# Patient Record
Sex: FEMALE | Race: BLACK OR AFRICAN AMERICAN | NOT HISPANIC OR LATINO | Employment: OTHER | URBAN - METROPOLITAN AREA
[De-identification: names, ages, dates, MRNs, and addresses within clinical notes are randomized per-mention and may not be internally consistent; named-entity substitution may affect disease eponyms.]

---

## 2017-03-17 ENCOUNTER — TRANSCRIBE ORDERS (OUTPATIENT)
Dept: ADMINISTRATIVE | Facility: HOSPITAL | Age: 77
End: 2017-03-17

## 2017-03-17 DIAGNOSIS — Z12.31 OTHER SCREENING MAMMOGRAM: Primary | ICD-10-CM

## 2017-03-22 ENCOUNTER — HOSPITAL ENCOUNTER (OUTPATIENT)
Dept: MAMMOGRAPHY | Facility: HOSPITAL | Age: 77
Discharge: HOME | End: 2017-03-22
Payer: MEDICARE

## 2017-03-22 DIAGNOSIS — Z12.31 OTHER SCREENING MAMMOGRAM: ICD-10-CM

## 2017-03-22 PROCEDURE — G0202 SCR MAMMO BI INCL CAD: HCPCS

## 2018-04-16 LAB
ABSOL LYMPHOCYTES (HISTORICAL): 2.5 K/UL (ref 0.5–4)
ALBUMIN SERPL BCP-MCNC: 4.2 G/DL (ref 3–5.2)
ALP SERPL-CCNC: 80 U/L (ref 43–122)
ALT SERPL W P-5'-P-CCNC: 20 U/L (ref 9–52)
ANION GAP SERPL CALCULATED.3IONS-SCNC: 13 MMOL/L (ref 5–14)
AST SERPL W P-5'-P-CCNC: 18 U/L (ref 14–36)
BASOPHILS # BLD AUTO: 0.1 K/UL (ref 0–0.1)
BASOPHILS # BLD AUTO: 1 % (ref 0–1)
BILIRUB SERPL-MCNC: 1.3 MG/DL
BUN SERPL-MCNC: 19 MG/DL (ref 5–25)
CALCIUM SERPL-MCNC: 10 MG/DL (ref 8.4–10.2)
CHLORIDE SERPL-SCNC: 103 MEQ/L (ref 97–108)
CHOLEST SERPL-MCNC: 163 MG/DL
CHOLEST/HDLC SERPL: 2.6 {RATIO}
CO2 SERPL-SCNC: 28 MMOL/L (ref 22–30)
COMMENT (HISTORICAL): NORMAL
CREATINE, SERUM (HISTORICAL): 0.8 MG/DL (ref 0.6–1.2)
DEPRECATED RDW RBC AUTO: 14 %
EGFR (HISTORICAL): >60 ML/MIN/1.73 M2
EOSINOPHIL # BLD AUTO: 0 K/UL (ref 0–0.4)
EOSINOPHIL NFR BLD AUTO: 0 % (ref 0–6)
EST. AVERAGE GLUCOSE BLD GHB EST-MCNC: 134 MG/DL
FOLATE SERPL-MCNC: 10.4 NG/ML
GLUCOSE SERPL-MCNC: 109 MG/DL (ref 70–99)
HBA1C MFR BLD HPLC: 6.3 %
HCT VFR BLD AUTO: 44.8 % (ref 36–46)
HDLC SERPL-MCNC: 62 MG/DL
HGB BLD-MCNC: 15.1 G/DL (ref 12–16)
LDL/HDL RATIO (HISTORICAL): 1.3
LDLC SERPL CALC-MCNC: 78 MG/DL
LYMPHOCYTES NFR BLD AUTO: 42 % (ref 25–45)
MCH RBC QN AUTO: 31.2 PG (ref 26–34)
MCHC RBC AUTO-ENTMCNC: 33.8 % (ref 31–36)
MCV RBC AUTO: 92 FL (ref 80–100)
MONOCYTES # BLD AUTO: 0.5 K/UL (ref 0.2–0.9)
MONOCYTES NFR BLD AUTO: 9 % (ref 1–10)
NEUTROPHILS ABS COUNT (HISTORICAL): 2.9 K/UL (ref 1.8–7.8)
NEUTS SEG NFR BLD AUTO: 48 % (ref 45–65)
PLATELET # BLD AUTO: 210 K/MCL (ref 150–450)
POTASSIUM SERPL-SCNC: 4 MEQ/L (ref 3.6–5)
RBC # BLD AUTO: 4.85 M/MCL (ref 4–5.2)
RBC MORPHOLOGY (HISTORICAL): NORMAL
SODIUM SERPL-SCNC: 143 MEQ/L (ref 137–147)
TOTAL PROTEIN (HISTORICAL): 7.5 G/DL (ref 5.9–8.4)
TRIGL SERPL-MCNC: 117 MG/DL
TSH SERPL DL<=0.05 MIU/L-ACNC: 0.59 UIU/ML (ref 0.47–4.68)
VIT B12 SERPL-MCNC: 647 PG/ML (ref 239–931)
VITAMIN D25-HYDROXY (HISTORICAL): 23.3 NG/ML (ref 30–100)
VLDLC SERPL CALC-MCNC: 23 MG/DL (ref 0–40)
WBC # BLD AUTO: 6 K/MCL (ref 4.5–11)

## 2018-04-17 ENCOUNTER — TRANSCRIBE ORDERS (OUTPATIENT)
Dept: ADMINISTRATIVE | Facility: HOSPITAL | Age: 78
End: 2018-04-17

## 2018-04-17 DIAGNOSIS — Z12.31 ENCOUNTER FOR SCREENING MAMMOGRAM FOR MALIGNANT NEOPLASM OF BREAST: Primary | ICD-10-CM

## 2018-04-18 LAB — B BURGDORFERI AB,IGM/WB (HISTORICAL): 0.45

## 2018-04-30 ENCOUNTER — HOSPITAL ENCOUNTER (OUTPATIENT)
Dept: MAMMOGRAPHY | Facility: HOSPITAL | Age: 78
Discharge: HOME | End: 2018-04-30
Payer: MEDICARE

## 2018-04-30 DIAGNOSIS — Z12.31 ENCOUNTER FOR SCREENING MAMMOGRAM FOR MALIGNANT NEOPLASM OF BREAST: ICD-10-CM

## 2018-04-30 PROCEDURE — 77067 SCR MAMMO BI INCL CAD: CPT

## 2018-05-01 ENCOUNTER — TELEPHONE (OUTPATIENT)
Dept: MAMMOGRAPHY | Facility: CLINIC | Age: 78
End: 2018-05-01

## 2018-05-03 ENCOUNTER — TRANSCRIBE ORDERS (OUTPATIENT)
Dept: ADMINISTRATIVE | Facility: HOSPITAL | Age: 78
End: 2018-05-03

## 2018-05-03 DIAGNOSIS — R92.8 ABNORMAL MAMMOGRAM: Primary | ICD-10-CM

## 2018-05-04 ENCOUNTER — HOSPITAL ENCOUNTER (OUTPATIENT)
Dept: ULTRASOUND IMAGING | Facility: CLINIC | Age: 78
Discharge: HOME | End: 2018-05-04
Payer: MEDICARE

## 2018-05-04 ENCOUNTER — HOSPITAL ENCOUNTER (OUTPATIENT)
Dept: MAMMOGRAPHY | Facility: CLINIC | Age: 78
Discharge: HOME | End: 2018-05-04
Payer: MEDICARE

## 2018-05-04 DIAGNOSIS — R92.8 ABNORMAL MAMMOGRAM: ICD-10-CM

## 2018-05-04 DIAGNOSIS — R92.0 MAMMOGRAPHIC MICROCALCIFICATION: ICD-10-CM

## 2018-05-04 PROCEDURE — 76642 ULTRASOUND BREAST LIMITED: CPT

## 2018-05-04 PROCEDURE — 77065 DX MAMMO INCL CAD UNI: CPT

## 2018-05-04 RX ORDER — VALSARTAN AND HYDROCHLOROTHIAZIDE 80; 12.5 MG/1; MG/1
TABLET, FILM COATED ORAL
COMMUNITY
End: 2018-07-24

## 2018-05-04 RX ORDER — VALSARTAN AND HYDROCHLOROTHIAZIDE 80; 12.5 MG/1; MG/1
TABLET, FILM COATED ORAL EVERY 24 HOURS
COMMUNITY
Start: 2017-08-01 | End: 2019-01-07 | Stop reason: SDUPTHER

## 2018-05-07 RX ORDER — ASPIRIN 81 MG/1
81 TABLET ORAL DAILY
COMMUNITY
End: 2020-03-10 | Stop reason: ALTCHOICE

## 2018-05-07 RX ORDER — AMLODIPINE BESYLATE 5 MG/1
5 TABLET ORAL DAILY
COMMUNITY
End: 2019-01-07 | Stop reason: SDUPTHER

## 2018-05-09 ENCOUNTER — HOSPITAL ENCOUNTER (OUTPATIENT)
Dept: ULTRASOUND IMAGING | Facility: CLINIC | Age: 78
Discharge: HOME | End: 2018-05-09
Payer: MEDICARE

## 2018-05-09 ENCOUNTER — HOSPITAL ENCOUNTER (OUTPATIENT)
Dept: ULTRASOUND IMAGING | Facility: CLINIC | Age: 78
Discharge: HOME | End: 2018-05-09
Admitting: RADIOLOGY
Payer: MEDICARE

## 2018-05-09 ENCOUNTER — HOSPITAL ENCOUNTER (OUTPATIENT)
Dept: MAMMOGRAPHY | Facility: CLINIC | Age: 78
Discharge: HOME | End: 2018-05-09
Payer: MEDICARE

## 2018-05-09 ENCOUNTER — HOSPITAL ENCOUNTER (OUTPATIENT)
Dept: MAMMOGRAPHY | Facility: CLINIC | Age: 78
Discharge: HOME | End: 2018-05-09

## 2018-05-09 VITALS — DIASTOLIC BLOOD PRESSURE: 78 MMHG | SYSTOLIC BLOOD PRESSURE: 130 MMHG | HEART RATE: 72 BPM

## 2018-05-09 DIAGNOSIS — R92.0 MAMMOGRAPHIC MICROCALCIFICATION: ICD-10-CM

## 2018-05-09 DIAGNOSIS — R92.8 ABNORMAL FINDINGS ON DIAGNOSTIC IMAGING OF BREAST: ICD-10-CM

## 2018-05-09 DIAGNOSIS — R92.8 OTHER ABNORMAL AND INCONCLUSIVE FINDINGS ON DIAGNOSTIC IMAGING OF BREAST: ICD-10-CM

## 2018-05-09 PROCEDURE — 19081 BX BREAST 1ST LESION STRTCTC: CPT

## 2018-05-09 PROCEDURE — 88342 IMHCHEM/IMCYTCHM 1ST ANTB: CPT | Performed by: PATHOLOGY

## 2018-05-09 PROCEDURE — 88341 IMHCHEM/IMCYTCHM EA ADD ANTB: CPT | Performed by: PATHOLOGY

## 2018-05-09 PROCEDURE — 19083 BX BREAST 1ST LESION US IMAG: CPT

## 2018-05-09 PROCEDURE — 88305 TISSUE EXAM BY PATHOLOGIST: CPT | Performed by: PATHOLOGY

## 2018-05-09 PROCEDURE — 88361 TUMOR IMMUNOHISTOCHEM/COMPUT: CPT | Performed by: PATHOLOGY

## 2018-05-09 RX ORDER — LIDOCAINE HYDROCHLORIDE AND EPINEPHRINE BITARTRATE 20; .01 MG/ML; MG/ML
10 INJECTION, SOLUTION SUBCUTANEOUS ONCE
Status: COMPLETED | OUTPATIENT
Start: 2018-05-09 | End: 2018-05-09

## 2018-05-09 RX ORDER — LIDOCAINE HYDROCHLORIDE 10 MG/ML
5 INJECTION, SOLUTION INFILTRATION; PERINEURAL ONCE
Status: COMPLETED | OUTPATIENT
Start: 2018-05-09 | End: 2018-05-09

## 2018-05-09 RX ORDER — LIDOCAINE HYDROCHLORIDE 10 MG/ML
5 INJECTION, SOLUTION INFILTRATION; PERINEURAL ONCE
Status: DISCONTINUED | OUTPATIENT
Start: 2018-05-09 | End: 2018-05-10 | Stop reason: HOSPADM

## 2018-05-09 RX ADMIN — LIDOCAINE HYDROCHLORIDE AND EPINEPHRINE 10 ML: 20; 10 INJECTION, SOLUTION INFILTRATION; PERINEURAL at 11:32

## 2018-05-09 RX ADMIN — LIDOCAINE HYDROCHLORIDE 5 ML: 10 INJECTION, SOLUTION INFILTRATION; PERINEURAL at 10:34

## 2018-05-09 RX ADMIN — LIDOCAINE HYDROCHLORIDE 5 ML: 10 INJECTION, SOLUTION INFILTRATION; PERINEURAL at 11:32

## 2018-05-09 NOTE — PROGRESS NOTES
Ice pack given:    ___X__yes _____no    Discharge instructions signed by patient:    __X___yes _____no    Discharge instructions given to patient:    __X___yes _____no    Discharged via:    __X___amulatory    _____wheelchair    _____stretcher    Stable on discharge:    __X___yes ____no

## 2018-05-09 NOTE — PROGRESS NOTES
Patient arrived via:    ___X__ambulatory    _____wheelchair    _____stretcher      Domestic violence screen    ___X___negative______positive    Breast Implants:    _______yes _____X___no

## 2018-05-09 NOTE — PROGRESS NOTES
Procedure type:    _____ultrasound guided __x___stereotactic    Breast:    _____Left ___x__Right    Location:    Needle: 8ga Revolve    # of passes: 10    Clip: Tripple twist    Performed by: Dr Matias Park held for 5 minutes by:  Francisca GreenPCA)    Steri Strips:    __X___yes _____no    Band aid:  __X___yes_____no    Tape and guaze:    ___X__yes _____no    Tolerated procedure:    ___X__yes _____no

## 2018-05-09 NOTE — PROGRESS NOTES
Procedure type:    ___X__ultrasound guided _____stereotactic    Breast:    _____Left ___X__Right    Location: 100 5cmfn    Needle: 12ga Joaquin    # of passes: 2    Clip: Wing Ultra clip    Performed by: Dr Poppy Isbell held for 5 minutes by:  Balwinder GreenPCA)    Steri Strips:    ___X__yes _____no    Band aid:  ___X__yes_____no    Tape and guaze:    ___X__yes _____no    Tolerated procedure:    ___X__yes _____no

## 2018-05-10 NOTE — PROGRESS NOTES
Spoke with pt on 05/10/18 at 11:07     Post procedure call completed    Bleeding: _____yes __x___no    Pain: _____yes __x____no    Redness/Swelling: ______yes ___x___no    Band aid removed: ___x__yes _____no    Steri-Strips intact: ___x___yes _____no

## 2018-05-15 ENCOUNTER — TELEPHONE (OUTPATIENT)
Dept: MAMMOGRAPHY | Facility: CLINIC | Age: 78
End: 2018-05-15

## 2018-05-31 LAB
ABSOL LYMPHOCYTES (HISTORICAL): 2.4 K/UL (ref 0.5–4)
ALBUMIN SERPL BCP-MCNC: 4.1 G/DL (ref 3–5.2)
ALP SERPL-CCNC: 77 U/L (ref 43–122)
ALT SERPL W P-5'-P-CCNC: 21 U/L (ref 9–52)
ANION GAP SERPL CALCULATED.3IONS-SCNC: 11 MMOL/L (ref 5–14)
AST SERPL W P-5'-P-CCNC: 15 U/L (ref 14–36)
BASOPHILS # BLD AUTO: 0 % (ref 0–1)
BASOPHILS # BLD AUTO: 0 K/UL (ref 0–0.1)
BILIRUB SERPL-MCNC: 1.9 MG/DL
BUN SERPL-MCNC: 18 MG/DL (ref 5–25)
CALCIUM SERPL-MCNC: 9.6 MG/DL (ref 8.4–10.2)
CHLORIDE SERPL-SCNC: 101 MEQ/L (ref 97–108)
CO2 SERPL-SCNC: 29 MMOL/L (ref 22–30)
CREATINE, SERUM (HISTORICAL): 0.83 MG/DL (ref 0.6–1.2)
DEPRECATED RDW RBC AUTO: 13.6 %
EGFR (HISTORICAL): >60 ML/MIN/1.73 M2
EOSINOPHIL # BLD AUTO: 0 K/UL (ref 0–0.4)
EOSINOPHIL NFR BLD AUTO: 0 % (ref 0–6)
GLUCOSE SERPL-MCNC: 86 MG/DL (ref 70–99)
HCT VFR BLD AUTO: 43.4 % (ref 36–46)
HGB BLD-MCNC: 14.6 G/DL (ref 12–16)
LYMPHOCYTES NFR BLD AUTO: 43 % (ref 25–45)
MCH RBC QN AUTO: 32 PG (ref 26–34)
MCHC RBC AUTO-ENTMCNC: 33.7 % (ref 31–36)
MCV RBC AUTO: 95 FL (ref 80–100)
MONOCYTES # BLD AUTO: 0.5 K/UL (ref 0.2–0.9)
MONOCYTES NFR BLD AUTO: 9 % (ref 1–10)
NEUTROPHILS ABS COUNT (HISTORICAL): 2.6 K/UL (ref 1.8–7.8)
NEUTS SEG NFR BLD AUTO: 48 % (ref 45–65)
PLATELET # BLD AUTO: 198 K/MCL (ref 150–450)
POTASSIUM SERPL-SCNC: 4 MEQ/L (ref 3.6–5)
RBC # BLD AUTO: 4.56 M/MCL (ref 4–5.2)
SODIUM SERPL-SCNC: 141 MEQ/L (ref 137–147)
TOTAL PROTEIN (HISTORICAL): 7.5 G/DL (ref 5.9–8.4)
WBC # BLD AUTO: 5.5 K/MCL (ref 4.5–11)

## 2018-06-21 ENCOUNTER — CONVERSION ENCOUNTER (OUTPATIENT)
Dept: MAMMOGRAPHY | Facility: CLINIC | Age: 78
End: 2018-06-21

## 2018-07-19 PROBLEM — M17.10 OSTEOARTHRITIS OF KNEE: Status: ACTIVE | Noted: 2018-07-19

## 2018-07-19 PROBLEM — S06.5X9A SDH (SUBDURAL HEMATOMA) (HCC): Status: ACTIVE | Noted: 2017-01-31

## 2018-07-19 PROBLEM — Z91.19 NONCOMPLIANCE WITH TREATMENT: Status: ACTIVE | Noted: 2018-01-15

## 2018-07-19 PROBLEM — E04.1 THYROID NODULE: Status: ACTIVE | Noted: 2017-04-11

## 2018-07-19 PROBLEM — S22.49XA FRACTURE OF MULTIPLE RIBS: Status: ACTIVE | Noted: 2017-02-13

## 2018-07-19 PROBLEM — S06.5XAA SDH (SUBDURAL HEMATOMA): Status: ACTIVE | Noted: 2017-01-31

## 2018-07-19 PROBLEM — Z79.01 LONG TERM CURRENT USE OF ANTICOAGULANT THERAPY: Status: ACTIVE | Noted: 2018-04-23

## 2018-07-19 PROBLEM — M32.9 LUPUS (HCC): Status: ACTIVE | Noted: 2018-07-19

## 2018-07-19 PROBLEM — E55.9 VITAMIN D DEFICIENCY: Status: ACTIVE | Noted: 2017-01-27

## 2018-07-19 PROBLEM — R42 VERTIGO: Status: ACTIVE | Noted: 2018-07-19

## 2018-07-19 PROBLEM — M17.9 OSTEOARTHRITIS OF KNEE: Status: ACTIVE | Noted: 2018-07-19

## 2018-07-19 PROBLEM — I25.10 CHRONIC CORONARY ARTERY DISEASE: Status: ACTIVE | Noted: 2018-07-19

## 2018-07-19 PROBLEM — I48.0 PAROXYSMAL ATRIAL FIBRILLATION (HCC): Status: ACTIVE | Noted: 2017-10-10

## 2018-07-19 PROBLEM — Z91.199 NONCOMPLIANCE WITH TREATMENT: Status: ACTIVE | Noted: 2018-01-15

## 2018-07-19 PROBLEM — M19.041 OSTEOARTHRITIS OF FINGER OF RIGHT HAND: Status: ACTIVE | Noted: 2018-01-15

## 2018-07-19 PROBLEM — IMO0002 LUPUS: Status: ACTIVE | Noted: 2018-07-19

## 2018-07-19 PROBLEM — R92.8 ABNORMAL MAMMOGRAM: Status: ACTIVE | Noted: 2018-05-08

## 2018-07-19 PROBLEM — S02.401D: Status: ACTIVE | Noted: 2017-01-31

## 2018-07-19 PROBLEM — D05.11 INTRADUCTAL CARCINOMA IN SITU OF RIGHT BREAST: Status: ACTIVE | Noted: 2018-06-01

## 2018-07-19 PROBLEM — M48.02 CERVICAL SPINAL STENOSIS: Status: ACTIVE | Noted: 2017-07-19

## 2018-07-19 PROBLEM — E66.9 OBESITY: Status: ACTIVE | Noted: 2018-07-19

## 2018-07-19 PROBLEM — R51.9 HEADACHE: Status: ACTIVE | Noted: 2018-07-19

## 2018-07-24 ENCOUNTER — CLINICAL SUPPORT (OUTPATIENT)
Dept: RADIATION ONCOLOGY | Facility: CLINIC | Age: 78
End: 2018-07-24
Payer: MEDICARE

## 2018-07-24 ENCOUNTER — RADIATION ONCOLOGY CONSULT (OUTPATIENT)
Dept: RADIATION ONCOLOGY | Facility: CLINIC | Age: 78
End: 2018-07-24
Attending: RADIOLOGY
Payer: MEDICARE

## 2018-07-24 VITALS
HEIGHT: 63 IN | OXYGEN SATURATION: 95 % | SYSTOLIC BLOOD PRESSURE: 162 MMHG | WEIGHT: 165.4 LBS | RESPIRATION RATE: 2 BRPM | BODY MASS INDEX: 29.3 KG/M2 | TEMPERATURE: 96.4 F | HEART RATE: 60 BPM | DIASTOLIC BLOOD PRESSURE: 80 MMHG

## 2018-07-24 DIAGNOSIS — D05.11 INTRADUCTAL CARCINOMA IN SITU OF RIGHT BREAST: Primary | ICD-10-CM

## 2018-07-24 PROCEDURE — 99215 OFFICE O/P EST HI 40 MIN: CPT | Performed by: RADIOLOGY

## 2018-07-24 RX ORDER — CEFADROXIL 500 MG/1
500 CAPSULE ORAL EVERY 12 HOURS SCHEDULED
COMMUNITY
End: 2018-10-24

## 2018-07-24 NOTE — PROGRESS NOTES
Consultation - Radiation Oncology     [facility]:[id number] : 1940  Encounter: [id number]  Patient Information: Mendel Jourdain      CHIEF COMPLAINT  Chief Complaint   Patient presents with    Consult     radiation oncology     Cancer Staging  Intraductal carcinoma in situ of right breast  Staging form: Breast, AJCC 8th Edition  - Clinical: Stage 0 (cTis (DCIS), cN0, cM0, ER: Positive, NC: Positive) - Signed by Claudeen Bruce, MD on 2018           History of Present Illness   Mendel Jourdain is a 69y o  year old female who presented with abnormal screening mammogram   She has had a previous benign left breast biopsy on 4/3/13  Cyst aspiration left breast on 4/8/10      4/30/18 Screening bilateral mammogram:  Cluster of indeterminate cacilfications in the right retroareolar inner, slightly superior breast   Additional imaging for right breast  Recommended  Stable left breast      18 Diagnostic right breast mammogram and US:  Report lacks description of any significant findings  ACR BI-RADS® Assessments: BiRad:4 - Suspicious (Overall)     18 Right breast stereotactic biopsy  DCIS, grade 2/3  ER/NC positive     18 Dr Betsy Lovell consult  Recommended right partial mastectomy and her daughter to undergo genetic testing  Daughter with history of breast cancer in her 35s     18 Right breast partial mastectomy  Pathology revealed DCIS with calcifications present, grade 2-3/3, closest margin is inferior margin with 0 6mm of clearance     7/10/18 Reexcision of margin  Pathology residual DCIS with 2mm margin     18 Dr Betsy Lovell post-op [facility] oncology referral to discuss antiestrogen therapy and radiation oncology referral for evaluation of partial breast radiation      18 developed right breast redness, pain and had some drainage from incisional area  She called Dr Betsy Lovell on  to report symptoms  No drainage noted , but pain and redness persisted   She was started on Cefadroxil 500 mg BID x 10 days  She was seen by Dr Maulik Shay yesterday - continue antibiotics  Prescription given for Z-christy to begin the day prior to partial breast catheter placement  No drainage today, still with redness  She has some right breast soreness and occasional sharp shooting pains through breast    No fever/chills       Partial breast catheter insertion scheduled for 18 Dr Trice Jackson consult              Historical Information      Intraductal carcinoma in situ of right breast    2018 Biopsy     Right breast stereotactic biopsy x2 sites:   Ductal carcinoma in-situ (DCIS), features suggestive of involvement of underlying intraductal papilloma  Nuclear Grade: II-III (intermediate to high)  % positive  IN 70-75% positive           2018 Initial Diagnosis     Intraductal carcinoma in situ of right breast       2018 Surgery     Right-sided needle localized partial mastectomy with intraoperative ultrasound guidance:  DCIS, grade 2-3/3, calcifications present  Margins - free of malignancy, closest margin is inferior with 0 6 mm of clearance  Posterior margin with 2 1 mm of clearance  Stage 0, pTis (DCIS),pN0,pMX  - Dr Delaney Fried         7/10/2018 Surgery     Re-excision of right breast inferior margin tissue:  - Foci of ductal epithelial hyperplasia are seen ranging from usual ductal hyperplasia to atypical ductal hyperplasia to a few foci having features which are felt to be compatible with ductal carcinoma in situ, primarily solid type, nuclear grade 1-2/3   - None of the foci of atypia or probable ductal carcinoma in situ are seen at or within 2 mm the outer margins of excision, though it is noted that such foci are seen in 3 of 5 slides  - One intraductal papilloma is identified   - Fat necrosis is seen along the inner surface of this reexcision                Past Medical History:   Diagnosis Date    Allergic rhinitis     Arthritis     Atrial fibrillation ([facility] [address] [facility]     [facility]     Colitis     Coronary artery disease     Diabetes mellitus ([facility] )     DJD (degenerative joint disease), cervical     Fibromyalgia     Glaucoma     Hypertension     Osteoarthritis     Sleep apnea     Vertigo      Past Surgical History:   Procedure Laterality Date    BREAST BIOPSY      BREAST LUMPECTOMY      CHOLECYSTECTOMY      COLONOSCOPY  2013    HYSTERECTOMY      TONSILLECTOMY         Family History   Problem Relation Age of Onset    Heart attack Sister     Hypertension Family     Diabetes Family     Breast cancer Daughter 28       Social History   History   Alcohol Use    Yes     Comment: occasional     History   Drug Use No     History   Smoking Status    Never Smoker   Smokeless Tobacco    Never Used     Comment: no smoke exposure         Meds/Allergies     Current Outpatient Prescriptions:     amLODIPine (NORVASC) 5 mg tablet, Take 5 mg by mouth daily, Disp: , Rfl:     apixaban (ELIQUIS) 5 mg, Every 12 hours, Disp: , Rfl:     aspirin (ECOTRIN LOW STRENGTH) 81 mg EC tablet, Take 81 mg by mouth daily, Disp: , Rfl:     cefadroxil (DURICEF) 500 mg capsule, Take 500 mg by mouth every 12 (twelve) hours, Disp: , Rfl:     metFORMIN (GLUCOPHAGE) 500 mg tablet, metformin 500 mg tablet, Disp: , Rfl:     metoprolol tartrate (LOPRESSOR) 25 mg tablet, Take 25 mg by mouth every 12 (twelve) hours, Disp: , Rfl:     valsartan-hydrochlorothiazide (DIOVAN HCT) 80-12 5 MG per tablet, every 24 hours, Disp: , Rfl:   No Known Allergies      Review of Systems  Constitutional: Positive for fatigue  HENT: Negative  Eyes: Positive for visual disturbance (some blurred vision due to cataracts)  Respiratory: Negative  Cardiovascular: Negative  Gastrointestinal: Positive for nausea (occasional)  Bowels vary from constipation to loose stools   Endocrine: Negative  Genitourinary: Negative      Musculoskeletal: Positive for arthralgias (knees (L>R), lower back) and back pain  Skin: Positive for color change (darkening, dryness of skin at right upper outer breast region)  Allergic/Immunologic: Negative  Neurological: Positive for dizziness (sometimes) and headaches (frequency)  Hematological: Negative  Psychiatric/Behavioral: Negative          Screening  Tobacco  Current tobacco user: no  If yes, brief counseling provided: NA     Hypertension  Hypertension screening performed: yes  Normotensive:  no  If no, referred to PCP: yes     Depression Screening  Screened for depression using PHQ-2: yes     Screened for depression using PHQ-9:  no  Screening positive or negative:  negative  If score >4, was any of the following actions taken? Additional evaluation for depression, suicide risk assesment, referral to PCP or psychiatry, medication started:  n/a     Advanced Care Planning for Patients >65 years  Advanced Care Planning Discussed:  yes  Patient named surrogate decision maker or care plan in chart: no     OB/GYN History:  The patient underwent menarche at 16 years  Menopause Status - postmenopausal  No LMP recorded  Patient has had a hysterectomy  Menopause at unknown   Menopause Reason  Hormone replacement therapy: no   Years used n/a   4   Para 3 (1 miscarriage)  Age at first delivery being 14 years  Nursing: not applicable  Birth control pills: no   Years used n/a  Gyncological comment: partial hysterectomy at age 29       OBJECTIVE:   /80   Pulse 60   Temp (!) 96 4 °F (35 8 °C) (Tympanic)   Resp (!) 2   Ht 5' 2 5" (1 588 m)   Wt 75 kg (165 lb 6 4 oz)   SpO2 95%   BMI 29 77 kg/m²   Pain Assessment:  0  Performance Status: ECOG/Zubrod/WHO: 0 - Asymptomatic    Physical Exam   Constitutional: She appears well-developed  HENT:   Head: Normocephalic  Hair is normal    Mouth/Throat: Oropharynx is clear and moist    Eyes: EOM are normal  No scleral icterus  Neck: Neck supple  No spinous process tenderness present   No edema present  Cardiovascular: Normal rate and regular rhythm  Pulmonary/Chest: Effort normal and breath sounds normal  No respiratory distress  She has no wheezes  She exhibits no tenderness  There is no supraclavicular axillary adenopathy palpable  Minimal edema on the right breast   She has a patch of dry flaky skin in the right upper outer quadrant in the region of her incision  No drainage noted  No surrounding erythema  No suspicious lesions palpable in either breast   Abdominal: Soft  Bowel sounds are normal  She exhibits no distension, no ascites and no mass  There is no hepatosplenomegaly  There is no tenderness  There is no rebound  Genitourinary: No breast swelling or tenderness  Musculoskeletal: Normal range of motion  She exhibits no edema or tenderness  Lymphadenopathy:     She has no cervical adenopathy  She has no axillary adenopathy  Neurological: She is alert  She has normal strength  No cranial nerve deficit  Coordination and gait normal    Skin: Skin is intact  Psychiatric: She has a normal mood and affect  Her speech is normal and behavior is normal          RESULTS  Lab Results    Chemistry        Component Value Date/Time     05/31/2018 1338    K 4 0 05/31/2018 1338     05/31/2018 1338    CO2 29 05/31/2018 1338    BUN 18 05/31/2018 1338    CREATININE 0 90 06/29/2016 0733    CREATININE 0 87 05/13/2015 0732        Component Value Date/Time    CALCIUM 9 6 05/31/2018 1338    ALKPHOS 77 05/31/2018 1338    AST 15 05/31/2018 1338    ALT 21 05/31/2018 1338    BILITOT 1 9 (H) 05/31/2018 1338            Lab Results   Component Value Date    WBC 5 5 05/31/2018    HGB 14 6 05/31/2018    HCT 43 4 05/31/2018    MCV 95 05/31/2018     05/31/2018         Imaging Studies  No results found  Pathology:    Breast, Right, Stereotactic biopsy, 2 cores with calcifications:  - Ductal carcinoma in-situ (DCIS)  -- Architectural Patterns: Cribriform and solid  Cancerization of lobules  -- Features suggestive of involvement of underlying intraductal papilloma  -- Nuclear Grade: II-III (intermediate to high)  -- Necrosis: Present, focal and central "comedo" necrosis identified  -- Confirmed by positive e-cadherin, P120 (membranous) and myoepithelial immunostains p63 and        calponin-B     -- DCIS involves at least one submitted core biopsy and two core fragments, max  Dimension= 2 2 mm     -- Estrogen and Progesterone receptor studies pending, to be described in an addendum   - No invasive carcinoma identified  - Microcalcifications: Present, associated with DCIS and benign breast tissue  -- Calcifications are present & concordant with the radiologists description in terms of size & character        (i e  > 100 microns & clustered)  - Best representative tumor block: A2   - Sclerotic fibroepithelial nodule of uncertain significance, favor benign  -- Tissue loss on myoepithelial immunostains prevents further classification  -- Associated microcalcifications  - Benign proliferative and non-proliferative fibrocystic changes with atypia consisting of columnar cell     hyperplasia with flat epithelial atypia, apocrine adenosis and cystic and papillary apocrine metaplasia       B  Breast, Right, stereotactic biopsy, 3 cores without calcifications:  - Ductal carcinoma in-situ (DCIS), predominantly fragments associated with blood clot  -- Architectural Patterns: Cribriform and solid  -- Features consistent with involvement of underlying intraductal papilloma  -- Nuclear Grade: II-III (intermediate and high)  -- Necrosis: Present, central necrosis identified associated with detached calcification  -- Confirmed by positive myoepithelial immunostains p63, p40 ,calponin-B, SMHC        and absence of mosaic epithelial staining on CK 5/6 immunostain      -- DCIS involves one submitted core biopsy and multiple fragments within blood clot, max  dimension= 2 mm  - No invasive carcinoma identified  - Microcalcifications: Present, associated with DCIS and benign breast tissue     - Best representative tumor blocks: B1, B4   - Columnar cell hyperplasia      Varsha Moore MD          Received:            07/10/2018 2148               Specimen:    Breast, Right                                                                              Final Diagnosis   A  Right breast inferior margin tissue:  - Foci of ductal epithelial hyperplasia are seen ranging from usual ductal hyperplasia to atypical ductal hyperplasia to a few foci having features which are felt to be compatible with ductal carcinoma in situ, primarily solid type, nuclear grade 1-2/3   - None of the foci of atypia or probable ductal carcinoma in situ are seen at or within 2 mm the outer margins of excision, though it is noted that such foci are seen in 3 of 5 slides  - One intraductal papilloma is identified   - Fat necrosis is seen along the inner surface of this reexcision       Estrogen Receptor/ER               100%                 Positive  Primary Antibody: SP1  Internal control:positive              Staining Intensity:Strong  External control:positive             Shad Score*: 8     Progesterone Receptor/PgR       70-75%              Positive  Primary Antibody:1E2  Internal control:positive              Staining Intensity: Strong  External control: positive             Shad Score*: 7          ASSESSMENT  1   Intraductal carcinoma in situ of right breast  Radiation Simulation Treatment     Cancer Staging  Intraductal carcinoma in situ of right breast  Staging form: Breast, AJCC 8th Edition  - Clinical: Stage 0 (cTis (DCIS), cN0, cM0, ER: Positive, WA: Positive) - Signed by Lisy Aldana MD on 7/24/2018        PLAN/DISCUSSION  Orders Placed This Encounter   Procedures   Giana Grijalva Treatment          Soni Silverman is a 69y o  year old female with DCIS of the right breast status post breast conservation surgery  She is stage 0 (TIS Nx)  Her tumor is intermediate to high grade with negative margins  ER/NE positive  I reviewed with her that overall her prognosis is very good  She understands the role of radiation is to reduce her risk of local recurrence  We discussed radiation options with either whole breast standard fractionation or hypo fractionated course versus partial breast irradiation  We discussed the procedure involved with both modalities as well as areas irradiated and time frame for therapy  Jacek's  current suitable risk category for PBI include low risk DCIS which includes lesion less than 2 5 cm, low to intermediate grade  We discussed her tumor was intermediate to high grade  She was here today with her daughter as well as a phone conference with her other daughter during the consultation  She has been placed on antibiotics which has stopped her breast drainage  She currently has no signs of infection  She is scheduled to have a MOIRA catheter placed on 8/8/2018  We have scheduled her for CT simulation on 8/10/2018  She is aware that she would begin her radiation treatments on 08/13/18 on a twice a day schedule for 1 week  The possible side effects were reviewed with her  She has signed consent for partial breast irradiation  Yang Hi MD  3/73/1021,15:93 AM      Portions of the record may have been created with voice recognition software   Occasional wrong word or "sound a like" substitutions may have occurred due to the inherent limitations of voice recognition software   Read the chart carefully and recognize, using context, where substitutions have occurred

## 2018-07-24 NOTE — PROGRESS NOTES
Charlie Koehler  1940  Ms Carla Alfredo is a 69 y o  female    Chief Complaint   Patient presents with    Consult     radiation oncology       Cancer Staging  No matching staging information was found for the patient  69 y o  Female seen today for radiation oncology consult for recently diagnosed right breast cancer  Referred by Dr Yaneth Cummings for evaluation for partial breast radiation  Family history of breast cancer in daughter at age 28  Her genetic testing was negative for mutations  Hx of previous benign left breast biopsy on 4/3/13  Cyst aspiration left breast on 4/8/10     4/30/18 Screening bilateral mammogram:  Cluster of indeterminate cacilfications in the right retroareolar inner, slightly superior breast   Additional imaging for right breast  Recommended  Stable left breast     5/4/18 Diagnostic right breast mammogram and US:  Report lacks description of any significant findings  ACR BI-RADS® Assessments: BiRad:4 - Suspicious (Overall)    5/9/18 Right breast stereotactic biopsy  DCIS, 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    5/18/18 Dr Yaneth Cummings consult  Recommended right partial mastectomy and her daughter to undergo genetic testing     6/21/18 Right breast partial mastectomy  Pathology revealed DCIS with calcifications present, grade 2-3/3, closest margin is inferior margin with 0 6mm of clearance    7/10/18 Reexcision of margin  Pathology residual DCIS with 2mm margin    7/18/18 Dr Yaneth Cummings post-op OV  Recommending medical oncology referral to discuss antiestrogen therapy and radiation oncology referral for evaluation of partial breast radiation  7/21/18 developed right breast redness, pain and had some drainage from incisional area  She called Dr Yaneth Cummings on 7/22 to report symptoms  No drainage noted 7/22, but pain and redness persisted  She was started on Cefadroxil 500 mg BID x 10 days  She was seen by Dr Yaneth Cummings yesterday - continue antibiotics   Prescription given for Z-christy to begin the day prior to partial breast catheter placement  No drainage today, still with redness  She has some right breast soreness and occasional sharp shooting pains through breast    No fever/chills  Partial breast catheter insertion scheduled for 8/8/18 8/16/18 Dr Tereza Nassar consult         Intraductal carcinoma in situ of right breast    5/9/2018 Biopsy     Right breast stereotactic biopsy x2 sites:   Ductal carcinoma in-situ (DCIS), features suggestive of involvement of underlying intraductal papilloma  Nuclear Grade: II-III (intermediate to high)  % positive  CA 70-75% positive           6/1/2018 Initial Diagnosis     Intraductal carcinoma in situ of right breast       6/21/2018 Surgery     Right-sided needle localized partial mastectomy with intraoperative ultrasound guidance:  DCIS, grade 2-3/3, calcifications present  Margins - free of malignancy, closest margin is inferior with 0 6 mm of clearance  Posterior margin with 2 1 mm of clearance  Stage 0, pTis (DCIS),pN0,pMX  - Dr Veronica Hall         7/10/2018 Surgery     Re-excision of right breast inferior margin tissue:  - Foci of ductal epithelial hyperplasia are seen ranging from usual ductal hyperplasia to atypical ductal hyperplasia to a few foci having features which are felt to be compatible with ductal carcinoma in situ, primarily solid type, nuclear grade 1-2/3   - None of the foci of atypia or probable ductal carcinoma in situ are seen at or within 2 mm the outer margins of excision, though it is noted that such foci are seen in 3 of 5 slides  - One intraductal papilloma is identified   - Fat necrosis is seen along the inner surface of this reexcision              Clinical Trial: no    Screening  Tobacco  Current tobacco user: no  If yes, brief counseling provided: NA    Hypertension  Hypertension screening performed: yes  Normotensive:  no  If no, referred to PCP: yes    Depression Screening  Screened for depression using PHQ-2: yes    Screened for depression using PHQ-9:  no  Screening positive or negative:  negative  If score >4, was any of the following actions taken? Additional evaluation for depression, suicide risk assesment, referral to PCP or psychiatry, medication started:  n/a    Advanced Care Planning for Patients >65 years  Advanced Care Planning Discussed:  yes  Patient named surrogate decision maker or care plan in chart: no    OB/GYN History:  The patient underwent menarche at 16 years  Menopause Status - postmenopausal  No LMP recorded  Patient has had a hysterectomy  Menopause at unknown   Menopause Reason  Hormone replacement therapy: no   Years used n/a   4   Para 3 (1 miscarriage)  Age at first delivery being 14 years  Nursing: not applicable  Birth control pills: no   Years used n/a  Gyncological comment: partial hysterectomy at age 29        There are no preventive care reminders to display for this patient      Patient Active Problem List   Diagnosis    Abnormal mammogram    Paroxysmal atrial fibrillation (HCC)    Chronic coronary artery disease    Cervical spinal stenosis    Closed fracture of maxilla with routine healing    Conduction disorder of the heart    DDD (degenerative disc disease), cervical    Degeneration of intervertebral disc    Dizziness and giddiness    Headache    Hypokalemia    Fracture of multiple ribs    Impaired fasting glucose    Intraductal carcinoma in situ of right breast    Long term current use of anticoagulant therapy    Latent tuberculosis    Low back pain    Lupus    Noncompliance with treatment    Obesity    Osteoarthritis of finger of right hand    Osteoarthritis of knee    SDH (subdural hematoma) (HCC)    Type 2 diabetes mellitus ([address] )    Thyroid nodule    Vertigo    Vitamin D deficiency    Essential hypertension     Past Medical History:   Diagnosis Date    Allergic rhinitis     Arthritis     Atrial fibrillation ([address] )     [facility]     Colitis     Coronary artery disease     Diabetes mellitus ([facility] )     DJD (degenerative joint disease), cervical     Fibromyalgia     Glaucoma     Hypertension     Osteoarthritis     Sleep apnea     Vertigo      Past Surgical History:   Procedure Laterality Date    BREAST BIOPSY      BREAST LUMPECTOMY      CHOLECYSTECTOMY      COLONOSCOPY  2013    HYSTERECTOMY      TONSILLECTOMY       Family History   Problem Relation Age of Onset    Heart attack Sister     Hypertension Family     Diabetes Family     Breast cancer Daughter 28     Social History     Social History    Marital status: Single     Spouse name: N/A    Number of children: N/A    Years of education: N/A     Occupational History    Not on file  Social History Main Topics    Smoking status: Never Smoker    Smokeless tobacco: Never Used      Comment: no smoke exposure    Alcohol use Yes      Comment: occasional    Drug use: No    Sexual activity: Not on file     Other Topics Concern    Not on file     Social History Narrative    No narrative on file       Current Outpatient Prescriptions:     amLODIPine (NORVASC) 5 mg tablet, Take 5 mg by mouth daily, Disp: , Rfl:     apixaban (ELIQUIS) 5 mg, Every 12 hours, Disp: , Rfl:     aspirin (ECOTRIN LOW STRENGTH) 81 mg EC tablet, Take 81 mg by mouth daily, Disp: , Rfl:     cefadroxil (DURICEF) 500 mg capsule, Take 500 mg by mouth every 12 (twelve) hours, Disp: , Rfl:     metFORMIN (GLUCOPHAGE) 500 mg tablet, metformin 500 mg tablet, Disp: , Rfl:     metoprolol tartrate (LOPRESSOR) 25 mg tablet, Take 25 mg by mouth every 12 (twelve) hours, Disp: , Rfl:     valsartan-hydrochlorothiazide (DIOVAN HCT) 80-12 5 MG per tablet, every 24 hours, Disp: , Rfl:     No Known Allergies    Review of Systems:  Review of Systems   Constitutional: Positive for fatigue  HENT: Negative  Eyes: Positive for visual disturbance (some blurred vision due to cataracts)     Respiratory: Negative  Cardiovascular: Negative  Gastrointestinal: Positive for nausea (occasional)  Bowels vary from constipation to loose stools   Endocrine: Negative  Genitourinary: Negative  Musculoskeletal: Positive for arthralgias (knees (L>R), lower back) and back pain  Skin: Positive for color change (darkening, dryness of skin at right upper outer breast region)  Allergic/Immunologic: Negative  Neurological: Positive for dizziness (sometimes) and headaches (frequency)  Hematological: Negative  Psychiatric/Behavioral: Negative  Vitals:    07/24/18 0951   BP: 162/80   Pulse: 60   Resp: (!) 2   Temp: (!) 96 4 °F (35 8 °C)   TempSrc: Tympanic   SpO2: 95%   Weight: 75 kg (165 lb 6 4 oz)   Height: 5' 2 5" (1 588 m)            Imaging:No results found      Teaching:  NCI RT packet with RT to breast education given

## 2018-08-07 ENCOUNTER — APPOINTMENT (OUTPATIENT)
Dept: RADIATION ONCOLOGY | Facility: CLINIC | Age: 78
End: 2018-08-07
Attending: RADIOLOGY
Payer: MEDICARE

## 2018-08-07 PROCEDURE — 77290 THER RAD SIMULAJ FIELD CPLX: CPT | Performed by: RADIOLOGY

## 2018-08-07 PROCEDURE — 77332 RADIATION TREATMENT AID(S): CPT | Performed by: RADIOLOGY

## 2018-08-13 PROCEDURE — 77334 RADIATION TREATMENT AID(S): CPT | Performed by: RADIOLOGY

## 2018-08-13 PROCEDURE — 77300 RADIATION THERAPY DOSE PLAN: CPT | Performed by: RADIOLOGY

## 2018-08-13 PROCEDURE — 77295 3-D RADIOTHERAPY PLAN: CPT | Performed by: RADIOLOGY

## 2018-08-15 DIAGNOSIS — C50.111 MALIGNANT NEOPLASM OF CENTRAL PORTION OF RIGHT BREAST IN FEMALE, ESTROGEN RECEPTOR POSITIVE (HCC): Primary | ICD-10-CM

## 2018-08-15 DIAGNOSIS — Z17.0 MALIGNANT NEOPLASM OF CENTRAL PORTION OF RIGHT BREAST IN FEMALE, ESTROGEN RECEPTOR POSITIVE (HCC): Primary | ICD-10-CM

## 2018-08-15 PROCEDURE — 77280 THER RAD SIMULAJ FIELD SMPL: CPT | Performed by: RADIOLOGY

## 2018-08-16 ENCOUNTER — OFFICE VISIT (OUTPATIENT)
Dept: HEMATOLOGY ONCOLOGY | Facility: CLINIC | Age: 78
End: 2018-08-16
Payer: MEDICARE

## 2018-08-16 VITALS
BODY MASS INDEX: 28.7 KG/M2 | WEIGHT: 162 LBS | TEMPERATURE: 97.8 F | RESPIRATION RATE: 18 BRPM | HEART RATE: 66 BPM | DIASTOLIC BLOOD PRESSURE: 81 MMHG | HEIGHT: 63 IN | OXYGEN SATURATION: 98 % | SYSTOLIC BLOOD PRESSURE: 132 MMHG

## 2018-08-16 DIAGNOSIS — D05.11 INTRADUCTAL CARCINOMA IN SITU OF RIGHT BREAST: Primary | ICD-10-CM

## 2018-08-16 PROCEDURE — 77412 RADIATION TX DELIVERY LVL 3: CPT | Performed by: RADIOLOGY

## 2018-08-16 PROCEDURE — 99204 OFFICE O/P NEW MOD 45 MIN: CPT | Performed by: INTERNAL MEDICINE

## 2018-08-16 NOTE — LETTER
August 16, 2018     Lady Courtney MD  [address] [place] [address]    Patient: Mckinley Vu   YOB: 1940   Date of Visit: 8/16/2018       Dear Dr Kwasi Hatfield: Thank you for referring Mckinley Vu to me for evaluation  Below are my notes for this consultation  If you have questions, please do not hesitate to call me  I look forward to following your patient along with you  Sincerely,        Farnaz North MD        CC: MD Antolin Hughes MD Bertina Minks, MD  8/16/2018  8:31 AM  Sign at close encounter  Hematology / Oncology Outpatient Consult Note    Mckinley Vu 69 y o  female [id number]         Date:  8/16/2018    Assessment / Plan:  A 60-year-old postmenopausal woman with newly diagnosed ductal carcinoma in situ in the right breast, grade 2-3, % positive, AZ 70% positive  She underwent lumpectomy, resulting in PETE  She presents today with her daughter to discuss adjuvant treatment options  She is starting adjuvant radiation therapy which I agree  We had extensive discussion regarding the diagnosis, non invasive nature of disease, non life-threatening disease, very good prognosis, possible adjuvant hormonal therapy  Since her DCIS is ER positive, adjuvant hormonal therapy with aromatase inhibitor can be considered  However, adjuvant hormonal therapy does not provide survival benefit  Benefit of AI may include a few percentage decrease of local recurrence without affecting overall survival   Side effect of AI include hot flashes, musculoskeletal symptom as well as bone mineral density loss  Overall, risk benefit ratio is not favorable  Therefore, I recommended her to not to take adjuvant hormonal therapy which she and her daughter felt very comfortable  All the patient and her daughter's questions were answered to their satisfaction  She is going to be followed by Dr Kwasi Hatfield            Subjective: HPI:  A 81-year-old postmenopausal woman who was found to have radiographic abnormality in her right breast, based on a screening mammography  Therefore, she initially underwent stereotactic core biopsy from the right breast which showed ductal carcinoma in situ, grade 2-3  % positive, CA 70% positive  She subsequently underwent excisional biopsy by Dr Irene Peña which showed ductal carcinoma in situ, grade 2-3  Surgical margin was only 0 6 mm  Therefore, she underwent reexcision of inferior margin which showed residual ductal carcinoma in situ  Final surgical margin was more than 2 mm  She is going to start adjuvant radiation therapy, today  She presents today to discuss possible adjuvant hormonal therapy  She feels well  She has no complaint of pain  She has no fever, chills or night sweats  She has no recent weight loss  She denied any respiratory symptoms  She has a few past medical history, including hypertension, diabetes as well as atrial fibrillation  She takes apixaban for stroke prevention  She is a lifetime never smoker  Her performance status is normal         Interval History:          Objective:     Primary Diagnosis:    Ductal carcinoma in situ in the right breast, grade 2-3  % positive, CA 70% positive  Diagnosed in July 2018  Cancer Staging:  Cancer Staging  Intraductal carcinoma in situ of right breast  Staging form: Breast, AJCC 8th Edition  - Clinical: Stage 0 (cTis (DCIS), cN0, cM0, ER: Positive, CA: Positive) - Signed by Betty Calderon MD on 7/24/2018        Previous Hematologic/ Oncologic Treatment:         Current Hematologic/ Oncologic Treatment:      Adjuvant radiation therapy to be started, today  Disease Status:     PETE status post lumpectomy  Test Results:    Pathology:    Lumpectomy specimen showed ductal carcinoma in situ, grade 2-3  % positive, CA 70% positive  No evidence of invasive carcinoma    Final surgical margin was more than 2 mm     Radiology:        Laboratory:    See below  Physical Exam:      General Appearance:    Alert, oriented        Eyes:    PERRL   Ears:    Normal external ear canals, both ears   Nose:   Nares normal, septum midline   Throat:   Mucosa moist  Pharynx without injection  Neck:   Supple       Lungs:     Clear to auscultation bilaterally   Chest Wall:    No tenderness or deformity    Heart:    Regular rate and rhythm       Abdomen:     Soft, non-tender, bowel sounds +, no organomegaly           Extremities:   Extremities no cyanosis or edema       Skin:   no rash or icterus  Lymph nodes:   Cervical, supraclavicular, and axillary nodes normal   Neurologic:   CNII-XII intact, normal strength, sensation and reflexes     Throughout          Breast exam: Lumpectomy scar at 12:00 position in the right breast with no palpable abnormality  Left breast exam is negative  ROS: Review of Systems        Imaging: No results found  Labs:   Lab Results   Component Value Date    WBC 5 5 05/31/2018    HGB 14 6 05/31/2018    HCT 43 4 05/31/2018    MCV 95 05/31/2018     05/31/2018     Lab Results   Component Value Date     05/31/2018    K 4 0 05/31/2018     05/31/2018    CO2 29 05/31/2018    ANIONGAP 11 05/31/2018    BUN 18 05/31/2018    CREATININE 0 90 06/29/2016    GLUCOSE 86 05/31/2018    CALCIUM 9 6 05/31/2018    AST 15 05/31/2018    ALT 21 05/31/2018    ALKPHOS 77 05/31/2018    PROT 7 5 05/31/2018    BILITOT 1 9 (H) 05/31/2018    EGFR >60 0 06/29/2016         Lab Results   Component Value Date    GCEBCORG97 647 04/16/2018       Lab Results   Component Value Date    FOLATE 10 4 04/16/2018           Vital Sign:    Body surface area is 1 76 meters squared      Wt Readings from Last 3 Encounters:   08/16/18 73 5 kg (162 lb)   07/24/18 75 kg (165 lb 6 4 oz)        Temp Readings from Last 3 Encounters:   08/16/18 97 8 °F (36 6 °C) (Tympanic)   07/24/18 (!) 96 4 °F (35 8 °C) (Tympanic)        BP Readings from Last 3 Encounters:   08/16/18 132/81   07/24/18 162/80   05/09/18 130/78         Pulse Readings from Last 3 Encounters:   08/16/18 66   07/24/18 60   05/09/18 72     @LASTSAO2(3)@    Active Problems:   Patient Active Problem List   Diagnosis    Abnormal mammogram    Paroxysmal atrial fibrillation (HCC)    Chronic coronary artery disease    Cervical spinal stenosis    Closed fracture of maxilla with routine healing    Conduction disorder of the heart    DDD (degenerative disc disease), cervical    Degeneration of intervertebral disc    Dizziness and giddiness    Headache    Hypokalemia    Fracture of multiple ribs    Impaired fasting glucose    Intraductal carcinoma in situ of right breast    Long term current use of anticoagulant therapy    Latent tuberculosis    Low back pain    Lupus    Noncompliance with treatment    Obesity    Osteoarthritis of finger of right hand    Osteoarthritis of knee    SDH (subdural hematoma) (HCC)    Type 2 diabetes mellitus ([address] )    Thyroid nodule    Vertigo    Vitamin D deficiency    Essential hypertension       Past Medical History:   Past Medical History:   Diagnosis Date    Allergic rhinitis     Arthritis     Atrial fibrillation ([address]     Breast cancer ([address]     Colitis     Coronary artery disease     Diabetes mellitus ([address]     DJD (degenerative joint disease), cervical     Fibromyalgia     Glaucoma     Hypertension     Osteoarthritis     Sleep apnea     Vertigo        Surgical History:   Past Surgical History:   Procedure Laterality Date    BREAST BIOPSY      BREAST LUMPECTOMY      CHOLECYSTECTOMY      COLONOSCOPY  2013    HYSTERECTOMY      TONSILLECTOMY         Family History:    Family History   Problem Relation Age of Onset    Heart attack Sister     Hypertension Family     Diabetes Family     Breast cancer Daughter 28       Cancer-related family history includes Breast cancer (age of onset: 28) in her daughter  Social History:   Social History     Social History    Marital status: Single     Spouse name: N/A    Number of children: N/A    Years of education: N/A     Occupational History    Not on file  Social History Main Topics    Smoking status: Never Smoker    Smokeless tobacco: Never Used      Comment: no smoke exposure    Alcohol use Yes      Comment: occasional    Drug use: No    Sexual activity: Not on file     Other Topics Concern    Not on file     Social History Narrative    No narrative on file       Current Medications:   Current Outpatient Prescriptions   Medication Sig Dispense Refill    amLODIPine (NORVASC) 5 mg tablet Take 5 mg by mouth daily      apixaban (ELIQUIS) 5 mg Every 12 hours      aspirin (ECOTRIN LOW STRENGTH) 81 mg EC tablet Take 81 mg by mouth daily      cefadroxil (DURICEF) 500 mg capsule Take 500 mg by mouth every 12 (twelve) hours      metFORMIN (GLUCOPHAGE) 500 mg tablet metformin 500 mg tablet      metoprolol tartrate (LOPRESSOR) 25 mg tablet Take 25 mg by mouth every 12 (twelve) hours      valsartan-hydrochlorothiazide (DIOVAN HCT) 80-12 5 MG per tablet every 24 hours       No current facility-administered medications for this visit          Allergies: No Known Allergies

## 2018-08-16 NOTE — PROGRESS NOTES
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  She underwent lumpectomy, resulting in PETE  She presents today with her daughter to discuss adjuvant treatment options  She is starting adjuvant radiation therapy which I agree  We had extensive discussion regarding the diagnosis, non invasive nature of disease, non life-threatening disease, very good prognosis, possible adjuvant hormonal therapy  Since her DCIS is ER positive, adjuvant hormonal therapy with aromatase inhibitor can be considered  However, adjuvant hormonal therapy does not provide survival benefit  Benefit of AI may include a few percentage decrease of local recurrence without affecting overall survival   Side effect of AI include hot flashes, musculoskeletal symptom as well as bone mineral density loss  Overall, risk benefit ratio is not favorable  Therefore, I recommended her to not to take adjuvant hormonal therapy which she and her daughter felt very comfortable  All the patient and her daughter's questions were answered to their satisfaction  She is going to be followed by Dr Kwasi Hatfield  Subjective:     HPI:  A 52-year-old postmenopausal woman who was found to have radiographic abnormality in her right breast, based on a screening mammography  Therefore, she initially underwent stereotactic core biopsy from the right breast which showed ductal carcinoma in situ, grade 2-3  % positive, VT 70% positive  She subsequently underwent excisional biopsy by Dr Kwasi Hatfield which showed ductal carcinoma in situ, grade 2-3  Surgical margin was only 0 6 mm  Therefore, she underwent reexcision of inferior margin which showed residual ductal carcinoma in situ    Final surgical margin was more than 2 mm  She is going to start adjuvant radiation therapy, today  She presents today to discuss possible adjuvant hormonal therapy  She feels well  She has no complaint of pain  She has no fever, chills or night sweats  She has no recent weight loss  She denied any respiratory symptoms  She has a few past medical history, including hypertension, diabetes as well as atrial fibrillation  She takes apixaban for stroke prevention  She is a lifetime never smoker  Her performance status is normal         Interval History:          Objective:     Primary Diagnosis:    Ductal carcinoma in situ in the right breast, grade 2-3  % positive, AL 70% positive  Diagnosed in July 2018  Cancer Staging:  Cancer Staging  Intraductal carcinoma in situ of right breast  Staging form: Breast, AJCC 8th Edition  - Clinical: Stage 0 (cTis (DCIS), cN0, cM0, ER: Positive, AL: Positive) - Signed by Dulce Maria Sumner MD on 7/24/2018        Previous Hematologic/ Oncologic Treatment:         Current Hematologic/ Oncologic Treatment:      Adjuvant radiation therapy to be started, today  Disease Status:     PETE status post lumpectomy  Test Results:    Pathology:    Lumpectomy specimen showed ductal carcinoma in situ, grade 2-3  % positive, AL 70% positive  No evidence of invasive carcinoma  Final surgical margin was more than 2 mm  Radiology:        Laboratory:    See below  Physical Exam:      General Appearance:    Alert, oriented        Eyes:    PERRL   Ears:    Normal external ear canals, both ears   Nose:   Nares normal, septum midline   Throat:   Mucosa moist  Pharynx without injection  Neck:   Supple       Lungs:     Clear to auscultation bilaterally   Chest Wall:    No tenderness or deformity    Heart:    Regular rate and rhythm       Abdomen:     Soft, non-tender, bowel sounds +, no organomegaly           Extremities:   Extremities no cyanosis or edema       Skin:   no rash or icterus  Lymph nodes:   Cervical, supraclavicular, and axillary nodes normal   Neurologic:   CNII-XII intact, normal strength, sensation and reflexes     Throughout          Breast exam: Lumpectomy scar at 12:00 position in the right breast with no palpable abnormality  Left breast exam is negative  ROS: Review of Systems        Imaging: No results found  Labs:   Lab Results   Component Value Date    WBC 5 5 05/31/2018    HGB 14 6 05/31/2018    HCT 43 4 05/31/2018    MCV 95 05/31/2018     05/31/2018     Lab Results   Component Value Date     05/31/2018    K 4 0 05/31/2018     05/31/2018    CO2 29 05/31/2018    ANIONGAP 11 05/31/2018    BUN 18 05/31/2018    CREATININE 0 90 06/29/2016    GLUCOSE 86 05/31/2018    CALCIUM 9 6 05/31/2018    AST 15 05/31/2018    ALT 21 05/31/2018    ALKPHOS 77 05/31/2018    PROT 7 5 05/31/2018    BILITOT 1 9 (H) 05/31/2018    EGFR >60 0 06/29/2016         Lab Results   Component Value Date    IRBPGPJL17 647 04/16/2018       Lab Results   Component Value Date    FOLATE 10 4 04/16/2018           Vital Sign:    Body surface area is 1 76 meters squared      Wt Readings from Last 3 Encounters:   08/16/18 73 5 kg (162 lb)   07/24/18 75 kg (165 lb 6 4 oz)        Temp Readings from Last 3 Encounters:   08/16/18 97 8 °F (36 6 °C) (Tympanic)   07/24/18 (!) 96 4 °F (35 8 °C) (Tympanic)        BP Readings from Last 3 Encounters:   08/16/18 132/81   07/24/18 162/80   05/09/18 130/78         Pulse Readings from Last 3 Encounters:   08/16/18 66   07/24/18 60   05/09/18 72     @LASTSAO2(3)@    Active Problems:   Patient Active Problem List   Diagnosis    Abnormal mammogram    Paroxysmal atrial fibrillation (HCC)    Chronic coronary artery disease    Cervical spinal stenosis    Closed fracture of maxilla with routine healing    Conduction disorder of the heart    DDD (degenerative disc disease), cervical    Degeneration of intervertebral disc    Dizziness and giddiness  Headache    Hypokalemia    Fracture of multiple ribs    Impaired fasting glucose    Intraductal carcinoma in situ of right breast    Long term current use of anticoagulant therapy    Latent tuberculosis    Low back pain    Lupus    Noncompliance with treatment    Obesity    Osteoarthritis of finger of right hand    Osteoarthritis of knee    SDH (subdural hematoma) (HCC)    Type 2 diabetes mellitus (HCC)    Thyroid nodule    Vertigo    Vitamin D deficiency    Essential hypertension       Past Medical History:   Past Medical History:   Diagnosis Date    Allergic rhinitis     Arthritis     Atrial fibrillation (HCC)     [facility] )     Colitis     Coronary artery disease     Diabetes mellitus ([facility] )     DJD (degenerative joint disease), cervical     Fibromyalgia     Glaucoma     Hypertension     Osteoarthritis     Sleep apnea     Vertigo        Surgical History:   Past Surgical History:   Procedure Laterality Date    BREAST BIOPSY      BREAST LUMPECTOMY      CHOLECYSTECTOMY      COLONOSCOPY  2013    HYSTERECTOMY      TONSILLECTOMY         Family History:    Family History   Problem Relation Age of Onset    Heart attack Sister     Hypertension Family     Diabetes Family     Breast cancer Daughter 28       Cancer-related family history includes Breast cancer (age of onset: 28) in her daughter  Social History:   Social History     Social History    Marital status: Single     Spouse name: N/A    Number of children: N/A    Years of education: N/A     Occupational History    Not on file       Social History Main Topics    Smoking status: Never Smoker    Smokeless tobacco: Never Used      Comment: no smoke exposure    Alcohol use Yes      Comment: occasional    Drug use: No    Sexual activity: Not on file     Other Topics Concern    Not on file     Social History Narrative    No narrative on file       Current Medications:   Current Outpatient Prescriptions Medication Sig Dispense Refill    amLODIPine (NORVASC) 5 mg tablet Take 5 mg by mouth daily      apixaban (ELIQUIS) 5 mg Every 12 hours      aspirin (ECOTRIN LOW STRENGTH) 81 mg EC tablet Take 81 mg by mouth daily      cefadroxil (DURICEF) 500 mg capsule Take 500 mg by mouth every 12 (twelve) hours      metFORMIN (GLUCOPHAGE) 500 mg tablet metformin 500 mg tablet      metoprolol tartrate (LOPRESSOR) 25 mg tablet Take 25 mg by mouth every 12 (twelve) hours      valsartan-hydrochlorothiazide (DIOVAN HCT) 80-12 5 MG per tablet every 24 hours       No current facility-administered medications for this visit          Allergies: No Known Allergies

## 2018-08-17 PROCEDURE — 77412 RADIATION TX DELIVERY LVL 3: CPT | Performed by: RADIOLOGY

## 2018-08-20 ENCOUNTER — OFFICE VISIT (OUTPATIENT)
Dept: FAMILY MEDICINE CLINIC | Facility: CLINIC | Age: 78
End: 2018-08-20
Payer: MEDICARE

## 2018-08-20 ENCOUNTER — APPOINTMENT (OUTPATIENT)
Dept: LAB | Facility: CLINIC | Age: 78
End: 2018-08-20
Attending: RADIOLOGY
Payer: MEDICARE

## 2018-08-20 VITALS
RESPIRATION RATE: 16 BRPM | WEIGHT: 164 LBS | HEIGHT: 63 IN | BODY MASS INDEX: 29.06 KG/M2 | DIASTOLIC BLOOD PRESSURE: 80 MMHG | TEMPERATURE: 97.5 F | HEART RATE: 64 BPM | SYSTOLIC BLOOD PRESSURE: 114 MMHG | OXYGEN SATURATION: 96 %

## 2018-08-20 DIAGNOSIS — Z17.0 MALIGNANT NEOPLASM OF CENTRAL PORTION OF RIGHT BREAST IN FEMALE, ESTROGEN RECEPTOR POSITIVE (HCC): ICD-10-CM

## 2018-08-20 DIAGNOSIS — C50.111 MALIGNANT NEOPLASM OF CENTRAL PORTION OF RIGHT BREAST IN FEMALE, ESTROGEN RECEPTOR POSITIVE (HCC): ICD-10-CM

## 2018-08-20 DIAGNOSIS — E11.9 TYPE 2 DIABETES MELLITUS WITHOUT COMPLICATION, WITHOUT LONG-TERM CURRENT USE OF INSULIN (HCC): ICD-10-CM

## 2018-08-20 DIAGNOSIS — R73.01 IMPAIRED FASTING GLUCOSE: ICD-10-CM

## 2018-08-20 DIAGNOSIS — I10 ESSENTIAL HYPERTENSION: Primary | ICD-10-CM

## 2018-08-20 DIAGNOSIS — Z01.818 PRE-OPERATIVE CLEARANCE: ICD-10-CM

## 2018-08-20 LAB
ERYTHROCYTE [DISTWIDTH] IN BLOOD BY AUTOMATED COUNT: 14 % (ref 11.6–15.1)
GRANULOCYTES NFR BLD AUTO: 55.1 % (ref 47–80)
GRANULOCYTES NFR BLD: 3.3 THOUSAND/ΜL (ref 1.85–7.82)
HCT VFR BLD AUTO: 44.6 % (ref 34.8–46.1)
HGB BLD-MCNC: 14.4 G/DL (ref 11.5–15.4)
LYMPHOCYTES # BLD AUTO: 2.3 THOUSANDS/ΜL (ref 0.6–4.47)
LYMPHOCYTES NFR BLD AUTO: 39 % (ref 14–44)
MCH RBC QN AUTO: 30.5 PG (ref 26.8–34.3)
MCHC RBC AUTO-ENTMCNC: 32.2 G/DL (ref 31.4–37.4)
MCV RBC AUTO: 95 FL (ref 82–98)
MONOCYTES # BLD AUTO: 0.3 THOUSAND/ΜL (ref 0.17–1.22)
MONOCYTES NFR BLD AUTO: 6 % (ref 4–12)
PLATELET # BLD AUTO: 189 THOUSANDS/UL (ref 149–390)
PMV BLD AUTO: 8.9 FL (ref 8.9–12.7)
RBC # BLD AUTO: 4.71 MILLION/UL (ref 3.81–5.12)
WBC # BLD AUTO: 5.9 THOUSAND/UL (ref 4.31–10.16)
WBC NRBC COR # BLD: 5.9 THOUSAND/UL (ref 4.31–10.16)

## 2018-08-20 PROCEDURE — 36415 COLL VENOUS BLD VENIPUNCTURE: CPT

## 2018-08-20 PROCEDURE — 85025 COMPLETE CBC W/AUTO DIFF WBC: CPT

## 2018-08-20 PROCEDURE — 77412 RADIATION TX DELIVERY LVL 3: CPT | Performed by: RADIOLOGY

## 2018-08-20 PROCEDURE — 99214 OFFICE O/P EST MOD 30 MIN: CPT | Performed by: FAMILY MEDICINE

## 2018-08-20 RX ORDER — AMOXICILLIN 500 MG/1
CAPSULE ORAL 3 TIMES DAILY
COMMUNITY
Start: 2018-08-20 | End: 2019-02-25 | Stop reason: SDUPTHER

## 2018-08-21 PROBLEM — Z01.818 PRE-OPERATIVE CLEARANCE: Status: ACTIVE | Noted: 2018-08-21

## 2018-08-21 PROCEDURE — 77412 RADIATION TX DELIVERY LVL 3: CPT | Performed by: RADIOLOGY

## 2018-08-22 PROCEDURE — 77417 THER RADIOLOGY PORT IMAGE(S): CPT | Performed by: RADIOLOGY

## 2018-08-22 PROCEDURE — 77412 RADIATION TX DELIVERY LVL 3: CPT | Performed by: RADIOLOGY

## 2018-08-22 PROCEDURE — 77336 RADIATION PHYSICS CONSULT: CPT | Performed by: RADIOLOGY

## 2018-08-22 NOTE — PROGRESS NOTES
Assessment/Plan:    Pre-operative clearance  Recommend patient to stop the Eliquis 3 days before the extraction and started the day after the extraction the patient she does not need the antibiotic also patient will hold aspirin 7 days before the procedure prophylactic patient clear for the procedure       Diagnoses and all orders for this visit:    Essential hypertension  -     Comprehensive metabolic panel; Future  -     Hemoglobin A1C; Future  -     Lipid Panel with Direct LDL reflex; Future  -     Microalbumin / creatinine urine ratio; Future  -     TSH, 3rd generation with Free T4 reflex; Future    Impaired fasting glucose  -     Comprehensive metabolic panel; Future  -     Hemoglobin A1C; Future  -     Lipid Panel with Direct LDL reflex; Future  -     Microalbumin / creatinine urine ratio; Future  -     TSH, 3rd generation with Free T4 reflex; Future    Type 2 diabetes mellitus without complication, without long-term current use of insulin (HCC)  -     Comprehensive metabolic panel; Future  -     Hemoglobin A1C; Future  -     Lipid Panel with Direct LDL reflex; Future  -     Microalbumin / creatinine urine ratio; Future  -     TSH, 3rd generation with Free T4 reflex; Future    Pre-operative clearance    Other orders  -     amoxicillin (AMOXIL) 500 mg capsule; 3 (three) times a day          Subjective:   Chief Complaint   Patient presents with    Follow-up      chronic conditions         Patient ID: Adele Jean is a 69 y o  female      Patient here for preop clearance for dental work the patient had proximal AFib she been seen by Cardiology who placed her on Eliquis and the patient she is going to have a tooth extraction next week the she deny any chest pain short of breath no palpitation she is independent and no history of bleeding no history of valvular problem        The following portions of the patient's history were reviewed and updated as appropriate: allergies, current medications, past family history, past medical history, past social history, past surgical history and problem list     Review of Systems   Constitutional: Negative for fatigue and fever  HENT: Negative for ear pain, sinus pain, sinus pressure and sore throat  Eyes: Negative for pain and redness  Respiratory: Negative for cough, chest tightness and shortness of breath  Cardiovascular: Negative for chest pain, palpitations and leg swelling  Gastrointestinal: Negative for abdominal pain, blood in stool, constipation, diarrhea and nausea  Genitourinary: Negative for flank pain, frequency and hematuria  Musculoskeletal: Negative for back pain and joint swelling  Skin: Negative for rash  Neurological: Negative for dizziness, numbness and headaches  Hematological: Does not bruise/bleed easily  Objective:  Vitals:    08/20/18 1435   BP: 114/80   BP Location: Left arm   Patient Position: Sitting   Cuff Size: Standard   Pulse: 64   Resp: 16   Temp: 97 5 °F (36 4 °C)   TempSrc: Oral   SpO2: 96%   Weight: 74 4 kg (164 lb)   Height: 5' 2 75" (1 594 m)      Physical Exam   Constitutional: She is oriented to person, place, and time  She appears well-developed and well-nourished  HENT:   Head: Normocephalic  Right Ear: External ear normal    Left Ear: External ear normal    Eyes: Conjunctivae and EOM are normal  Right eye exhibits no discharge  Left eye exhibits no discharge  Neck: No JVD present  Cardiovascular: Normal rate, regular rhythm and normal heart sounds  Exam reveals no gallop  No murmur heard  Pulmonary/Chest: Effort normal  No respiratory distress  She has no wheezes  She has no rales  She exhibits no tenderness  Abdominal: She exhibits no mass  There is no tenderness  There is no rebound  Musculoskeletal: She exhibits no edema or tenderness  Neurological: She is alert and oriented to person, place, and time  Skin: No rash noted  No erythema

## 2018-08-22 NOTE — ASSESSMENT & PLAN NOTE
Recommend patient to stop the Eliquis 3 days before the extraction and started the day after the extraction the patient she does not need the antibiotic also patient will hold aspirin 7 days before the procedure prophylactic patient clear for the procedure

## 2018-08-23 PROCEDURE — 77412 RADIATION TX DELIVERY LVL 3: CPT | Performed by: RADIOLOGY

## 2018-08-24 PROCEDURE — 77412 RADIATION TX DELIVERY LVL 3: CPT | Performed by: RADIOLOGY

## 2018-08-27 DIAGNOSIS — I10 ESSENTIAL HYPERTENSION: ICD-10-CM

## 2018-08-27 DIAGNOSIS — I48.0 PAF (PAROXYSMAL ATRIAL FIBRILLATION) (HCC): Primary | ICD-10-CM

## 2018-08-27 PROCEDURE — 77412 RADIATION TX DELIVERY LVL 3: CPT | Performed by: RADIOLOGY

## 2018-08-28 ENCOUNTER — APPOINTMENT (OUTPATIENT)
Dept: LAB | Facility: HOSPITAL | Age: 78
End: 2018-08-28
Payer: MEDICARE

## 2018-08-28 DIAGNOSIS — E11.9 TYPE 2 DIABETES MELLITUS WITHOUT COMPLICATION, WITHOUT LONG-TERM CURRENT USE OF INSULIN (HCC): ICD-10-CM

## 2018-08-28 DIAGNOSIS — R73.01 IMPAIRED FASTING GLUCOSE: ICD-10-CM

## 2018-08-28 DIAGNOSIS — I10 ESSENTIAL HYPERTENSION: ICD-10-CM

## 2018-08-28 LAB
ALBUMIN SERPL BCP-MCNC: 4.1 G/DL (ref 3–5.2)
ALP SERPL-CCNC: 75 U/L (ref 43–122)
ALT SERPL W P-5'-P-CCNC: 25 U/L (ref 9–52)
ANION GAP SERPL CALCULATED.3IONS-SCNC: 8 MMOL/L (ref 5–14)
AST SERPL W P-5'-P-CCNC: 18 U/L (ref 14–36)
BILIRUB SERPL-MCNC: 1.4 MG/DL
BUN SERPL-MCNC: 18 MG/DL (ref 5–25)
CALCIUM SERPL-MCNC: 9.4 MG/DL (ref 8.4–10.2)
CHLORIDE SERPL-SCNC: 104 MMOL/L (ref 97–108)
CHOLEST SERPL-MCNC: 149 MG/DL
CO2 SERPL-SCNC: 28 MMOL/L (ref 22–30)
CREAT SERPL-MCNC: 0.76 MG/DL (ref 0.6–1.2)
CREAT UR-MCNC: 126 MG/DL
EST. AVERAGE GLUCOSE BLD GHB EST-MCNC: 131 MG/DL
GFR SERPL CREATININE-BSD FRML MDRD: 88 ML/MIN/1.73SQ M
GLUCOSE P FAST SERPL-MCNC: 88 MG/DL (ref 70–99)
HBA1C MFR BLD: 6.2 % (ref 4.2–6.3)
HDLC SERPL-MCNC: 48 MG/DL (ref 40–59)
LDLC SERPL CALC-MCNC: 69 MG/DL
MICROALBUMIN UR-MCNC: 11.1 MG/L (ref 0–20)
MICROALBUMIN/CREAT 24H UR: 9 MG/G CREATININE (ref 0–30)
POTASSIUM SERPL-SCNC: 3.6 MMOL/L (ref 3.6–5)
PROT SERPL-MCNC: 8 G/DL (ref 5.9–8.4)
SODIUM SERPL-SCNC: 140 MMOL/L (ref 137–147)
TRIGL SERPL-MCNC: 162 MG/DL
TSH SERPL DL<=0.05 MIU/L-ACNC: 0.79 UIU/ML (ref 0.47–4.68)

## 2018-08-28 PROCEDURE — 36415 COLL VENOUS BLD VENIPUNCTURE: CPT

## 2018-08-28 PROCEDURE — 77412 RADIATION TX DELIVERY LVL 3: CPT | Performed by: RADIOLOGY

## 2018-08-28 PROCEDURE — 82043 UR ALBUMIN QUANTITATIVE: CPT

## 2018-08-28 PROCEDURE — 80053 COMPREHEN METABOLIC PANEL: CPT

## 2018-08-28 PROCEDURE — 84443 ASSAY THYROID STIM HORMONE: CPT

## 2018-08-28 PROCEDURE — 80061 LIPID PANEL: CPT

## 2018-08-28 PROCEDURE — 82570 ASSAY OF URINE CREATININE: CPT

## 2018-08-28 PROCEDURE — 83036 HEMOGLOBIN GLYCOSYLATED A1C: CPT

## 2018-08-29 PROCEDURE — 77336 RADIATION PHYSICS CONSULT: CPT | Performed by: RADIOLOGY

## 2018-08-29 PROCEDURE — 77417 THER RADIOLOGY PORT IMAGE(S): CPT | Performed by: RADIOLOGY

## 2018-08-29 PROCEDURE — 77412 RADIATION TX DELIVERY LVL 3: CPT | Performed by: RADIOLOGY

## 2018-08-30 PROCEDURE — 77412 RADIATION TX DELIVERY LVL 3: CPT | Performed by: RADIOLOGY

## 2018-08-31 PROCEDURE — 77412 RADIATION TX DELIVERY LVL 3: CPT | Performed by: RADIOLOGY

## 2018-09-04 ENCOUNTER — RADIATION THERAPY TREATMENT (OUTPATIENT)
Dept: RADIATION ONCOLOGY | Facility: CLINIC | Age: 78
End: 2018-09-04
Attending: RADIOLOGY
Payer: MEDICARE

## 2018-09-04 PROCEDURE — 77412 RADIATION TX DELIVERY LVL 3: CPT | Performed by: RADIOLOGY

## 2018-09-05 PROCEDURE — 77412 RADIATION TX DELIVERY LVL 3: CPT | Performed by: RADIOLOGY

## 2018-09-05 PROCEDURE — 77334 RADIATION TREATMENT AID(S): CPT | Performed by: RADIOLOGY

## 2018-09-05 PROCEDURE — 77280 THER RAD SIMULAJ FIELD SMPL: CPT | Performed by: RADIOLOGY

## 2018-09-05 PROCEDURE — 77307 TELETHX ISODOSE PLAN CPLX: CPT | Performed by: RADIOLOGY

## 2018-09-06 PROCEDURE — 77336 RADIATION PHYSICS CONSULT: CPT | Performed by: RADIOLOGY

## 2018-09-06 PROCEDURE — 77412 RADIATION TX DELIVERY LVL 3: CPT | Performed by: RADIOLOGY

## 2018-09-06 PROCEDURE — 77417 THER RADIOLOGY PORT IMAGE(S): CPT | Performed by: RADIOLOGY

## 2018-09-07 PROCEDURE — 77412 RADIATION TX DELIVERY LVL 3: CPT | Performed by: RADIOLOGY

## 2018-09-10 PROCEDURE — 77412 RADIATION TX DELIVERY LVL 3: CPT | Performed by: RADIOLOGY

## 2018-09-10 PROCEDURE — 77331 SPECIAL RADIATION DOSIMETRY: CPT | Performed by: RADIOLOGY

## 2018-09-11 PROCEDURE — 77412 RADIATION TX DELIVERY LVL 3: CPT | Performed by: RADIOLOGY

## 2018-09-12 PROCEDURE — 77412 RADIATION TX DELIVERY LVL 3: CPT | Performed by: RADIOLOGY

## 2018-09-13 PROCEDURE — 77412 RADIATION TX DELIVERY LVL 3: CPT | Performed by: RADIOLOGY

## 2018-09-13 PROCEDURE — 77336 RADIATION PHYSICS CONSULT: CPT | Performed by: RADIOLOGY

## 2018-09-14 PROCEDURE — 77412 RADIATION TX DELIVERY LVL 3: CPT | Performed by: RADIOLOGY

## 2018-10-02 DIAGNOSIS — I48.91 ATRIAL FIBRILLATION, UNSPECIFIED TYPE (HCC): Primary | ICD-10-CM

## 2018-10-11 ENCOUNTER — OFFICE VISIT (OUTPATIENT)
Dept: FAMILY MEDICINE CLINIC | Facility: CLINIC | Age: 78
End: 2018-10-11
Payer: MEDICARE

## 2018-10-11 VITALS
BODY MASS INDEX: 30 KG/M2 | SYSTOLIC BLOOD PRESSURE: 130 MMHG | HEART RATE: 63 BPM | OXYGEN SATURATION: 98 % | WEIGHT: 163 LBS | TEMPERATURE: 97.8 F | DIASTOLIC BLOOD PRESSURE: 80 MMHG | HEIGHT: 62 IN

## 2018-10-11 DIAGNOSIS — I10 ESSENTIAL HYPERTENSION: ICD-10-CM

## 2018-10-11 DIAGNOSIS — Z00.01 ENCOUNTER FOR GENERAL ADULT MEDICAL EXAMINATION WITH ABNORMAL FINDINGS: Primary | ICD-10-CM

## 2018-10-11 DIAGNOSIS — Z23 NEED FOR INFLUENZA VACCINATION: ICD-10-CM

## 2018-10-11 DIAGNOSIS — I48.0 PAROXYSMAL ATRIAL FIBRILLATION (HCC): ICD-10-CM

## 2018-10-11 DIAGNOSIS — Z11.59 NEED FOR HEPATITIS C SCREENING TEST: ICD-10-CM

## 2018-10-11 DIAGNOSIS — Z11.4 ENCOUNTER FOR SCREENING FOR HIV: ICD-10-CM

## 2018-10-11 DIAGNOSIS — E55.9 VITAMIN D DEFICIENCY: ICD-10-CM

## 2018-10-11 DIAGNOSIS — E11.9 TYPE 2 DIABETES MELLITUS WITHOUT COMPLICATION, WITHOUT LONG-TERM CURRENT USE OF INSULIN (HCC): ICD-10-CM

## 2018-10-11 DIAGNOSIS — Z79.01 LONG TERM CURRENT USE OF ANTICOAGULANT THERAPY: ICD-10-CM

## 2018-10-11 PROCEDURE — 90662 IIV NO PRSV INCREASED AG IM: CPT

## 2018-10-11 PROCEDURE — G0439 PPPS, SUBSEQ VISIT: HCPCS

## 2018-10-11 PROCEDURE — 99214 OFFICE O/P EST MOD 30 MIN: CPT

## 2018-10-11 PROCEDURE — G0008 ADMIN INFLUENZA VIRUS VAC: HCPCS

## 2018-10-11 NOTE — ASSESSMENT & PLAN NOTE
Chronic asymptomatic patient on vitamin-D supplement encouraged patient to continue diet which was vitamin-D

## 2018-10-11 NOTE — PROGRESS NOTES
Assessment and Plan:    Problem List Items Addressed This Visit     Paroxysmal atrial fibrillation (HCC)     Chronic asymptomatic rate is controlled on metoprolol tartrate 25 mg twice a day patient already on a Eliquis 5 mg twice a day patient does followed by Cardiology         Relevant Orders    CBC and differential    Comprehensive metabolic panel    Hemoglobin A1C    Lipid panel    Microalbumin / creatinine urine ratio    TSH baseline    Vitamin D 25 hydroxy    Long term current use of anticoagulant therapy     Patient on Eliquis secondary to AFib         Relevant Orders    CBC and differential    Comprehensive metabolic panel    Hemoglobin A1C    Lipid panel    Microalbumin / creatinine urine ratio    TSH baseline    Vitamin D 25 hydroxy    Type 2 diabetes mellitus (HCC)     Lab Results   Component Value Date    HGBA1C 6 2 08/28/2018     Chronic fair control on metformin 500 twice a day patient already on Arb and she is already on statin we encouraged patient to continue with the low carb diet and important lose weight           Relevant Orders    CBC and differential    Comprehensive metabolic panel    Hemoglobin A1C    Lipid panel    Microalbumin / creatinine urine ratio    TSH baseline    Vitamin D 25 hydroxy    Vitamin D deficiency     Chronic asymptomatic patient on vitamin-D supplement encouraged patient to continue diet which was vitamin-D         Relevant Orders    CBC and differential    Comprehensive metabolic panel    Hemoglobin A1C    Lipid panel    Microalbumin / creatinine urine ratio    TSH baseline    Vitamin D 25 hydroxy    Essential hypertension     Chronic well controlled continue current medication low-salt diet less than 2 g a day ,   low caffeine intake   regular aerobic exercise 20 to totally minute a day diet and important lose weight discussed with the patient           Relevant Orders    CBC and differential    Comprehensive metabolic panel    Hemoglobin A1C    Lipid panel Microalbumin / creatinine urine ratio    TSH baseline    Vitamin D 25 hydroxy    BMI 29 0-29 9,adult     Lifestyle modification including increased activity 30 min a day 5 days a week and healthy diet discussed with the patient           Other Visit Diagnoses     Encounter for general adult medical examination with abnormal findings    -  Primary    Increased activity healthy diet will hydration fall precaution discussed with the patient also we discussed Ms shearer shin appropriate for her age    Need for influenza vaccination        Relevant Orders    influenza vaccine, 2984-6064, high-dose, PF 0 5 mL, for patients 65 yr+ (FLUZONE HIGH-DOSE) (Completed)    Need for hepatitis C screening test        Relevant Orders    Hepatitis C antibody    Encounter for screening for HIV        Relevant Orders    HIV 1/2 AG-AB combo        Health Maintenance Due   Topic Date Due    Diabetic Foot Exam  12/21/1950    DM Eye Exam  12/21/1950    DTaP,Tdap,and Td Vaccines (1 - Tdap) 12/21/1962    Urinary Incontinence Screening  12/21/2006    INFLUENZA VACCINE  07/01/2018         HPI:  Richard Villegas is a 69 y o  female here for her Subsequent Wellness Visit      Patient Active Problem List   Diagnosis    Abnormal mammogram    Paroxysmal atrial fibrillation (HCC)    Chronic coronary artery disease    Cervical spinal stenosis    Closed fracture of maxilla with routine healing    Conduction disorder of the heart    DDD (degenerative disc disease), cervical    Degeneration of intervertebral disc    Dizziness and giddiness    Headache    Hypokalemia    Fracture of multiple ribs    Intraductal carcinoma in situ of right breast    Long term current use of anticoagulant therapy    Latent tuberculosis    Low back pain    Lupus    Noncompliance with treatment    Obesity    Osteoarthritis of finger of right hand    Osteoarthritis of knee    SDH (subdural hematoma) (HCC)    Type 2 diabetes mellitus ([address] )    Thyroid nodule  Vertigo    Vitamin D deficiency    Essential hypertension    Pre-operative clearance    BMI 29 0-29 9,adult     Past Medical History:   Diagnosis Date    Allergic rhinitis     Arthritis     Atrial fibrillation (HCC)     [facility] )     Colitis     Coronary artery disease     Diabetes mellitus ([facility] )     DJD (degenerative joint disease), cervical     Fibromyalgia     Glaucoma     Hypertension     Osteoarthritis     Sleep apnea     Vertigo      Past Surgical History:   Procedure Laterality Date    BREAST BIOPSY      BREAST LUMPECTOMY      CHOLECYSTECTOMY      COLONOSCOPY  2013    HYSTERECTOMY      TONSILLECTOMY       Family History   Problem Relation Age of Onset    Heart attack Sister     Hypertension Family     Diabetes Family     Breast cancer Daughter 28     History   Smoking Status    Never Smoker   Smokeless Tobacco    Never Used     Comment: no smoke exposure     History   Alcohol Use    Yes     Comment: occasional      History   Drug Use No       Current Outpatient Prescriptions   Medication Sig Dispense Refill    amLODIPine (NORVASC) 5 mg tablet Take 5 mg by mouth daily      amoxicillin (AMOXIL) 500 mg capsule 3 (three) times a day      apixaban (ELIQUIS) 5 mg Take 1 tablet (5 mg total) by mouth 2 (two) times a day 60 tablet 2    aspirin (ECOTRIN LOW STRENGTH) 81 mg EC tablet Take 81 mg by mouth daily      cefadroxil (DURICEF) 500 mg capsule Take 500 mg by mouth every 12 (twelve) hours      metFORMIN (GLUCOPHAGE) 500 mg tablet metformin 500 mg tablet      metoprolol tartrate (LOPRESSOR) 25 mg tablet Take 1 tablet (25 mg total) by mouth every 12 (twelve) hours 60 tablet 5    valsartan-hydrochlorothiazide (DIOVAN HCT) 80-12 5 MG per tablet every 24 hours       No current facility-administered medications for this visit        No Known Allergies  Immunization History   Administered Date(s) Administered    Influenza 11/07/2015, 01/27/2017, 10/09/2017    Influenza Split 10/25/2013    Influenza Split High Dose Preservative Free IM 01/27/2017, 10/09/2017    Influenza TIV (IM) 12/01/2014    Influenza, high dose seasonal 0 5 mL 10/11/2018    Pneumococcal Conjugate 13-Valent 01/27/2017    Pneumococcal Polysaccharide PPV23 08/14/2017    Zoster 09/12/2017, 09/13/2017       Patient Care Team:  Christiano Ambrose MD as PCP - Meryle Lei, MD (Radiation Oncology)  Marilu Painting MD (Cardiology)  Valentine Larios PA-C as Physician Assistant (Neurology)  Olga Patel MD (Breast Surgery)    Medicare Screening Tests and Risk Assessments:  Tal Little is here for her Subsequent Wellness visit  Last Medicare Wellness visit information reviewed, patient interviewed and updates made to the record today  Health Risk Assessment:  Patient rates overall health as good  Patient feels that their physical health rating is Same  Eyesight was rated as Same  Hearing was rated as Slightly worse  Patient feels that their emotional and mental health rating is Same  Pain experienced by patient in the last 7 days has been A lot  Patient's pain rating has been 9/10  Patient states that she has experienced no weight loss or gain in last 6 months  Emotional/Mental Health:  Patient has been feeling nervous/anxious  PHQ-9 Depression Screening:    Frequency of the following problems over the past two weeks:      1  Little interest or pleasure in doing things: 0 - not at all      2  Feeling down, depressed, or hopeless: 0 - not at all  PHQ-2 Score: 0          Broken Bones/Falls: Fall Risk Assessment:    In the past year, patient has experienced: No history of falling in past year          Bladder/Bowel:  Patient has not leaked urine accidently in the last six months  Patient reports no loss of bowel control  Immunizations:  Patient has had a flu vaccination within the last year  Patient has received a pneumonia shot  Patient has received a shingles shot    Patient has received tetanus/diphtheria shot  Home Safety:  Patient does not have trouble with stairs inside or outside of their home  Patient currently reports that there are no safety hazards present in home, working smoke alarms, working carbon monoxide detectors  Preventative Screenings:   Breast cancer screening performed, colon cancer screen completed, cholesterol screen completed, glaucoma eye exam completed,     Nutrition:  Current diet: Regular with servings of the following:    Medications:  Patient is currently taking over-the-counter supplements  List of OTC medications includes: ibuprophen, 81 asp, vitamin d    Lifestyle Choices:  Patient reports no tobacco use  Patient reports alcohol use  Alcohol use per week: 2 weekly  Patient does not drive a vehicle  Patient wears seat belt  Current level of exercise of physical activity described by patient as: none  Activities of Daily Living:  Can get out of bed by his or her self, able to dress self, able to make own meals, able to do own shopping, able to bathe self, can do own laundry/housekeeping, can manage own money, pay bills and track expenses    Previous Hospitalizations:  Hospitalization or ED visit in past 12 months  Number of hospitalizations within the last year: 1-2        Advanced Directives:  Patient has not decided on power of   Patient has not completed advanced directive          Preventative Screening/Counseling:      Cardiovascular:      General: Risks and Benefits Discussed and Screening Current      Counseling: Healthy Diet and Healthy Weight          Diabetes:      General: Risks and Benefits Discussed and Screening Current      Counseling: Healthy Diet and Healthy Weight          Colorectal Cancer:      General: Risks and Benefits Discussed and Screening Current      Comments: Colonoscopy was done January 2018 the no colonoscopy recall as screening        Breast Cancer:      General: Risks and Benefits Discussed Comments: Patient recently diagnosed with breast cancer and she is followed by specialist status post radiation treatment        Cervical Cancer:      General: Risks and Benefits Discussed, Screening Not Indicated and Patient Declines          Osteoporosis:      General: Risks and Benefits Discussed and Screening Current      Comments: Patient is up-to-date with her DEXA scan was done in 2017 April        AAA:      General: Risks and Benefits Discussed and Screening Current          Glaucoma:      General: Risks and Benefits Discussed and Screening Current      Comments: Patient does followed by Ophthalmology in regular basis        HIV:      General: Risks and Benefits Discussed      Due for labs: Rapid Test          Hepatitis C:      General: Risks and Benefits Discussed      Counseling: has received general HCV counseling        Advanced Directives:   Patient has no living will for healthcare, does not have durable POA for healthcare, 5 wishes given  Additional Comments: Five wishes booklet is given to the patient recommend to bring it next visit  Other Preventative Counseling (Non-Medicare):   Fall Prevention, Nutrition Counseling, Skin self-exam and Sunscreen use

## 2018-10-11 NOTE — PATIENT INSTRUCTIONS
10% - bad control"> 10% - bad control,Hemoglobin A1c (HbA1c) greater than 10% indicating poor diabetic control,Haemoglobin A1c greater than 10% indicating poor diabetic control">   Diabetes Mellitus Type 2 in Adults, Ambulatory Care   GENERAL INFORMATION:   Diabetes mellitus type 2  is a disease that affects how your body uses glucose (sugar)  Insulin helps move sugar out of the blood so it can be used for energy  Normally, when the blood sugar level increases, the pancreas makes more insulin  Type 2 diabetes develops because either the body cannot make enough insulin, or it cannot use the insulin correctly  After many years, your pancreas may stop making insulin  Common symptoms include the following:   · More hunger or thirst than usual     · Frequent urination     · Weight loss without trying     · Blurred vision  Seek immediate care for the following symptoms:   · Severe abdominal pain, or pain that spreads to your back  You may also be vomiting  · Trouble staying awake or focusing    · Shaking or sweating    · Blurred or double vision    · Breath has a fruity, sweet smell    · Breathing is deep and labored, or rapid and shallow    · Heartbeat is fast and weak  Treatment for diabetes mellitus type 2  includes keeping your blood sugar at a normal level  You must eat the right foods, and exercise regularly  You may also need medicine if you cannot control your blood sugar level with nutrition and exercise  Manage diabetes mellitus type 2:   · Check your blood sugar level  You will be taught how to check a small drop of blood in a glucose monitor  Ask your healthcare provider when and how often to check during the day  Ask your healthcare provider what your blood sugar levels should be when you check them  · Keep track of carbohydrates (sugar and starchy foods)  Your blood sugar level can get too high if you eat too many carbohydrates   Your dietitian will help you plan meals and snacks that have the right amount of carbohydrates  · Eat low-fat foods  Some examples are skinless chicken and low-fat milk  · Eat less sodium (salt)  Some examples of high-sodium foods to limit are soy sauce, potato chips, and soup  Do not add salt to food you cook  Limit your use of table salt  · Eat high-fiber foods  Foods that are a good source of fiber include vegetables, whole grain bread, and beans  · Limit alcohol  Alcohol affects your blood sugar level and can make it harder to manage your diabetes  Women should limit alcohol to 1 drink a day  Men should limit alcohol to 2 drinks a day  A drink of alcohol is 12 ounces of beer, 5 ounces of wine, or 1½ ounces of liquor  · Get regular exercise  Exercise can help keep your blood sugar level steady, decrease your risk of heart disease, and help you lose weight  Exercise for at least 30 minutes, 5 days a week  Include muscle strengthening activities 2 days each week  Work with your healthcare provider to create an exercise plan  · Check your feet each day  for injuries or open sores  Ask your healthcare provider for activities you can do if you have an open sore  · Quit smoking  If you smoke, it is never too late to quit  Smoking can worsen the problems that may occur with diabetes  Ask your healthcare provider for information about how to stop smoking if you are having trouble quitting  · Ask about your weight:  Ask healthcare providers if you need to lose weight, and how much to lose  Ask them to help you with a weight loss program  Even a 10 to 15 pound weight loss can help you manage your blood sugar level  · Carry medical alert identification  Wear medical alert jewelry or carry a card that says you have diabetes  Ask your healthcare provider where to get these items  · Ask about vaccines  Diabetes puts you at risk of serious illness if you get the flu, pneumonia, or hepatitis   Ask your healthcare provider if you should get a flu, pneumonia, or hepatitis B vaccine, and when to get the vaccine  Follow up with your healthcare provider as directed:  Write down your questions so you remember to ask them during your visits  CARE AGREEMENT:   You have the right to help plan your care  Learn about your health condition and how it may be treated  Discuss treatment options with your caregivers to decide what care you want to receive  You always have the right to refuse treatment  The above information is an  only  It is not intended as medical advice for individual conditions or treatments  Talk to your doctor, nurse or pharmacist before following any medical regimen to see if it is safe and effective for you  © 2014 [address] is for End User's use only and may not be sold, redistributed or otherwise used for commercial purposes  All illustrations and images included in CareNotes® are the copyrighted property of A D A M [employer]  or Tremaine Harris

## 2018-10-11 NOTE — ASSESSMENT & PLAN NOTE
Chronic asymptomatic rate is controlled on metoprolol tartrate 25 mg twice a day patient already on a Eliquis 5 mg twice a day patient does followed by Cardiology

## 2018-10-11 NOTE — PROGRESS NOTES
Subjective:   Chief Complaint   Patient presents with    Medicare Wellness Visit    Follow-up     chronic conditions        Patient ID: Bud Nolan is a 69 y o  female  Patient here for her Medicare annual exam and follow-up with a chronic condition  Patient's history of non-insulin-dependent diabetes on metformin 500 twice a day tolerated well asymptomatic and no side effect patient already on Arb and she is on statin and no microalbuminuria The patient has long history of hypertension the blood pressure today is in control patient tolerates medication well and no chest pain or short of breath no palpitation no headache patient's history of for proximal AFib her heart rate control asymptomatic no chest pain short of breath no palpitation the on Eliquis blood thinner also history of breast cancer she just finished her radiation and treatment and she is going to follow up with the Oncology patient's history of vitamin-D deficiency he is taking vitamin-D OTC as a supplement  Recent blood work discussed with the patient        The following portions of the patient's history were reviewed and updated as appropriate: allergies, current medications, past family history, past medical history, past social history, past surgical history and problem list     Review of Systems   Constitutional: Negative for fatigue and fever  HENT: Negative for ear pain, sinus pain, sinus pressure and sore throat  Eyes: Negative for pain and redness  Respiratory: Negative for cough, chest tightness and shortness of breath  Cardiovascular: Negative for chest pain, palpitations and leg swelling  Gastrointestinal: Negative for abdominal pain, blood in stool, constipation, diarrhea and nausea  Genitourinary: Negative for flank pain, frequency and hematuria  Musculoskeletal: Negative for back pain and joint swelling  Skin: Negative for rash  Neurological: Negative for dizziness, numbness and headaches  Hematological: Does not bruise/bleed easily  Objective:  Vitals:    10/11/18 1504   BP: 130/80   Pulse: 63   Temp: 97 8 °F (36 6 °C)   TempSrc: Oral   SpO2: 98%   Weight: 73 9 kg (163 lb)   Height: 5' 2" (1 575 m)      Physical Exam   Constitutional: She is oriented to person, place, and time  She appears well-developed and well-nourished  HENT:   Head: Normocephalic  Right Ear: External ear normal    Left Ear: External ear normal    Eyes: Conjunctivae and EOM are normal  Right eye exhibits no discharge  Left eye exhibits no discharge  Neck: No JVD present  Cardiovascular: Regular rhythm and normal heart sounds  Exam reveals no gallop  No murmur heard  Pulmonary/Chest: Effort normal  No respiratory distress  She has no wheezes  She has no rales  She exhibits no tenderness  Abdominal: She exhibits no mass  There is no tenderness  There is no rebound  Musculoskeletal: She exhibits no edema or tenderness  Neurological: She is alert and oriented to person, place, and time  Skin: No rash noted  No erythema           Assessment/Plan:    Type 2 diabetes mellitus (HCC)  Lab Results   Component Value Date    HGBA1C 6 2 08/28/2018     Chronic fair control on metformin 500 twice a day patient already on Arb and she is already on statin we encouraged patient to continue with the low carb diet and important lose weight      Paroxysmal atrial fibrillation (HCC)  Chronic asymptomatic rate is controlled on metoprolol tartrate 25 mg twice a day patient already on a Eliquis 5 mg twice a day patient does followed by Cardiology    Essential hypertension  Chronic well controlled continue current medication low-salt diet less than 2 g a day ,   low caffeine intake   regular aerobic exercise 20 to totally minute a day diet and important lose weight discussed with the patient      Long term current use of anticoagulant therapy  Patient on Eliquis secondary to AFib    Vitamin D deficiency  Chronic asymptomatic patient on vitamin-D supplement encouraged patient to continue diet which was vitamin-D    BMI 29 0-29 9,adult  Lifestyle modification including increased activity 30 min a day 5 days a week and healthy diet discussed with the patient       Diagnoses and all orders for this visit:    Encounter for general adult medical examination with abnormal findings  Comments:  Increased activity healthy diet will hydration fall precaution discussed with the patient also we discussed Ms  manfred shin appropriate for her age    Essential hypertension  -     CBC and differential; Future  -     Comprehensive metabolic panel; Future  -     Hemoglobin A1C; Future  -     Lipid panel; Future  -     Microalbumin / creatinine urine ratio  -     TSH baseline; Future  -     Vitamin D 25 hydroxy; Future    Paroxysmal atrial fibrillation (HCC)  -     CBC and differential; Future  -     Comprehensive metabolic panel; Future  -     Hemoglobin A1C; Future  -     Lipid panel; Future  -     Microalbumin / creatinine urine ratio  -     TSH baseline; Future  -     Vitamin D 25 hydroxy; Future    Type 2 diabetes mellitus without complication, without long-term current use of insulin (HCC)  -     CBC and differential; Future  -     Comprehensive metabolic panel; Future  -     Hemoglobin A1C; Future  -     Lipid panel; Future  -     Microalbumin / creatinine urine ratio  -     TSH baseline; Future  -     Vitamin D 25 hydroxy; Future    Vitamin D deficiency  -     CBC and differential; Future  -     Comprehensive metabolic panel; Future  -     Hemoglobin A1C; Future  -     Lipid panel; Future  -     Microalbumin / creatinine urine ratio  -     TSH baseline; Future  -     Vitamin D 25 hydroxy; Future    Long term current use of anticoagulant therapy  -     CBC and differential; Future  -     Comprehensive metabolic panel; Future  -     Hemoglobin A1C; Future  -     Lipid panel; Future  -     Microalbumin / creatinine urine ratio  -     TSH baseline;  Future  - Vitamin D 25 hydroxy; Future    Need for influenza vaccination  -     influenza vaccine, 5377-0807, high-dose, PF 0 5 mL, for patients 65 yr+ (FLUZONE HIGH-DOSE)    Need for hepatitis C screening test  -     Hepatitis C antibody; Future    Encounter for screening for HIV  -     HIV 1/2 AG-AB combo;  Future    BMI 29 0-29 9,adult

## 2018-10-11 NOTE — ASSESSMENT & PLAN NOTE
Lifestyle modification including increased activity 30 min a day 5 days a week and healthy diet discussed with the patient

## 2018-10-11 NOTE — ASSESSMENT & PLAN NOTE
Lab Results   Component Value Date    HGBA1C 6 2 08/28/2018     Chronic fair control on metformin 500 twice a day patient already on Arb and she is already on statin we encouraged patient to continue with the low carb diet and important lose weight

## 2018-10-24 ENCOUNTER — CLINICAL SUPPORT (OUTPATIENT)
Dept: RADIATION ONCOLOGY | Facility: CLINIC | Age: 78
End: 2018-10-24
Payer: MEDICARE

## 2018-10-24 ENCOUNTER — RADIATION ONCOLOGY FOLLOW-UP (OUTPATIENT)
Dept: RADIATION ONCOLOGY | Facility: CLINIC | Age: 78
End: 2018-10-24
Attending: RADIOLOGY
Payer: MEDICARE

## 2018-10-24 VITALS
BODY MASS INDEX: 30.47 KG/M2 | WEIGHT: 165.6 LBS | RESPIRATION RATE: 16 BRPM | DIASTOLIC BLOOD PRESSURE: 78 MMHG | HEART RATE: 70 BPM | HEIGHT: 62 IN | TEMPERATURE: 97.9 F | SYSTOLIC BLOOD PRESSURE: 120 MMHG | OXYGEN SATURATION: 98 %

## 2018-10-24 DIAGNOSIS — D05.11 INTRADUCTAL CARCINOMA IN SITU OF RIGHT BREAST: Primary | ICD-10-CM

## 2018-10-24 PROCEDURE — 99215 OFFICE O/P EST HI 40 MIN: CPT | Performed by: RADIOLOGY

## 2018-10-24 NOTE — PROGRESS NOTES
Paris Barakat  1940   Ms Joel Goldsmith is a 69 y o  female       Chief Complaint   Patient presents with    Follow-up     Radiation Oncology       Cancer Staging  Intraductal carcinoma in situ of right breast  Staging form: Breast, AJCC 8th Edition  - Clinical: Stage 0 (cTis (DCIS), cN0, cM0, ER: Positive, CO: Positive) - Signed by Latrice Carlin MD on 7/24/2018         Intraductal carcinoma in situ of right breast    5/9/2018 Initial Diagnosis     Intraductal carcinoma in situ of right breast         5/9/2018 Biopsy     Right breast stereotactic biopsy x2 sites:   Ductal carcinoma in-situ (DCIS), features suggestive of involvement of underlying intraductal papilloma  Nuclear Grade: II-III (intermediate to high)  % positive  CO 70-75% positive           6/21/2018 Surgery     Right-sided needle localized partial mastectomy with intraoperative ultrasound guidance:  DCIS, grade 2-3/3, calcifications present  Margins - free of malignancy, closest margin is inferior with 0 6 mm of clearance  Posterior margin with 2 1 mm of clearance  Stage 0, pTis (DCIS),pN0,pMX  - Dr Coral Vickers         7/10/2018 Surgery     Re-excision of right breast inferior margin tissue:  - Foci of ductal epithelial hyperplasia are seen ranging from usual ductal hyperplasia to atypical ductal hyperplasia to a few foci having features which are felt to be compatible with ductal carcinoma in situ, primarily solid type, nuclear grade 1-2/3   - None of the foci of atypia or probable ductal carcinoma in situ are seen at or within 2 mm the outer margins of excision, though it is noted that such foci are seen in 3 of 5 slides  - One intraductal papilloma is identified   - Fat necrosis is seen along the inner surface of this reexcision           8/16/2018 -  Hormone Therapy     None recommended by Dr Jessica Cline         8/16/2018 - 9/14/2018 Radiation     hypo fractionated course directed to the right breast to a total dose of 4256 centigray with an additional 1000 centigray to the lumpectomy cavity             Clinical Trial: no    Interval History: First follow up post right breast radiation completed on 9/14/18  Nipple still sore and itching, but still using Remedy cream       Lexy Lane MD on 12/12/2018  Screening  Tobacco  Current tobacco user: no  If yes, brief counseling provided: NA    Hypertension  Hypertension screening performed: yes  Normotensive:  yes  If no, referred to PCP: n/a    Depression Screening  Screened for depression using PHQ-2: yes    Screened for depression using PHQ-9:  no  Screening positive or negative:  negative  If score >4, was any of the following actions taken?    Additional evaluation for depression, suicide risk assesment, referral to PCP or psychiatry, medication started:  [id number] [facility] >65 years  Advanced Care Planning Discussed:  no  Patient named surrogate decision maker or care plan in chart: no    [unfilled]  Health Maintenance   Topic Date Due    Diabetic Foot Exam  12/21/1950    DM Eye Exam  12/21/1950    DTaP,Tdap,and Td Vaccines (1 - Tdap) 12/21/1962    HEMOGLOBIN A1C  02/28/2019    DXA SCAN  04/25/2019    URINE MICROALBUMIN  08/28/2019    Fall Risk  10/11/2019    Depression Screening PHQ  10/11/2019    Urinary Incontinence Screening  10/11/2019    CRC Screening: Colonoscopy  01/29/2028    INFLUENZA VACCINE  Completed    Pneumococcal PPSV23/PCV13 65+ Years / High and Highest Risk  Completed       Patient Active Problem List   Diagnosis    Abnormal mammogram    Paroxysmal atrial fibrillation (HCC)    Chronic coronary artery disease    Cervical spinal stenosis    Closed fracture of maxilla with routine healing    Conduction disorder of the heart    DDD (degenerative disc disease), cervical    Degeneration of intervertebral disc    Dizziness and giddiness    Headache    Hypokalemia    Fracture of multiple ribs    Intraductal carcinoma in situ of right breast    Long term current use of anticoagulant therapy    Latent tuberculosis    Low back pain    Lupus    Noncompliance with treatment    Obesity    Osteoarthritis of finger of right hand    Osteoarthritis of knee    SDH (subdural hematoma) (HCC)    Type 2 diabetes mellitus (HCC)    Thyroid nodule    Vertigo    Vitamin D deficiency    Essential hypertension    Pre-operative clearance    BMI 29 0-29 9,adult     Past Medical History:   Diagnosis Date    Allergic rhinitis     Arthritis     Atrial fibrillation (HCC)     [facility] ([facility] [address] )     Colitis     Coronary artery disease     [facility] ([email or web address] )     DJD (degenerative joint disease), cervical     Fibromyalgia     Glaucoma     History of external beam radiation therapy     8/16/18 to 9/14/2018 Right breast    Hypertension     Osteoarthritis     Sleep apnea     Vertigo      Past Surgical History:   Procedure Laterality Date    BREAST BIOPSY      BREAST LUMPECTOMY      CHOLECYSTECTOMY      COLONOSCOPY  2013    HYSTERECTOMY      TONSILLECTOMY       Family History   Problem Relation Age of Onset    Heart attack Sister     Hypertension Family     Diabetes Family     Breast cancer Daughter 28     Social History     Social History    Marital status: Single     Spouse name: N/A    Number of children: N/A    Years of education: N/A     Occupational History    Not on file       Social History Main Topics    Smoking status: Never Smoker    Smokeless tobacco: Never Used      Comment: no smoke exposure    Alcohol use Yes      Comment: occasional    Drug use: No    Sexual activity: Not on file     Other Topics Concern    Not on file     Social History Narrative    No narrative on file       Current Outpatient Prescriptions:     amLODIPine (NORVASC) 5 mg tablet, Take 5 mg by mouth daily, Disp: , Rfl:     apixaban (ELIQUIS) 5 mg, Take 1 tablet (5 mg total) by mouth 2 (two) times a day, Disp: 60 tablet, Rfl: 2    aspirin (ECOTRIN LOW STRENGTH) 81 mg EC tablet, Take 81 mg by mouth daily, Disp: , Rfl:     metFORMIN (GLUCOPHAGE) 500 mg tablet, metformin 500 mg tablet, Disp: , Rfl:     metoprolol tartrate (LOPRESSOR) 25 mg tablet, Take 1 tablet (25 mg total) by mouth every 12 (twelve) hours, Disp: 60 tablet, Rfl: 5    valsartan-hydrochlorothiazide (DIOVAN HCT) 80-12 5 MG per tablet, every 24 hours, Disp: , Rfl:     amoxicillin (AMOXIL) 500 mg capsule, 3 (three) times a day, Disp: , Rfl:   No Known Allergies    Review of Systems:  Review of Systems   Constitutional: Positive for fatigue  HENT: Negative  Eyes: Negative  Respiratory: Negative  Cardiovascular: Negative  Gastrointestinal: Negative  Endocrine: Negative  Musculoskeletal: Positive for arthralgias and back pain (Chronic)  Skin: Negative  Allergic/Immunologic: Negative  Neurological: Positive for dizziness and headaches (Ongoing from before treatment  )  Hematological: Negative  Psychiatric/Behavioral: Negative  Vitals:    10/24/18 1052   BP: 120/78   BP Location: Left arm   Patient Position: Sitting   Cuff Size: Standard   Pulse: 70   Resp: 16   Temp: 97 9 °F (36 6 °C)   TempSrc: Tympanic   SpO2: 98%   Weight: 75 1 kg (165 lb 9 6 oz)   Height: 5' 2" (1 575 m)       Pain Score: 0-No pain    Imaging:No results found  Teaching: Discussed breast massage

## 2018-10-24 NOTE — PROGRESS NOTES
Follow-up - Radiation Oncology   Giuliano Vasquez 1940 69 y o  female [id number]      History of Present Illness   Cancer Staging  Intraductal carcinoma in situ of right breast  Staging form: Breast, AJCC 8th Edition  - Clinical: Stage 0 (cTis (DCIS), cN0, cM0, ER: Positive, TX: Positive) - Signed by Yelena Valdez MD on 7/24/2018          Interval History:   First follow up post right breast radiation completed on 9/14/18  Nipple still sore and itching, but still using Remedy cream        Juaquin Dominguez MD on 12/12/2018       She denies any breast discomfort or swelling  Screening  Tobacco  Current tobacco user: no  If yes, brief counseling provided: NA     Hypertension  Hypertension screening performed: yes  Normotensive:  yes  If no, referred to PCP: n/a     Depression Screening  Screened for depression using PHQ-2: yes     Screened for depression using PHQ-9:  no  Screening positive or negative:  negative  If score >4, was any of the following actions taken? Additional evaluation for depression, suicide risk assesment, referral to PCP or psychiatry, medication started:  n/a     Advanced Care Planning for Patients >65 years  Advanced Care Planning Discussed:  no  Patient named surrogate decision maker or care plan in chart: no        Historical Information      Intraductal carcinoma in situ of right breast    5/9/2018 Initial Diagnosis     Intraductal carcinoma in situ of right breast         5/9/2018 Biopsy     Right breast stereotactic biopsy x2 sites:   Ductal carcinoma in-situ (DCIS), features suggestive of involvement of underlying intraductal papilloma  Nuclear Grade: II-III (intermediate to high)  % positive  TX 70-75% positive           6/21/2018 Surgery     Right-sided needle localized partial mastectomy with intraoperative ultrasound guidance:  DCIS, grade 2-3/3, calcifications present  Margins - free of malignancy, closest margin is inferior with 0 6 mm of clearance   Posterior margin with 2 1 mm of clearance  Stage 0, pTis (DCIS),pN0,pMX  - Dr Ras Hughes         7/10/2018 Surgery     Re-excision of right breast inferior margin tissue:  - Foci of ductal epithelial hyperplasia are seen ranging from usual ductal hyperplasia to atypical ductal hyperplasia to a few foci having features which are felt to be compatible with ductal carcinoma in situ, primarily solid type, nuclear grade 1-2/3   - None of the foci of atypia or probable ductal carcinoma in situ are seen at or within 2 mm the outer margins of excision, though it is noted that such foci are seen in 3 of 5 slides  - One intraductal papilloma is identified   - Fat necrosis is seen along the inner surface of this reexcision           8/16/2018 -  Hormone Therapy     None recommended by Dr Jailyn Ren         8/16/2018 - 9/14/2018 Radiation     hypo fractionated course directed to the right breast to a total dose of 4256 centigray with an additional 1000 centigray to the lumpectomy cavity             Past Medical History:   Diagnosis Date    Allergic rhinitis     Arthritis     Atrial fibrillation ([address]     Breast cancer ([address]     Colitis     Coronary artery disease     Diabetes mellitus ([address]     DJD (degenerative joint disease), cervical     Fibromyalgia     Glaucoma     History of external beam radiation therapy     8/16/18 to 9/14/2018 Right breast    Hypertension     Osteoarthritis     Sleep apnea     Vertigo      Past Surgical History:   Procedure Laterality Date    BREAST BIOPSY      BREAST LUMPECTOMY      CHOLECYSTECTOMY      COLONOSCOPY  2013    HYSTERECTOMY      TONSILLECTOMY         Social History   History   Alcohol Use    Yes     Comment: occasional     History   Drug Use No     History   Smoking Status    Never Smoker   Smokeless Tobacco    Never Used     Comment: no smoke exposure         Meds/Allergies     Current Outpatient Prescriptions:     amLODIPine (NORVASC) 5 mg tablet, Take 5 mg by mouth daily, Disp: , Rfl:     apixaban (ELIQUIS) 5 mg, Take 1 tablet (5 mg total) by mouth 2 (two) times a day, Disp: 60 tablet, Rfl: 2    aspirin (ECOTRIN LOW STRENGTH) 81 mg EC tablet, Take 81 mg by mouth daily, Disp: , Rfl:     metFORMIN (GLUCOPHAGE) 500 mg tablet, metformin 500 mg tablet, Disp: , Rfl:     metoprolol tartrate (LOPRESSOR) 25 mg tablet, Take 1 tablet (25 mg total) by mouth every 12 (twelve) hours, Disp: 60 tablet, Rfl: 5    valsartan-hydrochlorothiazide (DIOVAN HCT) 80-12 5 MG per tablet, every 24 hours, Disp: , Rfl:     amoxicillin (AMOXIL) 500 mg capsule, 3 (three) times a day, Disp: , Rfl:   No Known Allergies      Review of Systems  Constitutional: Positive for fatigue  HENT: Negative  Eyes: Negative  Respiratory: Negative  Cardiovascular: Negative  Gastrointestinal: Negative  Endocrine: Negative  Musculoskeletal: Positive for arthralgias and back pain (Chronic)  Skin: Negative  Allergic/Immunologic: Negative  Neurological: Positive for dizziness and headaches (Ongoing from before treatment  )  Hematological: Negative  Psychiatric/Behavioral: Negative  OBJECTIVE:   /78 (BP Location: Left arm, Patient Position: Sitting, Cuff Size: Standard)   Pulse 70   Temp 97 9 °F (36 6 °C) (Tympanic)   Resp 16   Ht 5' 2" (1 575 m)   Wt 75 1 kg (165 lb 9 6 oz)   SpO2 98%   BMI 30 29 kg/m²   Pain Assessment:  0  ECOG/Zubrod/WHO: 0 - Asymptomatic    Physical Exam   Constitutional: She appears well-developed  HENT:   Head: Normocephalic  Hair is normal    Mouth/Throat: Oropharynx is clear and moist    Eyes: EOM are normal  No scleral icterus  Neck: Neck supple  No spinous process tenderness present  No edema present  Cardiovascular: Normal rate and regular rhythm  Pulmonary/Chest: Effort normal and breath sounds normal  No respiratory distress  She has no wheezes  She exhibits no tenderness     There is no supraclavicular axillary adenopathy palpable  The skin in the radiated field shows hyperpigmentation which has less than  No area of desquamation  No suspicious lesions noted in either breast   No breast edema  Abdominal: Soft  Bowel sounds are normal  She exhibits no distension, no ascites and no mass  There is no hepatosplenomegaly  There is no tenderness  There is no rebound  Genitourinary: No breast swelling or tenderness  Musculoskeletal: Normal range of motion  She exhibits no edema or tenderness  Lymphadenopathy:     She has no cervical adenopathy  She has no axillary adenopathy  Neurological: She is alert  She has normal strength  No cranial nerve deficit  Coordination and gait normal    Skin: Skin is intact  Psychiatric: She has a normal mood and affect  Her speech is normal and behavior is normal            RESULTS    Lab Results: No results found for this or any previous visit (from the past 672 hour(s))  Imaging Studies:No results found  Assessment/Plan:  No orders of the defined types were placed in this encounter  Ana Rooney is a 69y o  year old female who is 1 month post radiation therapy to the right breast for DCIS  Her acute radiation reaction continues to improve  I have encouraged her to perform breast massage in given her skin care instructions  She will return for follow-up visit in 6 months  Celina Mata MD  10/24/2018,11:28 AM    Portions of the record may have been created with voice recognition software   Occasional wrong word or "sound a like" substitutions may have occurred due to the inherent limitations of voice recognition software   Read the chart carefully and recognize, using context, where substitutions have occurred

## 2018-11-12 ENCOUNTER — TRANSCRIBE ORDERS (OUTPATIENT)
Dept: ADMINISTRATIVE | Facility: HOSPITAL | Age: 78
End: 2018-11-12

## 2018-11-12 ENCOUNTER — APPOINTMENT (OUTPATIENT)
Dept: LAB | Facility: HOSPITAL | Age: 78
End: 2018-11-12
Payer: MEDICARE

## 2018-11-12 DIAGNOSIS — Z79.01 LONG TERM CURRENT USE OF ANTICOAGULANT THERAPY: ICD-10-CM

## 2018-11-12 DIAGNOSIS — I48.0 PAROXYSMAL ATRIAL FIBRILLATION (HCC): ICD-10-CM

## 2018-11-12 DIAGNOSIS — Z11.4 ENCOUNTER FOR SCREENING FOR HIV: ICD-10-CM

## 2018-11-12 DIAGNOSIS — E11.9 TYPE 2 DIABETES MELLITUS WITHOUT COMPLICATION, WITHOUT LONG-TERM CURRENT USE OF INSULIN (HCC): ICD-10-CM

## 2018-11-12 DIAGNOSIS — E55.9 VITAMIN D DEFICIENCY: ICD-10-CM

## 2018-11-12 DIAGNOSIS — Z11.59 NEED FOR HEPATITIS C SCREENING TEST: ICD-10-CM

## 2018-11-12 DIAGNOSIS — I10 ESSENTIAL HYPERTENSION: ICD-10-CM

## 2018-11-12 LAB
25(OH)D3 SERPL-MCNC: 22.8 NG/ML (ref 30–100)
ALBUMIN SERPL BCP-MCNC: 4.1 G/DL (ref 3–5.2)
ALP SERPL-CCNC: 81 U/L (ref 43–122)
ALT SERPL W P-5'-P-CCNC: 25 U/L (ref 9–52)
ANION GAP SERPL CALCULATED.3IONS-SCNC: 6 MMOL/L (ref 5–14)
AST SERPL W P-5'-P-CCNC: 19 U/L (ref 14–36)
BASOPHILS # BLD AUTO: 0 THOUSANDS/ΜL (ref 0–0.1)
BASOPHILS NFR BLD AUTO: 0 % (ref 0–1)
BILIRUB SERPL-MCNC: 1.6 MG/DL
BUN SERPL-MCNC: 17 MG/DL (ref 5–25)
CALCIUM SERPL-MCNC: 9.7 MG/DL (ref 8.4–10.2)
CHLORIDE SERPL-SCNC: 101 MMOL/L (ref 97–108)
CHOLEST SERPL-MCNC: 152 MG/DL
CO2 SERPL-SCNC: 34 MMOL/L (ref 22–30)
CREAT SERPL-MCNC: 0.83 MG/DL (ref 0.6–1.2)
CREAT UR-MCNC: 151 MG/DL
EOSINOPHIL # BLD AUTO: 0 THOUSAND/ΜL (ref 0–0.4)
EOSINOPHIL NFR BLD AUTO: 0 % (ref 0–6)
ERYTHROCYTE [DISTWIDTH] IN BLOOD BY AUTOMATED COUNT: 13.4 %
EST. AVERAGE GLUCOSE BLD GHB EST-MCNC: 137 MG/DL
GFR SERPL CREATININE-BSD FRML MDRD: 79 ML/MIN/1.73SQ M
GLUCOSE P FAST SERPL-MCNC: 109 MG/DL (ref 70–99)
HBA1C MFR BLD: 6.4 % (ref 4.2–6.3)
HCT VFR BLD AUTO: 43.6 % (ref 36–46)
HCV AB SER QL: NORMAL
HDLC SERPL-MCNC: 52 MG/DL (ref 40–59)
HGB BLD-MCNC: 14.8 G/DL (ref 12–16)
LDLC SERPL CALC-MCNC: 80 MG/DL
LYMPHOCYTES # BLD AUTO: 1.3 THOUSANDS/ΜL (ref 0.5–4)
LYMPHOCYTES NFR BLD AUTO: 32 % (ref 20–50)
MCH RBC QN AUTO: 32.2 PG (ref 26–34)
MCHC RBC AUTO-ENTMCNC: 34 G/DL (ref 31–36)
MCV RBC AUTO: 95 FL (ref 80–100)
MICROALBUMIN UR-MCNC: 10.3 MG/L (ref 0–20)
MICROALBUMIN/CREAT 24H UR: 7 MG/G CREATININE (ref 0–30)
MONOCYTES # BLD AUTO: 0.4 THOUSAND/ΜL (ref 0.2–0.9)
MONOCYTES NFR BLD AUTO: 10 % (ref 1–10)
NEUTROPHILS # BLD AUTO: 2.4 THOUSANDS/ΜL (ref 1.8–7.8)
NEUTS SEG NFR BLD AUTO: 57 % (ref 45–65)
NONHDLC SERPL-MCNC: 100 MG/DL
PLATELET # BLD AUTO: 181 THOUSANDS/UL (ref 150–450)
PMV BLD AUTO: 9.1 FL (ref 8.9–12.7)
POTASSIUM SERPL-SCNC: 3.8 MMOL/L (ref 3.6–5)
PROT SERPL-MCNC: 7.8 G/DL (ref 5.9–8.4)
RBC # BLD AUTO: 4.61 MILLION/UL (ref 4–5.2)
SODIUM SERPL-SCNC: 141 MMOL/L (ref 137–147)
TRIGL SERPL-MCNC: 102 MG/DL
TSH SERPL DL<=0.05 MIU/L-ACNC: 0.61 UIU/ML (ref 0.47–4.68)
WBC # BLD AUTO: 4.1 THOUSAND/UL (ref 4.5–11)

## 2018-11-12 PROCEDURE — 86803 HEPATITIS C AB TEST: CPT

## 2018-11-12 PROCEDURE — 82570 ASSAY OF URINE CREATININE: CPT | Performed by: FAMILY MEDICINE

## 2018-11-12 PROCEDURE — 85025 COMPLETE CBC W/AUTO DIFF WBC: CPT

## 2018-11-12 PROCEDURE — 80061 LIPID PANEL: CPT

## 2018-11-12 PROCEDURE — 80053 COMPREHEN METABOLIC PANEL: CPT

## 2018-11-12 PROCEDURE — 87389 HIV-1 AG W/HIV-1&-2 AB AG IA: CPT

## 2018-11-12 PROCEDURE — 84443 ASSAY THYROID STIM HORMONE: CPT

## 2018-11-12 PROCEDURE — 36415 COLL VENOUS BLD VENIPUNCTURE: CPT

## 2018-11-12 PROCEDURE — 82043 UR ALBUMIN QUANTITATIVE: CPT | Performed by: FAMILY MEDICINE

## 2018-11-12 PROCEDURE — 83036 HEMOGLOBIN GLYCOSYLATED A1C: CPT

## 2018-11-12 PROCEDURE — 82306 VITAMIN D 25 HYDROXY: CPT

## 2018-11-14 LAB — HIV 1+2 AB+HIV1 P24 AG SERPL QL IA: NORMAL

## 2018-12-01 PROBLEM — R55 SYNCOPE AND COLLAPSE: Status: ACTIVE | Noted: 2018-12-01

## 2018-12-01 PROBLEM — E78.00 PURE HYPERCHOLESTEROLEMIA: Status: ACTIVE | Noted: 2018-12-01

## 2018-12-13 ENCOUNTER — OFFICE VISIT (OUTPATIENT)
Dept: NEUROLOGY | Facility: CLINIC | Age: 78
End: 2018-12-13
Payer: MEDICARE

## 2018-12-13 VITALS
BODY MASS INDEX: 29.55 KG/M2 | SYSTOLIC BLOOD PRESSURE: 130 MMHG | DIASTOLIC BLOOD PRESSURE: 82 MMHG | WEIGHT: 166.8 LBS | RESPIRATION RATE: 18 BRPM | HEIGHT: 63 IN | HEART RATE: 68 BPM

## 2018-12-13 DIAGNOSIS — G44.211 INTRACTABLE EPISODIC TENSION-TYPE HEADACHE: ICD-10-CM

## 2018-12-13 DIAGNOSIS — M79.2 NEURALGIA: Primary | ICD-10-CM

## 2018-12-13 PROCEDURE — 99214 OFFICE O/P EST MOD 30 MIN: CPT | Performed by: PHYSICIAN ASSISTANT

## 2018-12-13 RX ORDER — IBUPROFEN 400 MG/1
400 TABLET ORAL EVERY 6 HOURS PRN
COMMUNITY
Start: 2017-01-31 | End: 2019-05-21 | Stop reason: ALTCHOICE

## 2018-12-13 RX ORDER — ACETAMINOPHEN 500 MG
1000 TABLET ORAL EVERY 8 HOURS PRN
COMMUNITY
Start: 2017-01-31 | End: 2019-11-18

## 2018-12-13 RX ORDER — GABAPENTIN 100 MG/1
100 CAPSULE ORAL 2 TIMES DAILY
COMMUNITY
Start: 2018-11-28 | End: 2019-02-18 | Stop reason: SDUPTHER

## 2018-12-13 NOTE — ASSESSMENT & PLAN NOTE
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
## 2018-12-13 NOTE — PATIENT INSTRUCTIONS
Increase gabapentin to 100mg three times a day for 3 days, then start taking 2-1-1 for 3 days, then 2-1-2 for 3 days, then call the office with an update  Call sooner if you are unable to tolerate the increase at any time

## 2019-01-07 DIAGNOSIS — I10 ESSENTIAL HYPERTENSION: Primary | ICD-10-CM

## 2019-01-07 RX ORDER — VALSARTAN AND HYDROCHLOROTHIAZIDE 80; 12.5 MG/1; MG/1
1 TABLET, FILM COATED ORAL EVERY 24 HOURS
Qty: 90 TABLET | Refills: 2 | Status: SHIPPED | OUTPATIENT
Start: 2019-01-07 | End: 2019-03-13 | Stop reason: ALTCHOICE

## 2019-01-07 RX ORDER — AMLODIPINE BESYLATE 5 MG/1
5 TABLET ORAL DAILY
Qty: 30 TABLET | Refills: 3 | Status: SHIPPED | OUTPATIENT
Start: 2019-01-07 | End: 2019-07-25 | Stop reason: SDUPTHER

## 2019-01-15 ENCOUNTER — OFFICE VISIT (OUTPATIENT)
Dept: FAMILY MEDICINE CLINIC | Facility: CLINIC | Age: 79
End: 2019-01-15
Payer: MEDICARE

## 2019-01-15 VITALS
RESPIRATION RATE: 16 BRPM | BODY MASS INDEX: 28.7 KG/M2 | WEIGHT: 162 LBS | DIASTOLIC BLOOD PRESSURE: 80 MMHG | OXYGEN SATURATION: 96 % | HEART RATE: 69 BPM | SYSTOLIC BLOOD PRESSURE: 136 MMHG | HEIGHT: 63 IN

## 2019-01-15 DIAGNOSIS — I10 ESSENTIAL HYPERTENSION: ICD-10-CM

## 2019-01-15 DIAGNOSIS — D05.11 INTRADUCTAL CARCINOMA IN SITU OF RIGHT BREAST: ICD-10-CM

## 2019-01-15 DIAGNOSIS — Z79.01 LONG TERM CURRENT USE OF ANTICOAGULANT THERAPY: ICD-10-CM

## 2019-01-15 DIAGNOSIS — I48.0 PAROXYSMAL ATRIAL FIBRILLATION (HCC): ICD-10-CM

## 2019-01-15 DIAGNOSIS — E78.00 PURE HYPERCHOLESTEROLEMIA: ICD-10-CM

## 2019-01-15 DIAGNOSIS — E11.9 TYPE 2 DIABETES MELLITUS WITHOUT COMPLICATION, WITHOUT LONG-TERM CURRENT USE OF INSULIN (HCC): Primary | ICD-10-CM

## 2019-01-15 DIAGNOSIS — E55.9 VITAMIN D DEFICIENCY: ICD-10-CM

## 2019-01-15 PROCEDURE — 99214 OFFICE O/P EST MOD 30 MIN: CPT | Performed by: FAMILY MEDICINE

## 2019-01-15 RX ORDER — ATORVASTATIN CALCIUM 10 MG/1
10 TABLET, FILM COATED ORAL DAILY
Qty: 30 TABLET | Refills: 2 | Status: SHIPPED | OUTPATIENT
Start: 2019-01-15 | End: 2019-04-08 | Stop reason: SDUPTHER

## 2019-01-15 NOTE — PROGRESS NOTES
Subjective:   Chief Complaint   Patient presents with    Follow-up     chronic conditions         Patient ID: Evie Smith is a 69 y o  female  Patient and office here for follow-up with a chronic condition  The patient has long history of hypertension the blood pressure today is in control patient tolerates medication well and no chest pain or short of breath no palpitation no headache  Patient's history of non-insulin-dependent diabetic she is on metformin 500 twice a day tolerated well without any side effect patient asymptomatic no dizziness no increased frequency urination nausea vomiting and no increase the thirsty the patient already on Arb   Patient was history of vitamin-D deficiency finished her course of 50,000 once a week for 12 week and she is taking no OTC the vitamin-D and she deny any muscle pain joint pain or bone pain  Patient's history of hyperlipidemia he try to controlled with diet deny any chest pain short of breath no palpitation no headache no blurred vision no weakness or lateralized of the symptom  Patient was history of for breast cancer intraductal carcinoma on the right breast status post partial mastectomy status post radiation  Patient also with history of proximal AFib and she is on metoprolol her rate is controlled asymptomatic no dizzy no light headache no chest pain no short of breath patient on anticoagulation Eliquis  Recent blood work discussed with the patient        The following portions of the patient's history were reviewed and updated as appropriate: allergies, current medications, past family history, past medical history, past social history, past surgical history and problem list     Review of Systems   Constitutional: Negative for fatigue and fever  HENT: Negative for ear pain, sinus pain, sinus pressure and sore throat  Eyes: Negative for pain and redness  Respiratory: Negative for cough, chest tightness and shortness of breath      Cardiovascular: Negative for chest pain, palpitations and leg swelling  Gastrointestinal: Negative for abdominal pain, blood in stool, constipation, diarrhea and nausea  Genitourinary: Negative for flank pain, frequency and hematuria  Musculoskeletal: Negative for back pain and joint swelling  Skin: Negative for rash  Neurological: Negative for dizziness, numbness and headaches  Hematological: Does not bruise/bleed easily  Objective:  Vitals:    01/15/19 1430   BP: 136/80   BP Location: Left arm   Patient Position: Sitting   Cuff Size: Large   Pulse: 69   Resp: 16   SpO2: 96%   Weight: 73 5 kg (162 lb)   Height: 5' 2 5" (1 588 m)      Physical Exam   Constitutional: She is oriented to person, place, and time  She appears well-developed and well-nourished  HENT:   Head: Normocephalic  Right Ear: External ear normal    Left Ear: External ear normal    Eyes: Conjunctivae and EOM are normal  Right eye exhibits no discharge  Left eye exhibits no discharge  Neck: No JVD present  Cardiovascular: Normal rate and regular rhythm  Exam reveals no gallop  Pulses are no weak pulses  Murmur heard  Pulses:       Dorsalis pedis pulses are 2+ on the right side, and 2+ on the left side  Pulmonary/Chest: Effort normal  No respiratory distress  She has no wheezes  She has no rales  She exhibits no tenderness  Abdominal: She exhibits no mass  There is no tenderness  There is no rebound  Musculoskeletal: She exhibits no edema or tenderness  Feet:    Feet:   Right Foot:   Skin Integrity: Negative for warmth  Left Foot:   Skin Integrity: Negative for warmth  Neurological: She is alert and oriented to person, place, and time  Skin: No rash noted  No erythema  Patient's shoes and socks removed  Right Foot/Ankle   Right Foot Inspection  Skin Exam: skin intact no warmth and no pre-ulcer                          Toe Exam: no swelling and erythema  Sensory       Monofilament testing: intact  Vascular  Capillary refills: < 3 seconds  The right DP pulse is 2+  Left Foot/Ankle  Left Foot Inspection  Skin Exam: skin intactno warmth and no pre-ulcer                         Toe Exam: no swelling and no erythema                   Sensory       Monofilament: intact  Vascular  Capillary refills: < 3 seconds  The left DP pulse is 2+  Assign Risk Category:  No deformity present; No loss of protective sensation;  No weak pulses       Risk: 0      Assessment/Plan:    Type 2 diabetes mellitus (HCC)  Lab Results   Component Value Date    HGBA1C 6 4 (H) 11/12/2018    Chronic asymptomatic fair control patient on metformin 500 mg twice a day tolerated well without any side effect patient already on Arb and will start her today on statin the patient does followed by Ophthalmology  No microalbuminuria    Hypertension  Chronic well controlled continue current medication  The patient on amlodipine 5 mg and she is on valsartan HCT 80/12 5 mg and patient on metoprolol   low-salt diet less than 2 g a day ,   low caffeine intake   regular aerobic exercise 20 to totally minute a day diet and important lose weight discussed with the patient      Paroxysmal atrial fibrillation (HCC)  Chronic asymptomatic rate is controlled patient already on metoprolol patient also on the blood thinner and Eliquis and patient tolerated well aware of risk of bleeding and patient does followed by Cardiology    Intraductal carcinoma in situ of right breast  Status post the partial mastectomy status post radiation and patient continue followed by Hematology Oncology    Pure hypercholesterolemia  Chronic , LDL sub therapeutic will start the patient on atorvastatin 10 mg once a day proper use of medication possible side effect discussed with the patient  Advised to maintain a low-fat low-cholesterol diet consult regarding potential comorbidity including cardiovascular disease consult regarding important weight loss      Vitamin D deficiency  Chronic asymptomatic fair control patient taking OTC med of vitamin-D but that she does not recall with the dosage patient will call us with the dosage will recommend patient to increase the knee diet which was vitamin-D       Diagnoses and all orders for this visit:    Type 2 diabetes mellitus without complication, without long-term current use of insulin ([facility] [address] )  -     Ambulatory referral to Ophthalmology; Future  -     atorvastatin (LIPITOR) 10 mg tablet; Take 1 tablet (10 mg total) by mouth daily  -     CBC and differential; Future  -     Comprehensive metabolic panel; Future  -     Hemoglobin A1C; Future  -     Lipid Panel with Direct LDL reflex; Future  -     TSH, 3rd generation with Free T4 reflex; Future    Essential hypertension  -     CBC and differential; Future  -     Comprehensive metabolic panel; Future  -     Hemoglobin A1C; Future  -     Lipid Panel with Direct LDL reflex; Future  -     TSH, 3rd generation with Free T4 reflex; Future    Paroxysmal atrial fibrillation (HCC)  -     CBC and differential; Future  -     Comprehensive metabolic panel; Future  -     Hemoglobin A1C; Future  -     Lipid Panel with Direct LDL reflex; Future  -     TSH, 3rd generation with Free T4 reflex; Future    Intraductal carcinoma in situ of right breast    Long term current use of anticoagulant therapy  -     CBC and differential; Future  -     Comprehensive metabolic panel; Future  -     Hemoglobin A1C; Future  -     Lipid Panel with Direct LDL reflex; Future  -     TSH, 3rd generation with Free T4 reflex; Future    Vitamin D deficiency    Pure hypercholesterolemia  -     CBC and differential; Future  -     Comprehensive metabolic panel; Future  -     Hemoglobin A1C; Future  -     Lipid Panel with Direct LDL reflex; Future  -     TSH, 3rd generation with Free T4 reflex;  Future

## 2019-01-16 ENCOUNTER — TELEPHONE (OUTPATIENT)
Dept: FAMILY MEDICINE CLINIC | Facility: CLINIC | Age: 79
End: 2019-01-16

## 2019-01-16 PROBLEM — M21.619 BUNION OF UNSPECIFIED FOOT: Status: ACTIVE | Noted: 2019-01-16

## 2019-01-16 NOTE — TELEPHONE ENCOUNTER
Daughter called stating that you wanted to know what type of vit d Sharath Pleasure is taking   She is on calcium 600 with vit d3

## 2019-01-16 NOTE — TELEPHONE ENCOUNTER
Vit D is low , and this will not be enough I recommend her to take extra vitamin-D 3 2000 International Units once a day

## 2019-01-17 NOTE — ASSESSMENT & PLAN NOTE
Chronic well controlled continue current medication  The patient on amlodipine 5 mg and she is on valsartan HCT 80/12 5 mg and patient on metoprolol   low-salt diet less than 2 g a day ,   low caffeine intake   regular aerobic exercise 20 to totally minute a day diet and important lose weight discussed with the patient

## 2019-01-17 NOTE — ASSESSMENT & PLAN NOTE
Status post the partial mastectomy status post radiation and patient continue followed by Hematology Oncology

## 2019-01-17 NOTE — ASSESSMENT & PLAN NOTE
Chronic , LDL sub therapeutic will start the patient on atorvastatin 10 mg once a day proper use of medication possible side effect discussed with the patient  Advised to maintain a low-fat low-cholesterol diet consult regarding potential comorbidity including cardiovascular disease consult regarding important weight loss

## 2019-01-17 NOTE — ASSESSMENT & PLAN NOTE
Chronic asymptomatic rate is controlled patient already on metoprolol patient also on the blood thinner and Eliquis and patient tolerated well aware of risk of bleeding and patient does followed by Cardiology

## 2019-01-17 NOTE — ASSESSMENT & PLAN NOTE
Lab Results   Component Value Date    HGBA1C 6 4 (H) 11/12/2018    Chronic asymptomatic fair control patient on metformin 500 mg twice a day tolerated well without any side effect patient already on Arb and will start her today on statin the patient does followed by Ophthalmology  No microalbuminuria

## 2019-01-28 LAB
LEFT EYE DIABETIC RETINOPATHY: NORMAL
RIGHT EYE DIABETIC RETINOPATHY: NORMAL

## 2019-02-12 ENCOUNTER — TELEPHONE (OUTPATIENT)
Dept: FAMILY MEDICINE CLINIC | Facility: CLINIC | Age: 79
End: 2019-02-12

## 2019-02-18 ENCOUNTER — APPOINTMENT (OUTPATIENT)
Dept: LAB | Facility: HOSPITAL | Age: 79
End: 2019-02-18
Payer: MEDICARE

## 2019-02-18 ENCOUNTER — TRANSCRIBE ORDERS (OUTPATIENT)
Dept: ADMINISTRATIVE | Facility: HOSPITAL | Age: 79
End: 2019-02-18

## 2019-02-18 DIAGNOSIS — M17.10 OSTEOARTHRITIS OF KNEE, UNSPECIFIED LATERALITY, UNSPECIFIED OSTEOARTHRITIS TYPE: ICD-10-CM

## 2019-02-18 DIAGNOSIS — E13.8 DIABETES MELLITUS OF OTHER TYPE WITH COMPLICATION, UNSPECIFIED WHETHER LONG TERM INSULIN USE: ICD-10-CM

## 2019-02-18 DIAGNOSIS — E13.8 DIABETES MELLITUS OF OTHER TYPE WITH COMPLICATION, UNSPECIFIED WHETHER LONG TERM INSULIN USE: Primary | ICD-10-CM

## 2019-02-18 DIAGNOSIS — M50.30 DDD (DEGENERATIVE DISC DISEASE), CERVICAL: Primary | ICD-10-CM

## 2019-02-18 LAB
ANION GAP SERPL CALCULATED.3IONS-SCNC: 9 MMOL/L (ref 5–14)
BUN SERPL-MCNC: 19 MG/DL (ref 5–25)
CALCIUM SERPL-MCNC: 9.9 MG/DL (ref 8.4–10.2)
CHLORIDE SERPL-SCNC: 100 MMOL/L (ref 97–108)
CO2 SERPL-SCNC: 30 MMOL/L (ref 22–30)
CREAT SERPL-MCNC: 0.79 MG/DL (ref 0.6–1.2)
GFR SERPL CREATININE-BSD FRML MDRD: 84 ML/MIN/1.73SQ M
GLUCOSE P FAST SERPL-MCNC: 112 MG/DL (ref 70–99)
POTASSIUM SERPL-SCNC: 3.4 MMOL/L (ref 3.6–5)
SODIUM SERPL-SCNC: 139 MMOL/L (ref 137–147)

## 2019-02-18 PROCEDURE — 36415 COLL VENOUS BLD VENIPUNCTURE: CPT

## 2019-02-18 PROCEDURE — 80048 BASIC METABOLIC PNL TOTAL CA: CPT

## 2019-02-18 RX ORDER — GABAPENTIN 100 MG/1
100 CAPSULE ORAL 2 TIMES DAILY
Qty: 120 CAPSULE | Refills: 0 | Status: SHIPPED | OUTPATIENT
Start: 2019-02-18 | End: 2019-03-13 | Stop reason: SDUPTHER

## 2019-02-25 ENCOUNTER — CONSULT (OUTPATIENT)
Dept: FAMILY MEDICINE CLINIC | Facility: CLINIC | Age: 79
End: 2019-02-25
Payer: MEDICARE

## 2019-02-25 VITALS
WEIGHT: 159 LBS | DIASTOLIC BLOOD PRESSURE: 80 MMHG | SYSTOLIC BLOOD PRESSURE: 140 MMHG | TEMPERATURE: 97.5 F | HEART RATE: 80 BPM | BODY MASS INDEX: 29.26 KG/M2 | HEIGHT: 62 IN | OXYGEN SATURATION: 97 %

## 2019-02-25 DIAGNOSIS — R07.81 RIB PAIN ON RIGHT SIDE: ICD-10-CM

## 2019-02-25 DIAGNOSIS — E66.3 OVERWEIGHT (BMI 25.0-29.9): ICD-10-CM

## 2019-02-25 DIAGNOSIS — Z01.818 PRE-OP EXAMINATION: Primary | ICD-10-CM

## 2019-02-25 DIAGNOSIS — Z23 NEED FOR DIPHTHERIA-TETANUS-PERTUSSIS (TDAP) VACCINE: ICD-10-CM

## 2019-02-25 PROCEDURE — 99214 OFFICE O/P EST MOD 30 MIN: CPT | Performed by: FAMILY MEDICINE

## 2019-02-25 PROCEDURE — 93000 ELECTROCARDIOGRAM COMPLETE: CPT | Performed by: FAMILY MEDICINE

## 2019-02-25 RX ORDER — POTASSIUM CHLORIDE 1500 MG/1
1 TABLET, FILM COATED, EXTENDED RELEASE ORAL EVERY 24 HOURS
COMMUNITY
Start: 2016-10-19 | End: 2019-08-20 | Stop reason: ALTCHOICE

## 2019-02-25 RX ORDER — NEPAFENAC 0.3 %
SUSPENSION, DROPS(FINAL DOSAGE FORM)(ML) OPHTHALMIC (EYE)
COMMUNITY
Start: 2019-02-08 | End: 2019-05-21 | Stop reason: ALTCHOICE

## 2019-02-25 RX ORDER — BESIFLOXACIN 6 MG/ML
SUSPENSION OPHTHALMIC
COMMUNITY
Start: 2019-02-15 | End: 2019-05-21 | Stop reason: ALTCHOICE

## 2019-02-25 RX ORDER — TRIPROLIDINE/PSEUDOEPHEDRINE 2.5MG-60MG
TABLET ORAL
COMMUNITY
Start: 2019-02-08 | End: 2019-05-21 | Stop reason: ALTCHOICE

## 2019-02-25 NOTE — PATIENT INSTRUCTIONS

## 2019-02-25 NOTE — ASSESSMENT & PLAN NOTE
Symptomatic recommended Tylenol for the pain we discussed the using a heating pad the and massage on the area of the symptom persistent plan for x-ray

## 2019-02-25 NOTE — ASSESSMENT & PLAN NOTE
The patient having cataract surgery on the right eye March 4, 2019 and on the left eye it in March 18, 2019  Minor surgery  The functional capacity for the patient is fair  Patient on anticoagulation we will hold the Eliquis 48 hours before the surgery start the 2nd day patient will hold aspirin 5 days before the surgery patient not on any nonsteroidal anti-inflammatory drugs

## 2019-02-25 NOTE — ASSESSMENT & PLAN NOTE
The patient BMI 29 5 for we discussed with the patient increase the physical activity totally minute a day 5 days a week 0 or 150 minutes a week patient has been recommended the to the watch for portion control cut down in the carbohydrate

## 2019-02-25 NOTE — PROGRESS NOTES
Subjective:   Chief Complaint   Patient presents with    Pre-op Exam    Other     rib pain        Patient ID: Gala Freeman is a 69 y o  female  Patient here for preop clearance having cataract surgery on her right eye on now on March 4th and the left eye in March 18 the patient deny any chest pain short of breath no palpitation no headache no blurred vision no weakness or lateralized of the symptom no abdomen pain nausea vomiting or diarrhea no renal problem patient able to go up 2 flight of steps without short of breath patient is not smoker patient on Eliquis for proximal AFib and she is on aspirin and no personal or family history of bleeding disorder  Patient concerned about the pain on her right rib the area below the breast the and patient feel pain worse with certain range of the motion describe it as achy 5/10 no swelling no fall no trauma no rash the pain is not increasing with breathing or was exertion      The following portions of the patient's history were reviewed and updated as appropriate: allergies, current medications, past family history, past medical history, past social history, past surgical history and problem list     Review of Systems   Constitutional: Negative for fatigue and fever  HENT: Negative for ear pain, sinus pressure, sinus pain and sore throat  Eyes: Negative for pain and redness  Respiratory: Negative for cough, chest tightness and shortness of breath  Cardiovascular: Negative for chest pain, palpitations and leg swelling  Gastrointestinal: Negative for abdominal pain, blood in stool, constipation, diarrhea and nausea  Genitourinary: Negative for flank pain, frequency and hematuria  Musculoskeletal: Negative for back pain and joint swelling  Rib pain   Skin: Negative for rash  Neurological: Negative for dizziness, numbness and headaches  Hematological: Does not bruise/bleed easily           Objective:  Vitals:    02/25/19 1335   BP: 140/80 Pulse: 80   Temp: 97 5 °F (36 4 °C)   TempSrc: Oral   SpO2: 97%   Weight: 72 1 kg (159 lb)   Height: 5' 1 5" (1 562 m)      Physical Exam   Constitutional: She is oriented to person, place, and time  She appears well-developed and well-nourished  HENT:   Head: Normocephalic  Right Ear: External ear normal    Left Ear: External ear normal    Eyes: Conjunctivae and EOM are normal  Right eye exhibits no discharge  Left eye exhibits no discharge  Neck: No JVD present  Cardiovascular: Normal rate, regular rhythm and normal heart sounds  Exam reveals no gallop  No murmur heard  Pulmonary/Chest: Effort normal  No respiratory distress  She has no wheezes  She has no rales  She exhibits no tenderness  Abdominal: She exhibits no mass  There is no tenderness  There is no rebound  Musculoskeletal: She exhibits tenderness  She exhibits no edema  Tenderness with palpitation on the right rib 7-8 no erythema no swelling   Neurological: She is alert and oriented to person, place, and time  Skin: No rash noted  No erythema  Assessment/Plan:    Overweight (BMI 25 0-29  9)  The patient BMI 29 5 for we discussed with the patient increase the physical activity totally minute a day 5 days a week 0 or 150 minutes a week patient has been recommended the to the watch for portion control cut down in the carbohydrate    Pre-op examination  The patient having cataract surgery on the right eye March 4, 2019 and on the left eye it in March 18, 2019  Minor surgery  The functional capacity for the patient is fair  Patient on anticoagulation we will hold the Eliquis 48 hours before the surgery start the 2nd day patient will hold aspirin 5 days before the surgery patient not on any nonsteroidal anti-inflammatory drugs    Rib pain on right side  Symptomatic recommended Tylenol for the pain we discussed the using a heating pad the and massage on the area of the symptom persistent plan for x-ray       Diagnoses and all orders for this visit:    Pre-op examination  -     POCT ECG    Overweight (BMI 25 0-29  9)    Rib pain on right side    Need for diphtheria-tetanus-pertussis (Tdap) vaccine  -     tetanus-diphtheria-acellular pertussis (BOOSTRIX) injection; Inject 0 5 mL into a muscle once for 1 dose    Other orders  -     BESIVANCE 0 6 % SUSP  -     DUREZOL 0 05 % EMUL  -     ILEVRO 0 3 % SUSP  -     Potassium Chloride ER (K-TAB) 20 MEQ TBCR; 1 tablet every 24 hours  -     Capsaicin (ARTHRITIS PAIN RELIEF EX); Apply topically as needed          BMI Counseling: Body mass index is 29 56 kg/m²  Discussed the patient's BMI with her  The BMI is above average  BMI counseling and education was provided to the patient  Nutrition recommendations include reducing portion sizes, decreasing overall calorie intake, 3-5 servings of fruits/vegetables daily, reducing fast food intake, consuming healthier snacks, decreasing soda and/or juice intake, moderation in carbohydrate intake and reducing intake of cholesterol  Exercise recommendations include moderate aerobic physical activity for 150 minutes/week

## 2019-03-06 ENCOUNTER — OFFICE VISIT (OUTPATIENT)
Dept: NEUROLOGY | Facility: CLINIC | Age: 79
End: 2019-03-06
Payer: MEDICARE

## 2019-03-06 VITALS
DIASTOLIC BLOOD PRESSURE: 90 MMHG | HEART RATE: 67 BPM | BODY MASS INDEX: 28.81 KG/M2 | RESPIRATION RATE: 18 BRPM | SYSTOLIC BLOOD PRESSURE: 142 MMHG | WEIGHT: 162.6 LBS | HEIGHT: 63 IN

## 2019-03-06 DIAGNOSIS — G44.219 EPISODIC TENSION-TYPE HEADACHE, NOT INTRACTABLE: Primary | ICD-10-CM

## 2019-03-06 DIAGNOSIS — M79.2 NEURALGIA: ICD-10-CM

## 2019-03-06 PROCEDURE — 99213 OFFICE O/P EST LOW 20 MIN: CPT | Performed by: PSYCHIATRY & NEUROLOGY

## 2019-03-06 RX ORDER — AMOXICILLIN 250 MG
1 CAPSULE ORAL
COMMUNITY
Start: 2017-01-31 | End: 2019-11-18

## 2019-03-06 RX ORDER — POLYETHYLENE GLYCOL 3350 17 G/17G
17 POWDER, FOR SOLUTION ORAL
COMMUNITY
Start: 2017-01-31 | End: 2019-06-04 | Stop reason: SDUPTHER

## 2019-03-06 NOTE — PROGRESS NOTES
Patient is here today for neuralgia    Patient ID: Ana Rooney is a 69 y o  female  Assessment/Plan:    Headache  Overall improved to the point where she is satisfied with her current level of control, describing it in the recent past only scattered mild tension-type headaches  Neuralgia  Left supraorbital region, posttraumatic appearing  On her current gabapentin schedule, which is quite low, she has noted improvement  However, she still finds the discomfort from that particular area to be annoying  Tolerating her low-dose gabapentin without any adverse side effects  Hopeful for improved control   --has been in the past intolerant to large dose increases in gabapentin  As result, will advance much more slowly  She will now increase to gabapentin 100 mg capsules taking 1 capsule 3 times daily (a m , mid to late afternoon and bedtime)  --I have asked that she call the office with a status report in 2 weeks or before then should she have any questions or concerns  She will follow up in 8 weeks  Subjective:    HPI  Patient, now 69years of age, is being followed in this office with her history of headaches, tension type, along with a post traumatic left supra orbital neuralgia  From the standpoint of her headaches, she has actually done well, noting scattered headaches generally of mild degree and she is satisfied with her overall current level of control  On her current low-dose gabapentin schedule at 100 mg twice daily she is experiencing some improvement with regard to the neuralgia involving the left supraorbital area  However, she continues to find the discomfort annoying  She is hopeful for additional control  There been no symptoms to suggest any adverse side effects from her current schedule of gabapentin  However, she does make note that in the past someone attempted to start her gabapentin at a much higher schedule and she became quite drowsy      Past Medical History: Diagnosis Date    Allergic rhinitis     Arthritis     Atrial fibrillation (HCC)     Breast cancer (HCC)     Cataracts, bilateral     Chronic headaches     pulsatile daily headaches    Colitis     Coronary artery disease     Diabetes mellitus ([address] )     DJD (degenerative joint disease), cervical     Facial neuralgia     left frontal facial post traumatic neuralgia    Fibromyalgia     Glaucoma     History of external beam radiation therapy     8/16/18 to 9/14/2018 Right breast    Hypertension     Hypokalemia     Hypovitaminosis D     Osteoarthritis     Prediabetes     Sleep apnea     Vertigo      Past Surgical History:   Procedure Laterality Date    BREAST BIOPSY      BREAST LUMPECTOMY      CHOLECYSTECTOMY      COLONOSCOPY  2013    HYSTERECTOMY      MAMMO NEEDLE LOCALIZATION RIGHT (ALL INC) Right 6/21/2018    MAMMO STEREOTACTIC BREAST BIOPSY RIGHT (ALL INC) Right 5/9/2018    TONSILLECTOMY      US GUIDED BREAST BIOPSY RIGHT COMPLETE Right 5/9/2018     Social History     Socioeconomic History    Marital status: Single     Spouse name: None    Number of children: None    Years of education: None    Highest education level: None   Occupational History    None   Social Needs    Financial resource strain: None    Food insecurity:     Worry: None     Inability: None    Transportation needs:     Medical: None     Non-medical: None   Tobacco Use    Smoking status: Never Smoker    Smokeless tobacco: Never Used    Tobacco comment: no smoke exposure   Substance and Sexual Activity    Alcohol use: Yes     Comment: occasional    Drug use: No    Sexual activity: None   Lifestyle    Physical activity:     Days per week: None     Minutes per session: None    Stress: None   Relationships    Social connections:     Talks on phone: None     Gets together: None     Attends [employer] service: None     Active member of club or organization: None     Attends meetings of clubs or organizations: None     Relationship status: None    Intimate partner violence:     Fear of current or ex partner: None     Emotionally abused: None     Physically abused: None     Forced sexual activity: None   Other Topics Concern    None   Social History Narrative    None     Family History   Problem Relation Age of Onset    Heart attack Sister     Hypertension Family     Diabetes Family     Stroke Family     Migraines Family     Breast cancer Daughter 28     No Known Allergies    Current Outpatient Medications:     acetaminophen (TYLENOL) 500 mg tablet, Take 1,000 mg by mouth every 8 (eight) hours as needed, Disp: , Rfl:     amLODIPine (NORVASC) 5 mg tablet, Take 1 tablet (5 mg total) by mouth daily, Disp: 30 tablet, Rfl: 3    apixaban (ELIQUIS) 5 mg, Take 1 tablet (5 mg total) by mouth 2 (two) times a day, Disp: 60 tablet, Rfl: 2    aspirin (ECOTRIN LOW STRENGTH) 81 mg EC tablet, Take 81 mg by mouth daily, Disp: , Rfl:     atorvastatin (LIPITOR) 10 mg tablet, Take 1 tablet (10 mg total) by mouth daily, Disp: 30 tablet, Rfl: 2    BESIVANCE 0 6 % SUSP, , Disp: , Rfl:     Capsaicin (ARTHRITIS PAIN RELIEF EX), Apply topically as needed, Disp: , Rfl:     DUREZOL 0 05 % EMUL, , Disp: , Rfl:     gabapentin (NEURONTIN) 100 mg capsule, Take 1 capsule (100 mg total) by mouth 2 (two) times a day, Disp: 120 capsule, Rfl: 0    ibuprofen (MOTRIN) 400 mg tablet, Take 400 mg by mouth every 6 (six) hours as needed, Disp: , Rfl:     ILEVRO 0 3 % SUSP, , Disp: , Rfl:     metFORMIN (GLUCOPHAGE) 500 mg tablet, metformin 500 mg tablet, Disp: , Rfl:     metoprolol tartrate (LOPRESSOR) 25 mg tablet, Take 1 tablet (25 mg total) by mouth every 12 (twelve) hours, Disp: 60 tablet, Rfl: 5    polyethylene glycol (MIRALAX) 17 g packet, Take 17 g by mouth, Disp: , Rfl:     Potassium Chloride ER (K-TAB) 20 MEQ TBCR, 1 tablet every 24 hours, Disp: , Rfl:     senna-docusate sodium (SENOKOT S) 8 6-50 mg per tablet, Take 1 tablet by mouth, Disp: , Rfl:     valsartan-hydrochlorothiazide (DIOVAN HCT) 80-12 5 MG per tablet, Take 1 tablet by mouth every 24 hours, Disp: 90 tablet, Rfl: 2    Objective:    Blood pressure 142/90, pulse 67, resp  rate 18, height 5' 2 5" (1 588 m), weight 73 8 kg (162 lb 9 6 oz), not currently breastfeeding  Physical Exam  Lungs clear to auscultation  Rhythm regular  Neurological Exam  Alert  Pleasantly interactive  Fully oriented  Unremarkable spontaneous gait  Cranial nerves 2-12 tested and grossly intact except for evidence of recent right cataract surgery as well as a subtle left facial asymmetry which is unchanged from the past   Today she described not a reduction in pin in the left supraorbital area but more an element of hypersensitivity  Funduscopic examination with marginated discs bilaterally  Essentially full symmetrical strength throughout the 4 extremities with no upper extremity drift  Sensory testing grossly intact to pin and position throughout  Muscle stretch reflexes bilaterally 1 throughout the upper extremities and at the knees and bilaterally 1+ at the ankles  Toe response downgoing bilaterally  ROS:    Review of Systems   Constitutional: Negative  Negative for appetite change and fever  HENT: Negative  Negative for hearing loss, tinnitus, trouble swallowing and voice change  Eyes: Negative  Negative for photophobia and pain  Respiratory: Negative  Negative for shortness of breath  Cardiovascular: Negative  Negative for palpitations  Gastrointestinal: Positive for diarrhea  Negative for nausea and vomiting  Endocrine: Negative  Negative for cold intolerance and heat intolerance  Genitourinary: Positive for frequency and urgency  Negative for dysuria  Musculoskeletal: Negative  Negative for myalgias and neck pain  Skin: Negative  Negative for rash  Allergic/Immunologic: Negative  Neurological: Positive for dizziness, light-headedness and headaches  Negative for tremors, seizures, syncope, facial asymmetry, speech difficulty, weakness and numbness  Hematological: Negative  Does not bruise/bleed easily  Psychiatric/Behavioral: Negative  Negative for confusion, hallucinations and sleep disturbance  I personally reviewed the ROS that was entered by the medical assistant  *Please note this document was created using voice recognition software and may contain sound-alike word errors  *

## 2019-03-06 NOTE — ASSESSMENT & PLAN NOTE
Overall improved to the point where she is satisfied with her current level of control, describing it in the recent past only scattered mild tension-type headaches

## 2019-03-06 NOTE — ASSESSMENT & PLAN NOTE
Left supraorbital region, posttraumatic appearing  On her current gabapentin schedule, which is quite low, she has noted improvement  However, she still finds the discomfort from that particular area to be annoying  Tolerating her low-dose gabapentin without any adverse side effects  Hopeful for improved control   --has been in the past intolerant to large dose increases in gabapentin  As result, will advance much more slowly  She will now increase to gabapentin 100 mg capsules taking 1 capsule 3 times daily (a m , mid to late afternoon and bedtime)  --I have asked that she call the office with a status report in 2 weeks or before then should she have any questions or concerns

## 2019-03-06 NOTE — PATIENT INSTRUCTIONS
Increase gabapentin using 100 mg capsules to 1 capsule 3 times a day  Please call in 2 weeks with a report as to how you are doing or before then should she have any questions or concerns

## 2019-03-06 NOTE — LETTER
March 6, 2019     Sae Lopez MD  [address]  [address]    Patient: Shelbi Bonilla   YOB: 1940   Date of Visit: 3/6/2019       Dear Dr Ulisses Russell: Thank you for referring Shelbi Bonilla to me for evaluation  Below are my notes for this consultation  If you have questions, please do not hesitate to call me  I look forward to following your patient along with you  Sincerely,        Mayra Gallardo MD        CC: No Recipients  Mayra Gallardo MD  3/6/2019  1:27 PM  Sign at close encounter  Patient is here today for neuralgia    Patient ID: Shelbi Bonilla is a 69 y o  female  Assessment/Plan:    Headache  Overall improved to the point where she is satisfied with her current level of control, describing it in the recent past only scattered mild tension-type headaches  Neuralgia  Left supraorbital region, posttraumatic appearing  On her current gabapentin schedule, which is quite low, she has noted improvement  However, she still finds the discomfort from that particular area to be annoying  Tolerating her low-dose gabapentin without any adverse side effects  Hopeful for improved control   --has been in the past intolerant to large dose increases in gabapentin  As result, will advance much more slowly  She will now increase to gabapentin 100 mg capsules taking 1 capsule 3 times daily (a m , mid to late afternoon and bedtime)  --I have asked that she call the office with a status report in 2 weeks or before then should she have any questions or concerns  She will follow up in 8 weeks  Subjective:    HPI  Patient, now 69years of age, is being followed in this office with her history of headaches, tension type, along with a post traumatic left supra orbital neuralgia    From the standpoint of her headaches, she has actually done well, noting scattered headaches generally of mild degree and she is satisfied with her overall current level of control  On her current low-dose gabapentin schedule at 100 mg twice daily she is experiencing some improvement with regard to the neuralgia involving the left supraorbital area  However, she continues to find the discomfort annoying  She is hopeful for additional control  There been no symptoms to suggest any adverse side effects from her current schedule of gabapentin  However, she does make note that in the past someone attempted to start her gabapentin at a much higher schedule and she became quite drowsy      Past Medical History:   Diagnosis Date    Allergic rhinitis     Arthritis     Atrial fibrillation (HCC)     Breast cancer (HCC)     Cataracts, bilateral     Chronic headaches     pulsatile daily headaches    Colitis     Coronary artery disease     Diabetes mellitus ([facility] [address] [facility]     DJD (degenerative joint disease), cervical     Facial neuralgia     left frontal facial post traumatic neuralgia    Fibromyalgia     Glaucoma     History of external beam radiation therapy     8/16/18 to 9/14/2018 Right breast    Hypertension     Hypokalemia     Hypovitaminosis D     Osteoarthritis     Prediabetes     Sleep apnea     Vertigo      Past Surgical History:   Procedure Laterality Date    BREAST BIOPSY      BREAST LUMPECTOMY      CHOLECYSTECTOMY      COLONOSCOPY  2013    HYSTERECTOMY      MAMMO NEEDLE LOCALIZATION RIGHT (ALL INC) Right 6/21/2018    MAMMO STEREOTACTIC BREAST BIOPSY RIGHT (ALL INC) Right 5/9/2018    TONSILLECTOMY      US GUIDED BREAST BIOPSY RIGHT COMPLETE Right 5/9/2018     Social History     Socioeconomic History    Marital status: Single     Spouse name: None    Number of children: None    Years of education: None    Highest education level: None   Occupational History    None   Social Needs    Financial resource strain: None    Food insecurity:     Worry: None     Inability: None    Transportation needs:     Medical: None     Non-medical: None   Tobacco Use    Smoking status: Never Smoker    Smokeless tobacco: Never Used    Tobacco comment: no smoke exposure   Substance and Sexual Activity    Alcohol use: Yes     Comment: occasional    Drug use: No    Sexual activity: None   Lifestyle    Physical activity:     Days per week: None     Minutes per session: None    Stress: None   Relationships    Social connections:     Talks on phone: None     Gets together: None     Attends Evangelical service: None     Active member of club or organization: None     Attends meetings of clubs or organizations: None     Relationship status: None    Intimate partner violence:     Fear of current or ex partner: None     Emotionally abused: None     Physically abused: None     Forced sexual activity: None   Other Topics Concern    None   Social History Narrative    None     Family History   Problem Relation Age of Onset    Heart attack Sister     Hypertension Family     Diabetes Family     Stroke Family     Migraines Family     Breast cancer Daughter 35     No Known Allergies    Current Outpatient Medications:     acetaminophen (TYLENOL) 500 mg tablet, Take 1,000 mg by mouth every 8 (eight) hours as needed, Disp: , Rfl:     amLODIPine (NORVASC) 5 mg tablet, Take 1 tablet (5 mg total) by mouth daily, Disp: 30 tablet, Rfl: 3    apixaban (ELIQUIS) 5 mg, Take 1 tablet (5 mg total) by mouth 2 (two) times a day, Disp: 60 tablet, Rfl: 2    aspirin (ECOTRIN LOW STRENGTH) 81 mg EC tablet, Take 81 mg by mouth daily, Disp: , Rfl:     atorvastatin (LIPITOR) 10 mg tablet, Take 1 tablet (10 mg total) by mouth daily, Disp: 30 tablet, Rfl: 2    BESIVANCE 0 6 % SUSP, , Disp: , Rfl:     Capsaicin (ARTHRITIS PAIN RELIEF EX), Apply topically as needed, Disp: , Rfl:     DUREZOL 0 05 % EMUL, , Disp: , Rfl:     gabapentin (NEURONTIN) 100 mg capsule, Take 1 capsule (100 mg total) by mouth 2 (two) times a day, Disp: 120 capsule, Rfl: 0    ibuprofen (MOTRIN) 400 mg tablet, Take 400 mg by mouth every 6 (six) hours as needed, Disp: , Rfl:     ILEVRO 0 3 % SUSP, , Disp: , Rfl:     metFORMIN (GLUCOPHAGE) 500 mg tablet, metformin 500 mg tablet, Disp: , Rfl:     metoprolol tartrate (LOPRESSOR) 25 mg tablet, Take 1 tablet (25 mg total) by mouth every 12 (twelve) hours, Disp: 60 tablet, Rfl: 5    polyethylene glycol (MIRALAX) 17 g packet, Take 17 g by mouth, Disp: , Rfl:     Potassium Chloride ER (K-TAB) 20 MEQ TBCR, 1 tablet every 24 hours, Disp: , Rfl:     senna-docusate sodium (SENOKOT S) 8 6-50 mg per tablet, Take 1 tablet by mouth, Disp: , Rfl:     valsartan-hydrochlorothiazide (DIOVAN HCT) 80-12 5 MG per tablet, Take 1 tablet by mouth every 24 hours, Disp: 90 tablet, Rfl: 2    Objective:    Blood pressure 142/90, pulse 67, resp  rate 18, height 5' 2 5" (1 588 m), weight 73 8 kg (162 lb 9 6 oz), not currently breastfeeding  Physical Exam  Lungs clear to auscultation  Rhythm regular  Neurological Exam  Alert  Pleasantly interactive  Fully oriented  Unremarkable spontaneous gait  Cranial nerves 2-12 tested and grossly intact except for evidence of recent right cataract surgery as well as a subtle left facial asymmetry which is unchanged from the past   Today she described not a reduction in pin in the left supraorbital area but more an element of hypersensitivity  Funduscopic examination with marginated discs bilaterally  Essentially full symmetrical strength throughout the 4 extremities with no upper extremity drift  Sensory testing grossly intact to pin and position throughout  Muscle stretch reflexes bilaterally 1 throughout the upper extremities and at the knees and bilaterally 1+ at the ankles  Toe response downgoing bilaterally  ROS:    Review of Systems   Constitutional: Negative  Negative for appetite change and fever  HENT: Negative  Negative for hearing loss, tinnitus, trouble swallowing and voice change  Eyes: Negative  Negative for photophobia and pain  Respiratory: Negative  Negative for shortness of breath  Cardiovascular: Negative  Negative for palpitations  Gastrointestinal: Positive for diarrhea  Negative for nausea and vomiting  Endocrine: Negative  Negative for cold intolerance and heat intolerance  Genitourinary: Positive for frequency and urgency  Negative for dysuria  Musculoskeletal: Negative  Negative for myalgias and neck pain  Skin: Negative  Negative for rash  Allergic/Immunologic: Negative  Neurological: Positive for dizziness, light-headedness and headaches  Negative for tremors, seizures, syncope, facial asymmetry, speech difficulty, weakness and numbness  Hematological: Negative  Does not bruise/bleed easily  Psychiatric/Behavioral: Negative  Negative for confusion, hallucinations and sleep disturbance  I personally reviewed the ROS that was entered by the medical assistant  *Please note this document was created using voice recognition software and may contain sound-alike word errors  *

## 2019-03-13 DIAGNOSIS — I10 ESSENTIAL HYPERTENSION: ICD-10-CM

## 2019-03-13 DIAGNOSIS — E11.9 TYPE 2 DIABETES MELLITUS WITHOUT COMPLICATION, WITHOUT LONG-TERM CURRENT USE OF INSULIN (HCC): Primary | ICD-10-CM

## 2019-03-13 DIAGNOSIS — M17.10 OSTEOARTHRITIS OF KNEE, UNSPECIFIED LATERALITY, UNSPECIFIED OSTEOARTHRITIS TYPE: ICD-10-CM

## 2019-03-13 DIAGNOSIS — M50.30 DDD (DEGENERATIVE DISC DISEASE), CERVICAL: ICD-10-CM

## 2019-03-13 DIAGNOSIS — M48.02 CERVICAL SPINAL STENOSIS: ICD-10-CM

## 2019-03-13 DIAGNOSIS — I48.91 ATRIAL FIBRILLATION, UNSPECIFIED TYPE (HCC): ICD-10-CM

## 2019-03-13 RX ORDER — TELMISARTAN AND HYDROCHLORTHIAZIDE 40; 12.5 MG/1; MG/1
1 TABLET ORAL DAILY
Qty: 30 TABLET | Refills: 2 | Status: SHIPPED | OUTPATIENT
Start: 2019-03-13 | End: 2019-03-14 | Stop reason: ALTCHOICE

## 2019-03-13 RX ORDER — GABAPENTIN 100 MG/1
100 CAPSULE ORAL 2 TIMES DAILY
Qty: 120 CAPSULE | Refills: 0 | Status: SHIPPED | OUTPATIENT
Start: 2019-03-13 | End: 2019-07-25 | Stop reason: SDUPTHER

## 2019-03-13 NOTE — TELEPHONE ENCOUNTER
Incoming fax next appointment 5/21/2019    Please send alternative for valsartan-hydrochlorathiazide 80-12 5mg

## 2019-03-14 DIAGNOSIS — I10 ESSENTIAL HYPERTENSION: Primary | ICD-10-CM

## 2019-03-14 RX ORDER — IRBESARTAN AND HYDROCHLOROTHIAZIDE 150; 12.5 MG/1; MG/1
1 TABLET, FILM COATED ORAL DAILY
Qty: 30 TABLET | Refills: 2 | Status: SHIPPED | OUTPATIENT
Start: 2019-03-14 | End: 2019-07-25 | Stop reason: SDUPTHER

## 2019-04-08 DIAGNOSIS — E11.9 TYPE 2 DIABETES MELLITUS WITHOUT COMPLICATION, WITHOUT LONG-TERM CURRENT USE OF INSULIN (HCC): ICD-10-CM

## 2019-04-08 RX ORDER — ATORVASTATIN CALCIUM 10 MG/1
10 TABLET, FILM COATED ORAL DAILY
Qty: 90 TABLET | Refills: 1 | Status: SHIPPED | OUTPATIENT
Start: 2019-04-08 | End: 2019-09-17 | Stop reason: SDUPTHER

## 2019-04-23 ENCOUNTER — CLINICAL SUPPORT (OUTPATIENT)
Dept: RADIATION ONCOLOGY | Facility: CLINIC | Age: 79
End: 2019-04-23
Attending: RADIOLOGY
Payer: MEDICARE

## 2019-04-23 VITALS
BODY MASS INDEX: 28.28 KG/M2 | DIASTOLIC BLOOD PRESSURE: 72 MMHG | TEMPERATURE: 97.2 F | SYSTOLIC BLOOD PRESSURE: 112 MMHG | HEART RATE: 66 BPM | HEIGHT: 63 IN | WEIGHT: 159.6 LBS | RESPIRATION RATE: 16 BRPM

## 2019-04-23 DIAGNOSIS — D05.11 INTRADUCTAL CARCINOMA IN SITU OF RIGHT BREAST: Primary | ICD-10-CM

## 2019-04-23 PROCEDURE — 99215 OFFICE O/P EST HI 40 MIN: CPT | Performed by: RADIOLOGY

## 2019-05-06 ENCOUNTER — APPOINTMENT (OUTPATIENT)
Dept: LAB | Facility: HOSPITAL | Age: 79
End: 2019-05-06
Payer: MEDICARE

## 2019-05-06 DIAGNOSIS — I48.0 PAROXYSMAL ATRIAL FIBRILLATION (HCC): ICD-10-CM

## 2019-05-06 DIAGNOSIS — E78.00 PURE HYPERCHOLESTEROLEMIA: ICD-10-CM

## 2019-05-06 DIAGNOSIS — I10 ESSENTIAL HYPERTENSION: ICD-10-CM

## 2019-05-06 DIAGNOSIS — Z79.01 LONG TERM CURRENT USE OF ANTICOAGULANT THERAPY: ICD-10-CM

## 2019-05-06 DIAGNOSIS — E11.9 TYPE 2 DIABETES MELLITUS WITHOUT COMPLICATION, WITHOUT LONG-TERM CURRENT USE OF INSULIN (HCC): ICD-10-CM

## 2019-05-06 LAB
ALBUMIN SERPL BCP-MCNC: 4.5 G/DL (ref 3–5.2)
ALP SERPL-CCNC: 77 U/L (ref 43–122)
ALT SERPL W P-5'-P-CCNC: 24 U/L (ref 9–52)
ANION GAP SERPL CALCULATED.3IONS-SCNC: 10 MMOL/L (ref 5–14)
AST SERPL W P-5'-P-CCNC: 20 U/L (ref 14–36)
BASOPHILS # BLD AUTO: 0 THOUSANDS/ΜL (ref 0–0.1)
BASOPHILS NFR BLD AUTO: 0 % (ref 0–1)
BILIRUB SERPL-MCNC: 1.6 MG/DL
BUN SERPL-MCNC: 20 MG/DL (ref 5–25)
CALCIUM ALBUM COR SERPL-MCNC: 9.8 MG/DL (ref 8.3–10.1)
CALCIUM SERPL-MCNC: 10.2 MG/DL (ref 8.4–10.2)
CHLORIDE SERPL-SCNC: 101 MMOL/L (ref 97–108)
CHOLEST SERPL-MCNC: 102 MG/DL
CO2 SERPL-SCNC: 30 MMOL/L (ref 22–30)
CREAT SERPL-MCNC: 0.74 MG/DL (ref 0.6–1.2)
EOSINOPHIL # BLD AUTO: 0 THOUSAND/ΜL (ref 0–0.4)
EOSINOPHIL NFR BLD AUTO: 0 % (ref 0–6)
ERYTHROCYTE [DISTWIDTH] IN BLOOD BY AUTOMATED COUNT: 13.7 %
EST. AVERAGE GLUCOSE BLD GHB EST-MCNC: 128 MG/DL
GFR SERPL CREATININE-BSD FRML MDRD: 90 ML/MIN/1.73SQ M
GLUCOSE P FAST SERPL-MCNC: 97 MG/DL (ref 70–99)
HBA1C MFR BLD: 6.1 % (ref 4.2–6.3)
HCT VFR BLD AUTO: 42.7 % (ref 36–46)
HDLC SERPL-MCNC: 54 MG/DL (ref 40–59)
HGB BLD-MCNC: 14.6 G/DL (ref 12–16)
LDLC SERPL CALC-MCNC: 32 MG/DL
LYMPHOCYTES # BLD AUTO: 1.5 THOUSANDS/ΜL (ref 0.5–4)
LYMPHOCYTES NFR BLD AUTO: 39 % (ref 25–45)
MCH RBC QN AUTO: 31.8 PG (ref 26–34)
MCHC RBC AUTO-ENTMCNC: 34.2 G/DL (ref 31–36)
MCV RBC AUTO: 93 FL (ref 80–100)
MONOCYTES # BLD AUTO: 0.4 THOUSAND/ΜL (ref 0.2–0.9)
MONOCYTES NFR BLD AUTO: 10 % (ref 1–10)
NEUTROPHILS # BLD AUTO: 2 THOUSANDS/ΜL (ref 1.8–7.8)
NEUTS SEG NFR BLD AUTO: 50 % (ref 45–65)
PLATELET # BLD AUTO: 188 THOUSANDS/UL (ref 150–450)
PMV BLD AUTO: 9.4 FL (ref 8.9–12.7)
POTASSIUM SERPL-SCNC: 3.6 MMOL/L (ref 3.6–5)
PROT SERPL-MCNC: 7.5 G/DL (ref 5.9–8.4)
RBC # BLD AUTO: 4.6 MILLION/UL (ref 4–5.2)
SODIUM SERPL-SCNC: 141 MMOL/L (ref 137–147)
TRIGL SERPL-MCNC: 78 MG/DL
TSH SERPL DL<=0.05 MIU/L-ACNC: 0.74 UIU/ML (ref 0.47–4.68)
WBC # BLD AUTO: 3.9 THOUSAND/UL (ref 4.5–11)

## 2019-05-06 PROCEDURE — 84443 ASSAY THYROID STIM HORMONE: CPT

## 2019-05-06 PROCEDURE — 36415 COLL VENOUS BLD VENIPUNCTURE: CPT

## 2019-05-06 PROCEDURE — 83036 HEMOGLOBIN GLYCOSYLATED A1C: CPT

## 2019-05-06 PROCEDURE — 80053 COMPREHEN METABOLIC PANEL: CPT

## 2019-05-06 PROCEDURE — 85025 COMPLETE CBC W/AUTO DIFF WBC: CPT

## 2019-05-06 PROCEDURE — 80061 LIPID PANEL: CPT

## 2019-05-21 ENCOUNTER — OFFICE VISIT (OUTPATIENT)
Dept: FAMILY MEDICINE CLINIC | Facility: CLINIC | Age: 79
End: 2019-05-21
Payer: MEDICARE

## 2019-05-21 VITALS
BODY MASS INDEX: 29.81 KG/M2 | SYSTOLIC BLOOD PRESSURE: 120 MMHG | HEART RATE: 63 BPM | HEIGHT: 62 IN | TEMPERATURE: 96.8 F | WEIGHT: 162 LBS | DIASTOLIC BLOOD PRESSURE: 80 MMHG | OXYGEN SATURATION: 96 %

## 2019-05-21 DIAGNOSIS — R42 VERTIGO: ICD-10-CM

## 2019-05-21 DIAGNOSIS — Z17.0 MALIGNANT NEOPLASM OF CENTRAL PORTION OF RIGHT BREAST IN FEMALE, ESTROGEN RECEPTOR POSITIVE (HCC): ICD-10-CM

## 2019-05-21 DIAGNOSIS — C50.111 MALIGNANT NEOPLASM OF CENTRAL PORTION OF RIGHT BREAST IN FEMALE, ESTROGEN RECEPTOR POSITIVE (HCC): ICD-10-CM

## 2019-05-21 DIAGNOSIS — E04.1 THYROID NODULE: ICD-10-CM
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
## 2019-05-21 DIAGNOSIS — E11.9 TYPE 2 DIABETES MELLITUS WITHOUT COMPLICATION, WITHOUT LONG-TERM CURRENT USE OF INSULIN (HCC): Primary | ICD-10-CM

## 2019-05-21 DIAGNOSIS — M85.88 OTHER SPECIFIED DISORDERS OF BONE DENSITY AND STRUCTURE, OTHER SITE: ICD-10-CM

## 2019-05-21 PROBLEM — S06.5XAA SDH (SUBDURAL HEMATOMA): Status: RESOLVED | Noted: 2017-01-31 | Resolved: 2019-05-21

## 2019-05-21 PROBLEM — R55 SYNCOPE AND COLLAPSE: Status: RESOLVED | Noted: 2018-12-01 | Resolved: 2019-05-21

## 2019-05-21 PROBLEM — S06.5X9A SDH (SUBDURAL HEMATOMA) (HCC): Status: RESOLVED | Noted: 2017-01-31 | Resolved: 2019-05-21

## 2019-05-21 PROBLEM — IMO0002 LUPUS: Status: RESOLVED | Noted: 2018-07-19 | Resolved: 2019-05-21

## 2019-05-21 PROBLEM — M32.9 LUPUS (HCC): Status: RESOLVED | Noted: 2018-07-19 | Resolved: 2019-05-21

## 2019-05-21 PROCEDURE — 99214 OFFICE O/P EST MOD 30 MIN: CPT | Performed by: FAMILY MEDICINE

## 2019-05-21 RX ORDER — MECLIZINE HYDROCHLORIDE 25 MG/1
25 TABLET ORAL DAILY
Qty: 15 TABLET | Refills: 0 | Status: SHIPPED | OUTPATIENT
Start: 2019-05-21 | End: 2020-02-22 | Stop reason: SDUPTHER

## 2019-05-29 ENCOUNTER — HOSPITAL ENCOUNTER (OUTPATIENT)
Dept: BONE DENSITY | Facility: CLINIC | Age: 79
Discharge: HOME | End: 2019-05-29
Payer: MEDICARE

## 2019-05-29 ENCOUNTER — HOSPITAL ENCOUNTER (OUTPATIENT)
Dept: ULTRASOUND IMAGING | Facility: HOSPITAL | Age: 79
Discharge: HOME | End: 2019-05-29
Payer: MEDICARE

## 2019-05-29 DIAGNOSIS — E04.1 THYROID NODULE: ICD-10-CM

## 2019-05-29 DIAGNOSIS — M85.88 OTHER SPECIFIED DISORDERS OF BONE DENSITY AND STRUCTURE, OTHER SITE: ICD-10-CM

## 2019-05-29 PROCEDURE — 77080 DXA BONE DENSITY AXIAL: CPT

## 2019-05-29 PROCEDURE — 76536 US EXAM OF HEAD AND NECK: CPT

## 2019-06-04 ENCOUNTER — OFFICE VISIT (OUTPATIENT)
Dept: FAMILY MEDICINE CLINIC | Facility: CLINIC | Age: 79
End: 2019-06-04
Payer: MEDICARE

## 2019-06-04 VITALS
HEIGHT: 62 IN | BODY MASS INDEX: 29.81 KG/M2 | TEMPERATURE: 97 F | SYSTOLIC BLOOD PRESSURE: 130 MMHG | DIASTOLIC BLOOD PRESSURE: 80 MMHG | OXYGEN SATURATION: 97 % | WEIGHT: 162 LBS | HEART RATE: 59 BPM

## 2019-06-04 DIAGNOSIS — E78.00 PURE HYPERCHOLESTEROLEMIA: ICD-10-CM

## 2019-06-04 DIAGNOSIS — M85.89 OSTEOPENIA OF MULTIPLE SITES: ICD-10-CM

## 2019-06-04 DIAGNOSIS — I48.0 PAROXYSMAL ATRIAL FIBRILLATION (HCC): ICD-10-CM

## 2019-06-04 DIAGNOSIS — Z79.01 LONG TERM CURRENT USE OF ANTICOAGULANT THERAPY: ICD-10-CM

## 2019-06-04 DIAGNOSIS — I10 ESSENTIAL HYPERTENSION: ICD-10-CM

## 2019-06-04 DIAGNOSIS — D72.818 OTHER DECREASED WHITE BLOOD CELL (WBC) COUNT: ICD-10-CM

## 2019-06-04 DIAGNOSIS — E04.1 THYROID NODULE: Primary | ICD-10-CM

## 2019-06-04 PROCEDURE — 99214 OFFICE O/P EST MOD 30 MIN: CPT | Performed by: FAMILY MEDICINE

## 2019-06-04 RX ORDER — TOPIRAMATE 25 MG/1
TABLET ORAL
COMMUNITY
Start: 2018-04-19 | End: 2019-11-18

## 2019-06-12 ENCOUNTER — HOSPITAL ENCOUNTER (OUTPATIENT)
Dept: ULTRASOUND IMAGING | Facility: HOSPITAL | Age: 79
Discharge: HOME | End: 2019-06-12
Payer: MEDICARE

## 2019-06-12 DIAGNOSIS — E04.1 THYROID NODULE: ICD-10-CM

## 2019-06-12 PROCEDURE — 88172 CYTP DX EVAL FNA 1ST EA SITE: CPT | Performed by: PATHOLOGY

## 2019-06-12 PROCEDURE — 88173 CYTOPATH EVAL FNA REPORT: CPT | Performed by: PATHOLOGY

## 2019-06-12 PROCEDURE — 10005 FNA BX W/US GDN 1ST LES: CPT

## 2019-06-14 ENCOUNTER — TELEPHONE (OUTPATIENT)
Dept: FAMILY MEDICINE CLINIC | Facility: CLINIC | Age: 79
End: 2019-06-14

## 2019-06-14 DIAGNOSIS — I48.0 PAROXYSMAL ATRIAL FIBRILLATION (HCC): ICD-10-CM

## 2019-06-14 DIAGNOSIS — E11.9 TYPE 2 DIABETES MELLITUS WITHOUT COMPLICATION, WITHOUT LONG-TERM CURRENT USE OF INSULIN (HCC): Primary | ICD-10-CM

## 2019-06-14 DIAGNOSIS — I10 ESSENTIAL HYPERTENSION: ICD-10-CM

## 2019-06-14 DIAGNOSIS — R89.9 ABNORMAL THYROID BIOPSY: Primary | ICD-10-CM

## 2019-06-14 DIAGNOSIS — E04.1 THYROID NODULE: ICD-10-CM

## 2019-06-14 DIAGNOSIS — E78.00 PURE HYPERCHOLESTEROLEMIA: ICD-10-CM

## 2019-06-14 DIAGNOSIS — E87.6 HYPOKALEMIA: ICD-10-CM

## 2019-07-12 ENCOUNTER — CONSULT (OUTPATIENT)
Dept: SURGICAL ONCOLOGY | Facility: CLINIC | Age: 79
End: 2019-07-12
Payer: MEDICARE

## 2019-07-12 VITALS
HEART RATE: 55 BPM | RESPIRATION RATE: 16 BRPM | SYSTOLIC BLOOD PRESSURE: 134 MMHG | DIASTOLIC BLOOD PRESSURE: 78 MMHG | WEIGHT: 163 LBS | HEIGHT: 62 IN | TEMPERATURE: 98.6 F | BODY MASS INDEX: 30 KG/M2

## 2019-07-12 DIAGNOSIS — R89.9 ABNORMAL THYROID BIOPSY: ICD-10-CM

## 2019-07-12 DIAGNOSIS — E04.1 THYROID NODULE: Primary | ICD-10-CM

## 2019-07-12 PROCEDURE — 99203 OFFICE O/P NEW LOW 30 MIN: CPT | Performed by: SURGERY

## 2019-07-12 RX ORDER — OYSTER SHELL CALCIUM WITH VITAMIN D 500; 200 MG/1; [IU]/1
1 TABLET, FILM COATED ORAL DAILY
COMMUNITY
End: 2019-11-18

## 2019-07-12 NOTE — LETTER
July 12, 2019     Essence Blas MD  [address] [facility]  [address]    Patient: Yvonne Mireles   YOB: 1940   Date of Visit: 7/12/2019       Dear Dr Iain Dela Cruz: Thank you for referring Yvonne Mireles to me for evaluation  Below are my notes for this consultation  If you have questions, please do not hesitate to call me  I look forward to following your patient along with you  Sincerely,        Haile Lovelace MD        CC: MD Vivi Upton MD Kristan Chesterfield, PA-C Saundra Bumps, MD Lorayne Hoffman, MD  7/12/2019 10:46 AM  Sign at close encounter               Surgical Oncology Consult       [facility]  [address] [place] [address]    Yvonne Mireles  1940  [id number]  [facility]  [address] [facility] [address]    Chief Complaint   Patient presents with    Consult     Thyroid nodule  Assessment/Plan:    No problem-specific Assessment & Plan notes found for this encounter  Problem List Items Addressed This Visit        Endocrine    Thyroid nodule - Primary    Relevant Orders    US guided thyroid biopsy with [facility]      Other Visit Diagnoses     Abnormal thyroid biopsy                Advance Care Planning/Advance Directives:  Discussed disease status, cancer treatment plans and/or cancer treatment goals with the patient  Intraductal carcinoma in situ of right breast    5/9/2018 Initial Diagnosis     Intraductal carcinoma in situ of right breast      5/9/2018 Biopsy     Right breast stereotactic biopsy x2 sites:   Ductal carcinoma in-situ (DCIS), features suggestive of involvement of underlying intraductal papilloma  Nuclear Grade: II-III (intermediate to high)     % positive  WI 70-75% positive        6/21/2018 Surgery     Right-sided needle localized partial mastectomy with intraoperative ultrasound guidance:  DCIS, grade 2-3/3, calcifications present  Margins - free of malignancy, closest margin is inferior with 0 6 mm of clearance  Posterior margin with 2 1 mm of clearance  Stage 0, pTis (DCIS),pN0,pMX  - Dr Lori Baron      7/10/2018 Surgery     Re-excision of right breast inferior margin tissue:  - Foci of ductal epithelial hyperplasia are seen ranging from usual ductal hyperplasia to atypical ductal hyperplasia to a few foci having features which are felt to be compatible with ductal carcinoma in situ, primarily solid type, nuclear grade 1-2/3   - None of the foci of atypia or probable ductal carcinoma in situ are seen at or within 2 mm the outer margins of excision, though it is noted that such foci are seen in 3 of 5 slides  - One intraductal papilloma is identified   - Fat necrosis is seen along the inner surface of this reexcision  8/16/2018 -  Hormone Therapy     None recommended by Dr Nasreen Rivera      8/16/2018 - 9/14/2018 Radiation     hypo fractionated course directed to the right breast to a total dose of 4256 centigray with an additional 1000 centigray to the lumpectomy cavity          History of Present Illness:  Patient is a 52-year-old woman who was found to have an incidental thyroid nodule of undetermined etiology  She has been asymptomatic from a endocrine standpoint  No difficulty breathing or swallowing  She underwent biopsy which revealed a [place] category 3 lesion  She has been referred for evaluation and treatment  No history of radiation exposure to the head or neck area as a child, though she did have radiation therapy for breast DCIS, 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  Review of Systems   Constitutional: Negative  HENT: Negative  Eyes: Negative  Respiratory: Negative  Cardiovascular: Negative  Gastrointestinal: Negative  Endocrine: Negative  Genitourinary: Negative  Musculoskeletal: Negative  Skin: Negative  Allergic/Immunologic: Negative  Neurological: Negative  Hematological: Negative  Psychiatric/Behavioral: Negative  Patient Active Problem List   Diagnosis    Abnormal mammogram    Paroxysmal atrial fibrillation (HCC)    Chronic coronary artery disease    Cervical spinal stenosis    Closed fracture of maxilla with routine healing    Conduction disorder of the heart    DDD (degenerative disc disease), cervical    Degeneration of intervertebral disc    Dizziness and giddiness    Headache    Hypokalemia    Fracture of multiple ribs    Intraductal carcinoma in situ of right breast    Long term current use of anticoagulant therapy    Latent tuberculosis    Low back pain    Noncompliance with treatment    Obesity    Osteoarthritis of finger of right hand    Osteoarthritis of knee    Type 2 diabetes mellitus ([address] )    Thyroid nodule    Vertigo    Vitamin D deficiency    Hypertension    Pre-operative clearance    Overweight (BMI 25 0-29  9)    Pure hypercholesterolemia    Neuralgia    Bunion of unspecified foot    Pre-op examination    Rib pain on right side    Malignant neoplasm of central portion of right breast in female, estrogen receptor positive ([address] )    Bug bite     Past Medical History:   Diagnosis Date    Allergic rhinitis     Arthritis     Atrial fibrillation ([address] )     Breast cancer (HCC)     Cataracts, bilateral     Chronic headaches     pulsatile daily headaches    Colitis     Coronary artery disease     Diabetes mellitus (HCC)     DJD (degenerative joint disease), cervical     Facial neuralgia     left frontal facial post traumatic neuralgia    Fibromyalgia     Glaucoma     History of external beam radiation therapy     8/16/18 to 9/14/2018 Right breast    Hypertension     Hypokalemia     Hypovitaminosis D     Lupus ([address] ) 7/19/2018    Osteoarthritis     Prediabetes     SDH (subdural hematoma) ([address] ) 1/31/2017    Sleep apnea     Syncope and collapse 12/1/2018    Vertigo      Past Surgical History:   Procedure Laterality Date    BREAST BIOPSY      BREAST LUMPECTOMY      CHOLECYSTECTOMY      COLONOSCOPY  2013    HYSTERECTOMY      MAMMO NEEDLE LOCALIZATION RIGHT (ALL INC) Right 6/21/2018    MAMMO STEREOTACTIC BREAST BIOPSY RIGHT (ALL INC) Right 5/9/2018    TONSILLECTOMY      US GUIDED BREAST BIOPSY RIGHT COMPLETE Right 5/9/2018    US GUIDED THYROID BIOPSY  6/12/2019     Family History   Problem Relation Age of Onset    Heart attack Sister     Hypertension Family     Diabetes Family     Stroke Family     Migraines Family     Breast cancer Daughter 28     Social History     Socioeconomic History    Marital status: Single     Spouse name: Not on file    Number of children: Not on file    Years of education: Not on file    Highest education level: Not on file   Occupational History    Not on file   Social Needs    Financial resource strain: Not on file    Food insecurity:     Worry: Not on file     Inability: Not on file    Transportation needs:     Medical: Not on file     Non-medical: Not on file   Tobacco Use    Smoking status: Never Smoker    Smokeless tobacco: Never Used    Tobacco comment: no smoke exposure   Substance and Sexual Activity    Alcohol use: Yes     Comment: occasional    Drug use: No    Sexual activity: Not on file   Lifestyle    Physical activity:     Days per week: Not on file     Minutes per session: Not on file    Stress: Not on file   Relationships    Social connections:     Talks on phone: Not on file     Gets together: Not on file     Attends Christianity service: Not on file     Active member of club or organization: Not on file     Attends meetings of clubs or organizations: Not on file     Relationship status: Not on file    Intimate partner violence:     Fear of current or ex partner: Not on file     Emotionally abused: Not on file     Physically abused: Not on file     Forced sexual activity: Not on file   Other Topics Concern    Not on file   Social History Narrative    Not on file       Current Outpatient Medications:     acetaminophen (TYLENOL) 500 mg tablet, Take 1,000 mg by mouth every 8 (eight) hours as needed, Disp: , Rfl:     amLODIPine (NORVASC) 5 mg tablet, Take 1 tablet (5 mg total) by mouth daily, Disp: 30 tablet, Rfl: 3    apixaban (ELIQUIS) 5 mg, Take 1 tablet (5 mg total) by mouth 2 (two) times a day, Disp: 60 tablet, Rfl: 2    aspirin (ECOTRIN LOW STRENGTH) 81 mg EC tablet, Take 81 mg by mouth daily, Disp: , Rfl:     atorvastatin (LIPITOR) 10 mg tablet, Take 1 tablet (10 mg total) by mouth daily, Disp: 90 tablet, Rfl: 1    calcium-vitamin D (OSCAL 500 + D) 500 mg-200 units per tablet, Take 1 tablet by mouth daily, Disp: , Rfl:     Capsaicin (ARTHRITIS PAIN RELIEF EX), Apply topically as needed, Disp: , Rfl:     gabapentin (NEURONTIN) 100 mg capsule, Take 1 capsule (100 mg total) by mouth 2 (two) times a day, Disp: 120 capsule, Rfl: 0    irbesartan-hydrochlorothiazide (AVALIDE) 150-12 5 MG per tablet, Take 1 tablet by mouth daily, Disp: 30 tablet, Rfl: 2    meclizine (ANTIVERT) 25 mg tablet, Take 1 tablet (25 mg total) by mouth daily, Disp: 15 tablet, Rfl: 0    metFORMIN (GLUCOPHAGE) 500 mg tablet, Take 1 tablet (500 mg total) by mouth 2 (two) times a day with meals, Disp: 180 tablet, Rfl: 2    metoprolol tartrate (LOPRESSOR) 25 mg tablet, Take 1 tablet (25 mg total) by mouth every 12 (twelve) hours, Disp: 60 tablet, Rfl: 5    topiramate (TOPAMAX) 25 mg tablet, take 1 tablet by oral route bid or as directed, Disp: , Rfl:     Potassium Chloride ER (K-TAB) 20 MEQ TBCR, 1 tablet every 24 hours, Disp: , Rfl:     senna-docusate sodium (SENOKOT S) 8 6-50 mg per tablet, Take 1 tablet by mouth, Disp: , Rfl:   No Known Allergies  Vitals:    07/12/19 1011   BP: 134/78   Pulse: 55   Resp: 16   Temp: 98 6 °F (37 °C)       Physical Exam   Constitutional: She appears well-developed and well-nourished  HENT:   Head: Normocephalic and atraumatic  Right Ear: External ear normal    Left Ear: External ear normal    Eyes: Pupils are equal, round, and reactive to light  EOM are normal    Neck: Normal range of motion  Neck supple  Cardiovascular: Normal rate and regular rhythm  Pulmonary/Chest: Effort normal and breath sounds normal    Abdominal: Soft  Bowel sounds are normal        Pathology:  [place] category 3 lesion, right thyroid nodule  Dated June 12, 2019    Labs:  Lab Results   Component Value Date    [id number] 0 740 05/06/2019         Imaging  Us Guided Thyroid Biopsy    Result Date: 6/12/2019  Narrative: ULTRASOUND-GUIDED THYROID BIOPSY HISTORY: 69year-old female with history of a right-sided thyroid nodule  COMPARISON: Thyroid ultrasound on May 29, 2019  FINDINGS: Prior ultrasound images were reviewed  On prior thyroid ultrasound from May 29, 2019, there was a 2 8 x 2 2 x 2 1 cm thyroid nodule (TI-RADS 4) that met criteria for FNA  On today's limited thyroid ultrasound, the right lower pole thyroid nodule measured 2 8 x 2 2 x 2 0 cm  The procedure was explained to the patient, including risks of hemorrhage, infection and local injury  Informed consent was freely obtained  The patient verbalized expressed understanding of the above risks and wished to proceed with the procedure  Final standard "time-out" procedure performed  PROCEDURE: The neck was prepped and draped in normal sterile fashion  Under real-time ultrasound guidance and local anesthesia 3 passes with a 25 gauge needle were made through the right lower pole thyroid nodule  Cytopathology was present and deemed the specimens adequate for evaluation  The patient tolerated the procedure well  Postprocedure instructions were provided for the patient  I asked the patient to call us with any questions, concerns, or acute problems  The patient expressed understanding of the above       Impression: Status post successful ultrasound-guided thyroid biopsy of the right lower pole thyroid nodule  I reviewed the above findings and procedure with Dr Lila Dang  PERFORMED, DICTATED AND SIGNED BY: Bereket Galicia PA-C Workstation performed: [id number]     Radiology Results    Result Date: 6/17/2019  Narrative: Ordered by an unspecified provider  I reviewed the above laboratory and imaging data  Discussion/Summary:  67-year-old woman with right thyroid nodule  Plan on repeat biopsy with [facility] testing 3 months from original biopsy  Rationale for this discussed with patient and her daughter  They understand the plan and wish to proceed as outlined

## 2019-07-12 NOTE — PROGRESS NOTES
Surgical Oncology Consult       [facility]  CANCER [facility] SURGICAL ONCOLOGY [facility] [address]    Tenisha Kenny  1940  [id number]  [address]  CANCER [facility] SURGICAL ONCOLOGY [facility]    Chief Complaint   Patient presents with    Consult     Thyroid nodule  Assessment/Plan:    No problem-specific Assessment & Plan notes found for this encounter  Problem List Items Addressed This Visit        Endocrine    Thyroid nodule - Primary    Relevant Orders    US guided thyroid biopsy with [facility]      Other Visit Diagnoses     Abnormal thyroid biopsy                Advance Care Planning/Advance Directives:  Discussed disease status, cancer treatment plans and/or cancer treatment goals with the patient  Intraductal carcinoma in situ of right breast    5/9/2018 Initial Diagnosis     Intraductal carcinoma in situ of right breast      5/9/2018 Biopsy     Right breast stereotactic biopsy x2 sites:   Ductal carcinoma in-situ (DCIS), features suggestive of involvement of underlying intraductal papilloma  Nuclear Grade: II-III (intermediate to high)  % positive  MT 70-75% positive        6/21/2018 Surgery     Right-sided needle localized partial mastectomy with intraoperative ultrasound guidance:  DCIS, grade 2-3/3, calcifications present  Margins - free of malignancy, closest margin is inferior with 0 6 mm of clearance  Posterior margin with 2 1 mm of clearance    Stage 0, pTis (DCIS),pN0,pMX  - Dr Art Keller      7/10/2018 Surgery     Re-excision of right breast inferior margin tissue:  - Foci of ductal epithelial hyperplasia are seen ranging from usual ductal hyperplasia to atypical ductal hyperplasia to a few foci having features which are felt to be compatible with ductal carcinoma in situ, primarily solid type, nuclear grade 1-2/3   - None of the foci of atypia or probable ductal carcinoma in situ are seen at or within 2 mm the outer margins of excision, though it is noted that such foci are seen in 3 of 5 slides  - One intraductal papilloma is identified   - Fat necrosis is seen along the inner surface of this reexcision  8/16/2018 -  Hormone Therapy     None recommended by Dr Kellie Workman      8/16/2018 - 9/14/2018 Radiation     hypo fractionated course directed to the right breast to a total dose of 4256 centigray with an additional 1000 centigray to the lumpectomy cavity          History of Present Illness:  Patient is a 72-year-old woman who was found to have an incidental thyroid nodule of undetermined etiology  She has been asymptomatic from a endocrine standpoint  No difficulty breathing or swallowing  She underwent biopsy which revealed a [place] category 3 lesion  She has been referred for evaluation and treatment  No history of radiation exposure to the head or neck area as a child, though she did have radiation therapy for breast DCIS, 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  Review of Systems   Constitutional: Negative  HENT: Negative  Eyes: Negative  Respiratory: Negative  Cardiovascular: Negative  Gastrointestinal: Negative  Endocrine: Negative  Genitourinary: Negative  Musculoskeletal: Negative  Skin: Negative  Allergic/Immunologic: Negative  Neurological: Negative  Hematological: Negative  Psychiatric/Behavioral: Negative            Patient Active Problem List   Diagnosis    Abnormal mammogram    Paroxysmal atrial fibrillation (HCC)    Chronic coronary artery disease    Cervical spinal stenosis    Closed fracture of maxilla with routine healing    Conduction disorder of the heart    DDD (degenerative disc disease), cervical    Degeneration of intervertebral disc    Dizziness and giddiness    Headache    Hypokalemia    Fracture of multiple ribs    Intraductal carcinoma in situ of right breast    Long term current use of anticoagulant therapy    Latent tuberculosis    Low back pain    Noncompliance with treatment    Obesity    Osteoarthritis of finger of right hand    Osteoarthritis of knee    Type 2 diabetes mellitus (HCC)    Thyroid nodule    Vertigo    Vitamin D deficiency    Hypertension    Pre-operative clearance    Overweight (BMI 25 0-29  9)    Pure hypercholesterolemia    Neuralgia    Bunion of unspecified foot    Pre-op examination    Rib pain on right side    Malignant neoplasm of central portion of right breast in female, estrogen receptor positive ([facility] [address] )    Bug bite     Past Medical History:   Diagnosis Date    Allergic rhinitis     Arthritis     Atrial fibrillation (HCC)     Breast cancer (HCC)     Cataracts, bilateral     Chronic headaches     pulsatile daily headaches    Colitis     Coronary artery disease     Diabetes mellitus (HCC)     DJD (degenerative joint disease), cervical     Facial neuralgia     left frontal facial post traumatic neuralgia    Fibromyalgia     Glaucoma     History of external beam radiation therapy     8/16/18 to 9/14/2018 Right breast    Hypertension     Hypokalemia     Hypovitaminosis D     Lupus ([facility] [address] ) 7/19/2018    Osteoarthritis     Prediabetes     SDH (subdural hematoma) ([facility] [address] ) 1/31/2017    Sleep apnea     Syncope and collapse 12/1/2018    Vertigo      Past Surgical History:   Procedure Laterality Date    BREAST BIOPSY      BREAST LUMPECTOMY      CHOLECYSTECTOMY      COLONOSCOPY  2013    HYSTERECTOMY      MAMMO NEEDLE LOCALIZATION RIGHT (ALL INC) Right 6/21/2018    MAMMO STEREOTACTIC BREAST BIOPSY RIGHT (ALL INC) Right 5/9/2018    TONSILLECTOMY      US GUIDED BREAST BIOPSY RIGHT COMPLETE Right 5/9/2018    US GUIDED THYROID BIOPSY  6/12/2019     Family History   Problem Relation Age of Onset    Heart attack Sister     Hypertension Family     Diabetes Family     Stroke Family     Migraines Family     Breast cancer Daughter 28 Social History     Socioeconomic History    Marital status: Single     Spouse name: Not on file    Number of children: Not on file    Years of education: Not on file    Highest education level: Not on file   Occupational History    Not on file   Social Needs    Financial resource strain: Not on file    Food insecurity:     Worry: Not on file     Inability: Not on file    Transportation needs:     Medical: Not on file     Non-medical: Not on file   Tobacco Use    Smoking status: Never Smoker    Smokeless tobacco: Never Used    Tobacco comment: no smoke exposure   Substance and Sexual Activity    Alcohol use: Yes     Comment: occasional    Drug use: No    Sexual activity: Not on file   Lifestyle    Physical activity:     Days per week: Not on file     Minutes per session: Not on file    Stress: Not on file   Relationships    Social connections:     Talks on phone: Not on file     Gets together: Not on file     Attends Sikh service: Not on file     Active member of club or organization: Not on file     Attends meetings of clubs or organizations: Not on file     Relationship status: Not on file    Intimate partner violence:     Fear of current or ex partner: Not on file     Emotionally abused: Not on file     Physically abused: Not on file     Forced sexual activity: Not on file   Other Topics Concern    Not on file   Social History Narrative    Not on file       Current Outpatient Medications:     acetaminophen (TYLENOL) 500 mg tablet, Take 1,000 mg by mouth every 8 (eight) hours as needed, Disp: , Rfl:     amLODIPine (NORVASC) 5 mg tablet, Take 1 tablet (5 mg total) by mouth daily, Disp: 30 tablet, Rfl: 3    apixaban (ELIQUIS) 5 mg, Take 1 tablet (5 mg total) by mouth 2 (two) times a day, Disp: 60 tablet, Rfl: 2    aspirin (ECOTRIN LOW STRENGTH) 81 mg EC tablet, Take 81 mg by mouth daily, Disp: , Rfl:     atorvastatin (LIPITOR) 10 mg tablet, Take 1 tablet (10 mg total) by mouth daily, Disp: 90 tablet, Rfl: 1    calcium-vitamin D (OSCAL 500 + D) 500 mg-200 units per tablet, Take 1 tablet by mouth daily, Disp: , Rfl:     Capsaicin (ARTHRITIS PAIN RELIEF EX), Apply topically as needed, Disp: , Rfl:     gabapentin (NEURONTIN) 100 mg capsule, Take 1 capsule (100 mg total) by mouth 2 (two) times a day, Disp: 120 capsule, Rfl: 0    irbesartan-hydrochlorothiazide (AVALIDE) 150-12 5 MG per tablet, Take 1 tablet by mouth daily, Disp: 30 tablet, Rfl: 2    meclizine (ANTIVERT) 25 mg tablet, Take 1 tablet (25 mg total) by mouth daily, Disp: 15 tablet, Rfl: 0    metFORMIN (GLUCOPHAGE) 500 mg tablet, Take 1 tablet (500 mg total) by mouth 2 (two) times a day with meals, Disp: 180 tablet, Rfl: 2    metoprolol tartrate (LOPRESSOR) 25 mg tablet, Take 1 tablet (25 mg total) by mouth every 12 (twelve) hours, Disp: 60 tablet, Rfl: 5    topiramate (TOPAMAX) 25 mg tablet, take 1 tablet by oral route bid or as directed, Disp: , Rfl:     Potassium Chloride ER (K-TAB) 20 MEQ TBCR, 1 tablet every 24 hours, Disp: , Rfl:     senna-docusate sodium (SENOKOT S) 8 6-50 mg per tablet, Take 1 tablet by mouth, Disp: , Rfl:   No Known Allergies  Vitals:    07/12/19 1011   BP: 134/78   Pulse: 55   Resp: 16   Temp: 98 6 °F (37 °C)       Physical Exam   Constitutional: She appears well-developed and well-nourished  HENT:   Head: Normocephalic and atraumatic  Right Ear: External ear normal    Left Ear: External ear normal    Eyes: Pupils are equal, round, and reactive to light  EOM are normal    Neck: Normal range of motion  Neck supple  Cardiovascular: Normal rate and regular rhythm  Pulmonary/Chest: Effort normal and breath sounds normal    Abdominal: Soft  Bowel sounds are normal        Pathology:  [place] category 3 lesion, right thyroid nodule    Dated June 12, 2019    Labs:  Lab Results   Component Value Date    [id number] 0 740 05/06/2019         Imaging  Us Guided Thyroid Biopsy    Result Date: 6/12/2019  Narrative: ULTRASOUND-GUIDED THYROID BIOPSY HISTORY: 69year-old female with history of a right-sided thyroid nodule  COMPARISON: Thyroid ultrasound on May 29, 2019  FINDINGS: Prior ultrasound images were reviewed  On prior thyroid ultrasound from May 29, 2019, there was a 2 8 x 2 2 x 2 1 cm thyroid nodule (TI-RADS 4) that met criteria for FNA  On today's limited thyroid ultrasound, the right lower pole thyroid nodule measured 2 8 x 2 2 x 2 0 cm  The procedure was explained to the patient, including risks of hemorrhage, infection and local injury  Informed consent was freely obtained  The patient verbalized expressed understanding of the above risks and wished to proceed with the procedure  Final standard "time-out" procedure performed  PROCEDURE: The neck was prepped and draped in normal sterile fashion  Under real-time ultrasound guidance and local anesthesia 3 passes with a 25 gauge needle were made through the right lower pole thyroid nodule  Cytopathology was present and deemed the specimens adequate for evaluation  The patient tolerated the procedure well  Postprocedure instructions were provided for the patient  I asked the patient to call us with any questions, concerns, or acute problems  The patient expressed understanding of the above  Impression: Status post successful ultrasound-guided thyroid biopsy of the right lower pole thyroid nodule  I reviewed the above findings and procedure with Dr Bertrand Leyva  PERFORMED, DICTATED AND SIGNED BY: Vanda Valderrama PA-C Workstation performed: [id number]     Radiology Results    Result Date: 6/17/2019  Narrative: Ordered by an unspecified provider  I reviewed the above laboratory and imaging data  Discussion/Summary:  50-year-old woman with right thyroid nodule  Plan on repeat biopsy with Afirma testing 3 months from original biopsy  Rationale for this discussed with patient and her daughter    They understand the plan and wish to proceed as outlined

## 2019-07-18 ENCOUNTER — TRANSCRIBE ORDERS (OUTPATIENT)
Dept: ADMINISTRATIVE | Facility: HOSPITAL | Age: 79
End: 2019-07-18

## 2019-07-18 ENCOUNTER — APPOINTMENT (OUTPATIENT)
Dept: LAB | Facility: HOSPITAL | Age: 79
End: 2019-07-18
Payer: MEDICARE

## 2019-07-18 DIAGNOSIS — I10 ESSENTIAL HYPERTENSION: ICD-10-CM

## 2019-07-18 DIAGNOSIS — Z79.01 LONG TERM CURRENT USE OF ANTICOAGULANT THERAPY: ICD-10-CM

## 2019-07-18 DIAGNOSIS — D72.818 OTHER DECREASED WHITE BLOOD CELL (WBC) COUNT: ICD-10-CM

## 2019-07-18 LAB
BASOPHILS # BLD AUTO: 0 THOUSANDS/ΜL (ref 0–0.1)
BASOPHILS NFR BLD AUTO: 0 % (ref 0–1)
EOSINOPHIL # BLD AUTO: 0 THOUSAND/ΜL (ref 0–0.4)
EOSINOPHIL NFR BLD AUTO: 0 % (ref 0–6)
ERYTHROCYTE [DISTWIDTH] IN BLOOD BY AUTOMATED COUNT: 14 %
FOLATE SERPL-MCNC: 12.7 NG/ML (ref 3.1–17.5)
HCT VFR BLD AUTO: 40.3 % (ref 36–46)
HGB BLD-MCNC: 13.4 G/DL (ref 12–16)
LYMPHOCYTES # BLD AUTO: 1.3 THOUSANDS/ΜL (ref 0.5–4)
LYMPHOCYTES NFR BLD AUTO: 32 % (ref 25–45)
MCH RBC QN AUTO: 31.7 PG (ref 26–34)
MCHC RBC AUTO-ENTMCNC: 33.3 G/DL (ref 31–36)
MCV RBC AUTO: 95 FL (ref 80–100)
MONOCYTES # BLD AUTO: 0.5 THOUSAND/ΜL (ref 0.2–0.9)
MONOCYTES NFR BLD AUTO: 12 % (ref 1–10)
NEUTROPHILS # BLD AUTO: 2.3 THOUSANDS/ΜL (ref 1.8–7.8)
NEUTS SEG NFR BLD AUTO: 56 % (ref 45–65)
PLATELET # BLD AUTO: 192 THOUSANDS/UL (ref 150–450)
PMV BLD AUTO: 9 FL (ref 8.9–12.7)
RBC # BLD AUTO: 4.22 MILLION/UL (ref 4–5.2)
VIT B12 SERPL-MCNC: 500 PG/ML (ref 100–900)
WBC # BLD AUTO: 4.2 THOUSAND/UL (ref 4.5–11)

## 2019-07-18 PROCEDURE — 84166 PROTEIN E-PHORESIS/URINE/CSF: CPT | Performed by: FAMILY MEDICINE

## 2019-07-18 PROCEDURE — 84165 PROTEIN E-PHORESIS SERUM: CPT | Performed by: PATHOLOGY

## 2019-07-18 PROCEDURE — 82607 VITAMIN B-12: CPT

## 2019-07-18 PROCEDURE — 85025 COMPLETE CBC W/AUTO DIFF WBC: CPT

## 2019-07-18 PROCEDURE — 82746 ASSAY OF FOLIC ACID SERUM: CPT

## 2019-07-18 PROCEDURE — 84166 PROTEIN E-PHORESIS/URINE/CSF: CPT | Performed by: PATHOLOGY

## 2019-07-18 PROCEDURE — 84165 PROTEIN E-PHORESIS SERUM: CPT

## 2019-07-18 PROCEDURE — 36415 COLL VENOUS BLD VENIPUNCTURE: CPT

## 2019-07-19 ENCOUNTER — TELEPHONE (OUTPATIENT)
Dept: NEUROLOGY | Facility: CLINIC | Age: 79
End: 2019-07-19

## 2019-07-19 LAB
ALBUMIN SERPL ELPH-MCNC: 3.68 G/DL (ref 3.5–5)
ALBUMIN SERPL ELPH-MCNC: 53.4 % (ref 52–65)
ALBUMIN UR ELPH-MCNC: 100 %
ALPHA1 GLOB MFR UR ELPH: 0 %
ALPHA1 GLOB SERPL ELPH-MCNC: 0.3 G/DL (ref 0.1–0.4)
ALPHA1 GLOB SERPL ELPH-MCNC: 4.3 % (ref 2.5–5)
ALPHA2 GLOB MFR UR ELPH: 0 %
ALPHA2 GLOB SERPL ELPH-MCNC: 0.76 G/DL (ref 0.4–1.2)
ALPHA2 GLOB SERPL ELPH-MCNC: 11 % (ref 7–13)
B-GLOBULIN MFR UR ELPH: 0 %
BETA GLOB ABNORMAL SERPL ELPH-MCNC: 0.43 G/DL (ref 0.4–0.8)
BETA1 GLOB SERPL ELPH-MCNC: 6.2 % (ref 5–13)
BETA2 GLOB SERPL ELPH-MCNC: 5.8 % (ref 2–8)
BETA2+GAMMA GLOB SERPL ELPH-MCNC: 0.4 G/DL (ref 0.2–0.5)
GAMMA GLOB ABNORMAL SERPL ELPH-MCNC: 1.33 G/DL (ref 0.5–1.6)
GAMMA GLOB MFR UR ELPH: 0 %
GAMMA GLOB SERPL ELPH-MCNC: 19.3 % (ref 12–22)
IGG/ALB SER: 1.15 {RATIO} (ref 1.1–1.8)
PROT PATTERN SERPL ELPH-IMP: NORMAL
PROT PATTERN UR ELPH-IMP: NORMAL
PROT SERPL-MCNC: 6.9 G/DL (ref 6.4–8.2)
PROT UR-MCNC: 7 MG/DL

## 2019-07-19 NOTE — TELEPHONE ENCOUNTER
----- Message from Lisa Shelby MD sent at 7/18/2019 10:50 AM EDT -----  Patient according to schedule with no follow-up by Neurology  Is the patient is still being followed by Neurology?

## 2019-07-25 DIAGNOSIS — I10 ESSENTIAL HYPERTENSION: ICD-10-CM

## 2019-07-25 DIAGNOSIS — I48.0 PAF (PAROXYSMAL ATRIAL FIBRILLATION) (HCC): ICD-10-CM

## 2019-07-25 DIAGNOSIS — I48.91 ATRIAL FIBRILLATION, UNSPECIFIED TYPE (HCC): ICD-10-CM

## 2019-07-25 DIAGNOSIS — M48.02 CERVICAL SPINAL STENOSIS: ICD-10-CM

## 2019-07-26 RX ORDER — IRBESARTAN AND HYDROCHLOROTHIAZIDE 150; 12.5 MG/1; MG/1
1 TABLET, FILM COATED ORAL DAILY
Qty: 30 TABLET | Refills: 0 | Status: SHIPPED | OUTPATIENT
Start: 2019-07-26 | End: 2019-09-17 | Stop reason: SDUPTHER

## 2019-07-26 RX ORDER — AMLODIPINE BESYLATE 5 MG/1
5 TABLET ORAL DAILY
Qty: 30 TABLET | Refills: 0 | Status: SHIPPED | OUTPATIENT
Start: 2019-07-26 | End: 2019-08-20 | Stop reason: ALTCHOICE

## 2019-07-26 RX ORDER — GABAPENTIN 100 MG/1
100 CAPSULE ORAL 2 TIMES DAILY
Qty: 120 CAPSULE | Refills: 0 | Status: SHIPPED | OUTPATIENT
Start: 2019-07-26 | End: 2019-09-17 | Stop reason: SDUPTHER

## 2019-08-06 ENCOUNTER — APPOINTMENT (OUTPATIENT)
Dept: LAB | Facility: HOSPITAL | Age: 79
End: 2019-08-06
Payer: MEDICARE

## 2019-08-06 DIAGNOSIS — E87.6 HYPOKALEMIA: ICD-10-CM

## 2019-08-06 DIAGNOSIS — I48.0 PAROXYSMAL ATRIAL FIBRILLATION (HCC): ICD-10-CM

## 2019-08-06 DIAGNOSIS — E78.00 PURE HYPERCHOLESTEROLEMIA: ICD-10-CM

## 2019-08-06 DIAGNOSIS — E11.9 TYPE 2 DIABETES MELLITUS WITHOUT COMPLICATION, WITHOUT LONG-TERM CURRENT USE OF INSULIN (HCC): ICD-10-CM

## 2019-08-06 DIAGNOSIS — E04.1 THYROID NODULE: ICD-10-CM

## 2019-08-06 DIAGNOSIS — I10 ESSENTIAL HYPERTENSION: ICD-10-CM

## 2019-08-06 LAB
ANION GAP SERPL CALCULATED.3IONS-SCNC: 10 MMOL/L (ref 5–14)
BASOPHILS # BLD AUTO: 0 THOUSANDS/ΜL (ref 0–0.1)
BASOPHILS NFR BLD AUTO: 1 % (ref 0–1)
BUN SERPL-MCNC: 18 MG/DL (ref 5–25)
CALCIUM SERPL-MCNC: 9.7 MG/DL (ref 8.4–10.2)
CHLORIDE SERPL-SCNC: 103 MMOL/L (ref 97–108)
CHOLEST SERPL-MCNC: 108 MG/DL
CO2 SERPL-SCNC: 27 MMOL/L (ref 22–30)
CREAT SERPL-MCNC: 0.89 MG/DL (ref 0.6–1.2)
EOSINOPHIL # BLD AUTO: 0 THOUSAND/ΜL (ref 0–0.4)
EOSINOPHIL NFR BLD AUTO: 1 % (ref 0–6)
ERYTHROCYTE [DISTWIDTH] IN BLOOD BY AUTOMATED COUNT: 14 %
EST. AVERAGE GLUCOSE BLD GHB EST-MCNC: 126 MG/DL
GFR SERPL CREATININE-BSD FRML MDRD: 72 ML/MIN/1.73SQ M
GLUCOSE P FAST SERPL-MCNC: 127 MG/DL (ref 70–99)
HBA1C MFR BLD: 6 % (ref 4.2–6.3)
HCT VFR BLD AUTO: 41.3 % (ref 36–46)
HDLC SERPL-MCNC: 49 MG/DL (ref 40–59)
HGB BLD-MCNC: 13.6 G/DL (ref 12–16)
LDLC SERPL CALC-MCNC: 41 MG/DL
LYMPHOCYTES # BLD AUTO: 1.7 THOUSANDS/ΜL (ref 0.5–4)
LYMPHOCYTES NFR BLD AUTO: 38 % (ref 25–45)
MCH RBC QN AUTO: 31.3 PG (ref 26–34)
MCHC RBC AUTO-ENTMCNC: 33 G/DL (ref 31–36)
MCV RBC AUTO: 95 FL (ref 80–100)
MONOCYTES # BLD AUTO: 0.4 THOUSAND/ΜL (ref 0.2–0.9)
MONOCYTES NFR BLD AUTO: 10 % (ref 1–10)
NEUTROPHILS # BLD AUTO: 2.4 THOUSANDS/ΜL (ref 1.8–7.8)
NEUTS SEG NFR BLD AUTO: 52 % (ref 45–65)
PLATELET # BLD AUTO: 197 THOUSANDS/UL (ref 150–450)
PMV BLD AUTO: 8.7 FL (ref 8.9–12.7)
POTASSIUM SERPL-SCNC: 3.8 MMOL/L (ref 3.6–5)
RBC # BLD AUTO: 4.34 MILLION/UL (ref 4–5.2)
SODIUM SERPL-SCNC: 140 MMOL/L (ref 137–147)
T4 FREE SERPL-MCNC: 1 NG/DL (ref 0.76–1.46)
TRIGL SERPL-MCNC: 90 MG/DL
TSH SERPL DL<=0.05 MIU/L-ACNC: 0.4 UIU/ML (ref 0.47–4.68)
WBC # BLD AUTO: 4.6 THOUSAND/UL (ref 4.5–11)

## 2019-08-06 PROCEDURE — 80048 BASIC METABOLIC PNL TOTAL CA: CPT

## 2019-08-06 PROCEDURE — 85025 COMPLETE CBC W/AUTO DIFF WBC: CPT

## 2019-08-06 PROCEDURE — 80061 LIPID PANEL: CPT

## 2019-08-06 PROCEDURE — 84443 ASSAY THYROID STIM HORMONE: CPT

## 2019-08-06 PROCEDURE — 84439 ASSAY OF FREE THYROXINE: CPT

## 2019-08-06 PROCEDURE — 83036 HEMOGLOBIN GLYCOSYLATED A1C: CPT

## 2019-08-06 PROCEDURE — 36415 COLL VENOUS BLD VENIPUNCTURE: CPT

## 2019-08-20 ENCOUNTER — OFFICE VISIT (OUTPATIENT)
Dept: FAMILY MEDICINE CLINIC | Facility: CLINIC | Age: 79
End: 2019-08-20
Payer: MEDICARE

## 2019-08-20 VITALS
TEMPERATURE: 97.1 F | WEIGHT: 158 LBS | OXYGEN SATURATION: 96 % | HEIGHT: 62 IN | BODY MASS INDEX: 29.08 KG/M2 | SYSTOLIC BLOOD PRESSURE: 102 MMHG | HEART RATE: 63 BPM | DIASTOLIC BLOOD PRESSURE: 62 MMHG | RESPIRATION RATE: 16 BRPM

## 2019-08-20 DIAGNOSIS — E55.9 VITAMIN D DEFICIENCY: ICD-10-CM

## 2019-08-20 DIAGNOSIS — I10 ESSENTIAL HYPERTENSION: ICD-10-CM

## 2019-08-20 DIAGNOSIS — I25.10 CHRONIC CORONARY ARTERY DISEASE: ICD-10-CM

## 2019-08-20 DIAGNOSIS — I48.0 PAROXYSMAL ATRIAL FIBRILLATION (HCC): ICD-10-CM

## 2019-08-20 DIAGNOSIS — E78.00 PURE HYPERCHOLESTEROLEMIA: ICD-10-CM

## 2019-08-20 DIAGNOSIS — E11.9 TYPE 2 DIABETES MELLITUS WITHOUT COMPLICATION, WITHOUT LONG-TERM CURRENT USE OF INSULIN (HCC): Primary | ICD-10-CM

## 2019-08-20 PROCEDURE — 99214 OFFICE O/P EST MOD 30 MIN: CPT | Performed by: FAMILY MEDICINE

## 2019-08-20 NOTE — PROGRESS NOTES
Subjective:   Chief Complaint   Patient presents with    Follow-up     chronic conditions        Patient ID: Rodríguez Arellano is a 69 y o  female  Patient and office follow-up with a chronic condition patient history of non-insulin-dependent diabetic she is on metformin tolerated well without any side effect a deny any increased thirsty increased frequency urination no dizziness no abdomen pain and no headache and patient also was history of coronary artery disease deny any chest pain short of breath no palpitation no dyspnea on exertion no lower extremity edema she is on the beta-blocker and statin she is on aspirin does followed by Cardiology patient history of vitamin-D deficiency on vitamin-D supplement tolerated well without any side effect deny any bone pain joint pain no muscle pain and no fatigue  Patient history of hypertension she is on amlodipine metoprolol and she is on Avalide and patient really has been running low blood pressure and sometimes she get dizzy when she get up quickly and the light headache and no nausea no vomiting no diarrhea  Recent blood work discussed with the patient      The following portions of the patient's history were reviewed and updated as appropriate: allergies, current medications, past family history, past medical history, past social history, past surgical history and problem list     Review of Systems   Constitutional: Negative for fatigue and fever  HENT: Negative for ear pain, sinus pressure, sinus pain and sore throat  Eyes: Negative for pain and redness  Respiratory: Negative for cough, chest tightness and shortness of breath  Cardiovascular: Negative for chest pain, palpitations and leg swelling  Gastrointestinal: Negative for abdominal pain, blood in stool, constipation, diarrhea and nausea  Genitourinary: Negative for flank pain, frequency and hematuria  Musculoskeletal: Negative for back pain and joint swelling  Skin: Negative for rash  Neurological: Negative for dizziness, numbness and headaches  Hematological: Does not bruise/bleed easily  Objective:  Vitals:    08/20/19 1515   BP: 102/62   BP Location: Left arm   Patient Position: Sitting   Cuff Size: Adult   Pulse: 63   Resp: 16   Temp: (!) 97 1 °F (36 2 °C)   TempSrc: Tympanic   SpO2: 96%   Weight: 71 7 kg (158 lb)   Height: 5' 2" (1 575 m)      Physical Exam   Constitutional: She is oriented to person, place, and time  She appears well-developed and well-nourished  HENT:   Head: Normocephalic  Right Ear: External ear normal    Left Ear: External ear normal    Eyes: Conjunctivae and EOM are normal  Right eye exhibits no discharge  Left eye exhibits no discharge  Neck: No JVD present  Cardiovascular: Normal rate  Exam reveals no gallop  Murmur heard  Pulmonary/Chest: Effort normal  No respiratory distress  She has no wheezes  She has no rales  She exhibits no tenderness  Abdominal: She exhibits no mass  There is no tenderness  There is no rebound  Musculoskeletal: She exhibits no edema or tenderness  Neurological: She is alert and oriented to person, place, and time  Skin: No rash noted  No erythema           Assessment/Plan:    Type 2 diabetes mellitus ([facility] [address] )  Lab Results   Component Value Date    HGBA1C 6 0 08/06/2019       Had chronic asymptomatic fair control continue with metformin the encouraged patient the a to the follow-up with the low carb diet important lose weight no sinus symptom of hypoglycemia patient already on atorvastatin and patient already on Arb    Hypertension  A chronic asymptomatic blood pressure has been running low will stop the amlodipine and continue with the Avalide and the metoprolol proper use of medication possible side effect discussed the patient low-salt diet and increased physical activity discussed with the patient    Chronic coronary artery disease  Chronic asymptomatic fair control patient does follow up with the Cardiology periodically she is already on statin she is already on beta-blocker aspirin and she is on Arb    Vitamin D deficiency  Chronic A asymptomatic continue vitamin-D supplement encouraged patient to increase physical activity       Diagnoses and all orders for this visit:    Type 2 diabetes mellitus without complication, without long-term current use of insulin ([facility])    Essential hypertension    Chronic coronary artery disease    Vitamin D deficiency    Paroxysmal atrial fibrillation ([facility] [address] )    Pure hypercholesterolemia

## 2019-08-22 NOTE — ASSESSMENT & PLAN NOTE
Chronic A asymptomatic continue vitamin-D supplement encouraged patient to increase physical activity

## 2019-08-22 NOTE — ASSESSMENT & PLAN NOTE
Lab Results   Component Value Date    HGBA1C 6 0 08/06/2019       Had chronic asymptomatic fair control continue with metformin the encouraged patient the a to the follow-up with the low carb diet important lose weight no sinus symptom of hypoglycemia patient already on atorvastatin and patient already on Arb

## 2019-08-22 NOTE — ASSESSMENT & PLAN NOTE
A chronic asymptomatic blood pressure has been running low will stop the amlodipine and continue with the Avalide and the metoprolol proper use of medication possible side effect discussed the patient low-salt diet and increased physical activity discussed with the patient

## 2019-08-22 NOTE — ASSESSMENT & PLAN NOTE
Chronic asymptomatic fair control patient does follow up with the Cardiology periodically she is already on statin she is already on beta-blocker aspirin and she is on Arb

## 2019-09-13 ENCOUNTER — HOSPITAL ENCOUNTER (OUTPATIENT)
Dept: ULTRASOUND IMAGING | Facility: HOSPITAL | Age: 79
Discharge: HOME | End: 2019-09-13
Attending: SURGERY | Admitting: RADIOLOGY
Payer: MEDICARE

## 2019-09-13 DIAGNOSIS — E04.1 THYROID NODULE: ICD-10-CM

## 2019-09-13 PROCEDURE — 10005 FNA BX W/US GDN 1ST LES: CPT

## 2019-09-13 PROCEDURE — 88173 CYTOPATH EVAL FNA REPORT: CPT | Performed by: PATHOLOGY

## 2019-09-13 RX ORDER — LIDOCAINE HYDROCHLORIDE 10 MG/ML
5 INJECTION, SOLUTION EPIDURAL; INFILTRATION; INTRACAUDAL; PERINEURAL ONCE
Status: COMPLETED | OUTPATIENT
Start: 2019-09-13 | End: 2019-09-13

## 2019-09-13 RX ADMIN — LIDOCAINE HYDROCHLORIDE 5 ML: 10 INJECTION, SOLUTION INFILTRATION; PERINEURAL at 09:45

## 2019-09-17 DIAGNOSIS — E11.9 TYPE 2 DIABETES MELLITUS WITHOUT COMPLICATION, WITHOUT LONG-TERM CURRENT USE OF INSULIN (HCC): ICD-10-CM

## 2019-09-17 DIAGNOSIS — I10 ESSENTIAL HYPERTENSION: ICD-10-CM

## 2019-09-17 DIAGNOSIS — I48.0 PAF (PAROXYSMAL ATRIAL FIBRILLATION) (HCC): ICD-10-CM

## 2019-09-17 DIAGNOSIS — M48.02 CERVICAL SPINAL STENOSIS: ICD-10-CM

## 2019-09-17 RX ORDER — GABAPENTIN 100 MG/1
100 CAPSULE ORAL 2 TIMES DAILY
Qty: 120 CAPSULE | Refills: 0 | Status: SHIPPED | OUTPATIENT
Start: 2019-09-17 | End: 2019-11-19 | Stop reason: SDUPTHER

## 2019-09-17 RX ORDER — IRBESARTAN AND HYDROCHLOROTHIAZIDE 150; 12.5 MG/1; MG/1
1 TABLET, FILM COATED ORAL DAILY
Qty: 30 TABLET | Refills: 0 | Status: SHIPPED | OUTPATIENT
Start: 2019-09-17 | End: 2019-09-18 | Stop reason: SDUPTHER

## 2019-09-17 RX ORDER — ATORVASTATIN CALCIUM 10 MG/1
10 TABLET, FILM COATED ORAL DAILY
Qty: 90 TABLET | Refills: 0 | Status: SHIPPED | OUTPATIENT
Start: 2019-09-17 | End: 2019-11-19 | Stop reason: SDUPTHER

## 2019-09-18 DIAGNOSIS — I10 ESSENTIAL HYPERTENSION: ICD-10-CM

## 2019-09-18 RX ORDER — IRBESARTAN AND HYDROCHLOROTHIAZIDE 150; 12.5 MG/1; MG/1
1 TABLET, FILM COATED ORAL DAILY
Qty: 30 TABLET | Refills: 0 | Status: SHIPPED | OUTPATIENT
Start: 2019-09-18 | End: 2019-10-14 | Stop reason: SDUPTHER

## 2019-09-20 ENCOUNTER — TELEPHONE (OUTPATIENT)
Dept: FAMILY MEDICINE CLINIC | Facility: CLINIC | Age: 79
End: 2019-09-20

## 2019-09-20 DIAGNOSIS — E11.9 TYPE 2 DIABETES MELLITUS WITHOUT COMPLICATION, WITHOUT LONG-TERM CURRENT USE OF INSULIN (HCC): Primary | ICD-10-CM

## 2019-09-20 DIAGNOSIS — I10 ESSENTIAL HYPERTENSION: ICD-10-CM

## 2019-09-20 RX ORDER — LANCETS 28 GAUGE
EACH MISCELLANEOUS
Qty: 100 EACH | Refills: 2 | Status: SHIPPED | OUTPATIENT
Start: 2019-09-20 | End: 2019-11-18

## 2019-09-20 RX ORDER — BLOOD PRESSURE TEST KIT-MEDIUM
KIT MISCELLANEOUS 2 TIMES DAILY
Qty: 1 EACH | Refills: 0 | Status: SHIPPED | OUTPATIENT
Start: 2019-09-20 | End: 2019-10-18 | Stop reason: SDUPTHER

## 2019-09-20 NOTE — TELEPHONE ENCOUNTER
To give script for Glucomenter ,lancet and test strips ,to check blood sugar once/day  Regarding Blood pressure machine I do not think insurance will cover

## 2019-10-04 ENCOUNTER — OFFICE VISIT (OUTPATIENT)
Dept: SURGICAL ONCOLOGY | Facility: CLINIC | Age: 79
End: 2019-10-04
Payer: MEDICARE

## 2019-10-04 VITALS
TEMPERATURE: 97.8 F | DIASTOLIC BLOOD PRESSURE: 90 MMHG | BODY MASS INDEX: 29.08 KG/M2 | WEIGHT: 158 LBS | RESPIRATION RATE: 16 BRPM | HEART RATE: 60 BPM | HEIGHT: 62 IN | SYSTOLIC BLOOD PRESSURE: 140 MMHG

## 2019-10-04 DIAGNOSIS — E04.1 THYROID NODULE: Primary | ICD-10-CM

## 2019-10-04 PROCEDURE — 99213 OFFICE O/P EST LOW 20 MIN: CPT | Performed by: SURGERY

## 2019-10-04 NOTE — PROGRESS NOTES
Surgical Oncology Follow Up       [facility] SURGICAL ONCOLOGY [facility] [address]    Rachel Jeffrey  1940  [id number]  [address]  [facility] SURGICAL ONCOLOGY [place]  [place] [facility] [address]    Chief Complaint   Patient presents with    Follow-up     3 month follow up  Assessment/Plan:    No problem-specific Assessment & Plan notes found for this encounter  Diagnoses and all orders for this visit:    Thyroid nodule        Advance Care Planning/Advance Directives:  Discussed disease status, cancer treatment plans and/or cancer treatment goals with the patient  Intraductal carcinoma in situ of right breast    5/9/2018 Initial Diagnosis     Intraductal carcinoma in situ of right breast      5/9/2018 Biopsy     Right breast stereotactic biopsy x2 sites:   Ductal carcinoma in-situ (DCIS), features suggestive of involvement of underlying intraductal papilloma  Nuclear Grade: II-III (intermediate to high)  % positive  CT 70-75% positive        6/21/2018 Surgery     Right-sided needle localized partial mastectomy with intraoperative ultrasound guidance:  DCIS, grade 2-3/3, calcifications present  Margins - free of malignancy, closest margin is inferior with 0 6 mm of clearance  Posterior margin with 2 1 mm of clearance  Stage 0, pTis (DCIS),pN0,pMX  - Dr Nilda Baron      7/10/2018 Surgery     Re-excision of right breast inferior margin tissue:  - Foci of ductal epithelial hyperplasia are seen ranging from usual ductal hyperplasia to atypical ductal hyperplasia to a few foci having features which are felt to be compatible with ductal carcinoma in situ, primarily solid type, nuclear grade 1-2/3   - None of the foci of atypia or probable ductal carcinoma in situ are seen at or within 2 mm the outer margins of excision, though it is noted that such foci are seen in 3 of 5 slides    - One intraductal papilloma is identified   - Fat necrosis is seen along the inner surface of this reexcision  8/16/2018 -  Hormone Therapy     None recommended by Dr Lindsey Myers      8/16/2018 - 9/14/2018 Radiation     hypo fractionated course directed to the right breast to a total dose of 4256 centigray with an additional 1000 centigray to the lumpectomy cavity          History of Present Illness:  Patient is here for follow-up  She had a right thyroid nodule biopsied with Afirma testing  She is now here to discuss results   -Interval History:  She had no new issues since I saw her last time  Review of Systems:  Review of Systems   Constitutional: Negative  HENT: Negative  Eyes: Negative  Respiratory: Negative  Cardiovascular: Negative  Gastrointestinal: Negative  Endocrine: Negative  Genitourinary: Negative  Musculoskeletal: Negative  Skin: Negative  Allergic/Immunologic: Negative  Neurological: Negative  Hematological: Negative  Psychiatric/Behavioral: Negative  Patient Active Problem List   Diagnosis    Abnormal mammogram    Paroxysmal atrial fibrillation (HCC)    Chronic coronary artery disease    Cervical spinal stenosis    Closed fracture of maxilla with routine healing    Conduction disorder of the heart    DDD (degenerative disc disease), cervical    Degeneration of intervertebral disc    Dizziness and giddiness    Headache    Hypokalemia    Fracture of multiple ribs    Intraductal carcinoma in situ of right breast    Long term current use of anticoagulant therapy    Latent tuberculosis    Low back pain    Noncompliance with treatment    Obesity    Osteoarthritis of finger of right hand    Osteoarthritis of knee    Type 2 diabetes mellitus ([address] )    Thyroid nodule    Vertigo    Vitamin D deficiency    Hypertension    Pre-operative clearance    Overweight (BMI 25 0-29  9)    Pure hypercholesterolemia    Neuralgia    Bunion of unspecified foot    Pre-op examination    Rib pain on right side    Malignant neoplasm of central portion of right breast in female, estrogen receptor positive [facility]    Bug bite     Past Medical History:   Diagnosis Date    Allergic rhinitis     Arthritis     Atrial fibrillation ([facility]     Breast cancer (HCC)     Cataracts, bilateral     Chronic headaches     pulsatile daily headaches    Colitis     Coronary artery disease     Diabetes mellitus ([facility]     DJD (degenerative joint disease), cervical     Facial neuralgia     left frontal facial post traumatic neuralgia    Fibromyalgia     Glaucoma     History of external beam radiation therapy     8/16/18 to 9/14/2018 Right breast    Hypertension     Hypokalemia     Hypovitaminosis D     Lupus [facility] 7/19/2018    Osteoarthritis     Prediabetes     SDH (subdural hematoma) ([place] [address] ) 1/31/2017    Sleep apnea     Syncope and collapse 12/1/2018    Vertigo      Past Surgical History:   Procedure Laterality Date    BREAST BIOPSY      BREAST LUMPECTOMY      CHOLECYSTECTOMY      COLONOSCOPY  2013    HYSTERECTOMY      MAMMO NEEDLE LOCALIZATION RIGHT ([employer]) Right 6/21/2018    MAMMO STEREOTACTIC BREAST BIOPSY RIGHT (ALL INC) Right 5/9/2018    TONSILLECTOMY      US GUIDED BREAST BIOPSY RIGHT COMPLETE Right 5/9/2018    US GUIDED THYROID BIOPSY  6/12/2019    US GUIDED THYROID BIOPSY  9/13/2019     Family History   Problem Relation Age of Onset    Heart attack Sister     Hypertension Family     Diabetes Family     Stroke Family     Migraines Family     Breast cancer Daughter 28     Social History     Socioeconomic History    Marital status: Single     Spouse name: Not on file    Number of children: Not on file    Years of education: Not on file    Highest education level: Not on file   Occupational History    Not on file   Social Needs    Financial resource strain: Not on file    Food insecurity:     Worry: Not on file     Inability: Not on file    Transportation needs:     Medical: Not on file     Non-medical: Not on file   Tobacco Use    Smoking status: Never Smoker    Smokeless tobacco: Never Used    Tobacco comment: no smoke exposure   Substance and Sexual Activity    Alcohol use: Yes     Comment: occasional    Drug use: No    Sexual activity: Not on file   Lifestyle    Physical activity:     Days per week: Not on file     Minutes per session: Not on file    Stress: Not on file   Relationships    Social connections:     Talks on phone: Not on file     Gets together: Not on file     Attends Hindu service: Not on file     Active member of club or organization: Not on file     Attends meetings of clubs or organizations: Not on file     Relationship status: Not on file    Intimate partner violence:     Fear of current or ex partner: Not on file     Emotionally abused: Not on file     Physically abused: Not on file     Forced sexual activity: Not on file   Other Topics Concern    Not on file   Social History Narrative    Not on file       Current Outpatient Medications:     acetaminophen (TYLENOL) 500 mg tablet, Take 1,000 mg by mouth every 8 (eight) hours as needed, Disp: , Rfl:     apixaban (ELIQUIS) 5 mg, Take 1 tablet (5 mg total) by mouth 2 (two) times a day, Disp: 60 tablet, Rfl: 2    aspirin (ECOTRIN LOW STRENGTH) 81 mg EC tablet, Take 81 mg by mouth daily, Disp: , Rfl:     atorvastatin (LIPITOR) 10 mg tablet, Take 1 tablet (10 mg total) by mouth daily, Disp: 90 tablet, Rfl: 0    Blood Pressure Monitoring (BLOOD PRESS MONITOR/M-L CUFF) MISC, by Does not apply route 2 (two) times a day, Disp: 1 each, Rfl: 0    calcium-vitamin D (OSCAL 500 + D) 500 mg-200 units per tablet, Take 1 tablet by mouth daily, Disp: , Rfl:     Capsaicin (ARTHRITIS PAIN RELIEF EX), Apply topically as needed, Disp: , Rfl:     gabapentin (NEURONTIN) 100 mg capsule, Take 1 capsule (100 mg total) by mouth 2 (two) times a day, Disp: 120 capsule, Rfl: 0    Lancets (FREESTYLE) lancets, Test BID, Disp: 100 each, Rfl: 2    metFORMIN (GLUCOPHAGE) 500 mg tablet, Take 1 tablet (500 mg total) by mouth 2 (two) times a day with meals, Disp: 180 tablet, Rfl: 2    metoprolol tartrate (LOPRESSOR) 25 mg tablet, Take 1 tablet (25 mg total) by mouth every 12 (twelve) hours, Disp: 60 tablet, Rfl: 0    senna-docusate sodium (SENOKOT S) 8 6-50 mg per tablet, Take 1 tablet by mouth, Disp: , Rfl:     irbesartan-hydrochlorothiazide (AVALIDE) 150-12 5 MG per tablet, Take 1 tablet by mouth daily (Patient not taking: Reported on 10/4/2019), Disp: 30 tablet, Rfl: 0    meclizine (ANTIVERT) 25 mg tablet, Take 1 tablet (25 mg total) by mouth daily (Patient not taking: Reported on 10/4/2019), Disp: 15 tablet, Rfl: 0    topiramate (TOPAMAX) 25 mg tablet, take 1 tablet by oral route bid or as directed, Disp: , Rfl:   No Known Allergies  Vitals:    10/04/19 0818   BP: 140/90   Pulse: 60   Resp: 16   Temp: 97 8 °F (36 6 °C)       Physical Exam   Constitutional: She is oriented to person, place, and time  She appears well-developed and well-nourished  HENT:   Head: Normocephalic and atraumatic  Right Ear: External ear normal    Left Ear: External ear normal    Eyes: Pupils are equal, round, and reactive to light  EOM are normal    Neck: Normal range of motion  Neck supple  No JVD present  No tracheal deviation present  No thyromegaly present  Palpable right thyroid nodule   Cardiovascular: Normal rate, regular rhythm and normal heart sounds  Pulmonary/Chest: Effort normal and breath sounds normal    Abdominal: Soft  Bowel sounds are normal    Musculoskeletal: Normal range of motion  Lymphadenopathy:     She has no cervical adenopathy  Neurological: She is alert and oriented to person, place, and time  Skin: Skin is warm and dry  Psychiatric: She has a normal mood and affect   Her behavior is normal  Judgment normal          Results:  Labs:  Case Report   Non-gynecologic Cytology                          Case: [id number]                                   Authorizing Provider: Claudia Christensen MD        Collected:           09/13/2019 0859               Ordering Location:     [facility]        Received:            09/13/2019 [address] [place] [address] Ultrasound                                                          Pathologist:           Flavia Leung MD                                                    Specimens:   A) - Thyroid, Right, lower pole                                                                      B) - Thyroid, Right, lower pole                                                            Final Diagnosis   A - B  Thyroid, Right, lower pole: (Thin prep and smear preparations)  Atypia of undetermined significance ([place] Category III) - See note  Follicular cells with oncocytic features in micro follicles with focal nuclear atypia characterized by crowding/overlapping, occasional very enlarged nuclei with pale chromatin and some alteration in nuclear countors  Colloid is present      Note:  (1) As reported in the [address] [place] [address] for Reporting Thyroid Cytopathology*, this diagnostic category has demonstrated anywhere from 10-30% risk of malignancy being found in subsequent resections and/or FNA  This risk of malignancy is expected to change due to the usage of the surgical pathology diagnosis of 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 (NIFTP)    The anticipated risk of malignancy secondary to NIFTP is 6-18%  The manual reports that the usual management following this diagnosis is repeat FNA, molecular testing, or lobectomy   Ultimately, clinical/imaging correlation for this patient is needed in arriving at the actual management plan      *The [place] System for Reporting Thyroid Cytopathology, Blondell Kayser , Vanita Matsu Rolley [place] ), 2018 (2nd ed )   Electronically signed by Kashif Dexter MD on 9/17/2019 at 10:26 AM   Note    The treating physician has requested a sample(s) from the above thyroid nodule(s) be sent for analysis by  [employer] [employer]  ([facility]), performed by Jazmine Han [place], [place])  [facility] only performs this test on specimen(s) receiving a cytologic diagnosis of [place] Category III or  IV  If such a diagnosis is rendered (above) specimen will be sent to THE [facility], and a separate report with  results of Afirma GEC will follow directly from [facility] (typically taking 14 days)  If a cytologic  interpretation other than [place] category III or IV is rendered, specimen will not be sent for Afirma GE  Imaging  Us Guided Thyroid Biopsy With Afirma    Result Date: 9/13/2019  Narrative: ULTRASOUND-GUIDED THYROID BIOPSY HISTORY: 69year-old with a history of a right lower pole thyroid nodule biopsy with cytopathology result indicating atypia of undetermined significance ([place] Category 3)  Patient presents with a prescription for ultrasound-guided fine-needle aspiration biopsy of the right lower pole thyroid nodule with Afirma sampling  COMPARISON: Prior thyroid ultrasound dated 5/29/2019 and prior ultrasound-guided thyroid biopsy dated 6/12/2019 were reviewed  A right lower pole thyroid nodule measuring 2 8 x 2 2 x 2 1 cm was identified and recommended for biopsy  FINDINGS: Prior ultrasound images were reviewed  On ultrasound imaging performed today, the corresponding right lower pole thyroid nodule measures 2 7 x 2 3 x 2 1 cm  The procedure was explained to the patient, including risks of hemorrhage, infection and local injury  The possibility of a nondiagnostic biopsy result and the need for repeat biopsy or sonographic follow-up was explained to the patient  Informed consent was freely obtained  The patient verbalized expressed understanding of the above risks and wished to proceed with the procedure   Final standard "time-out" procedure performed  PROCEDURE: The neck was prepped and draped in normal sterile fashion  Under real-time ultrasound guidance and local anesthesia five passes with 25 gauge needles were made through the right lower pole thyroid nodule  Cytopathology was present and deemed the specimens adequate for initial evaluation  Two of the samples were saved for potential Afirma testing  The patient tolerated the procedure well  Postprocedure instructions were provided for the patient  I asked the patient to call us with any questions, concerns, or acute problems  The patient expressed understanding of the above  Impression: Status post successful ultrasound-guided thyroid biopsy  Tissue samples were saved for Afirma testing, if required  The above findings and procedure were reviewed with Dr Evelyn Meza  Procedure was performed by El August PA-C under the direct supervision of Dr Evelyn Meza  Workstation performed: [id number]     I reviewed the above laboratory and imaging data  Discussion/Summary:  69year old woman, suspicious right thyroid nodule based on Afirma testing  Treatment implications were discussed with patient  Treatment options discussed including hemithyroidectomy, which I recommend based on Afirma test   She has 40-50% chance of risk of malignancy based on that test   The other option should she wish to avoid an operation would be interval surveillance with ultrasound  She is not sure as to what she would like to do at this point and would like to think about things over the next couple weeks  I will therefore have her come back in 2 weeks to discuss treatment options and make appropriate plans based on her decision  Copy reports given to patient for her records

## 2019-10-04 NOTE — LETTER
October 4, 2019     Rachel Michaud MD  [address] [facility]  [address]    Patient: Babar Santos   YOB: 1940   Date of Visit: 10/4/2019       Dear Dr Reena Heath: Thank you for referring Babar Santos to me for evaluation  Below are my notes for this consultation  If you have questions, please do not hesitate to call me  I look forward to following your patient along with you  Sincerely,        Francisco Olmos MD        CC: MD Tish Fall MD Fae Grumbles, MD Sandford Massed, MD  10/4/2019  8:54 AM  Sign at close encounter     Surgical Oncology Follow Up       [facility]    Babar Santos  1940  [id number]  [address] [facility] [place] [address]    Chief Complaint   Patient presents with    Follow-up     3 month follow up  Assessment/Plan:    No problem-specific Assessment & Plan notes found for this encounter  Diagnoses and all orders for this visit:    Thyroid nodule        Advance Care Planning/Advance Directives:  Discussed disease status, cancer treatment plans and/or cancer treatment goals with the patient  Intraductal carcinoma in situ of right breast    5/9/2018 Initial Diagnosis     Intraductal carcinoma in situ of right breast      5/9/2018 Biopsy     Right breast stereotactic biopsy x2 sites:   Ductal carcinoma in-situ (DCIS), features suggestive of involvement of underlying intraductal papilloma  Nuclear Grade: II-III (intermediate to high)  % positive  WA 70-75% positive        6/21/2018 Surgery     Right-sided needle localized partial mastectomy with intraoperative ultrasound guidance:  DCIS, grade 2-3/3, calcifications present  Margins - free of malignancy, closest margin is inferior with 0 6 mm of clearance   Posterior margin with 2 1 mm of clearance  Stage 0, pTis (DCIS),pN0,pMX  - Dr Quique Callahan      7/10/2018 Surgery     Re-excision of right breast inferior margin tissue:  - Foci of ductal epithelial hyperplasia are seen ranging from usual ductal hyperplasia to atypical ductal hyperplasia to a few foci having features which are felt to be compatible with ductal carcinoma in situ, primarily solid type, nuclear grade 1-2/3   - None of the foci of atypia or probable ductal carcinoma in situ are seen at or within 2 mm the outer margins of excision, though it is noted that such foci are seen in 3 of 5 slides  - One intraductal papilloma is identified   - Fat necrosis is seen along the inner surface of this reexcision  8/16/2018 -  Hormone Therapy     None recommended by Dr Hilda Garzon      8/16/2018 - 9/14/2018 Radiation     hypo fractionated course directed to the right breast to a total dose of 4256 centigray with an additional 1000 centigray to the lumpectomy cavity          History of Present Illness:  Patient is here for follow-up  She had a right thyroid nodule biopsied with Afirma testing  She is now here to discuss results   -Interval History:  She had no new issues since I saw her last time  Review of Systems:  Review of Systems   Constitutional: Negative  HENT: Negative  Eyes: Negative  Respiratory: Negative  Cardiovascular: Negative  Gastrointestinal: Negative  Endocrine: Negative  Genitourinary: Negative  Musculoskeletal: Negative  Skin: Negative  Allergic/Immunologic: Negative  Neurological: Negative  Hematological: Negative  Psychiatric/Behavioral: Negative          Patient Active Problem List   Diagnosis    Abnormal mammogram    Paroxysmal atrial fibrillation (HCC)    Chronic coronary artery disease    Cervical spinal stenosis    Closed fracture of maxilla with routine healing    Conduction disorder of the heart    DDD (degenerative disc disease), cervical    Degeneration of intervertebral disc    Dizziness and giddiness    Headache    Hypokalemia    Fracture of multiple ribs    Intraductal carcinoma in situ of right breast    Long term current use of anticoagulant therapy    Latent tuberculosis    Low back pain    Noncompliance with treatment    Obesity    Osteoarthritis of finger of right hand    Osteoarthritis of knee    Type 2 diabetes mellitus (HCC)    Thyroid nodule    Vertigo    Vitamin D deficiency    Hypertension    Pre-operative clearance    Overweight (BMI 25 0-29  9)    Pure hypercholesterolemia    Neuralgia    Bunion of unspecified foot    Pre-op examination    Rib pain on right side    Malignant neoplasm of central portion of right breast in female, estrogen receptor positive ([facility] [address] )    Bug bite     Past Medical History:   Diagnosis Date    Allergic rhinitis     Arthritis     Atrial fibrillation (HCC)     Breast cancer (HCC)     Cataracts, bilateral     Chronic headaches     pulsatile daily headaches    Colitis     Coronary artery disease     Diabetes mellitus ([address] )     DJD (degenerative joint disease), cervical     Facial neuralgia     left frontal facial post traumatic neuralgia    Fibromyalgia     Glaucoma     History of external beam radiation therapy     8/16/18 to 9/14/2018 Right breast    Hypertension     Hypokalemia     Hypovitaminosis D     Lupus ([facility] [address] ) 7/19/2018    Osteoarthritis     Prediabetes     SDH (subdural hematoma) ([facility] [address] ) 1/31/2017    Sleep apnea     Syncope and collapse 12/1/2018    Vertigo      Past Surgical History:   Procedure Laterality Date    BREAST BIOPSY      BREAST LUMPECTOMY      CHOLECYSTECTOMY      COLONOSCOPY  2013    HYSTERECTOMY      MAMMO NEEDLE LOCALIZATION RIGHT ([employer]) Right 6/21/2018    MAMMO STEREOTACTIC BREAST BIOPSY RIGHT (ALL INC) Right 5/9/2018    TONSILLECTOMY      US GUIDED BREAST BIOPSY RIGHT COMPLETE Right 5/9/2018    US GUIDED THYROID BIOPSY  6/12/2019 Amber 46 THYROID BIOPSY  9/13/2019     Family History   Problem Relation Age of Onset    Heart attack Sister     Hypertension Family     Diabetes Family     Stroke Family     Migraines Family     Breast cancer Daughter 28     Social History     Socioeconomic History    Marital status: Single     Spouse name: Not on file    Number of children: Not on file    Years of education: Not on file    Highest education level: Not on file   Occupational History    Not on file   Social Needs    Financial resource strain: Not on file    Food insecurity:     Worry: Not on file     Inability: Not on file    Transportation needs:     Medical: Not on file     Non-medical: Not on file   Tobacco Use    Smoking status: Never Smoker    Smokeless tobacco: Never Used    Tobacco comment: no smoke exposure   Substance and Sexual Activity    Alcohol use: Yes     Comment: occasional    Drug use: No    Sexual activity: Not on file   Lifestyle    Physical activity:     Days per week: Not on file     Minutes per session: Not on file    Stress: Not on file   Relationships    Social connections:     Talks on phone: Not on file     Gets together: Not on file     Attends Hindu service: Not on file     Active member of club or organization: Not on file     Attends meetings of clubs or organizations: Not on file     Relationship status: Not on file    Intimate partner violence:     Fear of current or ex partner: Not on file     Emotionally abused: Not on file     Physically abused: Not on file     Forced sexual activity: Not on file   Other Topics Concern    Not on file   Social History Narrative    Not on file       Current Outpatient Medications:     acetaminophen (TYLENOL) 500 mg tablet, Take 1,000 mg by mouth every 8 (eight) hours as needed, Disp: , Rfl:     apixaban (ELIQUIS) 5 mg, Take 1 tablet (5 mg total) by mouth 2 (two) times a day, Disp: 60 tablet, Rfl: 2    aspirin (ECOTRIN LOW STRENGTH) 81 mg EC tablet, Take 81 mg by mouth daily, Disp: , Rfl:     atorvastatin (LIPITOR) 10 mg tablet, Take 1 tablet (10 mg total) by mouth daily, Disp: 90 tablet, Rfl: 0    Blood Pressure Monitoring (BLOOD PRESS MONITOR/M-L CUFF) MISC, by Does not apply route 2 (two) times a day, Disp: 1 each, Rfl: 0    calcium-vitamin D (OSCAL 500 + D) 500 mg-200 units per tablet, Take 1 tablet by mouth daily, Disp: , Rfl:     Capsaicin (ARTHRITIS PAIN RELIEF EX), Apply topically as needed, Disp: , Rfl:     gabapentin (NEURONTIN) 100 mg capsule, Take 1 capsule (100 mg total) by mouth 2 (two) times a day, Disp: 120 capsule, Rfl: 0    Lancets (FREESTYLE) lancets, Test BID, Disp: 100 each, Rfl: 2    metFORMIN (GLUCOPHAGE) 500 mg tablet, Take 1 tablet (500 mg total) by mouth 2 (two) times a day with meals, Disp: 180 tablet, Rfl: 2    metoprolol tartrate (LOPRESSOR) 25 mg tablet, Take 1 tablet (25 mg total) by mouth every 12 (twelve) hours, Disp: 60 tablet, Rfl: 0    senna-docusate sodium (SENOKOT S) 8 6-50 mg per tablet, Take 1 tablet by mouth, Disp: , Rfl:     irbesartan-hydrochlorothiazide (AVALIDE) 150-12 5 MG per tablet, Take 1 tablet by mouth daily (Patient not taking: Reported on 10/4/2019), Disp: 30 tablet, Rfl: 0    meclizine (ANTIVERT) 25 mg tablet, Take 1 tablet (25 mg total) by mouth daily (Patient not taking: Reported on 10/4/2019), Disp: 15 tablet, Rfl: 0    topiramate (TOPAMAX) 25 mg tablet, take 1 tablet by oral route bid or as directed, Disp: , Rfl:   No Known Allergies  Vitals:    10/04/19 0818   BP: 140/90   Pulse: 60   Resp: 16   Temp: 97 8 °F (36 6 °C)       Physical Exam   Constitutional: She is oriented to person, place, and time  She appears well-developed and well-nourished  HENT:   Head: Normocephalic and atraumatic  Right Ear: External ear normal    Left Ear: External ear normal    Eyes: Pupils are equal, round, and reactive to light  EOM are normal    Neck: Normal range of motion  Neck supple   No JVD present  No tracheal deviation present  No thyromegaly present  Palpable right thyroid nodule   Cardiovascular: Normal rate, regular rhythm and normal heart sounds  Pulmonary/Chest: Effort normal and breath sounds normal    Abdominal: Soft  Bowel sounds are normal    Musculoskeletal: Normal range of motion  Lymphadenopathy:     She has no cervical adenopathy  Neurological: She is alert and oriented to person, place, and time  Skin: Skin is warm and dry  Psychiatric: She has a normal mood and affect  Her behavior is normal  Judgment normal          Results:  Labs:  Case Report   Non-gynecologic Cytology                          Case: [id number]                                   Authorizing Provider: Brooks Zuñiga MD        Collected:           09/13/2019 0859               Ordering Location:     [facility]        Received:            09/13/2019 [address] [place] [address] Ultrasound                                                          Pathologist:           Cliff Betancourt MD                                                    Specimens:   A) - Thyroid, Right, lower pole                                                                      B) - Thyroid, Right, lower pole                                                            Final Diagnosis   A - B  Thyroid, Right, lower pole: (Thin prep and smear preparations)  Atypia of undetermined significance ([place] Category III) - See note  Follicular cells with oncocytic features in micro follicles with focal nuclear atypia characterized by crowding/overlapping, occasional very enlarged nuclei with pale chromatin and some alteration in nuclear countors  Colloid is present      Note:  (1) As reported in the [facility] for Reporting Thyroid Cytopathology*, this diagnostic category has demonstrated anywhere from 10-30% risk of malignancy being found in subsequent resections and/or FNA    This risk of malignancy is expected to change due to the usage of the surgical pathology diagnosis of 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 (NIFTP)    The anticipated risk of malignancy secondary to NIFTP is 6-18%  The manual reports that the usual management following this diagnosis is repeat FNA, molecular testing, or lobectomy  Ultimately, clinical/imaging correlation for this patient is needed in arriving at the actual management plan      *The [place] System for Reporting Thyroid Cytopathology, Rory Stout (Ricardo Logan ), 2018 (2nd ed )   Electronically signed by Nataliia Clifford MD on 9/17/2019 at 10:26 AM   Note    The treating physician has requested a sample(s) from the above thyroid nodule(s) be sent for analysis by  [employer]  (Afirma GEC), performed by Jazmine Han [place], [place])  [facility] only performs this test on specimen(s) receiving a cytologic diagnosis of [place] Category III or  IV  If such a diagnosis is rendered (above) specimen will be sent to THE [facility] [place], and a separate report with  results of [facility] GEC will follow directly from [facility] (typically taking 14 days)  If a cytologic  interpretation other than [place] category III or IV is rendered, specimen will not be sent for Afirma GEC  Imaging  Us Guided Thyroid Biopsy With Afirma    Result Date: 9/13/2019  Narrative: ULTRASOUND-GUIDED THYROID BIOPSY HISTORY: 69year-old with a history of a right lower pole thyroid nodule biopsy with cytopathology result indicating atypia of undetermined significance ([place] Category 3)  Patient presents with a prescription for ultrasound-guided fine-needle aspiration biopsy of the right lower pole thyroid nodule with Afirma sampling  COMPARISON: Prior thyroid ultrasound dated 5/29/2019 and prior ultrasound-guided thyroid biopsy dated 6/12/2019 were reviewed    A right lower pole thyroid nodule measuring 2 8 x 2 2 x 2 1 cm was identified and recommended for biopsy  FINDINGS: Prior ultrasound images were reviewed  On ultrasound imaging performed today, the corresponding right lower pole thyroid nodule measures 2 7 x 2 3 x 2 1 cm  The procedure was explained to the patient, including risks of hemorrhage, infection and local injury  The possibility of a nondiagnostic biopsy result and the need for repeat biopsy or sonographic follow-up was explained to the patient  Informed consent was freely obtained  The patient verbalized expressed understanding of the above risks and wished to proceed with the procedure  Final standard "time-out" procedure performed  PROCEDURE: The neck was prepped and draped in normal sterile fashion  Under real-time ultrasound guidance and local anesthesia five passes with 25 gauge needles were made through the right lower pole thyroid nodule  Cytopathology was present and deemed the specimens adequate for initial evaluation  Two of the samples were saved for potential Afirma testing  The patient tolerated the procedure well  Postprocedure instructions were provided for the patient  I asked the patient to call us with any questions, concerns, or acute problems  The patient expressed understanding of the above  Impression: Status post successful ultrasound-guided thyroid biopsy  Tissue samples were saved for Afirma testing, if required  The above findings and procedure were reviewed with Dr Sandy uHnt  Procedure was performed by Germania August PA-C under the direct supervision of Dr Sandy Hunt  Workstation performed: [id number]     I reviewed the above laboratory and imaging data  Discussion/Summary:  69year old woman, suspicious right thyroid nodule based on Afirma testing  Treatment implications were discussed with patient    Treatment options discussed including hemithyroidectomy, which I recommend based on Afirma test   She has 40-50% chance of risk of malignancy based on that test   The other option should she wish to avoid an operation would be interval surveillance with ultrasound  She is not sure as to what she would like to do at this point and would like to think about things over the next couple weeks  I will therefore have her come back in 2 weeks to discuss treatment options and make appropriate plans based on her decision  Copy reports given to patient for her records

## 2019-10-14 ENCOUNTER — OFFICE VISIT (OUTPATIENT)
Dept: FAMILY MEDICINE CLINIC | Facility: CLINIC | Age: 79
End: 2019-10-14
Payer: MEDICARE

## 2019-10-14 VITALS
SYSTOLIC BLOOD PRESSURE: 120 MMHG | HEIGHT: 62 IN | BODY MASS INDEX: 29.26 KG/M2 | HEART RATE: 63 BPM | DIASTOLIC BLOOD PRESSURE: 80 MMHG | WEIGHT: 159 LBS | OXYGEN SATURATION: 98 % | TEMPERATURE: 97.8 F

## 2019-10-14 DIAGNOSIS — Z23 NEED FOR INFLUENZA VACCINATION: ICD-10-CM

## 2019-10-14 DIAGNOSIS — E55.9 VITAMIN D DEFICIENCY: ICD-10-CM

## 2019-10-14 DIAGNOSIS — Z00.01 ENCOUNTER FOR WELL ADULT EXAM WITH ABNORMAL FINDINGS: Primary | ICD-10-CM

## 2019-10-14 DIAGNOSIS — E11.9 TYPE 2 DIABETES MELLITUS WITHOUT COMPLICATION, WITHOUT LONG-TERM CURRENT USE OF INSULIN (HCC): ICD-10-CM

## 2019-10-14 DIAGNOSIS — I48.91 ATRIAL FIBRILLATION, UNSPECIFIED TYPE (HCC): ICD-10-CM

## 2019-10-14 DIAGNOSIS — M79.605 PAIN OF LEFT LOWER EXTREMITY: ICD-10-CM

## 2019-10-14 DIAGNOSIS — I10 ESSENTIAL HYPERTENSION: ICD-10-CM

## 2019-10-14 DIAGNOSIS — E04.1 THYROID NODULE: ICD-10-CM

## 2019-10-14 PROCEDURE — G0008 ADMIN INFLUENZA VIRUS VAC: HCPCS | Performed by: FAMILY MEDICINE

## 2019-10-14 PROCEDURE — 99214 OFFICE O/P EST MOD 30 MIN: CPT | Performed by: FAMILY MEDICINE

## 2019-10-14 PROCEDURE — 90662 IIV NO PRSV INCREASED AG IM: CPT | Performed by: FAMILY MEDICINE

## 2019-10-14 PROCEDURE — G0439 PPPS, SUBSEQ VISIT: HCPCS | Performed by: FAMILY MEDICINE

## 2019-10-14 RX ORDER — IRBESARTAN AND HYDROCHLOROTHIAZIDE 150; 12.5 MG/1; MG/1
1 TABLET, FILM COATED ORAL DAILY
Qty: 30 TABLET | Refills: 2 | Status: SHIPPED | OUTPATIENT
Start: 2019-10-14 | End: 2019-12-31 | Stop reason: SDUPTHER

## 2019-10-14 NOTE — PROGRESS NOTES
Assessment and Plan:     Problem List Items Addressed This Visit        Endocrine    Type 2 diabetes mellitus ([facility] )    Relevant Orders    CBC and differential    Basic metabolic panel    Lipid Panel with Direct LDL reflex    TSH, 3rd generation with Free T4 reflex    Hemoglobin A1C    Thyroid nodule     A chronic S/P biopsy ,abnormal Atypical cell   Patient F/up with Surgical Onchology         Relevant Orders    CBC and differential    Basic metabolic panel    Lipid Panel with Direct LDL reflex    TSH, 3rd generation with Free T4 reflex    Hemoglobin A1C       Cardiovascular and Mediastinum    Hypertension    Relevant Medications    irbesartan-hydrochlorothiazide (AVALIDE) 150-12 5 MG per tablet    Other Relevant Orders    CBC and differential    Basic metabolic panel    Lipid Panel with Direct LDL reflex    TSH, 3rd generation with Free T4 reflex    Hemoglobin A1C       Other    Vitamin D deficiency    Relevant Orders    CBC and differential    Basic metabolic panel    Lipid Panel with Direct LDL reflex    TSH, 3rd generation with Free T4 reflex    Hemoglobin A1C    Encounter for well adult exam with abnormal findings - Primary     Advice and education were given regarding nutrition, aerobic exercises, weight bearing exercises, cardiovascular risk reduction, fall risk reduction, and age appropriate supplements  The patient was counseled regarding instructions for management, risk factor reductions, prognosis, risks and benefits of treatment options, patient and family education, and importance of compliance with treatment       High-dose flu shot will be given today possible side effect discussed with the patient         Pain of left lower extremity     A new diagnosis associated with the muscle cramp positive tenderness in the lumbar spine plan to do x-ray of the lumbar spine to rule out degenerative disc disease discussed with the patient         Relevant Orders    XR spine lumbar minimum 4 views non injury Other Visit Diagnoses     Need for influenza vaccination        Relevant Orders    FLUZONE HIGH-DOSE: influenza vaccine, high-dose, preservative-free 0 5 mL (Completed)    Atrial fibrillation, unspecified type ([facility] )        Relevant Medications    apixaban (ELIQUIS) 5 mg           Preventive health issues were discussed with patient, and age appropriate screening tests were ordered as noted in patient's After Visit Summary  Personalized health advice and appropriate referrals for health education or preventive services given if needed, as noted in patient's After Visit Summary  History of Present Illness:     Patient presents for Medicare Annual Wellness visit    Patient Care Team:  Maria Teresa Lewis MD as PCP - Andrew Segal MD (Radiation Oncology)  Keith Navas MD (Cardiology)  Denny Dee MD (Breast Surgery)  Melissa Holly MD as Surgeon (Surgical Oncology)     Problem List:     Patient Active Problem List   Diagnosis    Abnormal mammogram    [facility]    Chronic coronary artery disease    Cervical spinal stenosis    Closed fracture of maxilla with routine healing    Conduction disorder of the heart    DDD (degenerative disc disease), cervical    Degeneration of intervertebral disc    Dizziness and giddiness    Headache    Hypokalemia    Fracture of multiple ribs    Intraductal carcinoma in situ of right breast    Long term current use of anticoagulant therapy    Latent tuberculosis    Low back pain    Noncompliance with treatment    Obesity    Osteoarthritis of finger of right hand    Osteoarthritis of knee    Type 2 diabetes mellitus ([facility]    Thyroid nodule    Vertigo    Vitamin D deficiency    Hypertension    Pre-operative clearance    Overweight (BMI 25 0-29  9)    Pure hypercholesterolemia    Neuralgia    Bunion of unspecified foot    Pre-op examination    Rib pain on right side    Malignant neoplasm of central portion of right breast in female, estrogen receptor positive ([facility] )    Bug bite    Encounter for well adult exam with abnormal findings    Pain of left lower extremity      Past Medical and Surgical History:     Past Medical History:   Diagnosis Date    Allergic rhinitis     Arthritis     Atrial fibrillation ([facility] )     Breast cancer ([place] [address] )     Cataracts, bilateral     Chronic headaches     pulsatile daily headaches    Colitis     Coronary artery disease     Diabetes mellitus ([place] [address] )     DJD (degenerative joint disease), cervical     Facial neuralgia     left frontal facial post traumatic neuralgia    Fibromyalgia     Glaucoma     History of external beam radiation therapy     8/16/18 to 9/14/2018 Right breast    Hypertension     Hypokalemia     Hypovitaminosis D     Lupus ([place] [address] ) 7/19/2018    Osteoarthritis     Prediabetes     SDH (subdural hematoma) ([place] [address] ) 1/31/2017    Sleep apnea     Syncope and collapse 12/1/2018    Vertigo      Past Surgical History:   Procedure Laterality Date    BREAST BIOPSY      BREAST LUMPECTOMY      CHOLECYSTECTOMY      COLONOSCOPY  2013    HYSTERECTOMY      MAMMO NEEDLE LOCALIZATION RIGHT ([employer] INC) Right 6/21/2018    MAMMO STEREOTACTIC BREAST BIOPSY RIGHT (ALL INC) Right 5/9/2018    TONSILLECTOMY      US GUIDED BREAST BIOPSY RIGHT COMPLETE Right 5/9/2018    US GUIDED THYROID BIOPSY  6/12/2019    US GUIDED THYROID BIOPSY  9/13/2019      Family History:     Family History   Problem Relation Age of Onset    Heart attack Sister     Hypertension Family     Diabetes Family     Stroke Family     Migraines Family     Breast cancer Daughter 28      Social History:     Social History     Socioeconomic History    Marital status: Single     Spouse name: None    Number of children: None    Years of education: None    Highest education level: None   Occupational History    None   Social Needs    Financial resource strain: None    Food insecurity:     Worry: Never true Inability: Never true    Transportation needs:     Medical: No     Non-medical: No   Tobacco Use    Smoking status: Never Smoker    Smokeless tobacco: Never Used    Tobacco comment: no smoke exposure   Substance and Sexual Activity    Alcohol use: Yes     Comment: occasional    Drug use: No    Sexual activity: None   Lifestyle    Physical activity:     Days per week: 0 days     Minutes per session: 0 min    Stress: Very much   Relationships    Social connections:     Talks on phone: Once a week     Gets together: More than three times a week     Attends Gnosticism service: More than 4 times per year     Active member of club or organization: Yes     Attends meetings of clubs or organizations: More than 4 times per year     Relationship status: Never     Intimate partner violence:     Fear of current or ex partner: No     Emotionally abused: No     Physically abused: No     Forced sexual activity: No   Other Topics Concern    None   Social History Narrative    None       Medications and Allergies:     Current Outpatient Medications   Medication Sig Dispense Refill    acetaminophen (TYLENOL) 500 mg tablet Take 1,000 mg by mouth every 8 (eight) hours as needed      apixaban (ELIQUIS) 5 mg Take 1 tablet (5 mg total) by mouth 2 (two) times a day 60 tablet 2    aspirin (ECOTRIN LOW STRENGTH) 81 mg EC tablet Take 81 mg by mouth daily      atorvastatin (LIPITOR) 10 mg tablet Take 1 tablet (10 mg total) by mouth daily 90 tablet 0    Blood Pressure Monitoring (BLOOD PRESS MONITOR/M-L CUFF) MISC by Does not apply route 2 (two) times a day 1 each 0    calcium-vitamin D (OSCAL 500 + D) 500 mg-200 units per tablet Take 1 tablet by mouth daily      Capsaicin (ARTHRITIS PAIN RELIEF EX) Apply topically as needed      gabapentin (NEURONTIN) 100 mg capsule Take 1 capsule (100 mg total) by mouth 2 (two) times a day 120 capsule 0    irbesartan-hydrochlorothiazide (AVALIDE) 150-12 5 MG per tablet Take 1 tablet by mouth daily 30 tablet 2    Lancets (FREESTYLE) lancets Test  each 2    meclizine (ANTIVERT) 25 mg tablet Take 1 tablet (25 mg total) by mouth daily 15 tablet 0    metFORMIN (GLUCOPHAGE) 500 mg tablet Take 1 tablet (500 mg total) by mouth 2 (two) times a day with meals 180 tablet 2    metoprolol tartrate (LOPRESSOR) 25 mg tablet Take 1 tablet (25 mg total) by mouth every 12 (twelve) hours 60 tablet 0    senna-docusate sodium (SENOKOT S) 8 6-50 mg per tablet Take 1 tablet by mouth      topiramate (TOPAMAX) 25 mg tablet take 1 tablet by oral route bid or as directed       No current facility-administered medications for this visit  No Known Allergies   Immunizations:     Immunization History   Administered Date(s) Administered    INFLUENZA 11/07/2015, 01/27/2017, 10/09/2017    Influenza Split 10/25/2013    Influenza Split High Dose Preservative Free IM 01/27/2017, 10/09/2017    Influenza TIV (IM) 12/01/2014    Influenza, high dose seasonal 0 5 mL 10/11/2018, 10/14/2019    Pneumococcal Conjugate 13-Valent 01/27/2017    Pneumococcal Polysaccharide PPV23 08/14/2017    Tdap 02/25/2019    Zoster 09/12/2017, 09/13/2017      Health Maintenance:         Topic Date Due    DXA SCAN  05/29/2021    CRC Screening: Colonoscopy  01/29/2028    Hepatitis C Screening  Completed         Topic Date Due    HEPATITIS B VACCINES (1 of 3 - Risk 3-dose series) 12/21/1959      Medicare Health Risk Assessment:     /80   Pulse 63   Temp 97 8 °F (36 6 °C) (Tympanic)   Ht 5' 2" (1 575 m)   Wt 72 1 kg (159 lb)   SpO2 98%   Breastfeeding? No   BMI 29 08 kg/m²      Ashley Miguel is here for her Subsequent Wellness visit  Last Medicare Wellness visit information reviewed, patient interviewed and updates made to the record today  Health Risk Assessment:   Patient rates overall health as good  Patient feels that their physical health rating is same  Eyesight was rated as slightly worse   Hearing was rated as slightly worse  Patient feels that their emotional and mental health rating is same  Pain experienced in the last 7 days has been a lot  Patient's pain rating has been 10/10  Patient states that she has experienced no weight loss or gain in last 6 months  Depression Screening:   PHQ-2 Score: 0      Fall Risk Screening: In the past year, patient has experienced: no history of falling in past year      Urinary Incontinence Screening:   Patient has not leaked urine accidently in the last six months  Home Safety:  Patient does not have trouble with stairs inside or outside of their home  Patient has working smoke alarms and has working carbon monoxide detector  Home safety hazards include: none  Nutrition:   Current diet is Regular  Medications:   Patient is currently taking over-the-counter supplements  OTC medications include: see medication list  Patient is able to manage medications  Activities of Daily Living (ADLs)/Instrumental Activities of Daily Living (IADLs):   Walk and transfer into and out of bed and chair?: Yes  Dress and groom yourself?: Yes    Bathe or shower yourself?: Yes    Feed yourself?  Yes  Do your laundry/housekeeping?: Yes  Manage your money, pay your bills and track your expenses?: Yes  Make your own meals?: Yes    Do your own shopping?: Yes    Durable Medical Equipment Suppliers  none    Previous Hospitalizations:   Any hospitalizations or ED visits within the last 12 months?: No      Advance Care Planning:   Living will: No    Durable POA for healthcare: No    Advanced directive: No    Advanced directive counseling given: No    Five wishes given: Yes    Patient declined ACP directive: No      Cognitive Screening:   Provider or family/friend/caregiver concerned regarding cognition?: No    PREVENTIVE SCREENINGS      Cardiovascular Screening:    General: Screening Not Indicated and History Lipid Disorder      Diabetes Screening:     General: Screening Not Indicated and History Diabetes      Colorectal Cancer Screening:     General: Screening Current      Breast Cancer Screening:     General: History Breast Cancer      Cervical Cancer Screening:    General: Screening Not Indicated      Osteoporosis Screening:    General: Screening Current      Abdominal Aortic Aneurysm (AAA) Screening:        General: Risks and Benefits Discussed and Patient Declines      Lung Cancer Screening:     General: Risks and Benefits Discussed and Screening Not Indicated      Hepatitis C Screening:    General: Screening Current      Nigel Damon MD

## 2019-10-14 NOTE — PROGRESS NOTES
Subjective:   Chief Complaint   Patient presents with   [facility] Wellness Visit    Other     L knee gives out        Patient ID: Malika Rosa is a 69 y o  female  Patient office for Medicare physical exam with her daughter and she had a concerned about her left leg and she feel like cramp in her left leg and the cramp start from the toe up to her the thigh and it can happen during the day and during the night no muscle weakness no numbness no rash she deny any fall or trauma no swelling in the joint and no redness in the joint patient does have history of chronic back pain she deny any lose control of the urine or stool no gait problem and no headache no blurred vision and no weakness or lateralized of the symptom        The following portions of the patient's history were reviewed and updated as appropriate: allergies, current medications, past family history, past medical history, past social history, past surgical history and problem list     Review of Systems   Constitutional: Negative for fatigue and fever  HENT: Negative for ear pain, sinus pressure, sinus pain and sore throat  Eyes: Negative for pain and redness  Respiratory: Negative for cough, chest tightness and shortness of breath  Cardiovascular: Negative for chest pain, palpitations and leg swelling  Gastrointestinal: Negative for abdominal pain, blood in stool, constipation, diarrhea and nausea  Genitourinary: Negative for flank pain, frequency and hematuria  Musculoskeletal: Negative for back pain and joint swelling  Left leg cramps   Skin: Negative for rash  Neurological: Negative for dizziness, numbness and headaches  Hematological: Does not bruise/bleed easily           Objective:  Vitals:    10/14/19 1457   BP: 120/80   Pulse: 63   Temp: 97 8 °F (36 6 °C)   TempSrc: Tympanic   SpO2: 98%   Weight: 72 1 kg (159 lb)   Height: 5' 2" (1 575 m)      Physical Exam   Constitutional: She is oriented to person, place, and time  She appears well-developed and well-nourished  HENT:   Head: Normocephalic  Right Ear: External ear normal    Left Ear: External ear normal    Eyes: Conjunctivae and EOM are normal  Right eye exhibits no discharge  Left eye exhibits no discharge  Neck: No JVD present  Cardiovascular: Normal rate, regular rhythm and normal heart sounds  Exam reveals no gallop  No murmur heard  Pulmonary/Chest: Effort normal  No respiratory distress  She has no wheezes  She has no rales  She exhibits no tenderness  Abdominal: She exhibits no mass  There is no tenderness  There is no rebound  Musculoskeletal: She exhibits tenderness  She exhibits no edema  The positive tenderness in the lumbar spine L4-L5 leg raise test is positive in the left side   Neurological: She is alert and oriented to person, place, and time  The bilateral leg no swelling and no joint swelling no joint tenderness sensation is normal and deep tendon reflex is normal and bilateral patella area   Skin: No rash noted  No erythema  Assessment/Plan:    Encounter for well adult exam with abnormal findings  Advice and education were given regarding nutrition, aerobic exercises, weight bearing exercises, cardiovascular risk reduction, fall risk reduction, and age appropriate supplements  The patient was counseled regarding instructions for management, risk factor reductions, prognosis, risks and benefits of treatment options, patient and family education, and importance of compliance with treatment       High-dose flu shot will be given today possible side effect discussed with the patient    Pain of left lower extremity  A new diagnosis associated with the muscle cramp positive tenderness in the lumbar spine plan to do x-ray of the lumbar spine to rule out degenerative disc disease discussed with the patient    Thyroid nodule  A chronic S/P biopsy ,abnormal Atypical cell   Patient F/up with Surgical Onchology       Diagnoses and all orders for this visit:    Encounter for well adult exam with abnormal findings    Pain of left lower extremity  -     XR spine lumbar minimum 4 views non injury; Future    Need for influenza vaccination  -     FLUZONE HIGH-DOSE: influenza vaccine, high-dose, preservative-free 0 5 mL    [facility], [facility]  -     apixaban (ELIQUIS) 5 mg; Take 1 tablet (5 mg total) by mouth 2 (two) times a day    Essential hypertension  -     irbesartan-hydrochlorothiazide (AVALIDE) 150-12 5 MG per tablet; Take 1 tablet by mouth daily  -     CBC and differential; Future  -     Basic metabolic panel; Future  -     Lipid Panel with Direct LDL reflex; Future  -     TSH, 3rd generation with Free T4 reflex; Future  -     Hemoglobin A1C; Future    Thyroid nodule  -     CBC and differential; Future  -     Basic metabolic panel; Future  -     Lipid Panel with Direct LDL reflex; Future  -     TSH, 3rd generation with Free T4 reflex; Future  -     Hemoglobin A1C; Future    Type 2 diabetes mellitus without complication, without long-term current use of insulin (HCC)  -     CBC and differential; Future  -     Basic metabolic panel; Future  -     Lipid Panel with Direct LDL reflex; Future  -     TSH, 3rd generation with Free T4 reflex; Future  -     Hemoglobin A1C; Future    Vitamin D deficiency  -     CBC and differential; Future  -     Basic metabolic panel; Future  -     Lipid Panel with Direct LDL reflex; Future  -     TSH, 3rd generation with Free T4 reflex;  Future  -     Hemoglobin A1C; Future

## 2019-10-14 NOTE — PATIENT INSTRUCTIONS
Medicare Preventive Visit Patient Instructions  Thank you for completing your Welcome to Medicare Visit or Medicare Annual Wellness Visit today  Your next wellness visit will be due in one year (10/14/2020)  The screening/preventive services that you may require over the next 5-10 years are detailed below  Some tests may not apply to you based off risk factors and/or age  Screening tests ordered at today's visit but not completed yet may show as past due  Also, please note that scanned in results may not display below  Preventive Screenings:  Service Recommendations Previous Testing/Comments   Colorectal Cancer Screening  * Colonoscopy    * Fecal Occult Blood Test (FOBT)/Fecal Immunochemical Test (FIT)  * Fecal DNA/Cologuard Test  * Flexible Sigmoidoscopy Age: 54-65 years old   Colonoscopy: every 10 years (may be performed more frequently if at higher risk)  OR  FOBT/FIT: every 1 year  OR  Cologuard: every 3 years  OR  Sigmoidoscopy: every 5 years  Screening may be recommended earlier than age 48 if at higher risk for colorectal cancer  Also, an individualized decision between you and your healthcare provider will decide whether screening between the ages of 74-80 would be appropriate  Colonoscopy: 01/29/2018  FOBT/FIT: Not on file  Cologuard: Not on file  Sigmoidoscopy: Not on file    Screening Current     Breast Cancer Screening Age: 36 years old  Frequency: every 1-2 years  Not required if history of left and right mastectomy Mammogram: 04/24/2019    History Breast Cancer   Cervical Cancer Screening Between the ages of 21-29, pap smear recommended once every 3 years  Between the ages of 33-67, can perform pap smear with HPV co-testing every 5 years     Recommendations may differ for women with a history of total hysterectomy, cervical cancer, or abnormal pap smears in past  Pap Smear: Not on file    Screening Not Indicated   Hepatitis C Screening Once for adults born between 1945 and 1965  More frequently in patients at high risk for Hepatitis C Hep C Antibody: 11/12/2018    Screening Current   Diabetes Screening 1-2 times per year if you're at risk for diabetes or have pre-diabetes Fasting glucose: 127 mg/dL   A1C: 6 0 %    Screening Not Indicated  History Diabetes   Cholesterol Screening Once every 5 years if you don't have a lipid disorder  May order more often based on risk factors  Lipid panel: 08/06/2019    Screening Not Indicated  History Lipid Disorder     Other Preventive Screenings Covered by Medicare:  1  Abdominal Aortic Aneurysm (AAA) Screening: covered once if your at risk  You're considered to be at risk if you have a family history of AAA  2  Lung Cancer Screening: covers low dose CT scan once per year if you meet all of the following conditions: (1) Age 50-69; (2) No signs or symptoms of lung cancer; (3) Current smoker or have quit smoking within the last 15 years; (4) You have a tobacco smoking history of at least 30 pack years (packs per day multiplied by number of years you smoked); (5) You get a written order from a healthcare provider  3  Glaucoma Screening: covered annually if you're considered high risk: (1) You have diabetes OR (2) Family history of glaucoma OR (3)  aged 48 and older OR (3)  American aged 72 and older  3  Osteoporosis Screening: covered every 2 years if you meet one of the following conditions: (1) You're estrogen deficient and at risk for osteoporosis based off medical history and other findings; (2) Have a vertebral abnormality; (3) On glucocorticoid therapy for more than 3 months; (4) Have primary hyperparathyroidism; (5) On osteoporosis medications and need to assess response to drug therapy  · Last bone density test (DXA Scan): 05/29/2019   5  HIV Screening: covered annually if you're between the age of 15-65  Also covered annually if you are younger than 13 and older than 72 with risk factors for HIV infection   For pregnant patients, it is covered up to 3 times per pregnancy  Immunizations:  Immunization Recommendations   Influenza Vaccine Annual influenza vaccination during flu season is recommended for all persons aged >= 6 months who do not have contraindications   Pneumococcal Vaccine (Prevnar and Pneumovax)  * Prevnar = PCV13  * Pneumovax = PPSV23   Adults 25-60 years old: 1-3 doses may be recommended based on certain risk factors  Adults 72 years old: Prevnar (PCV13) vaccine recommended followed by Pneumovax (PPSV23) vaccine  If already received PPSV23 since turning 65, then PCV13 recommended at least one year after PPSV23 dose  Hepatitis B Vaccine 3 dose series if at intermediate or high risk (ex: diabetes, end stage renal disease, liver disease)   Tetanus (Td) Vaccine - COST NOT COVERED BY MEDICARE PART B Following completion of primary series, a booster dose should be given every 10 years to maintain immunity against tetanus  Td may also be given as tetanus wound prophylaxis  Tdap Vaccine - COST NOT COVERED BY MEDICARE PART B Recommended at least once for all adults  For pregnant patients, recommended with each pregnancy  Shingles Vaccine (Shingrix) - COST NOT COVERED BY MEDICARE PART B  2 shot series recommended in those aged 48 and above     Health Maintenance Due:      Topic Date Due    DXA SCAN  05/29/2021    CRC Screening: Colonoscopy  01/29/2028    Hepatitis C Screening  Completed     Immunizations Due:      Topic Date Due    HEPATITIS B VACCINES (1 of 3 - Risk 3-dose series) 12/21/1959    INFLUENZA VACCINE  07/01/2019     Advance Directives   What are advance directives? Advance directives are legal documents that state your wishes and plans for medical care  These plans are made ahead of time in case you lose your ability to make decisions for yourself  Advance directives can apply to any medical decision, such as the treatments you want, and if you want to donate organs  What are the types of advance directives? There are many types of advance directives, and each state has rules about how to use them  You may choose a combination of any of the following:  · Living will: This is a written record of the treatment you want  You can also choose which treatments you do not want, which to limit, and which to stop at a certain time  This includes surgery, medicine, IV fluid, and tube feedings  · Durable power of  for healthcare [facility]): This is a written record that states who you want to make healthcare choices for you when you are unable to make them for yourself  This person, called a proxy, is usually a family member or a friend  You may choose more than 1 proxy  · Do not resuscitate (DNR) order:  A DNR order is used in case your heart stops beating or you stop breathing  It is a request not to have certain forms of treatment, such as CPR  A DNR order may be included in other types of advance directives  · Medical directive: This covers the care that you want if you are in a coma, near death, or unable to make decisions for yourself  You can list the treatments you want for each condition  Treatment may include pain medicine, surgery, blood transfusions, dialysis, IV or tube feedings, and a ventilator (breathing machine)  · Values history: This document has questions about your views, beliefs, and how you feel and think about life  This information can help others choose the care that you would choose  Why are advance directives important? An advance directive helps you control your care  Although spoken wishes may be used, it is better to have your wishes written down  Spoken wishes can be misunderstood, or not followed  Treatments may be given even if you do not want them  An advance directive may make it easier for your family to make difficult choices about your care     Weight Management   Why it is important to manage your weight:  Being overweight increases your risk of health conditions such as heart disease, high blood pressure, type 2 diabetes, and certain types of cancer  It can also increase your risk for osteoarthritis, sleep apnea, and other respiratory problems  Aim for a slow, steady weight loss  Even a small amount of weight loss can lower your risk of health problems  How to lose weight safely:  A safe and healthy way to lose weight is to eat fewer calories and get regular exercise  You can lose up about 1 pound a week by decreasing the number of calories you eat by 500 calories each day  Healthy meal plan for weight management:  A healthy meal plan includes a variety of foods, contains fewer calories, and helps you stay healthy  A healthy meal plan includes the following:  · Eat whole-grain foods more often  A healthy meal plan should contain fiber  Fiber is the part of grains, fruits, and vegetables that is not broken down by your body  Whole-grain foods are healthy and provide extra fiber in your diet  Some examples of whole-grain foods are whole-wheat breads and pastas, oatmeal, brown rice, and bulgur  · Eat a variety of vegetables every day  Include dark, leafy greens such as spinach, kale, nahum greens, and mustard greens  Eat yellow and orange vegetables such as carrots, sweet potatoes, and winter squash  · Eat a variety of fruits every day  Choose fresh or canned fruit (canned in its own juice or light syrup) instead of juice  Fruit juice has very little or no fiber  · Eat low-fat dairy foods  Drink fat-free (skim) milk or 1% milk  Eat fat-free yogurt and low-fat cottage cheese  Try low-fat cheeses such as mozzarella and other reduced-fat cheeses  · Choose meat and other protein foods that are low in fat  Choose beans or other legumes such as split peas or lentils  Choose fish, skinless poultry (chicken or turkey), or lean cuts of red meat (beef or pork)  Before you cook meat or poultry, cut off any visible fat  · Use less fat and oil  Try baking foods instead of frying them  Add less fat, such as margarine, sour cream, regular salad dressing and mayonnaise to foods  Eat fewer high-fat foods  Some examples of high-fat foods include french fries, doughnuts, ice cream, and cakes  · Eat fewer sweets  Limit foods and drinks that are high in sugar  This includes candy, cookies, regular soda, and sweetened drinks  Exercise:  Exercise at least 30 minutes per day on most days of the week  Some examples of exercise include walking, biking, dancing, and swimming  You can also fit in more physical activity by taking the stairs instead of the elevator or parking farther away from stores  Ask your healthcare provider about the best exercise plan for you  © Copyright [employer] 2018 Information is for End User's use only and may not be sold, redistributed or otherwise used for commercial purposes   All illustrations and images included in CareNotes® are the copyrighted property of [employer]  or [address] [place] [address]

## 2019-10-16 PROBLEM — M79.605 PAIN OF LEFT LOWER EXTREMITY: Status: ACTIVE | Noted: 2019-10-14

## 2019-10-16 PROBLEM — M79.605 PAIN OF LEFT LOWER EXTREMITY: Status: ACTIVE | Noted: 2019-10-16

## 2019-10-16 NOTE — ASSESSMENT & PLAN NOTE
Advice and education were given regarding nutrition, aerobic exercises, weight bearing exercises, cardiovascular risk reduction, fall risk reduction, and age appropriate supplements  The patient was counseled regarding instructions for management, risk factor reductions, prognosis, risks and benefits of treatment options, patient and family education, and importance of compliance with treatment       High-dose flu shot will be given today possible side effect discussed with the patient

## 2019-10-16 NOTE — ASSESSMENT & PLAN NOTE
A new diagnosis associated with the muscle cramp positive tenderness in the lumbar spine plan to do x-ray of the lumbar spine to rule out degenerative disc disease discussed with the patient

## 2019-10-18 DIAGNOSIS — I10 ESSENTIAL HYPERTENSION: ICD-10-CM

## 2019-10-18 DIAGNOSIS — E11.9 TYPE 2 DIABETES MELLITUS WITHOUT COMPLICATION, WITHOUT LONG-TERM CURRENT USE OF INSULIN (HCC): Primary | ICD-10-CM

## 2019-10-18 RX ORDER — BLOOD PRESSURE TEST KIT-MEDIUM
KIT MISCELLANEOUS 2 TIMES DAILY
Qty: 1 EACH | Refills: 0 | Status: SHIPPED | OUTPATIENT
Start: 2019-10-18 | End: 2019-11-18

## 2019-10-18 NOTE — TELEPHONE ENCOUNTER
INCOMING CALL FROM DAUGHTER STATING THIS WAS TO BE DONE AT APPOINTMENT AND NEVER WAS SENT TO PHARMACY

## 2019-10-25 ENCOUNTER — OFFICE VISIT (OUTPATIENT)
Dept: SURGICAL ONCOLOGY | Facility: CLINIC | Age: 79
End: 2019-10-25
Payer: MEDICARE

## 2019-10-25 VITALS
WEIGHT: 159 LBS | BODY MASS INDEX: 29.26 KG/M2 | HEART RATE: 66 BPM | HEIGHT: 62 IN | TEMPERATURE: 97.9 F | RESPIRATION RATE: 16 BRPM | SYSTOLIC BLOOD PRESSURE: 150 MMHG | DIASTOLIC BLOOD PRESSURE: 90 MMHG

## 2019-10-25 DIAGNOSIS — E04.1 THYROID NODULE: Primary | ICD-10-CM

## 2019-10-25 PROCEDURE — 99214 OFFICE O/P EST MOD 30 MIN: CPT | Performed by: SURGERY

## 2019-10-25 RX ORDER — TRAMADOL HYDROCHLORIDE 50 MG/1
50 TABLET ORAL EVERY 6 HOURS PRN
Qty: 10 TABLET | Refills: 0 | Status: SHIPPED | OUTPATIENT
Start: 2019-10-25 | End: 2019-12-31 | Stop reason: ALTCHOICE

## 2019-10-25 RX ORDER — LEVOTHYROXINE SODIUM 0.12 MG/1
125 TABLET ORAL DAILY
Qty: 30 TABLET | Refills: 3 | Status: SHIPPED | OUTPATIENT
Start: 2019-10-25 | End: 2019-12-31 | Stop reason: SDUPTHER

## 2019-10-25 NOTE — PATIENT INSTRUCTIONS
Pre-Surgery Instructions:   Medication Instructions    acetaminophen (TYLENOL) 500 mg tablet Stop taking 1 days prior to surgery    apixaban (ELIQUIS) 5 mg Refer to Dr Kely Benítez aspirin (ECOTRIN LOW STRENGTH) 81 mg EC tablet Stop taking 1 week prior to surgery    atorvastatin (LIPITOR) 10 mg tablet Stop taking 1 days prior to surgery    calcium-vitamin D (OSCAL 500 + D) 500 mg-200 units per tablet Stop taking 1 days prior to surgery    Capsaicin (ARTHRITIS PAIN RELIEF EX) Stop taking 1 days prior to surgery    gabapentin (NEURONTIN) 100 mg capsule Stop taking 1 days prior to surgery    irbesartan-hydrochlorothiazide (AVALIDE) 150-12 5 MG per tablet Take morning of surgery    meclizine (ANTIVERT) 25 mg tablet Stop taking 1 days prior to surgery    metFORMIN (GLUCOPHAGE) 500 mg tablet Stop taking 1 days prior to surgery    metoprolol tartrate (LOPRESSOR) 25 mg tablet Take morning of surgery    senna-docusate sodium (SENOKOT S) 8 6-50 mg per tablet Stop taking 1 days prior to surgery    topiramate (TOPAMAX) 25 mg tablet Stop taking 1 days prior to surgery     Total Thyroidectomy   WHAT YOU NEED TO KNOW:   A total thyroidectomy is surgery to remove your thyroid gland  Your thyroid gland makes hormones that regulate your metabolism, body temperature, heart rate, and the level of calcium in your blood  Your thyroid gland is shaped like a butterfly and found in the front lower part of your neck  DISCHARGE INSTRUCTIONS:   Medicines:   · Thyroid hormone: You are given this medicine to bring your thyroid hormone level back to normal      · Pain medicine: You may be given a prescription medicine to decrease pain  Do not wait until the pain is severe before you take this medicine  · Take your medicine as directed  Contact your healthcare provider if you think your medicine is not helping or if you have side effects  Tell him or her if you are allergic to any medicine   Keep a list of the medicines, vitamins, and herbs you take  Include the amounts, and when and why you take them  Bring the list or the pill bottles to follow-up visits  Carry your medicine list with you in case of an emergency  Follow up with your endocrinologist or surgeon as directed: You may need to return to have your bandage changed, drains removed, or more tests  Write down your questions so you remember to ask them during your visits  Self-care:   · Drains: You may go home with one or more drains in your neck  Ask your surgeon for more information about drains  · Swallowing and voice changes: You may have a sore throat, hoarse voice, or difficulty swallowing after surgery  It is normal to have these problems for up to 6 months after total thyroidectomy surgery  · Supplements:  Ask your endocrinologist if you need to take calcium or vitamin D and how much to take  Contact your endocrinologist or surgeon if:   · You have a fever  · You have questions about your drain  · You have pain in your surgery area that does not go away after you take pain medicine  · You lose weight, feel very nervous and hungry, and sweat for no reason  · You feel very tired and cold, gain weight for no reason, and your skin is very dry  · You vomit several times in a row  · Your skin is itchy, swollen, or has a rash  · Your incision is swollen, red, or has pus coming from it  · You have new voice weakness or hoarseness, or it is getting worse  · You have questions or concerns about your condition or care  Seek care immediately or call 911 if:   · You have sudden tingling or muscle cramps in your face, arm, or leg  · You have muscle spasms in your legs and feet that do not go away  · You have sudden abdominal pain  · Your arm or leg feels warm, tender, and painful  It may look swollen and red  · Your incision comes apart, or blood soaks through your bandage       · You have sudden swelling in your neck or difficulty swallowing  · You suddenly feel lightheaded and short of breath  · You have chest pain when you take a deep breath or cough, or you cough up blood  © 2017 [address] [employer] Information is for End User's use only and may not be sold, redistributed or otherwise used for commercial purposes  All illustrations and images included in CareNotes® are the copyrighted property of [employer]  or Tremaine Harris  The above information is an  only  It is not intended as medical advice for individual conditions or treatments  Talk to your doctor, nurse or pharmacist before following any medical regimen to see if it is safe and effective for you

## 2019-10-25 NOTE — H&P (VIEW-ONLY)
Surgical Oncology Follow Up       [facility] SURGICAL ONCOLOGY [facility] [address]    Kelvin Sebastian  1940  [id number]  [address] [facility] SURGICAL ONCOLOGY [place]  [place] [address]    Chief Complaint   Patient presents with    Follow-up     Discuss surgery  Assessment/Plan:    No problem-specific Assessment & Plan notes found for this encounter  Diagnoses and all orders for this visit:    Thyroid nodule  -     levothyroxine 125 mcg tablet; Take 1 tablet (125 mcg total) by mouth daily Take on an empty stomach in the morning  Start after surgery  -     traMADol (ULTRAM) 50 mg tablet; Take 1 tablet (50 mg total) by mouth every 6 (six) hours as needed for moderate pain        Advance Care Planning/Advance Directives:  Discussed disease status, cancer treatment plans and/or cancer treatment goals with the patient  Intraductal carcinoma in situ of right breast    5/9/2018 Initial Diagnosis     Intraductal carcinoma in situ of right breast      5/9/2018 Biopsy     Right breast stereotactic biopsy x2 sites:   Ductal carcinoma in-situ (DCIS), features suggestive of involvement of underlying intraductal papilloma  Nuclear Grade: II-III (intermediate to high)  % positive  AZ 70-75% positive        6/21/2018 Surgery     Right-sided needle localized partial mastectomy with intraoperative ultrasound guidance:  DCIS, grade 2-3/3, calcifications present  Margins - free of malignancy, closest margin is inferior with 0 6 mm of clearance  Posterior margin with 2 1 mm of clearance    Stage 0, pTis (DCIS),pN0,pMX  - Dr Shaunna Hyatt      7/10/2018 Surgery     Re-excision of right breast inferior margin tissue:  - Foci of ductal epithelial hyperplasia are seen ranging from usual ductal hyperplasia to atypical ductal hyperplasia to a few foci having features which are felt to be compatible with ductal carcinoma in situ, primarily solid type, nuclear grade 1-2/3   - None of the foci of atypia or probable ductal carcinoma in situ are seen at or within 2 mm the outer margins of excision, though it is noted that such foci are seen in 3 of 5 slides  - One intraductal papilloma is identified   - Fat necrosis is seen along the inner surface of this reexcision  8/16/2018 -  Hormone Therapy     None recommended by Dr Dianne Chavez      8/16/2018 - 9/14/2018 Radiation     hypo fractionated course directed to the right breast to a total dose of 4256 centigray with an additional 1000 centigray to the lumpectomy cavity          History of Present Illness:  Patient is a 71-year-old woman here for follow-up    -Interval History: We discussed thyroidectomy couple weeks ago but she wanted time to think about things  She is now here to go over her decision  Review of Systems:  Review of Systems   Constitutional: Negative  HENT: Negative  Eyes: Negative  Respiratory: Negative  Cardiovascular: Negative  Gastrointestinal: Negative  Endocrine: Negative  Genitourinary: Negative  Musculoskeletal: Negative  Skin: Negative  Allergic/Immunologic: Negative  Neurological: Negative  Hematological: Negative  Psychiatric/Behavioral: Negative          Patient Active Problem List   Diagnosis    Abnormal mammogram    Paroxysmal atrial fibrillation (HCC)    Chronic coronary artery disease    Cervical spinal stenosis    Closed fracture of maxilla with routine healing    Conduction disorder of the heart    DDD (degenerative disc disease), cervical    Degeneration of intervertebral disc    Dizziness and giddiness    Headache    Hypokalemia    Fracture of multiple ribs    Intraductal carcinoma in situ of right breast    Long term current use of anticoagulant therapy    Latent tuberculosis    Low back pain    Noncompliance with treatment    Obesity    Osteoarthritis of finger of right hand    Osteoarthritis of knee    Type 2 diabetes mellitus (HCC)    Thyroid nodule    Vertigo    Vitamin D deficiency    Hypertension    Pre-operative clearance    Overweight (BMI 25 0-29  9)    Pure hypercholesterolemia    Neuralgia    Bunion of unspecified foot    Pre-op examination    Rib pain on right side    Malignant neoplasm of central portion of right breast in female, estrogen receptor positive ([facility] [address]    Bug bite    Encounter for well adult exam with abnormal findings    Pain of left lower extremity     Past Medical History:   Diagnosis Date    Allergic rhinitis     Arthritis     Atrial fibrillation ([facility] [address]     Breast cancer (HCC)     Cataracts, bilateral     Chronic headaches     pulsatile daily headaches    Colitis     Coronary artery disease     Diabetes mellitus ([address]     DJD (degenerative joint disease), cervical     Facial neuralgia     left frontal facial post traumatic neuralgia    Fibromyalgia     Glaucoma     History of external beam radiation therapy     8/16/18 to 9/14/2018 Right breast    Hypertension     Hypokalemia     Hypovitaminosis D     Lupus ([facility] [address] ) 7/19/2018    Osteoarthritis     Prediabetes     SDH (subdural hematoma) ([facility] [address] ) 1/31/2017    Sleep apnea     Syncope and collapse 12/1/2018    Vertigo      Past Surgical History:   Procedure Laterality Date    BREAST BIOPSY      BREAST LUMPECTOMY      CHOLECYSTECTOMY      COLONOSCOPY  2013    HYSTERECTOMY      MAMMO NEEDLE LOCALIZATION RIGHT (ALL INC) Right 6/21/2018    MAMMO STEREOTACTIC BREAST BIOPSY RIGHT (ALL INC) Right 5/9/2018    TONSILLECTOMY      US GUIDED BREAST BIOPSY RIGHT COMPLETE Right 5/9/2018    US GUIDED THYROID BIOPSY  6/12/2019    US GUIDED THYROID BIOPSY  9/13/2019     Family History   Problem Relation Age of Onset    Heart attack Sister     Hypertension Family     Diabetes Family     Stroke Family     Migraines Family     Breast cancer Daughter 28     Social History Socioeconomic History    Marital status: Single     Spouse name: Not on file    Number of children: Not on file    Years of education: Not on file    Highest education level: Not on file   Occupational History    Not on file   Social Needs    Financial resource strain: Not on file    Food insecurity:     Worry: Never true     Inability: Never true   [facility] Transportation needs:     Medical: No     Non-medical: No   Tobacco Use    Smoking status: Never Smoker    Smokeless tobacco: Never Used    Tobacco comment: no smoke exposure   Substance and Sexual Activity    Alcohol use: Yes     Comment: occasional    Drug use: No    Sexual activity: Not on file   Lifestyle    Physical activity:     Days per week: 0 days     Minutes per session: 0 min    Stress: Very much   Relationships    Social connections:     Talks on phone: Once a week     Gets together: More than three times a week     Attends [employer] service: More than 4 times per year     Active member of club or organization: Yes     Attends meetings of clubs or organizations: More than 4 times per year     Relationship status: Never     Intimate partner violence:     Fear of current or ex partner: No     Emotionally abused: No     Physically abused: No     Forced sexual activity: No   Other Topics Concern    Not on file   Social History Narrative    Not on file       Current Outpatient Medications:     acetaminophen (TYLENOL) 500 mg tablet, Take 1,000 mg by mouth every 8 (eight) hours as needed, Disp: , Rfl:     apixaban (ELIQUIS) 5 mg, Take 1 tablet (5 mg total) by mouth 2 (two) times a day, Disp: 60 tablet, Rfl: 2    aspirin (ECOTRIN LOW STRENGTH) 81 mg EC tablet, Take 81 mg by mouth daily, Disp: , Rfl:     atorvastatin (LIPITOR) 10 mg tablet, Take 1 tablet (10 mg total) by mouth daily, Disp: 90 tablet, Rfl: 0    Blood Glucose Monitoring Suppl (ACURA BLOOD GLUCOSE METER) w/Device KIT, by Does not apply route 2 (two) times a day, Disp: 1 kit, Rfl: 0    Blood Pressure Monitoring (BLOOD PRESS MONITOR/M-L CUFF) MISC, by Does not apply route 2 (two) times a day, Disp: 1 each, Rfl: 0    calcium-vitamin D (OSCAL 500 + D) 500 mg-200 units per tablet, Take 1 tablet by mouth daily, Disp: , Rfl:     Capsaicin (ARTHRITIS PAIN RELIEF EX), Apply topically as needed, Disp: , Rfl:     gabapentin (NEURONTIN) 100 mg capsule, Take 1 capsule (100 mg total) by mouth 2 (two) times a day, Disp: 120 capsule, Rfl: 0    irbesartan-hydrochlorothiazide (AVALIDE) 150-12 5 MG per tablet, Take 1 tablet by mouth daily, Disp: 30 tablet, Rfl: 2    Lancets (FREESTYLE) lancets, Test BID, Disp: 100 each, Rfl: 2    meclizine (ANTIVERT) 25 mg tablet, Take 1 tablet (25 mg total) by mouth daily, Disp: 15 tablet, Rfl: 0    metFORMIN (GLUCOPHAGE) 500 mg tablet, Take 1 tablet (500 mg total) by mouth 2 (two) times a day with meals, Disp: 180 tablet, Rfl: 2    metoprolol tartrate (LOPRESSOR) 25 mg tablet, Take 1 tablet (25 mg total) by mouth every 12 (twelve) hours, Disp: 60 tablet, Rfl: 0    senna-docusate sodium (SENOKOT S) 8 6-50 mg per tablet, Take 1 tablet by mouth, Disp: , Rfl:     topiramate (TOPAMAX) 25 mg tablet, take 1 tablet by oral route bid or as directed, Disp: , Rfl:   No Known Allergies  Vitals:    10/25/19 0957   BP: 150/90   Pulse: 66   Resp: 16   Temp: 97 9 °F (36 6 °C)       Physical Exam   Constitutional: She is oriented to person, place, and time  She appears well-developed and well-nourished  HENT:   Head: Normocephalic and atraumatic  Right Ear: External ear normal    Left Ear: External ear normal    Eyes: Pupils are equal, round, and reactive to light  EOM are normal    Neck: Normal range of motion  Neck supple  Cardiovascular: Normal rate, regular rhythm and normal heart sounds  Pulmonary/Chest: Effort normal and breath sounds normal    Abdominal: Soft  Bowel sounds are normal    Musculoskeletal: Normal range of motion     Neurological: She is alert and oriented to person, place, and time  Skin: Skin is warm and dry  Psychiatric: She has a normal mood and affect  Her behavior is normal  Judgment and thought content normal          Results:  Labs:  Lab Results   Component Value Date    [id number] 0 401 (L) 08/06/2019         Imaging  No results found  I reviewed the above laboratory and imaging data  Discussion/Summary:  78-year-old woman, suspicious thyroid nodule  Treatment options including hemithyroidectomy versus total thyroidectomy were discussed with her  She is not too keen on the idea of possible repeat operation needed should she have hemithyroidectomy and cancer be found  She therefore opted for total thyroidectomy  Risks and benefits of the surgery were therefore discussed with her  These include infection, bleeding, recurrent nerve injury, need for possible additional surgery, hypocalcemia, were discussed  She understands the plan wishes to proceed as outlined  Will set up for total thyroidectomy

## 2019-10-25 NOTE — PROGRESS NOTES
Surgical Oncology Follow Up       [facility] SURGICAL ONCOLOGY [facility] [address]    Soledad Bare  1940  [id number]  [address] [facility] SURGICAL ONCOLOGY [place]  [place] [address]    Chief Complaint   Patient presents with    Follow-up     Discuss surgery  Assessment/Plan:    No problem-specific Assessment & Plan notes found for this encounter  Diagnoses and all orders for this visit:    Thyroid nodule  -     levothyroxine 125 mcg tablet; Take 1 tablet (125 mcg total) by mouth daily Take on an empty stomach in the morning  Start after surgery  -     traMADol (ULTRAM) 50 mg tablet; Take 1 tablet (50 mg total) by mouth every 6 (six) hours as needed for moderate pain        Advance Care Planning/Advance Directives:  Discussed disease status, cancer treatment plans and/or cancer treatment goals with the patient  Intraductal carcinoma in situ of right breast    5/9/2018 Initial Diagnosis     Intraductal carcinoma in situ of right breast      5/9/2018 Biopsy     Right breast stereotactic biopsy x2 sites:   Ductal carcinoma in-situ (DCIS), features suggestive of involvement of underlying intraductal papilloma  Nuclear Grade: II-III (intermediate to high)  % positive  SD 70-75% positive        6/21/2018 Surgery     Right-sided needle localized partial mastectomy with intraoperative ultrasound guidance:  DCIS, grade 2-3/3, calcifications present  Margins - free of malignancy, closest margin is inferior with 0 6 mm of clearance  Posterior margin with 2 1 mm of clearance    Stage 0, pTis (DCIS),pN0,pMX  - Dr Radha Shankar      7/10/2018 Surgery     Re-excision of right breast inferior margin tissue:  - Foci of ductal epithelial hyperplasia are seen ranging from usual ductal hyperplasia to atypical ductal hyperplasia to a few foci having features which are felt to be compatible with ductal carcinoma in situ, primarily solid type, nuclear grade 1-2/3   - None of the foci of atypia or probable ductal carcinoma in situ are seen at or within 2 mm the outer margins of excision, though it is noted that such foci are seen in 3 of 5 slides  - One intraductal papilloma is identified   - Fat necrosis is seen along the inner surface of this reexcision  8/16/2018 -  Hormone Therapy     None recommended by Dr Meek Perry      8/16/2018 - 9/14/2018 Radiation     hypo fractionated course directed to the right breast to a total dose of 4256 centigray with an additional 1000 centigray to the lumpectomy cavity          History of Present Illness:  Patient is a 80-year-old woman here for follow-up    -Interval History: We discussed thyroidectomy couple weeks ago but she wanted time to think about things  She is now here to go over her decision  Review of Systems:  Review of Systems   Constitutional: Negative  HENT: Negative  Eyes: Negative  Respiratory: Negative  Cardiovascular: Negative  Gastrointestinal: Negative  Endocrine: Negative  Genitourinary: Negative  Musculoskeletal: Negative  Skin: Negative  Allergic/Immunologic: Negative  Neurological: Negative  Hematological: Negative  Psychiatric/Behavioral: Negative          Patient Active Problem List   Diagnosis    Abnormal mammogram    Paroxysmal atrial fibrillation (HCC)    Chronic coronary artery disease    Cervical spinal stenosis    Closed fracture of maxilla with routine healing    Conduction disorder of the heart    DDD (degenerative disc disease), cervical    Degeneration of intervertebral disc    Dizziness and giddiness    Headache    Hypokalemia    Fracture of multiple ribs    Intraductal carcinoma in situ of right breast    Long term current use of anticoagulant therapy    Latent tuberculosis    Low back pain    Noncompliance with treatment    Obesity    Osteoarthritis of finger of right hand    Osteoarthritis of knee    Type 2 diabetes mellitus (HCC)    Thyroid nodule    Vertigo    Vitamin D deficiency    Hypertension    Pre-operative clearance    Overweight (BMI 25 0-29  9)    Pure hypercholesterolemia    Neuralgia    Bunion of unspecified foot    Pre-op examination    Rib pain on right side    Malignant neoplasm of central portion of right breast in female, estrogen receptor positive ([facility] [address] [facility]    Bug bite    Encounter for well adult exam with abnormal findings    Pain of left lower extremity     Past Medical History:   Diagnosis Date    Allergic rhinitis     Arthritis     Atrial fibrillation ([facility] [address] [facility]     Breast cancer (HCC)     Cataracts, bilateral     Chronic headaches     pulsatile daily headaches    Colitis     Coronary artery disease     Diabetes mellitus ([address]     DJD (degenerative joint disease), cervical     Facial neuralgia     left frontal facial post traumatic neuralgia    Fibromyalgia     Glaucoma     History of external beam radiation therapy     8/16/18 to 9/14/2018 Right breast    Hypertension     Hypokalemia     Hypovitaminosis D     Lupus ([facility] [address] [facility] 7/19/2018    Osteoarthritis     Prediabetes     SDH (subdural hematoma) ([facility] [address] [facility] 1/31/2017    Sleep apnea     Syncope and collapse 12/1/2018    Vertigo      Past Surgical History:   Procedure Laterality Date    BREAST BIOPSY      BREAST LUMPECTOMY      CHOLECYSTECTOMY      COLONOSCOPY  2013    HYSTERECTOMY      MAMMO NEEDLE LOCALIZATION RIGHT (ALL INC) Right 6/21/2018    MAMMO STEREOTACTIC BREAST BIOPSY RIGHT (ALL INC) Right 5/9/2018    TONSILLECTOMY      US GUIDED BREAST BIOPSY RIGHT COMPLETE Right 5/9/2018    US GUIDED THYROID BIOPSY  6/12/2019    US GUIDED THYROID BIOPSY  9/13/2019     Family History   Problem Relation Age of Onset    Heart attack Sister     Hypertension Family     Diabetes Family     Stroke Family     Migraines Family     Breast cancer Daughter 28     Social History Socioeconomic History    Marital status: Single     Spouse name: Not on file    Number of children: Not on file    Years of education: Not on file    Highest education level: Not on file   Occupational History    Not on file   Social Needs    Financial resource strain: Not on file    Food insecurity:     Worry: Never true     Inability: Never true   [employer] Transportation needs:     Medical: No     Non-medical: No   Tobacco Use    Smoking status: Never Smoker    Smokeless tobacco: Never Used    Tobacco comment: no smoke exposure   Substance and Sexual Activity    Alcohol use: Yes     Comment: occasional    Drug use: No    Sexual activity: Not on file   Lifestyle    Physical activity:     Days per week: 0 days     Minutes per session: 0 min    Stress: Very much   Relationships    Social connections:     Talks on phone: Once a week     Gets together: More than three times a week     Attends [employer] service: More than 4 times per year     Active member of club or organization: Yes     Attends meetings of clubs or organizations: More than 4 times per year     Relationship status: Never     Intimate partner violence:     Fear of current or ex partner: No     Emotionally abused: No     Physically abused: No     Forced sexual activity: No   Other Topics Concern    Not on file   Social History Narrative    Not on file       Current Outpatient Medications:     acetaminophen (TYLENOL) 500 mg tablet, Take 1,000 mg by mouth every 8 (eight) hours as needed, Disp: , Rfl:     apixaban (ELIQUIS) 5 mg, Take 1 tablet (5 mg total) by mouth 2 (two) times a day, Disp: 60 tablet, Rfl: 2    aspirin (ECOTRIN LOW STRENGTH) 81 mg EC tablet, Take 81 mg by mouth daily, Disp: , Rfl:     atorvastatin (LIPITOR) 10 mg tablet, Take 1 tablet (10 mg total) by mouth daily, Disp: 90 tablet, Rfl: 0    Blood Glucose Monitoring Suppl (ACURA BLOOD GLUCOSE METER) w/Device KIT, by Does not apply route 2 (two) times a day, Disp: 1 kit, Rfl: 0    Blood Pressure Monitoring (BLOOD PRESS MONITOR/M-L CUFF) MISC, by Does not apply route 2 (two) times a day, Disp: 1 each, Rfl: 0    calcium-vitamin D (OSCAL 500 + D) 500 mg-200 units per tablet, Take 1 tablet by mouth daily, Disp: , Rfl:     Capsaicin (ARTHRITIS PAIN RELIEF EX), Apply topically as needed, Disp: , Rfl:     gabapentin (NEURONTIN) 100 mg capsule, Take 1 capsule (100 mg total) by mouth 2 (two) times a day, Disp: 120 capsule, Rfl: 0    irbesartan-hydrochlorothiazide (AVALIDE) 150-12 5 MG per tablet, Take 1 tablet by mouth daily, Disp: 30 tablet, Rfl: 2    Lancets (FREESTYLE) lancets, Test BID, Disp: 100 each, Rfl: 2    meclizine (ANTIVERT) 25 mg tablet, Take 1 tablet (25 mg total) by mouth daily, Disp: 15 tablet, Rfl: 0    metFORMIN (GLUCOPHAGE) 500 mg tablet, Take 1 tablet (500 mg total) by mouth 2 (two) times a day with meals, Disp: 180 tablet, Rfl: 2    metoprolol tartrate (LOPRESSOR) 25 mg tablet, Take 1 tablet (25 mg total) by mouth every 12 (twelve) hours, Disp: 60 tablet, Rfl: 0    senna-docusate sodium (SENOKOT S) 8 6-50 mg per tablet, Take 1 tablet by mouth, Disp: , Rfl:     topiramate (TOPAMAX) 25 mg tablet, take 1 tablet by oral route bid or as directed, Disp: , Rfl:   No Known Allergies  Vitals:    10/25/19 0957   BP: 150/90   Pulse: 66   Resp: 16   Temp: 97 9 °F (36 6 °C)       Physical Exam   Constitutional: She is oriented to person, place, and time  She appears well-developed and well-nourished  HENT:   Head: Normocephalic and atraumatic  Right Ear: External ear normal    Left Ear: External ear normal    Eyes: Pupils are equal, round, and reactive to light  EOM are normal    Neck: Normal range of motion  Neck supple  Cardiovascular: Normal rate, regular rhythm and normal heart sounds  Pulmonary/Chest: Effort normal and breath sounds normal    Abdominal: Soft  Bowel sounds are normal    Musculoskeletal: Normal range of motion     Neurological: She is alert and oriented to person, place, and time  Skin: Skin is warm and dry  Psychiatric: She has a normal mood and affect  Her behavior is normal  Judgment and thought content normal          Results:  Labs:  Lab Results   Component Value Date    [id number] 0 401 (L) 08/06/2019         Imaging  No results found  I reviewed the above laboratory and imaging data  Discussion/Summary:  27-year-old woman, suspicious thyroid nodule  Treatment options including hemithyroidectomy versus total thyroidectomy were discussed with her  She is not too keen on the idea of possible repeat operation needed should she have hemithyroidectomy and cancer be found  She therefore opted for total thyroidectomy  Risks and benefits of the surgery were therefore discussed with her  These include infection, bleeding, recurrent nerve injury, need for possible additional surgery, hypocalcemia, were discussed  She understands the plan wishes to proceed as outlined  Will set up for total thyroidectomy

## 2019-10-25 NOTE — LETTER
October 25, 2019     Nigel Damon MD  [address] [facility]  [address]    Patient: Spencer Reed   YOB: 1940   Date of Visit: 10/25/2019       Dear Dr Manjit Dos Santos: Thank you for referring Spencer Reed to me for evaluation  Below are my notes for this consultation  If you have questions, please do not hesitate to call me  I look forward to following your patient along with you  Sincerely,        Kenneth Burden MD        CC: MD Rebecca Pham MD Ansel Shay, MD Cayetano Cypress, MD  10/25/2019 10:54 AM  Sign at close encounter     Surgical Oncology Follow Up       [facility]    Spencer Reed  1940  [id number]  [address] [facility] SURGICAL ONCOLOGY [address] [address]    Chief Complaint   Patient presents with    Follow-up     Discuss surgery  Assessment/Plan:    No problem-specific Assessment & Plan notes found for this encounter  Diagnoses and all orders for this visit:    Thyroid nodule  -     levothyroxine 125 mcg tablet; Take 1 tablet (125 mcg total) by mouth daily Take on an empty stomach in the morning  Start after surgery  -     traMADol (ULTRAM) 50 mg tablet; Take 1 tablet (50 mg total) by mouth every 6 (six) hours as needed for moderate pain        Advance Care Planning/Advance Directives:  Discussed disease status, cancer treatment plans and/or cancer treatment goals with the patient  Intraductal carcinoma in situ of right breast    5/9/2018 Initial Diagnosis     Intraductal carcinoma in situ of right breast      5/9/2018 Biopsy     Right breast stereotactic biopsy x2 sites:   Ductal carcinoma in-situ (DCIS), features suggestive of involvement of underlying intraductal papilloma  Nuclear Grade: II-III (intermediate to high)     % positive  ID 70-75% positive        6/21/2018 Surgery     Right-sided needle localized partial mastectomy with intraoperative ultrasound guidance:  DCIS, grade 2-3/3, calcifications present  Margins - free of malignancy, closest margin is inferior with 0 6 mm of clearance  Posterior margin with 2 1 mm of clearance  Stage 0, pTis (DCIS),pN0,pMX  - Dr Lady Valladares      7/10/2018 Surgery     Re-excision of right breast inferior margin tissue:  - Foci of ductal epithelial hyperplasia are seen ranging from usual ductal hyperplasia to atypical ductal hyperplasia to a few foci having features which are felt to be compatible with ductal carcinoma in situ, primarily solid type, nuclear grade 1-2/3   - None of the foci of atypia or probable ductal carcinoma in situ are seen at or within 2 mm the outer margins of excision, though it is noted that such foci are seen in 3 of 5 slides  - One intraductal papilloma is identified   - Fat necrosis is seen along the inner surface of this reexcision  8/16/2018 -  Hormone Therapy     None recommended by Dr Jigar Hendrix      8/16/2018 - 9/14/2018 Radiation     hypo fractionated course directed to the right breast to a total dose of 4256 centigray with an additional 1000 centigray to the lumpectomy cavity          History of Present Illness:  Patient is a 23-year-old woman here for follow-up    -Interval History: We discussed thyroidectomy couple weeks ago but she wanted time to think about things  She is now here to go over her decision  Review of Systems:  Review of Systems   Constitutional: Negative  HENT: Negative  Eyes: Negative  Respiratory: Negative  Cardiovascular: Negative  Gastrointestinal: Negative  Endocrine: Negative  Genitourinary: Negative  Musculoskeletal: Negative  Skin: Negative  Allergic/Immunologic: Negative  Neurological: Negative  Hematological: Negative  Psychiatric/Behavioral: Negative          Patient Active Problem List Diagnosis    Abnormal mammogram    Paroxysmal atrial fibrillation (HCC)    Chronic coronary artery disease    Cervical spinal stenosis    Closed fracture of maxilla with routine healing    Conduction disorder of the heart    DDD (degenerative disc disease), cervical    Degeneration of intervertebral disc    Dizziness and giddiness    Headache    Hypokalemia    Fracture of multiple ribs    Intraductal carcinoma in situ of right breast    Long term current use of anticoagulant therapy    Latent tuberculosis    Low back pain    Noncompliance with treatment    Obesity    Osteoarthritis of finger of right hand    Osteoarthritis of knee    Type 2 diabetes mellitus ([address]    Thyroid nodule    Vertigo    Vitamin D deficiency    Hypertension    Pre-operative clearance    Overweight (BMI 25 0-29  9)    Pure hypercholesterolemia    Neuralgia    Bunion of unspecified foot    Pre-op examination    Rib pain on right side    Malignant neoplasm of central portion of right breast in female, estrogen receptor positive ([address] )    Bug bite    Encounter for well adult exam with abnormal findings    Pain of left lower extremity     Past Medical History:   Diagnosis Date    Allergic rhinitis     Arthritis     Atrial fibrillation ([address] )     Breast cancer ([address]     Cataracts, bilateral     Chronic headaches     pulsatile daily headaches    Colitis     Coronary artery disease     Diabetes mellitus ([address] )     DJD (degenerative joint disease), cervical     Facial neuralgia     left frontal facial post traumatic neuralgia    Fibromyalgia     Glaucoma     History of external beam radiation therapy     8/16/18 to 9/14/2018 Right breast    Hypertension     Hypokalemia     Hypovitaminosis D     Lupus ([address] ) 7/19/2018    Osteoarthritis     Prediabetes     SDH (subdural hematoma) ([address] ) 1/31/2017    Sleep apnea     Syncope and collapse 12/1/2018    Vertigo      Past Surgical History:   Procedure Laterality Date    BREAST BIOPSY      BREAST LUMPECTOMY      CHOLECYSTECTOMY      COLONOSCOPY  2013    HYSTERECTOMY      MAMMO NEEDLE LOCALIZATION RIGHT (ALL INC) Right 6/21/2018    MAMMO STEREOTACTIC BREAST BIOPSY RIGHT (ALL INC) Right 5/9/2018    TONSILLECTOMY      US GUIDED BREAST BIOPSY RIGHT COMPLETE Right 5/9/2018    US GUIDED THYROID BIOPSY  6/12/2019    US GUIDED THYROID BIOPSY  9/13/2019     Family History   Problem Relation Age of Onset    Heart attack Sister     Hypertension Family     Diabetes Family     Stroke Family     Migraines Family     Breast cancer Daughter 28     Social History     Socioeconomic History    Marital status: Single     Spouse name: Not on file    Number of children: Not on file    Years of education: Not on file    Highest education level: Not on file   Occupational History    Not on file   Social Needs    Financial resource strain: Not on file    Food insecurity:     Worry: Never true     Inability: Never true    Transportation needs:     Medical: No     Non-medical: No   Tobacco Use    Smoking status: Never Smoker    Smokeless tobacco: Never Used    Tobacco comment: no smoke exposure   Substance and Sexual Activity    Alcohol use: Yes     Comment: occasional    Drug use: No    Sexual activity: Not on file   Lifestyle    Physical activity:     Days per week: 0 days     Minutes per session: 0 min    Stress: Very much   Relationships    Social connections:     Talks on phone: Once a week     Gets together: More than three times a week     Attends Rastafari service: More than 4 times per year     Active member of club or organization: Yes     Attends meetings of clubs or organizations: More than 4 times per year     Relationship status: Never     Intimate partner violence:     Fear of current or ex partner: No     Emotionally abused: No     Physically abused: No     Forced sexual activity: No   Other Topics Concern    Not on file   Social History Narrative    Not on file       Current Outpatient Medications:     acetaminophen (TYLENOL) 500 mg tablet, Take 1,000 mg by mouth every 8 (eight) hours as needed, Disp: , Rfl:     apixaban (ELIQUIS) 5 mg, Take 1 tablet (5 mg total) by mouth 2 (two) times a day, Disp: 60 tablet, Rfl: 2    aspirin (ECOTRIN LOW STRENGTH) 81 mg EC tablet, Take 81 mg by mouth daily, Disp: , Rfl:     atorvastatin (LIPITOR) 10 mg tablet, Take 1 tablet (10 mg total) by mouth daily, Disp: 90 tablet, Rfl: 0    Blood Glucose Monitoring Suppl (ACURA BLOOD GLUCOSE METER) w/Device KIT, by Does not apply route 2 (two) times a day, Disp: 1 kit, Rfl: 0    Blood Pressure Monitoring (BLOOD PRESS MONITOR/M-L CUFF) MISC, by Does not apply route 2 (two) times a day, Disp: 1 each, Rfl: 0    calcium-vitamin D (OSCAL 500 + D) 500 mg-200 units per tablet, Take 1 tablet by mouth daily, Disp: , Rfl:     Capsaicin (ARTHRITIS PAIN RELIEF EX), Apply topically as needed, Disp: , Rfl:     gabapentin (NEURONTIN) 100 mg capsule, Take 1 capsule (100 mg total) by mouth 2 (two) times a day, Disp: 120 capsule, Rfl: 0    irbesartan-hydrochlorothiazide (AVALIDE) 150-12 5 MG per tablet, Take 1 tablet by mouth daily, Disp: 30 tablet, Rfl: 2    Lancets (FREESTYLE) lancets, Test BID, Disp: 100 each, Rfl: 2    meclizine (ANTIVERT) 25 mg tablet, Take 1 tablet (25 mg total) by mouth daily, Disp: 15 tablet, Rfl: 0    metFORMIN (GLUCOPHAGE) 500 mg tablet, Take 1 tablet (500 mg total) by mouth 2 (two) times a day with meals, Disp: 180 tablet, Rfl: 2    metoprolol tartrate (LOPRESSOR) 25 mg tablet, Take 1 tablet (25 mg total) by mouth every 12 (twelve) hours, Disp: 60 tablet, Rfl: 0    senna-docusate sodium (SENOKOT S) 8 6-50 mg per tablet, Take 1 tablet by mouth, Disp: , Rfl:     topiramate (TOPAMAX) 25 mg tablet, take 1 tablet by oral route bid or as directed, Disp: , Rfl:   No Known Allergies  Vitals:    10/25/19 0957   BP: 150/90   Pulse: 66   Resp: 16 Temp: 97 9 °F (36 6 °C)       Physical Exam   Constitutional: She is oriented to person, place, and time  She appears well-developed and well-nourished  HENT:   Head: Normocephalic and atraumatic  Right Ear: External ear normal    Left Ear: External ear normal    Eyes: Pupils are equal, round, and reactive to light  EOM are normal    Neck: Normal range of motion  Neck supple  Cardiovascular: Normal rate, regular rhythm and normal heart sounds  Pulmonary/Chest: Effort normal and breath sounds normal    Abdominal: Soft  Bowel sounds are normal    Musculoskeletal: Normal range of motion  Neurological: She is alert and oriented to person, place, and time  Skin: Skin is warm and dry  Psychiatric: She has a normal mood and affect  Her behavior is normal  Judgment and thought content normal          Results:  Labs:  Lab Results   Component Value Date    [id number] 0 401 (L) 08/06/2019         Imaging  No results found  I reviewed the above laboratory and imaging data  Discussion/Summary:  67-year-old woman, suspicious thyroid nodule  Treatment options including hemithyroidectomy versus total thyroidectomy were discussed with her  She is not too keen on the idea of possible repeat operation needed should she have hemithyroidectomy and cancer be found  She therefore opted for total thyroidectomy  Risks and benefits of the surgery were therefore discussed with her  These include infection, bleeding, recurrent nerve injury, need for possible additional surgery, hypocalcemia, were discussed  She understands the plan wishes to proceed as outlined  Will set up for total thyroidectomy

## 2019-10-26 ENCOUNTER — HOSPITAL ENCOUNTER (OUTPATIENT)
Dept: ULTRASOUND IMAGING | Facility: HOSPITAL | Age: 79
Discharge: HOME | End: 2019-10-26
Attending: SURGERY
Payer: MEDICARE

## 2019-10-26 DIAGNOSIS — E04.1 THYROID NODULE: ICD-10-CM

## 2019-10-26 PROCEDURE — 76536 US EXAM OF HEAD AND NECK: CPT

## 2019-10-28 ENCOUNTER — HOSPITAL ENCOUNTER (OUTPATIENT)
Dept: RADIOLOGY | Facility: HOSPITAL | Age: 79
Discharge: HOME | End: 2019-10-28
Attending: SURGERY
Payer: MEDICARE

## 2019-10-28 ENCOUNTER — APPOINTMENT (OUTPATIENT)
Dept: LAB | Facility: HOSPITAL | Age: 79
End: 2019-10-28
Attending: SURGERY
Payer: MEDICARE

## 2019-10-28 ENCOUNTER — HOSPITAL ENCOUNTER (OUTPATIENT)
Dept: NON INVASIVE DIAGNOSTICS | Facility: HOSPITAL | Age: 79
Discharge: HOME | End: 2019-10-28
Attending: SURGERY
Payer: MEDICARE

## 2019-10-28 DIAGNOSIS — E04.1 THYROID NODULE: ICD-10-CM

## 2019-10-28 LAB
ALBUMIN SERPL BCP-MCNC: 3.5 G/DL (ref 3.5–5)
ALP SERPL-CCNC: 81 U/L (ref 46–116)
ALT SERPL W P-5'-P-CCNC: 16 U/L (ref 12–78)
ANION GAP SERPL CALCULATED.3IONS-SCNC: 9 MMOL/L (ref 4–13)
AST SERPL W P-5'-P-CCNC: 21 U/L (ref 5–45)
ATRIAL RATE: 53 BPM
BASOPHILS # BLD AUTO: 0.01 THOUSANDS/ΜL (ref 0–0.1)
BASOPHILS NFR BLD AUTO: 0 % (ref 0–1)
BILIRUB SERPL-MCNC: 1.27 MG/DL (ref 0.2–1)
BUN SERPL-MCNC: 15 MG/DL (ref 5–25)
CALCIUM SERPL-MCNC: 9.7 MG/DL (ref 8.3–10.1)
CHLORIDE SERPL-SCNC: 101 MMOL/L (ref 100–108)
CO2 SERPL-SCNC: 33 MMOL/L (ref 21–32)
CREAT SERPL-MCNC: 0.87 MG/DL (ref 0.6–1.3)
EOSINOPHIL # BLD AUTO: 0.02 THOUSAND/ΜL (ref 0–0.61)
EOSINOPHIL NFR BLD AUTO: 0 % (ref 0–6)
ERYTHROCYTE [DISTWIDTH] IN BLOOD BY AUTOMATED COUNT: 12.7 % (ref 11.6–15.1)
GFR SERPL CREATININE-BSD FRML MDRD: 74 ML/MIN/1.73SQ M
GLUCOSE P FAST SERPL-MCNC: 102 MG/DL (ref 65–99)
HCT VFR BLD AUTO: 42.2 % (ref 34.8–46.1)
HGB BLD-MCNC: 13.9 G/DL (ref 11.5–15.4)
IMM GRANULOCYTES # BLD AUTO: 0.01 THOUSAND/UL (ref 0–0.2)
IMM GRANULOCYTES NFR BLD AUTO: 0 % (ref 0–2)
LYMPHOCYTES # BLD AUTO: 2.23 THOUSANDS/ΜL (ref 0.6–4.47)
LYMPHOCYTES NFR BLD AUTO: 44 % (ref 14–44)
MCH RBC QN AUTO: 31.4 PG (ref 26.8–34.3)
MCHC RBC AUTO-ENTMCNC: 32.9 G/DL (ref 31.4–37.4)
MCV RBC AUTO: 96 FL (ref 82–98)
MONOCYTES # BLD AUTO: 0.48 THOUSAND/ΜL (ref 0.17–1.22)
MONOCYTES NFR BLD AUTO: 10 % (ref 4–12)
NEUTROPHILS # BLD AUTO: 2.27 THOUSANDS/ΜL (ref 1.85–7.62)
NEUTS SEG NFR BLD AUTO: 46 % (ref 43–75)
NRBC BLD AUTO-RTO: 0 /100 WBCS
P AXIS: 65 DEGREES
PLATELET # BLD AUTO: 193 THOUSANDS/UL (ref 149–390)
PMV BLD AUTO: 11 FL (ref 8.9–12.7)
POTASSIUM SERPL-SCNC: 3.2 MMOL/L (ref 3.5–5.3)
PR INTERVAL: 154 MS
PROT SERPL-MCNC: 7.3 G/DL (ref 6.4–8.2)
QRS AXIS: 4 DEGREES
QRSD INTERVAL: 96 MS
QT INTERVAL: 414 MS
QTC INTERVAL: 388 MS
RBC # BLD AUTO: 4.42 MILLION/UL (ref 3.81–5.12)
SODIUM SERPL-SCNC: 143 MMOL/L (ref 136–145)
T WAVE AXIS: 13 DEGREES
VENTRICULAR RATE: 53 BPM
WBC # BLD AUTO: 5.02 THOUSAND/UL (ref 4.31–10.16)

## 2019-10-28 PROCEDURE — 85025 COMPLETE CBC W/AUTO DIFF WBC: CPT

## 2019-10-28 PROCEDURE — 93005 ELECTROCARDIOGRAM TRACING: CPT | Performed by: SURGERY

## 2019-10-28 PROCEDURE — 71046 X-RAY EXAM CHEST 2 VIEWS: CPT

## 2019-10-28 PROCEDURE — 36415 COLL VENOUS BLD VENIPUNCTURE: CPT

## 2019-10-28 PROCEDURE — 93010 ELECTROCARDIOGRAM REPORT: CPT | Performed by: INTERNAL MEDICINE

## 2019-10-28 PROCEDURE — 80053 COMPREHEN METABOLIC PANEL: CPT

## 2019-10-29 ENCOUNTER — RADIATION ONCOLOGY FOLLOW-UP (OUTPATIENT)
Dept: RADIATION ONCOLOGY | Facility: CLINIC | Age: 79
End: 2019-10-29
Attending: RADIOLOGY
Payer: MEDICARE

## 2019-10-29 VITALS
TEMPERATURE: 97.6 F | OXYGEN SATURATION: 94 % | RESPIRATION RATE: 16 BRPM | WEIGHT: 158.51 LBS | HEART RATE: 64 BPM | BODY MASS INDEX: 29.17 KG/M2 | SYSTOLIC BLOOD PRESSURE: 146 MMHG | DIASTOLIC BLOOD PRESSURE: 80 MMHG | HEIGHT: 62 IN

## 2019-10-29 DIAGNOSIS — D05.11 INTRADUCTAL CARCINOMA IN SITU OF RIGHT BREAST: Primary | ICD-10-CM

## 2019-10-29 PROCEDURE — 99211 OFF/OP EST MAY X REQ PHY/QHP: CPT | Performed by: RADIOLOGY

## 2019-10-29 NOTE — PROGRESS NOTES
Fredy Currie 1940 is a 69 y o  female       Follow up visit   Fredy Currie 69year old female with DCIS in the central portion of the right breast, status post breast conservation surgery  She is stage 0 (TIS Nx)  Her tumor is intermediate to high grade with negative margins   ER/LA positive  Presents today for a routine follow up after completing radiation treatments on 9/14/18  Last seen in our office on 4/23/19 4/24/19-MAMMO   IMPRESSIONS:  Benign findings on bilateral diagnostic mammogram with 3D tomogram    7/12/19-Dr Kam Del Castillo for thyroid nodule   -Plan on repeat biopsy with Afirma testing 3 months from original biopsy    7/29/19-Dr Favian Coronel   - No evidence of disease upon examination  -F/U in 3 months    9/13/19- Thyroid biopsy revealed   Atypia of undetermined significance ([place] Category III)    10/25/19- Dr Chloe Ho   -Recent biopsy revealed suspicious nodule  -schedule thyroidectomy (Nov 21)     10/31/19-Dr Favian Coronel      Oncology History    Fredy Currie 69year old female with DCIS in the central portion of the right breast, status post breast conservation surgery  She is stage 0 (TIS Nx)  Her tumor is intermediate to high grade with negative margins   ER/LA positive  Presents today for a routine follow up after completing radiation treatments on 9/14/18   Last seen in our office on 4/23/19 4/24/19-MAMMO   IMPRESSIONS:  Benign findings on bilateral diagnostic mammogram with 3D tomogram    7/12/19-Dr Kam Del Castillo for thyroid nodule   -Plan on repeat biopsy with Afirma testing 3 months from original biopsy    7/29/19-Dr Favian Coronel   - No evidence of disease upon examination  -F/U in 3 months    9/13/19- Thyroid biopsy revealed   Atypia of undetermined significance ([place] Category III)    10/25/19- Dr Chloe Ho   -Recent biopsy revealed suspicious nodule  -schedule thyroidectomy (Nov 21)     10/31/19-Dr  [facility]          Intraductal carcinoma in situ of right breast    5/9/2018 Initial Diagnosis     Intraductal carcinoma in situ of right breast      5/9/2018 Biopsy     Right breast stereotactic biopsy x2 sites:   Ductal carcinoma in-situ (DCIS), features suggestive of involvement of underlying intraductal papilloma  Nuclear Grade: II-III (intermediate to high)  % positive  NE 70-75% positive        6/21/2018 Surgery     Right-sided needle localized partial mastectomy with intraoperative ultrasound guidance:  DCIS, grade 2-3/3, calcifications present  Margins - free of malignancy, closest margin is inferior with 0 6 mm of clearance  Posterior margin with 2 1 mm of clearance  Stage 0, pTis (DCIS),pN0,pMX  - Dr Florencio Wheeler      7/10/2018 Surgery     Re-excision of right breast inferior margin tissue:  - Foci of ductal epithelial hyperplasia are seen ranging from usual ductal hyperplasia to atypical ductal hyperplasia to a few foci having features which are felt to be compatible with ductal carcinoma in situ, primarily solid type, nuclear grade 1-2/3   - None of the foci of atypia or probable ductal carcinoma in situ are seen at or within 2 mm the outer margins of excision, though it is noted that such foci are seen in 3 of 5 slides  - One intraductal papilloma is identified   - Fat necrosis is seen along the inner surface of this reexcision        8/16/2018 -  Hormone Therapy     None recommended by Dr Faby Knox      8/16/2018 - 9/14/2018 Radiation     hypo fractionated course directed to the right breast to a total dose of 4256 centigray with an additional 1000 centigray to the lumpectomy cavity          Clinical Trial: no    Imaging    Health Maintenance   Topic Date Due    HEPATITIS B VACCINES (1 of 3 - Risk 3-dose series) 12/21/1960    URINE MICROALBUMIN  11/12/2019    Diabetic Foot Exam  01/16/2020    DM Eye Exam  01/28/2020    HEMOGLOBIN A1C  02/06/2020    BMI: Followup Plan  02/25/2020    Fall Risk  10/14/2020    Depression Screening PHQ  10/14/2020    Urinary Incontinence Screening  10/14/2020    Medicare Annual Wellness Visit (AWV)  10/14/2020    BMI: Adult  10/25/2020    DXA SCAN  05/29/2021    CRC Screening: Colonoscopy  01/29/2028    DTaP,Tdap,and Td Vaccines (2 - Td) 02/25/2029    Hepatitis C Screening  Completed    INFLUENZA VACCINE  Completed    Pneumococcal Vaccine: 65+ Years  Completed    Pneumococcal Vaccine: Pediatrics (0 to 5 Years) and At-Risk Patients (6 to 59 Years)  Aged Out       Patient Active Problem List   Diagnosis    Abnormal mammogram    Paroxysmal atrial fibrillation ([address]    Chronic coronary artery disease    Cervical spinal stenosis    Closed fracture of maxilla with routine healing    Conduction disorder of the heart    DDD (degenerative disc disease), cervical    Degeneration of intervertebral disc    Dizziness and giddiness    Headache    Hypokalemia    Fracture of multiple ribs    Intraductal carcinoma in situ of right breast    Long term current use of anticoagulant therapy    Latent tuberculosis    Low back pain    Noncompliance with treatment    Obesity    Osteoarthritis of finger of right hand    Osteoarthritis of knee    Type 2 diabetes mellitus ([address]    Thyroid nodule    Vertigo    Vitamin D deficiency    Hypertension    Pre-operative clearance    Overweight (BMI 25 0-29  9)    Pure hypercholesterolemia    Neuralgia    Bunion of unspecified foot    Pre-op examination    Rib pain on right side    Malignant neoplasm of central portion of right breast in female, estrogen receptor positive ([address] )    Bug bite    Encounter for well adult exam with abnormal findings    Pain of left lower extremity     Past Medical History:   Diagnosis Date    Allergic rhinitis     Arthritis     Atrial fibrillation ([address] )     Breast cancer ([address] )     Cataracts, bilateral     Chronic headaches     pulsatile daily headaches    Colitis     Coronary artery disease     Diabetes mellitus ([facility] )     DJD (degenerative joint disease), cervical     Facial neuralgia     left frontal facial post traumatic neuralgia    Fibromyalgia     Glaucoma     History of external beam radiation therapy     8/16/18 to 9/14/2018 Right breast    Hypertension     Hypokalemia     Hypovitaminosis D     Lupus ([facility] ) 7/19/2018    Osteoarthritis     Prediabetes     SDH [facility]dural hematoma) ([facility]) 1/31/2017    Sleep apnea     Syncope and collapse 12/1/2018    Vertigo      Past Surgical History:   Procedure Laterality Date    BREAST BIOPSY      BREAST LUMPECTOMY      CHOLECYSTECTOMY      COLONOSCOPY  2013    HYSTERECTOMY      MAMMO NEEDLE LOCALIZATION RIGHT (ALL INC) Right 6/21/2018    MAMMO STEREOTACTIC BREAST BIOPSY RIGHT (ALL INC) Right 5/9/2018    TONSILLECTOMY      US GUIDED BREAST BIOPSY RIGHT COMPLETE Right 5/9/2018    US GUIDED THYROID BIOPSY  6/12/2019    US GUIDED THYROID BIOPSY  9/13/2019     Family History   Problem Relation Age of Onset    Heart attack Sister     Hypertension Family     Diabetes Family     Stroke Family     Migraines Family     Breast cancer Daughter 28     Social History     Socioeconomic History    Marital status: Single     Spouse name: Not on file    Number of children: Not on file    Years of education: Not on file    Highest education level: Not on file   Occupational History    Not on file   Social Needs    Financial resource strain: Not on file    Food insecurity:     Worry: Never true     Inability: Never true    Transportation needs:     Medical: No     Non-medical: No   Tobacco Use    Smoking status: Never Smoker    Smokeless tobacco: Never Used    Tobacco comment: no smoke exposure   Substance and Sexual Activity    Alcohol use: Yes     Comment: occasional    Drug use: No    Sexual activity: Not on file   Lifestyle    Physical activity:     Days per week: 0 days     Minutes per session: 0 min    Stress: Very much   Relationships    Social connections:     Talks on phone: Once a week     Gets together: More than three times a week     Attends Hindu service: More than 4 times per year     Active member of club or organization: Yes     Attends meetings of clubs or organizations: More than 4 times per year     Relationship status: Never     Intimate partner violence:     Fear of current or ex partner: No     Emotionally abused: No     Physically abused: No     Forced sexual activity: No   Other Topics Concern    Not on file   Social History Narrative    Not on file       Current Outpatient Medications:     acetaminophen (TYLENOL) 500 mg tablet, Take 1,000 mg by mouth every 8 (eight) hours as needed, Disp: , Rfl:     apixaban (ELIQUIS) 5 mg, Take 1 tablet (5 mg total) by mouth 2 (two) times a day, Disp: 60 tablet, Rfl: 2    aspirin (ECOTRIN LOW STRENGTH) 81 mg EC tablet, Take 81 mg by mouth daily, Disp: , Rfl:     atorvastatin (LIPITOR) 10 mg tablet, Take 1 tablet (10 mg total) by mouth daily, Disp: 90 tablet, Rfl: 0    Blood Glucose Monitoring Suppl (ACURA BLOOD GLUCOSE METER) w/Device KIT, by Does not apply route 2 (two) times a day, Disp: 1 kit, Rfl: 0    Blood Pressure Monitoring (BLOOD PRESS MONITOR/M-L CUFF) MISC, by Does not apply route 2 (two) times a day, Disp: 1 each, Rfl: 0    calcium-vitamin D (OSCAL 500 + D) 500 mg-200 units per tablet, Take 1 tablet by mouth daily, Disp: , Rfl:     Capsaicin (ARTHRITIS PAIN RELIEF EX), Apply topically as needed, Disp: , Rfl:     gabapentin (NEURONTIN) 100 mg capsule, Take 1 capsule (100 mg total) by mouth 2 (two) times a day, Disp: 120 capsule, Rfl: 0    irbesartan-hydrochlorothiazide (AVALIDE) 150-12 5 MG per tablet, Take 1 tablet by mouth daily, Disp: 30 tablet, Rfl: 2    Lancets (FREESTYLE) lancets, Test BID, Disp: 100 each, Rfl: 2    meclizine (ANTIVERT) 25 mg tablet, Take 1 tablet (25 mg total) by mouth daily, Disp: 15 tablet, Rfl: 0    metFORMIN (GLUCOPHAGE) 500 mg tablet, Take 1 tablet (500 mg total) by mouth 2 (two) times a day with meals, Disp: 180 tablet, Rfl: 2    metoprolol tartrate (LOPRESSOR) 25 mg tablet, Take 1 tablet (25 mg total) by mouth every 12 (twelve) hours, Disp: 60 tablet, Rfl: 0    senna-docusate sodium (SENOKOT S) 8 6-50 mg per tablet, Take 1 tablet by mouth, Disp: , Rfl:     levothyroxine 125 mcg tablet, Take 1 tablet (125 mcg total) by mouth daily Take on an empty stomach in the morning  Start after surgery  (Patient not taking: Reported on 10/29/2019), Disp: 30 tablet, Rfl: 3    topiramate (TOPAMAX) 25 mg tablet, take 1 tablet by oral route bid or as directed, Disp: , Rfl:     traMADol (ULTRAM) 50 mg tablet, Take 1 tablet (50 mg total) by mouth every 6 (six) hours as needed for moderate pain (Patient not taking: Reported on 10/29/2019), Disp: 10 tablet, Rfl: 0  No Known Allergies    Review of Systems:  Review of Systems   Constitutional: Negative  HENT: Negative  Eyes: Negative  Respiratory: Negative  Cardiovascular: Negative  Gastrointestinal: Negative  Endocrine: Negative  Genitourinary: Negative  Musculoskeletal: Positive for arthralgias and back pain  Skin: Negative  Allergic/Immunologic: Negative  Neurological: Positive for dizziness, light-headedness and headaches  Hematological: Negative  Psychiatric/Behavioral: Positive for sleep disturbance  Vitals:    10/29/19 0754   BP: 146/80   Pulse: 64   Resp: 16   Temp: 97 6 °F (36 4 °C)   SpO2: 94%   Weight: 71 9 kg (158 lb 8 2 oz)   Height: 5' 2" (1 575 m)        Pain assessment:0    Pain Score: 0-No pain      Imaging:No results found      Teaching

## 2019-10-29 NOTE — PROGRESS NOTES
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, status post breast conservation surgery  She is stage 0 (TIS Nx)  Her tumor is intermediate to high grade with negative margins   ER/VT positive   Presents today for a routine follow up after completing radiation treatments on 9/14/18  Last seen in our office on 4/23/19 4/24/19-MAMMO   IMPRESSIONS:  Benign findings on bilateral diagnostic mammogram with 3D tomogram     7/12/19-Dr Swenson Graft for thyroid nodule   -Plan on repeat biopsy with Afirma testing 3 months from original biopsy     7/29/19-Dr Dewey Hoffman   - No evidence of disease upon examination  -F/U in 3 months     9/13/19- Thyroid biopsy revealed   Atypia of undetermined significance (Breeden Category III)     10/25/19- Dr Lesa Wheeler   -Recent biopsy revealed suspicious nodule  -schedule thyroidectomy (Nov 21)      10/31/19-Dr Dewey Hoffman         Historical Information      Intraductal carcinoma in situ of right breast    5/9/2018 Initial Diagnosis     Intraductal carcinoma in situ of right breast      5/9/2018 Biopsy     Right breast stereotactic biopsy x2 sites:   Ductal carcinoma in-situ (DCIS), features suggestive of involvement of underlying intraductal papilloma  Nuclear Grade: II-III (intermediate to high)  % positive  VT 70-75% positive        6/21/2018 Surgery     Right-sided needle localized partial mastectomy with intraoperative ultrasound guidance:  DCIS, grade 2-3/3, calcifications present   Margins - free of malignancy, closest margin is inferior with 0 6 mm of clearance  Posterior margin with 2 1 mm of clearance  Stage 0, pTis (DCIS),pN0,pMX  - Dr Cheryle Corpus      7/10/2018 Surgery     Re-excision of right breast inferior margin tissue:  - Foci of ductal epithelial hyperplasia are seen ranging from usual ductal hyperplasia to atypical ductal hyperplasia to a few foci having features which are felt to be compatible with ductal carcinoma in situ, primarily solid type, nuclear grade 1-2/3   - None of the foci of atypia or probable ductal carcinoma in situ are seen at or within 2 mm the outer margins of excision, though it is noted that such foci are seen in 3 of 5 slides  - One intraductal papilloma is identified   - Fat necrosis is seen along the inner surface of this reexcision        8/16/2018 -  Hormone Therapy     None recommended by Dr Flavio Bradshaw      8/16/2018 - 9/14/2018 Radiation     hypo fractionated course directed to the right breast to a total dose of 4256 centigray with an additional 1000 centigray to the lumpectomy cavity          Past Medical History:   Diagnosis Date    Allergic rhinitis     Arthritis     Atrial fibrillation ([address]     Breast cancer ([address] )     Cataracts, bilateral     Chronic headaches     pulsatile daily headaches    Colitis     Coronary artery disease     Diabetes mellitus ([address] )     DJD (degenerative joint disease), cervical     Facial neuralgia     left frontal facial post traumatic neuralgia    Fibromyalgia     Glaucoma     History of external beam radiation therapy     8/16/18 to 9/14/2018 Right breast    Hypertension     Hypokalemia     Hypovitaminosis D     Lupus ([address] ) 7/19/2018    Osteoarthritis     Prediabetes     SDH (subdural hematoma) ([address] ) 1/31/2017    Sleep apnea     Syncope and collapse 12/1/2018    Vertigo      Past Surgical History:   Procedure Laterality Date    BREAST BIOPSY      BREAST LUMPECTOMY      CHOLECYSTECTOMY      COLONOSCOPY  2013    HYSTERECTOMY      MAMMO NEEDLE LOCALIZATION RIGHT (ALL INC) Right 6/21/2018    MAMMO STEREOTACTIC BREAST BIOPSY RIGHT (ALL INC) Right 5/9/2018    TONSILLECTOMY      US GUIDED BREAST BIOPSY RIGHT COMPLETE Right 5/9/2018    US GUIDED THYROID BIOPSY  6/12/2019    US GUIDED THYROID BIOPSY  9/13/2019       Social History   Social History     Substance and Sexual Activity   Alcohol Use Yes    Comment: occasional     Social History     Substance and Sexual Activity   Drug Use No     Social History     Tobacco Use   Smoking Status Never Smoker   Smokeless Tobacco Never Used   Tobacco Comment    no smoke exposure         Meds/Allergies     Current Outpatient Medications:     acetaminophen (TYLENOL) 500 mg tablet, Take 1,000 mg by mouth every 8 (eight) hours as needed, Disp: , Rfl:     apixaban (ELIQUIS) 5 mg, Take 1 tablet (5 mg total) by mouth 2 (two) times a day, Disp: 60 tablet, Rfl: 2    aspirin (ECOTRIN LOW STRENGTH) 81 mg EC tablet, Take 81 mg by mouth daily, Disp: , Rfl:     atorvastatin (LIPITOR) 10 mg tablet, Take 1 tablet (10 mg total) by mouth daily, Disp: 90 tablet, Rfl: 0    Blood Glucose Monitoring Suppl (ACURA BLOOD GLUCOSE METER) w/Device KIT, by Does not apply route 2 (two) times a day, Disp: 1 kit, Rfl: 0    Blood Pressure Monitoring (BLOOD PRESS MONITOR/M-L CUFF) MISC, by Does not apply route 2 (two) times a day, Disp: 1 each, Rfl: 0    calcium-vitamin D (OSCAL 500 + D) 500 mg-200 units per tablet, Take 1 tablet by mouth daily, Disp: , Rfl:     Capsaicin (ARTHRITIS PAIN RELIEF EX), Apply topically as needed, Disp: , Rfl:     gabapentin (NEURONTIN) 100 mg capsule, Take 1 capsule (100 mg total) by mouth 2 (two) times a day, Disp: 120 capsule, Rfl: 0    irbesartan-hydrochlorothiazide (AVALIDE) 150-12 5 MG per tablet, Take 1 tablet by mouth daily, Disp: 30 tablet, Rfl: 2    Lancets (FREESTYLE) lancets, Test BID, Disp: 100 each, Rfl: 2    meclizine (ANTIVERT) 25 mg tablet, Take 1 tablet (25 mg total) by mouth daily, Disp: 15 tablet, Rfl: 0    metFORMIN (GLUCOPHAGE) 500 mg tablet, Take 1 tablet (500 mg total) by mouth 2 (two) times a day with meals, Disp: 180 tablet, Rfl: 2    metoprolol tartrate (LOPRESSOR) 25 mg tablet, Take 1 tablet (25 mg total) by mouth every 12 (twelve) hours, Disp: 60 tablet, Rfl: 0    senna-docusate sodium (SENOKOT S) 8 6-50 mg per tablet, Take 1 tablet by mouth, Disp: , Rfl:     levothyroxine 125 mcg tablet, Take 1 tablet (125 mcg total) by mouth daily Take on an empty stomach in the morning  Start after surgery  (Patient not taking: Reported on 10/29/2019), Disp: 30 tablet, Rfl: 3    topiramate (TOPAMAX) 25 mg tablet, take 1 tablet by oral route bid or as directed, Disp: , Rfl:     traMADol (ULTRAM) 50 mg tablet, Take 1 tablet (50 mg total) by mouth every 6 (six) hours as needed for moderate pain (Patient not taking: Reported on 10/29/2019), Disp: 10 tablet, Rfl: 0  No Known Allergies      Review of Systems     Constitutional: Negative  HENT: Negative  Eyes: Negative  Respiratory: Negative  Cardiovascular: Negative  Gastrointestinal: Negative  Endocrine: Negative  Genitourinary: Negative  Musculoskeletal: Positive for arthralgias and back pain  Skin: Negative  Allergic/Immunologic: Negative  Neurological: Positive for dizziness, light-headedness and headaches  Hematological: Negative  Psychiatric/Behavioral: Positive for sleep disturbance  OBJECTIVE:   /80   Pulse 64   Temp 97 6 °F (36 4 °C)   Resp 16   Ht 5' 2" (1 575 m)   Wt 71 9 kg (158 lb 8 2 oz)   SpO2 94%   BMI 28 99 kg/m²   Pain Assessment:  0  ECOG/Zubrod/WHO: 0 - Asymptomatic    Physical Exam   Constitutional: She appears well-developed  HENT:   Head: Normocephalic  Hair is normal    Mouth/Throat: Oropharynx is clear and moist    Eyes: EOM are normal  No scleral icterus  Neck: Neck supple  No spinous process tenderness present  No edema present     Cardiovascular: Normal rate and regular rhythm  Pulmonary/Chest: Effort normal and breath sounds normal  No respiratory distress  She has no wheezes  She exhibits no tenderness  No breast tenderness  There is no supraclavicular axillary adenopathy palpable  The skin in the radiated field shows minimal hyperpigmentation no desquamation  No suspicious lesions palpable in either breast   No significant fibrosis  No breast edema   Abdominal: Soft  Bowel sounds are normal  She exhibits no distension, no ascites and no mass  There is no hepatosplenomegaly  There is no tenderness  There is no rebound  Genitourinary: No breast tenderness  Musculoskeletal: Normal range of motion  She exhibits no edema or tenderness  Lymphadenopathy:     She has no cervical adenopathy  She has no axillary adenopathy  Neurological: She is alert  She has normal strength  No cranial nerve deficit  Coordination and gait normal    Skin: Skin is intact  Psychiatric: She has a normal mood and affect  Her speech is normal and behavior is normal    Vitals reviewed             RESULTS    Lab Results:   Recent Results (from the past 672 hour(s))   Comprehensive metabolic panel    Collection Time: 10/28/19  7:31 AM   Result Value Ref Range    Sodium 143 136 - 145 mmol/L    Potassium 3 2 (L) 3 5 - 5 3 mmol/L    Chloride 101 100 - 108 mmol/L    CO2 33 (H) 21 - 32 mmol/L    ANION GAP 9 4 - 13 mmol/L    BUN 15 5 - 25 mg/dL    Creatinine 0 87 0 60 - 1 30 mg/dL    Glucose, Fasting 102 (H) 65 - 99 mg/dL    Calcium 9 7 8 3 - 10 1 mg/dL    AST 21 5 - 45 U/L    ALT 16 12 - 78 U/L    Alkaline Phosphatase 81 46 - 116 U/L    Total Protein 7 3 6 4 - 8 2 g/dL    Albumin 3 5 3 5 - 5 0 g/dL    Total Bilirubin 1 27 (H) 0 20 - 1 00 mg/dL    eGFR 74 ml/min/1 73sq m   CBC and differential    Collection Time: 10/28/19  7:31 AM   Result Value Ref Range    WBC 5 02 4 31 - 10 16 Thousand/uL    RBC 4 42 3 81 - 5 12 Million/uL    Hemoglobin 13 9 11 5 - 15 4 g/dL    Hematocrit 42 2 34 8 - 46 1 %    MCV 96 82 - 98 fL    MCH 31 4 26 8 - 34 3 pg    MCHC 32 9 31 4 - 37 4 g/dL    RDW 12 7 11 6 - 15 1 %    MPV 11 0 8 9 - 12 7 fL    Platelets 977 808 - 635 Thousands/uL    nRBC 0 /100 WBCs    Neutrophils Relative 46 43 - 75 %    Immat GRANS % 0 0 - 2 %    Lymphocytes Relative 44 14 - 44 %    Monocytes Relative 10 4 - 12 %    Eosinophils Relative 0 0 - 6 %    Basophils Relative 0 0 - 1 %    Neutrophils Absolute 2 27 1 85 - 7 62 Thousands/µL    Immature Grans Absolute 0 01 0 00 - 0 20 Thousand/uL    Lymphocytes Absolute 2 23 0 60 - 4 47 Thousands/µL    Monocytes Absolute 0 48 0 17 - 1 22 Thousand/µL    Eosinophils Absolute 0 02 0 00 - 0 61 Thousand/µL    Basophils Absolute 0 01 0 00 - 0 10 Thousands/µL   EKG 12 lead    Collection Time: 10/28/19  7:40 AM   Result Value Ref Range    Ventricular Rate 53 BPM    Atrial Rate 53 BPM    CT Interval 154 ms    QRSD Interval 96 ms    QT Interval 414 ms    QTC Interval 388 ms    P Axis 65 degrees    QRS Axis 4 degrees    T [place] 13 degrees       Imaging Studies:No results found  Assessment/Plan:  No orders of the defined types were placed in this encounter  Matty Ornelas is a 69y o  year old female is 13 months status post a hypo fractionated course of radiation therapy for DCIS of the right breast   She has no clinical evidence of recurrence  Her mammogram from 4/24/2019 returned stable  She will return for follow-up visit in 1 year as she will be seeing Dr Berta Elizalde in the interim  Derrek Boswell MD  10/29/2019,8:55 AM    Portions of the record may have been created with voice recognition software   Occasional wrong word or "sound a like" substitutions may have occurred due to the inherent limitations of voice recognition software   Read the chart carefully and recognize, using context, where substitutions have occurred

## 2019-10-31 PROCEDURE — 1124F ACP DISCUSS-NO DSCNMKR DOCD: CPT | Performed by: PATHOLOGY

## 2019-11-11 ENCOUNTER — TELEPHONE (OUTPATIENT)
Dept: FAMILY MEDICINE CLINIC | Facility: CLINIC | Age: 79
End: 2019-11-11

## 2019-11-11 NOTE — TELEPHONE ENCOUNTER
Tried to call patient to let know to stop eliquis 2 days prior but no answer and couldn't leave message  Just rang   Patient does have apt on the 19th at our office

## 2019-11-11 NOTE — TELEPHONE ENCOUNTER
Called oncology at [phone] and let them know per dr Mariam Melendez stop eliquis 2 days prior to procedure

## 2019-11-12 ENCOUNTER — APPOINTMENT (OUTPATIENT)
Dept: LAB | Facility: HOSPITAL | Age: 79
End: 2019-11-12
Payer: MEDICARE

## 2019-11-12 ENCOUNTER — HOSPITAL ENCOUNTER (OUTPATIENT)
Dept: RADIOLOGY | Facility: HOSPITAL | Age: 79
Discharge: HOME | End: 2019-11-12
Payer: MEDICARE

## 2019-11-12 DIAGNOSIS — I10 ESSENTIAL HYPERTENSION: ICD-10-CM

## 2019-11-12 DIAGNOSIS — E04.1 THYROID NODULE: ICD-10-CM

## 2019-11-12 DIAGNOSIS — M79.605 PAIN OF LEFT LOWER EXTREMITY: ICD-10-CM

## 2019-11-12 DIAGNOSIS — E55.9 VITAMIN D DEFICIENCY: ICD-10-CM

## 2019-11-12 DIAGNOSIS — E11.9 TYPE 2 DIABETES MELLITUS WITHOUT COMPLICATION, WITHOUT LONG-TERM CURRENT USE OF INSULIN (HCC): ICD-10-CM

## 2019-11-12 LAB
ANION GAP SERPL CALCULATED.3IONS-SCNC: 7 MMOL/L (ref 5–14)
BASOPHILS # BLD AUTO: 0 THOUSANDS/ΜL (ref 0–0.1)
BASOPHILS NFR BLD AUTO: 0 % (ref 0–1)
BUN SERPL-MCNC: 12 MG/DL (ref 5–25)
CALCIUM SERPL-MCNC: 9.3 MG/DL (ref 8.4–10.2)
CHLORIDE SERPL-SCNC: 104 MMOL/L (ref 97–108)
CHOLEST SERPL-MCNC: 116 MG/DL
CO2 SERPL-SCNC: 30 MMOL/L (ref 22–30)
CREAT SERPL-MCNC: 0.77 MG/DL (ref 0.6–1.2)
EOSINOPHIL # BLD AUTO: 0 THOUSAND/ΜL (ref 0–0.4)
EOSINOPHIL NFR BLD AUTO: 0 % (ref 0–6)
ERYTHROCYTE [DISTWIDTH] IN BLOOD BY AUTOMATED COUNT: 14.1 %
EST. AVERAGE GLUCOSE BLD GHB EST-MCNC: 128 MG/DL
GFR SERPL CREATININE-BSD FRML MDRD: 86 ML/MIN/1.73SQ M
GLUCOSE P FAST SERPL-MCNC: 95 MG/DL (ref 70–99)
HBA1C MFR BLD: 6.1 % (ref 4.2–6.3)
HCT VFR BLD AUTO: 41.1 % (ref 36–46)
HDLC SERPL-MCNC: 46 MG/DL
HGB BLD-MCNC: 13.7 G/DL (ref 12–16)
LDLC SERPL CALC-MCNC: 55 MG/DL
LYMPHOCYTES # BLD AUTO: 1.6 THOUSANDS/ΜL (ref 0.5–4)
LYMPHOCYTES NFR BLD AUTO: 35 % (ref 25–45)
MCH RBC QN AUTO: 31.1 PG (ref 26–34)
MCHC RBC AUTO-ENTMCNC: 33.3 G/DL (ref 31–36)
MCV RBC AUTO: 94 FL (ref 80–100)
MONOCYTES # BLD AUTO: 0.4 THOUSAND/ΜL (ref 0.2–0.9)
MONOCYTES NFR BLD AUTO: 9 % (ref 1–10)
NEUTROPHILS # BLD AUTO: 2.6 THOUSANDS/ΜL (ref 1.8–7.8)
NEUTS SEG NFR BLD AUTO: 55 % (ref 45–65)
PLATELET # BLD AUTO: 199 THOUSANDS/UL (ref 150–450)
PMV BLD AUTO: 9 FL (ref 8.9–12.7)
POTASSIUM SERPL-SCNC: 3.7 MMOL/L (ref 3.6–5)
RBC # BLD AUTO: 4.4 MILLION/UL (ref 4–5.2)
SODIUM SERPL-SCNC: 141 MMOL/L (ref 137–147)
TRIGL SERPL-MCNC: 77 MG/DL
TSH SERPL DL<=0.05 MIU/L-ACNC: 0.54 UIU/ML (ref 0.47–4.68)
WBC # BLD AUTO: 4.7 THOUSAND/UL (ref 4.5–11)

## 2019-11-12 PROCEDURE — 80048 BASIC METABOLIC PNL TOTAL CA: CPT

## 2019-11-12 PROCEDURE — 72110 X-RAY EXAM L-2 SPINE 4/>VWS: CPT

## 2019-11-12 PROCEDURE — 80061 LIPID PANEL: CPT

## 2019-11-12 PROCEDURE — 83036 HEMOGLOBIN GLYCOSYLATED A1C: CPT

## 2019-11-12 PROCEDURE — 36415 COLL VENOUS BLD VENIPUNCTURE: CPT

## 2019-11-12 PROCEDURE — 85025 COMPLETE CBC W/AUTO DIFF WBC: CPT

## 2019-11-12 PROCEDURE — 84443 ASSAY THYROID STIM HORMONE: CPT

## 2019-11-14 ENCOUNTER — TELEPHONE (OUTPATIENT)
Dept: SURGICAL ONCOLOGY | Facility: CLINIC | Age: 79
End: 2019-11-14

## 2019-11-14 NOTE — TELEPHONE ENCOUNTER
Notified patient to stop taking eliquis 2 days prior to surgery per Dr Kavon Moran  Pt verbally understands

## 2019-11-18 NOTE — PRE-PROCEDURE INSTRUCTIONS
Pre-Surgery Instructions:   Medication Instructions    apixaban (ELIQUIS) 5 mg Patient was instructed by Physician and understands   aspirin (ECOTRIN LOW STRENGTH) 81 mg EC tablet Patient was instructed by Physician and understands   atorvastatin (LIPITOR) 10 mg tablet Instructed patient per Anesthesia Guidelines   gabapentin (NEURONTIN) 100 mg capsule Instructed patient per Anesthesia Guidelines   irbesartan-hydrochlorothiazide (AVALIDE) 150-12 5 MG per tablet Instructed patient per Anesthesia Guidelines   meclizine (ANTIVERT) 25 mg tablet Instructed patient per Anesthesia Guidelines   metFORMIN (GLUCOPHAGE) 500 mg tablet Instructed patient per Anesthesia Guidelines   metoprolol tartrate (LOPRESSOR) 25 mg tablet Instructed patient per Anesthesia Guidelines  Spoke to pt  Medication list reviewed & instructed   Per pt: instructed by PCP on eliquis  See telephone encounter 11/11 giving permission to hold eliquis 2 days prior to surgery  Pt also instructed to hold aspirin 7 days prior to surgery from surgeon office  Am DOS pt ok to take gabapentin & metoprolol with 1-2 sips of water  Showering instructions given by surgeon office, reviewed @ time of call  All instructions verbally understood by patient  No further questions  ACE/ARB Med Class     Continue this medication up to the evening before surgery/procedure, but do not take the morning of the day of surgery  Antiepileptic Med Class     Continue to take this medication on your normal schedule  If this is an oral medication and you take it in the morning, then you may take this medicine with a sip of water  ASA Med Class: Aspirin     Should be discontinued at least one week prior to planned operation, unless specifically stated otherwise by surgical service  Your Surgeon may have patient stop taking aspirin up to a week before surgery if having intracranial, middle ear, posterior eye, spine surgery or prostate surgery  [Patients taking aspirin for coronary stents should be reviewed by an anesthesiologist in the optimization clinic  Please do not discontinue aspirin in patients with coronary stents unless given specific permission to do so by the cardiologist who prescribed medication ]   If your surgeon approves please continue to take this medication on your normal schedule  You may take this medication on the morning of your surgery with a sip of water  Beta blocker Med Class     Continue to take this heart medication on your normal schedule  If this is an oral medication and you take it in the morning, then you may take this medicine with a sip of water  Direct Xa Inhibitor Med Class     Stop taking this medication at least 3 days prior to surgery/procedure with prescribing Physician and Surgeon consultation  Insulin Med Class     Pre-Surgery/Procedure Instructions for Adult Patients who Take Medicine for Diabetes or to Control their Blood Sugar     Day Before Surgery/Procedure  Use the directions based on the type of medicine you take for your diabetes  1  If you are having a procedure that does not require a bowel prep:  ? Pre-Mixed Insulin (Intermediate Acting: Humalog 75/25, Humulin 70/30  Novolog 70/30, Regular Insulin)  § Take ½ your regular dose the evening before your procedure  ? Rapid/Fast Acting Insulin/Long Acting Insulin (Humalog U200, NovoLog, Apidra, Lantus, Levemir, Kenyatta Bosworth, Hester)  § Take your FULL regular dose the day before procedure  ? Oral Diabetic Medicines including Glipizide/Glimepiride/Glucotrol (sulfonylurea)  § Take your regular dose with dinner the evening before your procedure    2  If you are having a procedure (e g  Colonoscopy) that requires a bowel prep and you are allowed to have at least a clear liquid diet:  ? Pre-Mixed Insulin (Intermediate Acting: Humalog 75/25, Humulin 70/30, Novolog 70/30, Regular Insulin)  § Take ½ your regular dose the evening before your procedure  ? Rapid/Fast Acting Insulin (Humalog U200, NovoLog, Apidra, Fiasp)  § Take ½ your regular dose the evening before your procedure  ? Long Acting Insulin (Lantus, Levemir, Geoffery Decent)  § Take your FULL regular dose the day before procedure  ? Oral Glipizide/Glimepiride/Glucotrol (sulfonylurea)  § Take ½ your regular dose the evening before your procedure  ? Oral Diabetic Medicines that are NOT Glipizide/Glimepiride/Glucotrol  § Take your regular dose with dinner in the evening before your procedure      Day of Surgery/Procedure  · Long Acting Insulin (Lantus, Levemir, Geoffery Decent)  ? If you usually take your Long-Acting Insulin in the morning, take the full dose as scheduled  · With the exception of the morning Long-Acting Insulin noted above, DO NOT take ANY diabetic medicine on the day of your procedure unless you were instructed by the doctor who manages your diabetic medicines  · Continue to check your blood sugars  · If you have an insulin pump then consult with your Endocrinologist for instructions  · If you cannot see your Endocrinologist, on the day of the procedure set your insulin pump to your basal rate only  Please bring your insulin pump supplies to the hospital      This Educational material has been approved by the Patient Education Advisory Committee  Date prepared: 1/17/2018          Expiration date: 1/17/2019        Approval Number:                     Opioid Med Class     Continue to take this medication on your normal schedule  If this is an oral medication and you take it in the morning, then you may take this medicine with a sip of water  Statin Med Class     Continue to take this medication on your normal schedule  If this is an oral medication and you take it in the morning, then you may take this medicine with a sip of water  Thyroxine Med Class     Continue to take this medication on your normal schedule    If this is an oral medication and you take it in the morning, then you may take this medicine with a sip of water

## 2019-11-19 ENCOUNTER — OFFICE VISIT (OUTPATIENT)
Dept: FAMILY MEDICINE CLINIC | Facility: CLINIC | Age: 79
End: 2019-11-19
Payer: MEDICARE

## 2019-11-19 ENCOUNTER — TELEPHONE (OUTPATIENT)
Dept: FAMILY MEDICINE CLINIC | Facility: CLINIC | Age: 79
End: 2019-11-19

## 2019-11-19 VITALS
BODY MASS INDEX: 29.08 KG/M2 | HEIGHT: 62 IN | TEMPERATURE: 97.6 F | DIASTOLIC BLOOD PRESSURE: 100 MMHG | WEIGHT: 158 LBS | HEART RATE: 62 BPM | SYSTOLIC BLOOD PRESSURE: 180 MMHG | OXYGEN SATURATION: 98 %

## 2019-11-19 DIAGNOSIS — Z01.818 PRE-OP EXAMINATION: Primary | ICD-10-CM

## 2019-11-19 DIAGNOSIS — I10 ESSENTIAL HYPERTENSION: ICD-10-CM

## 2019-11-19 DIAGNOSIS — E11.9 TYPE 2 DIABETES MELLITUS WITHOUT COMPLICATION, WITHOUT LONG-TERM CURRENT USE OF INSULIN (HCC): ICD-10-CM

## 2019-11-19 DIAGNOSIS — M48.02 CERVICAL SPINAL STENOSIS: ICD-10-CM

## 2019-11-19 DIAGNOSIS — E78.00 PURE HYPERCHOLESTEROLEMIA: ICD-10-CM

## 2019-11-19 DIAGNOSIS — E04.1 THYROID NODULE: ICD-10-CM

## 2019-11-19 LAB
CREAT UR-MCNC: 59.4 MG/DL
MICROALBUMIN UR-MCNC: 15.4 MG/L (ref 0–20)
MICROALBUMIN/CREAT 24H UR: 26 MG/G CREATININE (ref 0–30)

## 2019-11-19 PROCEDURE — 99214 OFFICE O/P EST MOD 30 MIN: CPT | Performed by: FAMILY MEDICINE

## 2019-11-19 PROCEDURE — 82043 UR ALBUMIN QUANTITATIVE: CPT | Performed by: FAMILY MEDICINE

## 2019-11-19 PROCEDURE — 82570 ASSAY OF URINE CREATININE: CPT | Performed by: FAMILY MEDICINE

## 2019-11-19 RX ORDER — GABAPENTIN 100 MG/1
100 CAPSULE ORAL 2 TIMES DAILY
Qty: 180 CAPSULE | Refills: 1 | Status: CANCELLED | OUTPATIENT
Start: 2019-11-19

## 2019-11-19 RX ORDER — ATORVASTATIN CALCIUM 10 MG/1
10 TABLET, FILM COATED ORAL DAILY
Qty: 90 TABLET | Refills: 1 | Status: SHIPPED | OUTPATIENT
Start: 2019-11-19 | End: 2019-12-31 | Stop reason: SDUPTHER

## 2019-11-19 RX ORDER — GABAPENTIN 100 MG/1
100 CAPSULE ORAL 2 TIMES DAILY
Qty: 180 CAPSULE | Refills: 1 | Status: SHIPPED | OUTPATIENT
Start: 2019-11-19 | End: 2020-06-16 | Stop reason: SDUPTHER

## 2019-11-19 RX ORDER — ATORVASTATIN CALCIUM 10 MG/1
10 TABLET, FILM COATED ORAL DAILY
Qty: 90 TABLET | Refills: 1 | Status: CANCELLED | OUTPATIENT
Start: 2019-11-19

## 2019-11-19 NOTE — ASSESSMENT & PLAN NOTE
Patient is here for preop clearance for thyroidectomy patient blood pressure 180/100 I recheck it personally and it was 180/100 will hold on the clearance patient intake her blood pressure medication she will take it today will recheck it tomorrow if continued to be high will hold on the surgery , discussed with the patient  I did recommend to the patient to stop the Eliquis 2 days before the surgery just in case she is cleared

## 2019-11-19 NOTE — TELEPHONE ENCOUNTER
Spoke with Dr Glover's Nurse Arlette Boyd advised her patient was in the office today for a ore op clearance and her BP was extremely elevated due to stopping her BP medication, Advised her she is not cleared as of today patient will be taking her medication and coming in tomorrow again for a BP check and we will call with a update

## 2019-11-19 NOTE — PROGRESS NOTES
Subjective:   Chief Complaint   Patient presents with    Pre-op Exam        Patient ID: Malika Rosa is a 69 y o  female  Patient and office with her granddaughter for preop clearance patient having thyroidectomy on Thursday November 21, 2019 patient today deny any chest pain short of breath no palpitation no headache no blurred vision no weakness no lateralized of the symptom no abdomen pain nausea vomiting or diarrhea the patient is not smoker there is no personal or family history of bleeding disorder she is able to go 2 flight of stairs without any short of breath  Looking to her vital sign blood pressure is elevated to 180 over 100 the I did repeated and it is same 180/100 patient she did stop or her medication since Sunday for the surgery she was confused about what medication she should stop and she stop all of them       The following portions of the patient's history were reviewed and updated as appropriate: allergies, current medications, past family history, past medical history, past social history, past surgical history and problem list     Review of Systems   Constitutional: Negative for fatigue and fever  HENT: Negative for ear pain, sinus pressure, sinus pain and sore throat  Eyes: Negative for pain and redness  Respiratory: Negative for cough, chest tightness and shortness of breath  Cardiovascular: Negative for chest pain, palpitations and leg swelling  Gastrointestinal: Negative for abdominal pain, blood in stool, constipation, diarrhea and nausea  Genitourinary: Negative for flank pain, frequency and hematuria  Musculoskeletal: Negative for back pain and joint swelling  Skin: Negative for rash  Neurological: Negative for dizziness, numbness and headaches  Hematological: Does not bruise/bleed easily                Objective:  Vitals:    11/19/19 0911   BP: (!) 180/100   Pulse: 62   Temp: 97 6 °F (36 4 °C)   TempSrc: Tympanic   SpO2: 98%   Weight: 71 7 kg (158 lb) Height: 5' 2" (1 575 m)      Physical Exam   Constitutional: She is oriented to person, place, and time  She appears well-developed and well-nourished  HENT:   Head: Normocephalic  Right Ear: External ear normal    Left Ear: External ear normal    Eyes: Conjunctivae and EOM are normal  Right eye exhibits no discharge  Left eye exhibits no discharge  Neck: No JVD present  Cardiovascular: Normal rate and regular rhythm  Exam reveals no gallop  Murmur heard  Pulmonary/Chest: Effort normal  No respiratory distress  She has no wheezes  She has no rales  She exhibits no tenderness  Abdominal: She exhibits no mass  There is no tenderness  There is no rebound  Musculoskeletal: She exhibits no edema or tenderness  Neurological: She is alert and oriented to person, place, and time  Skin: No rash noted  No erythema  Assessment/Plan:    Pre-op examination  Patient is here for preop clearance for thyroidectomy patient blood pressure 180/100 I recheck it personally and it was 180/100 will hold on the clearance patient intake her blood pressure medication she will take it today will recheck it tomorrow if continued to be high will hold on the surgery , discussed with the patient  I did recommend to the patient to stop the Eliquis 2 days before the surgery just in case she is cleared    Hypertension  Uncontrolled 2 to patient did not take her blood pressure medication for the last 2 days she was can conffuse about stopping her medication before the surgery and she stop everything patient asymptomatic recommend to restart her medication will re-evaluate tomorrow       Diagnoses and all orders for this visit:    Pre-op examination    Essential hypertension    Thyroid nodule  -     Ambulatory referral to [facility]    Cervical spinal stenosis  -     gabapentin (NEURONTIN) 100 mg capsule;  Take 1 capsule (100 mg total) by mouth 2 (two) times a day    Type 2 diabetes mellitus without complication, without long-term current use of insulin (HCC)  -     Microalbumin / creatinine urine ratio  -     atorvastatin (LIPITOR) 10 mg tablet; Take 1 tablet (10 mg total) by mouth daily    Pure hypercholesterolemia  -     atorvastatin (LIPITOR) 10 mg tablet; Take 1 tablet (10 mg total) by mouth daily    Other orders  -     Cancel: gabapentin (NEURONTIN) 100 mg capsule; Take 1 capsule (100 mg total) by mouth 2 (two) times a day  -     Cancel: atorvastatin (LIPITOR) 10 mg tablet;  Take 1 tablet (10 mg total) by mouth daily

## 2019-11-19 NOTE — ASSESSMENT & PLAN NOTE
Uncontrolled 2 to patient did not take her blood pressure medication for the last 2 days she was can conffuse about stopping her medication before the surgery and she stop everything patient asymptomatic recommend to restart her medication will re-evaluate tomorrow

## 2019-11-20 ENCOUNTER — CLINICAL SUPPORT (OUTPATIENT)
Dept: FAMILY MEDICINE CLINIC | Facility: CLINIC | Age: 79
End: 2019-11-20

## 2019-11-20 ENCOUNTER — TELEPHONE (OUTPATIENT)
Dept: NEUROLOGY | Facility: CLINIC | Age: 79
End: 2019-11-20

## 2019-11-20 VITALS — OXYGEN SATURATION: 97 % | HEART RATE: 67 BPM | SYSTOLIC BLOOD PRESSURE: 142 MMHG | DIASTOLIC BLOOD PRESSURE: 90 MMHG

## 2019-11-20 DIAGNOSIS — I10 ESSENTIAL HYPERTENSION: Primary | ICD-10-CM

## 2019-11-20 NOTE — PROGRESS NOTES
Patient was seen today in my office to recheck on her blood pressure and blood pressure checked by my nurse was 140/90 and I recheck it personally on the right arm it is 140/90 and patient asymptomatic no chest pain no short of breath no palpitation no lower extremity edema no dyspnea on exertion and recommend the patient to continue with her blood pressure medication even at the day of the surgery and will review the medication she is supposed to be start the day before the surgery  A after review the preop the testing including EKG and the blood work patient will be cleared for the surgery

## 2019-11-21 ENCOUNTER — HOSPITAL ENCOUNTER (OUTPATIENT)
Facility: HOSPITAL | Age: 79
Setting detail: OUTPATIENT SURGERY
Discharge: HOME | End: 2019-11-22
Attending: SURGERY | Admitting: SURGERY
Payer: MEDICARE

## 2019-11-21 ENCOUNTER — ANESTHESIA (OUTPATIENT)
Dept: PERIOP | Facility: HOSPITAL | Age: 79
End: 2019-11-21
Payer: MEDICARE

## 2019-11-21 ENCOUNTER — ANESTHESIA EVENT (OUTPATIENT)
Dept: PERIOP | Facility: HOSPITAL | Age: 79
End: 2019-11-21
Payer: MEDICARE

## 2019-11-21 DIAGNOSIS — E04.1 THYROID NODULE: ICD-10-CM

## 2019-11-21 DIAGNOSIS — I48.91 ATRIAL FIBRILLATION, UNSPECIFIED TYPE (HCC): ICD-10-CM

## 2019-11-21 LAB
CALCIUM SERPL-MCNC: 8.8 MG/DL (ref 8.3–10.1)
CALCIUM SERPL-MCNC: 8.9 MG/DL (ref 8.3–10.1)
GLUCOSE SERPL-MCNC: 131 MG/DL (ref 65–140)
GLUCOSE SERPL-MCNC: 139 MG/DL (ref 65–140)
GLUCOSE SERPL-MCNC: 97 MG/DL (ref 65–140)
PLATELET # BLD AUTO: 184 THOUSANDS/UL (ref 149–390)
PMV BLD AUTO: 10.4 FL (ref 8.9–12.7)

## 2019-11-21 PROCEDURE — 60240 REMOVAL OF THYROID: CPT | Performed by: SURGERY

## 2019-11-21 PROCEDURE — 88307 TISSUE EXAM BY PATHOLOGIST: CPT | Performed by: PATHOLOGY

## 2019-11-21 PROCEDURE — 99024 POSTOP FOLLOW-UP VISIT: CPT | Performed by: SURGERY

## 2019-11-21 PROCEDURE — 82310 ASSAY OF CALCIUM: CPT | Performed by: SURGERY

## 2019-11-21 PROCEDURE — 85049 AUTOMATED PLATELET COUNT: CPT | Performed by: SURGERY

## 2019-11-21 PROCEDURE — 82948 REAGENT STRIP/BLOOD GLUCOSE: CPT

## 2019-11-21 RX ORDER — LIDOCAINE HYDROCHLORIDE 10 MG/ML
0.5 INJECTION, SOLUTION EPIDURAL; INFILTRATION; INTRACAUDAL; PERINEURAL ONCE AS NEEDED
Status: COMPLETED | OUTPATIENT
Start: 2019-11-21 | End: 2019-11-21

## 2019-11-21 RX ORDER — FENTANYL CITRATE/PF 50 MCG/ML
25 SYRINGE (ML) INJECTION
Status: DISCONTINUED | OUTPATIENT
Start: 2019-11-21 | End: 2019-11-21 | Stop reason: HOSPADM

## 2019-11-21 RX ORDER — ROCURONIUM BROMIDE 10 MG/ML
INJECTION, SOLUTION INTRAVENOUS AS NEEDED
Status: DISCONTINUED | OUTPATIENT
Start: 2019-11-21 | End: 2019-11-21 | Stop reason: SURG

## 2019-11-21 RX ORDER — ONDANSETRON 2 MG/ML
4 INJECTION INTRAMUSCULAR; INTRAVENOUS EVERY 6 HOURS PRN
Status: DISCONTINUED | OUTPATIENT
Start: 2019-11-21 | End: 2019-11-22 | Stop reason: HOSPADM

## 2019-11-21 RX ORDER — HYDRALAZINE HYDROCHLORIDE 20 MG/ML
INJECTION INTRAMUSCULAR; INTRAVENOUS AS NEEDED
Status: DISCONTINUED | OUTPATIENT
Start: 2019-11-21 | End: 2019-11-21 | Stop reason: SURG

## 2019-11-21 RX ORDER — HEPARIN SODIUM 5000 [USP'U]/ML
5000 INJECTION, SOLUTION INTRAVENOUS; SUBCUTANEOUS EVERY 8 HOURS SCHEDULED
Status: DISCONTINUED | OUTPATIENT
Start: 2019-11-21 | End: 2019-11-22 | Stop reason: HOSPADM

## 2019-11-21 RX ORDER — LABETALOL 20 MG/4 ML (5 MG/ML) INTRAVENOUS SYRINGE
AS NEEDED
Status: DISCONTINUED | OUTPATIENT
Start: 2019-11-21 | End: 2019-11-21 | Stop reason: SURG

## 2019-11-21 RX ORDER — PROPOFOL 10 MG/ML
INJECTION, EMULSION INTRAVENOUS AS NEEDED
Status: DISCONTINUED | OUTPATIENT
Start: 2019-11-21 | End: 2019-11-21 | Stop reason: SURG

## 2019-11-21 RX ORDER — GLYCOPYRROLATE 0.2 MG/ML
INJECTION INTRAMUSCULAR; INTRAVENOUS AS NEEDED
Status: DISCONTINUED | OUTPATIENT
Start: 2019-11-21 | End: 2019-11-21 | Stop reason: SURG

## 2019-11-21 RX ORDER — SODIUM CHLORIDE, SODIUM LACTATE, POTASSIUM CHLORIDE, CALCIUM CHLORIDE 600; 310; 30; 20 MG/100ML; MG/100ML; MG/100ML; MG/100ML
125 INJECTION, SOLUTION INTRAVENOUS CONTINUOUS
Status: DISCONTINUED | OUTPATIENT
Start: 2019-11-21 | End: 2019-11-21 | Stop reason: HOSPADM

## 2019-11-21 RX ORDER — SODIUM CHLORIDE, SODIUM LACTATE, POTASSIUM CHLORIDE, CALCIUM CHLORIDE 600; 310; 30; 20 MG/100ML; MG/100ML; MG/100ML; MG/100ML
75 INJECTION, SOLUTION INTRAVENOUS CONTINUOUS
Status: DISCONTINUED | OUTPATIENT
Start: 2019-11-21 | End: 2019-11-22

## 2019-11-21 RX ORDER — ONDANSETRON 2 MG/ML
4 INJECTION INTRAMUSCULAR; INTRAVENOUS ONCE AS NEEDED
Status: DISCONTINUED | OUTPATIENT
Start: 2019-11-21 | End: 2019-11-21 | Stop reason: HOSPADM

## 2019-11-21 RX ORDER — BUPIVACAINE HYDROCHLORIDE 2.5 MG/ML
INJECTION, SOLUTION INFILTRATION; PERINEURAL AS NEEDED
Status: DISCONTINUED | OUTPATIENT
Start: 2019-11-21 | End: 2019-11-21 | Stop reason: HOSPADM

## 2019-11-21 RX ORDER — ONDANSETRON 2 MG/ML
INJECTION INTRAMUSCULAR; INTRAVENOUS AS NEEDED
Status: DISCONTINUED | OUTPATIENT
Start: 2019-11-21 | End: 2019-11-21 | Stop reason: SURG

## 2019-11-21 RX ORDER — ACETAMINOPHEN 325 MG/1
650 TABLET ORAL EVERY 6 HOURS PRN
Status: DISCONTINUED | OUTPATIENT
Start: 2019-11-21 | End: 2019-11-22 | Stop reason: HOSPADM

## 2019-11-21 RX ORDER — LEVOTHYROXINE SODIUM 0.12 MG/1
125 TABLET ORAL
Status: DISCONTINUED | OUTPATIENT
Start: 2019-11-22 | End: 2019-11-22 | Stop reason: HOSPADM

## 2019-11-21 RX ORDER — OXYCODONE HYDROCHLORIDE 5 MG/1
2.5 TABLET ORAL EVERY 4 HOURS PRN
Status: DISCONTINUED | OUTPATIENT
Start: 2019-11-21 | End: 2019-11-22 | Stop reason: HOSPADM

## 2019-11-21 RX ORDER — NEOSTIGMINE METHYLSULFATE 1 MG/ML
INJECTION INTRAVENOUS AS NEEDED
Status: DISCONTINUED | OUTPATIENT
Start: 2019-11-21 | End: 2019-11-21 | Stop reason: SURG

## 2019-11-21 RX ORDER — FENTANYL CITRATE 50 UG/ML
INJECTION, SOLUTION INTRAMUSCULAR; INTRAVENOUS AS NEEDED
Status: DISCONTINUED | OUTPATIENT
Start: 2019-11-21 | End: 2019-11-21 | Stop reason: SURG

## 2019-11-21 RX ORDER — SODIUM CHLORIDE 9 MG/ML
INJECTION, SOLUTION INTRAVENOUS CONTINUOUS PRN
Status: DISCONTINUED | OUTPATIENT
Start: 2019-11-21 | End: 2019-11-21 | Stop reason: SURG

## 2019-11-21 RX ORDER — OXYCODONE HYDROCHLORIDE 5 MG/1
5 TABLET ORAL EVERY 4 HOURS PRN
Status: DISCONTINUED | OUTPATIENT
Start: 2019-11-21 | End: 2019-11-22 | Stop reason: HOSPADM

## 2019-11-21 RX ORDER — MECLIZINE HCL 12.5 MG/1
25 TABLET ORAL DAILY
Status: DISCONTINUED | OUTPATIENT
Start: 2019-11-22 | End: 2019-11-22 | Stop reason: HOSPADM

## 2019-11-21 RX ORDER — LABETALOL 20 MG/4 ML (5 MG/ML) INTRAVENOUS SYRINGE
10 EVERY 4 HOURS PRN
Status: DISCONTINUED | OUTPATIENT
Start: 2019-11-21 | End: 2019-11-22 | Stop reason: HOSPADM

## 2019-11-21 RX ORDER — ATORVASTATIN CALCIUM 10 MG/1
10 TABLET, FILM COATED ORAL
Status: DISCONTINUED | OUTPATIENT
Start: 2019-11-21 | End: 2019-11-22 | Stop reason: HOSPADM

## 2019-11-21 RX ORDER — LIDOCAINE HYDROCHLORIDE 10 MG/ML
INJECTION, SOLUTION INFILTRATION; PERINEURAL AS NEEDED
Status: DISCONTINUED | OUTPATIENT
Start: 2019-11-21 | End: 2019-11-21 | Stop reason: SURG

## 2019-11-21 RX ADMIN — SODIUM CHLORIDE, SODIUM LACTATE, POTASSIUM CHLORIDE, AND CALCIUM CHLORIDE 125 ML/HR: .6; .31; .03; .02 INJECTION, SOLUTION INTRAVENOUS at 13:50

## 2019-11-21 RX ADMIN — SODIUM CHLORIDE, SODIUM LACTATE, POTASSIUM CHLORIDE, AND CALCIUM CHLORIDE 75 ML/HR: .6; .31; .03; .02 INJECTION, SOLUTION INTRAVENOUS at 18:00

## 2019-11-21 RX ADMIN — LIDOCAINE HYDROCHLORIDE 0.5 ML: 10 INJECTION, SOLUTION EPIDURAL; INFILTRATION; INTRACAUDAL; PERINEURAL at 13:50

## 2019-11-21 RX ADMIN — ONDANSETRON 4 MG: 2 INJECTION INTRAMUSCULAR; INTRAVENOUS at 16:36

## 2019-11-21 RX ADMIN — FENTANYL CITRATE 25 MCG: 50 INJECTION, SOLUTION INTRAMUSCULAR; INTRAVENOUS at 17:49

## 2019-11-21 RX ADMIN — FENTANYL CITRATE 50 MCG: 50 INJECTION, SOLUTION INTRAMUSCULAR; INTRAVENOUS at 15:12

## 2019-11-21 RX ADMIN — PROPOFOL 110 MG: 10 INJECTION, EMULSION INTRAVENOUS at 15:12

## 2019-11-21 RX ADMIN — ROCURONIUM BROMIDE 30 MG: 50 INJECTION, SOLUTION INTRAVENOUS at 15:12

## 2019-11-21 RX ADMIN — NEOSTIGMINE METHYLSULFATE 3 MG: 1 INJECTION INTRAVENOUS at 16:51

## 2019-11-21 RX ADMIN — OXYCODONE HYDROCHLORIDE 5 MG: 5 TABLET ORAL at 20:14

## 2019-11-21 RX ADMIN — METOPROLOL TARTRATE 25 MG: 25 TABLET ORAL at 22:29

## 2019-11-21 RX ADMIN — FENTANYL CITRATE 25 MCG: 50 INJECTION, SOLUTION INTRAMUSCULAR; INTRAVENOUS at 17:42

## 2019-11-21 RX ADMIN — GLYCOPYRROLATE 0.6 MG: 0.2 INJECTION, SOLUTION INTRAMUSCULAR; INTRAVENOUS at 16:51

## 2019-11-21 RX ADMIN — HEPARIN SODIUM 5000 UNITS: 5000 INJECTION INTRAVENOUS; SUBCUTANEOUS at 22:29

## 2019-11-21 RX ADMIN — ATORVASTATIN CALCIUM 10 MG: 10 TABLET, FILM COATED ORAL at 22:29

## 2019-11-21 RX ADMIN — PROPOFOL 60 MG: 10 INJECTION, EMULSION INTRAVENOUS at 15:17

## 2019-11-21 RX ADMIN — LABETALOL 20 MG/4 ML (5 MG/ML) INTRAVENOUS SYRINGE 5 MG: at 16:26

## 2019-11-21 RX ADMIN — LABETALOL 20 MG/4 ML (5 MG/ML) INTRAVENOUS SYRINGE 5 MG: at 16:56

## 2019-11-21 RX ADMIN — FENTANYL CITRATE 50 MCG: 50 INJECTION, SOLUTION INTRAMUSCULAR; INTRAVENOUS at 15:39

## 2019-11-21 RX ADMIN — HYDRALAZINE HYDROCHLORIDE 5 MG: 20 INJECTION INTRAMUSCULAR; INTRAVENOUS at 17:01

## 2019-11-21 RX ADMIN — SODIUM CHLORIDE, SODIUM LACTATE, POTASSIUM CHLORIDE, AND CALCIUM CHLORIDE: .6; .31; .03; .02 INJECTION, SOLUTION INTRAVENOUS at 15:07

## 2019-11-21 RX ADMIN — LABETALOL 20 MG/4 ML (5 MG/ML) INTRAVENOUS SYRINGE 5 MG: at 16:15

## 2019-11-21 RX ADMIN — LIDOCAINE HYDROCHLORIDE 50 MG: 10 INJECTION, SOLUTION INFILTRATION; PERINEURAL at 15:12

## 2019-11-21 RX ADMIN — SODIUM CHLORIDE: 0.9 INJECTION, SOLUTION INTRAVENOUS at 15:18

## 2019-11-21 NOTE — OP NOTE
OPERATIVE REPORT  PATIENT NAME: Emely Shell    :  1940  MRN: [id number]  Pt Location: [facility] OR ROOM 07    SURGERY DATE: 2019    Surgeon(s) and Role:     * Jas Garvin MD - Primary     * Dawson Méndez MD - Assisting     * Jania Real MD - Assisting    Preop Diagnosis:  Thyroid nodule [E04 1]    Post-Op Diagnosis Codes: * Thyroid nodule [E04 1]    Procedure(s) (LRB):  THYROIDECTOMY, total (N/A)    Specimen(s):  ID Type Source Tests Collected by Time Destination   1 : total thyroidectomy Tissue Thyroid TISSUE EXAM Jas Garvin MD 2019 1504        Estimated Blood Loss:   20 mL    Drains:  * No LDAs found *    Anesthesia Type:   General    Operative Indications:  Thyroid nodule [E04 1]  right    Operative Findings:  Right thyroid nodule    Complications:   None    Procedure and Technique:  The patient was brought into the operating room and identified by a proper timeout  Following this she was intubated by the anesthesia team   She was then positioned with a shoulder roll behind the scapula and the head in the sniffing position  The neck was then prepped and draped in the usual fashion  Following this, the skin at and around the planned cervical incision site was anesthetized with lidocaine  Next, a 4 cm incision was made 2 fingerbreadths above the sternal notch  Cautery was used to dissect through the dermis and subcutaneous fat  The platysma was transected with cautery  We then created flaps superiorly and inferiorly underneath the platysma  Gelpi retractors were then placed for exposure  We then proceeded to look for the strap muscles  We were able to visualize strap muscles and divided them to expose the thyroid  We first focused on the left thyroid lobe  The strap muscles were mobilized them off the anterolateral aspect of the thyroid lobe  Using traction on the superior pole we were able to take down the superior pole vessels with Harmonic Scalpel    We rotated the gland anteromedially  We saw what appeared to be the parathyroid glands which were visualized and preserved by means of close capsular dissection using bipolar device  We visualized recurrent laryngeal nerve as we rolled the small lobe forward  The ligament of berry was then clamped and tied off with a 2-0 vicryl suture  The lobe was then freed from anterior surface of the trachea with cautery  Valsalva was applied in the operative Field inspected for bleeding  None was seen  Surgicel was placed in the operative field  We then focused on the right thyroid lobe  The strap muscles were mobilized them off the anterolateral aspect of the thyroid lobe  Using traction on the superior pole we were able to take down the superior pole vessels with Harmonic Scalpel  We rotated the gland anteromedially  We saw what appeared to be the parathyroid glands which were visualized and preserved by means of close capsular dissection using bipolar device  We visualized recurrent laryngeal nerve as we rolled the small lobe forward  The ligament of berry was then clamped and tied off with a 2-0 vicryl suture  The lobe was then freed from anterior surface of the trachea with cautery and the gland was then sent to pathology for processing  Valsalva was applied in the operative Field inspected for bleeding  None was seen  Surgicel was placed in the operative field  Once we were sure of hemostasis, closure was performed using 3-0 Vicryl to close the strap muscles in the midline, followed by running 3-0 Vicryl to close the platysma, followed by a 4-0 vicryl to close the dermis in interrupted fashion, followed by 5-0 Monocryl placed in running subcuticular fashion to close the skin  Benzoin and steristrips were applied to the incision, followed by gauze and a blue towel  The patient tolerated the procedure well without any difficulties        I was present for the entire procedure    Patient Disposition:  PACU SIGNATURE: Luis Angel Salinas MD  DATE: November 21, 2019  TIME: 5:04 PM

## 2019-11-21 NOTE — ANESTHESIA PREPROCEDURE EVALUATION
Review of Systems/Medical History  Patient summary reviewed  Chart reviewed  No history of anesthetic complications     Cardiovascular  Exercise tolerance (METS): >4,  Hyperlipidemia, Hypertension controlled, CAD , Dysrhythmias (PAF) ,    Pulmonary       GI/Hepatic            Endo/Other  Diabetes type 2 Oral agent, History of thyroid disease , hypothyroidism,      GYN  Negative gynecology ROS          Hematology  Negative hematology ROS      Musculoskeletal    Arthritis     Neurology    Headaches,    Psychology   Negative psychology ROS            Lab Results   Component Value Date    WBC 4 70 11/12/2019    HGB 13 7 11/12/2019    HCT 41 1 11/12/2019    MCV 94 11/12/2019     11/12/2019     Lab Results   Component Value Date     05/31/2018    K 3 7 11/12/2019    CO2 30 11/12/2019     11/12/2019    BUN 12 11/12/2019    CREATININE 0 77 11/12/2019     Lab Results   Component Value Date    HGBA1C 6 1 11/12/2019         Physical Exam    Airway    Mallampati score: II  TM Distance: >3 FB  Neck ROM: full     Dental       Cardiovascular      Pulmonary      Other Findings        Anesthesia Plan  ASA Score- 3     Anesthesia Type- general with ASA Monitors  Additional Monitors:   Airway Plan: ETT  Plan Factors-    Induction- intravenous  Postoperative Plan- Plan for postoperative opioid use  Informed Consent- Anesthetic plan and risks discussed with patient  I personally reviewed this patient with the CRNA  Discussed and agreed on the Anesthesia Plan with the CRNA  Rito Barth

## 2019-11-21 NOTE — ANESTHESIA POSTPROCEDURE EVALUATION
Post-Op Assessment Note    CV Status:  Stable  Pain Score: 0    Pain management: adequate     Mental Status:  Alert and awake   Hydration Status:  Euvolemic   PONV Controlled:  Controlled   Airway Patency:  Patent   Post Op Vitals Reviewed: Yes      Staff: CRNA           BP   155/85   Temp 98 7 °F (37 1 °C) (11/21/19 1713)    Pulse  73   Resp   15   SpO2 97 % (11/21/19 1713)

## 2019-11-21 NOTE — INTERVAL H&P NOTE
H&P reviewed  After examining the patient I find no changes in the patients condition since the H&P had been written      Vitals:    11/21/19 1325   BP: 142/83   Pulse: 63   Resp: 20   Temp: 98 5 °F (36 9 °C)   SpO2: 97%

## 2019-11-22 VITALS
HEART RATE: 73 BPM | SYSTOLIC BLOOD PRESSURE: 144 MMHG | OXYGEN SATURATION: 96 % | WEIGHT: 158 LBS | HEIGHT: 61 IN | BODY MASS INDEX: 29.83 KG/M2 | TEMPERATURE: 98.8 F | RESPIRATION RATE: 19 BRPM | DIASTOLIC BLOOD PRESSURE: 72 MMHG

## 2019-11-22 LAB
ANION GAP SERPL CALCULATED.3IONS-SCNC: 8 MMOL/L (ref 4–13)
BASOPHILS # BLD AUTO: 0.01 THOUSANDS/ΜL (ref 0–0.1)
BASOPHILS NFR BLD AUTO: 0 % (ref 0–1)
BUN SERPL-MCNC: 13 MG/DL (ref 5–25)
CALCIUM SERPL-MCNC: 8.5 MG/DL (ref 8.3–10.1)
CHLORIDE SERPL-SCNC: 103 MMOL/L (ref 100–108)
CO2 SERPL-SCNC: 28 MMOL/L (ref 21–32)
CREAT SERPL-MCNC: 0.84 MG/DL (ref 0.6–1.3)
EOSINOPHIL # BLD AUTO: 0 THOUSAND/ΜL (ref 0–0.61)
EOSINOPHIL NFR BLD AUTO: 0 % (ref 0–6)
ERYTHROCYTE [DISTWIDTH] IN BLOOD BY AUTOMATED COUNT: 13.3 % (ref 11.6–15.1)
GFR SERPL CREATININE-BSD FRML MDRD: 78 ML/MIN/1.73SQ M
GLUCOSE P FAST SERPL-MCNC: 112 MG/DL (ref 65–99)
GLUCOSE SERPL-MCNC: 112 MG/DL (ref 65–140)
GLUCOSE SERPL-MCNC: 127 MG/DL (ref 65–140)
HCT VFR BLD AUTO: 39.2 % (ref 34.8–46.1)
HGB BLD-MCNC: 13.2 G/DL (ref 11.5–15.4)
IMM GRANULOCYTES # BLD AUTO: 0.02 THOUSAND/UL (ref 0–0.2)
IMM GRANULOCYTES NFR BLD AUTO: 0 % (ref 0–2)
LYMPHOCYTES # BLD AUTO: 1.4 THOUSANDS/ΜL (ref 0.6–4.47)
LYMPHOCYTES NFR BLD AUTO: 19 % (ref 14–44)
MCH RBC QN AUTO: 31.2 PG (ref 26.8–34.3)
MCHC RBC AUTO-ENTMCNC: 33.7 G/DL (ref 31.4–37.4)
MCV RBC AUTO: 93 FL (ref 82–98)
MONOCYTES # BLD AUTO: 0.68 THOUSAND/ΜL (ref 0.17–1.22)
MONOCYTES NFR BLD AUTO: 9 % (ref 4–12)
NEUTROPHILS # BLD AUTO: 5.45 THOUSANDS/ΜL (ref 1.85–7.62)
NEUTS SEG NFR BLD AUTO: 72 % (ref 43–75)
NRBC BLD AUTO-RTO: 0 /100 WBCS
PLATELET # BLD AUTO: 198 THOUSANDS/UL (ref 149–390)
PMV BLD AUTO: 10.8 FL (ref 8.9–12.7)
POTASSIUM SERPL-SCNC: 3.1 MMOL/L (ref 3.5–5.3)
RBC # BLD AUTO: 4.23 MILLION/UL (ref 3.81–5.12)
SODIUM SERPL-SCNC: 139 MMOL/L (ref 136–145)
WBC # BLD AUTO: 7.56 THOUSAND/UL (ref 4.31–10.16)

## 2019-11-22 PROCEDURE — 82948 REAGENT STRIP/BLOOD GLUCOSE: CPT

## 2019-11-22 PROCEDURE — 85025 COMPLETE CBC W/AUTO DIFF WBC: CPT | Performed by: SURGERY

## 2019-11-22 PROCEDURE — 99024 POSTOP FOLLOW-UP VISIT: CPT | Performed by: SURGERY

## 2019-11-22 PROCEDURE — NC001 PR NO CHARGE: Performed by: PHYSICIAN ASSISTANT

## 2019-11-22 PROCEDURE — 80048 BASIC METABOLIC PNL TOTAL CA: CPT | Performed by: SURGERY

## 2019-11-22 RX ORDER — POTASSIUM CHLORIDE 20 MEQ/1
40 TABLET, EXTENDED RELEASE ORAL ONCE
Status: COMPLETED | OUTPATIENT
Start: 2019-11-22 | End: 2019-11-22

## 2019-11-22 RX ADMIN — METOPROLOL TARTRATE 25 MG: 25 TABLET ORAL at 10:10

## 2019-11-22 RX ADMIN — LEVOTHYROXINE SODIUM 125 MCG: 125 TABLET ORAL at 05:06

## 2019-11-22 RX ADMIN — MECLIZINE HYDROCHLORIDE 25 MG: 12.5 TABLET, FILM COATED ORAL at 10:10

## 2019-11-22 RX ADMIN — OXYCODONE HYDROCHLORIDE 2.5 MG: 5 TABLET ORAL at 05:16

## 2019-11-22 RX ADMIN — POTASSIUM CHLORIDE 40 MEQ: 1500 TABLET, EXTENDED RELEASE ORAL at 10:10

## 2019-11-22 RX ADMIN — HEPARIN SODIUM 5000 UNITS: 5000 INJECTION INTRAVENOUS; SUBCUTANEOUS at 05:06

## 2019-11-22 NOTE — PROGRESS NOTES
Postop Note- Surgical Oncology  Sadi Zieglerer 69 y o  female MRN: [id number]  Unit/Bed#: [facility] 927-01 Encounter: [id number]    Assessment:  Patient is a 69 y o  Female with a history of thyroid nodule who now presents status post total thyroidectomy  Calcium Levels Post-op:  PACU- 8 8  11pm- 8 9      Plan:  F/U AM Calcium  Regular diet  PRN pain meds  LR @ 75ml/hr  OOB, ambulate as tolerated  DVT ppx: Heparin + SCD's  Begin Levothyroxine 125mcg Daily in AM  AM Labs    Subjective/Objective     Subjective: Patient states that she has been doing well since the surgery except for 7/10 pain in the anterior neck around her incision site  She has had sips of clears so far, and denies any nausea or vomiting  Objective:  Vitals:  Blood pressure 163/76, pulse 69, temperature 97 6 °F (36 4 °C), resp  rate 14, height 5' 1" (1 549 m), weight 71 7 kg (158 lb), SpO2 100 % on 2L Nasal Canula  ,Body mass index is 29 85 kg/m²  Intake/Output Summary (Last 24 hours) at 11/21/2019 2127  Last data filed at 11/21/2019 1950  Gross per 24 hour   Intake 1150 ml   Output 870 ml   Net 280 ml       Invasive Devices     Peripheral Intravenous Line            Peripheral IV 11/21/19 Left Hand less than 1 day    Peripheral IV 11/21/19 Right Hand less than 1 day                Physical Exam   Constitutional: No distress  Eyes: Conjunctivae are normal    Neck:   Dressing taken down and replaced  Incision is clean, dry and intact  No erythema noted  Appropriately tender  Cardiovascular: Normal rate, regular rhythm and normal heart sounds  Exam reveals no gallop and no friction rub  No murmur heard  Pulmonary/Chest: Effort normal and breath sounds normal  No stridor  No respiratory distress  On 2L Nasal Canula   Abdominal: Soft  Bowel sounds are normal  She exhibits no distension and no mass  There is no tenderness  There is no rebound and no guarding  Musculoskeletal: She exhibits no edema  Skin: Skin is warm and dry   She is not diaphoretic  Psychiatric: She has a normal mood and affect  Lab, Imaging and other studies:  I have personally reviewed pertinent lab results    CBC:   Lab Results   Component Value Date     11/21/2019    MPV 10 4 11/21/2019   , CMP:   Lab Results   Component Value Date    CALCIUM 8 8 11/21/2019     VTE Pharmacologic Prophylaxis: Heparin  VTE Mechanical Prophylaxis: sequential compression device

## 2019-11-22 NOTE — DISCHARGE SUMMARY
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, 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  Procedures Performed:  November 21st, 2019 total thyroidectomy - Dr Alfredo Costello Course:  Patient has done well postoperatively  There is no perioral numbness or tingling  There is slight hoarseness of voice  Patient's anterior neck incision is clean and without hematoma  Patient's postop calcium was 8 8 midnight 8 9 and this morning 8 5  Patient will be discharged home today since she is tolerating a diabetic diet  She will follow with Dr Herminio Vega in the [facility] office on December 6th, 2019 at 2:45 a m  In the afternoon  She will restart her Eliquis tomorrow morning  If there is any perioral numbness or tingling she is to take 2 Tums and call the office  Pathology pending    Complications:  None    Discharge Diagnosis:  Thyroid nodule    Discharge Date:  November 22nd, 2019    Condition at Discharge:  Good     Discharge instructions/Information to patient and family:   See after visit summary for information provided to patient and family  Provisions for Follow-Up Care:  See after visit summary for information related to follow-up care and any pertinent home health orders  Disposition:  Home    Planned Readmission:  No    Discharge Statement   I spent 20 minutes discharging the patient  This time was spent on the day of discharge   I had direct contact with the patient on the day of discharge  Additional documentation is required if more than 30 minutes were spent on discharge  Discharge Medications:  See after visit summary for reconciled discharge medications provided to patient and family

## 2019-11-22 NOTE — PROGRESS NOTES
Progress Note - Surgical Oncology   Froilan العراقي 69 y o  female MRN: [id number]  Unit/Bed#: [facility] 927-01 Encounter: [id number]    Assessment:  60-year-old female postop day 1 total thyroidectomy    Calcium 8 8 and 8 9  AM calcium pending  No hypocalcemia symptoms    Plan:  Regular diet  DC IV fluids  Await a calcium   Discharge planning  Plan to restart Eliquis at discharge  Subjective/Objective   Subjective:   No events overnight  No numbness or tingling  Expected mild discomfort with swallowing    Objective:     Blood pressure 148/76, pulse 77, temperature 98 3 °F (36 8 °C), temperature source Oral, resp  rate 18, height 5' 1" (1 549 m), weight 71 7 kg (158 lb), SpO2 99 %, not currently breastfeeding  ,Body mass index is 29 85 kg/m²  Intake/Output Summary (Last 24 hours) at 11/22/2019 0555  Last data filed at 11/22/2019 0505  Gross per 24 hour   Intake 1150 ml   Output 1270 ml   Net -120 ml       Invasive Devices     Peripheral Intravenous Line            Peripheral IV 11/21/19 Left Hand less than 1 day    Peripheral IV 11/21/19 Right Hand less than 1 day                Physical Exam:     Gen: NAD, AAOx3  CV: RRR  Pulm: no resp distress  Neck: incision c/d/i  Steristrips in place  No hematoma  Abd: Soft, non-distended, non-tender      Lab, Imaging and other studies:I have personally reviewed pertinent lab results      VTE Pharmacologic Prophylaxis: Sequential compression device (Venodyne)   VTE Mechanical Prophylaxis: sequential compression device

## 2019-11-29 DIAGNOSIS — I10 ESSENTIAL HYPERTENSION: ICD-10-CM

## 2019-11-29 DIAGNOSIS — I48.0 PAF (PAROXYSMAL ATRIAL FIBRILLATION) (HCC): ICD-10-CM

## 2019-12-04 PROBLEM — D44.0 NONINVASIVE FOLLICULAR NEOPLASM OF THYROID WITH PAPILLARY-LIKE NUCLEAR FEATURES: Status: ACTIVE | Noted: 2019-12-04

## 2019-12-04 PROBLEM — D49.7 NONINVASIVE FOLLICULAR NEOPLASM OF THYROID WITH PAPILLARY-LIKE NUCLEAR FEATURES: Status: ACTIVE | Noted: 2019-12-04

## 2019-12-06 ENCOUNTER — OFFICE VISIT (OUTPATIENT)
Dept: SURGICAL ONCOLOGY | Facility: CLINIC | Age: 79
End: 2019-12-06

## 2019-12-06 VITALS
BODY MASS INDEX: 28.52 KG/M2 | SYSTOLIC BLOOD PRESSURE: 120 MMHG | WEIGHT: 155 LBS | RESPIRATION RATE: 16 BRPM | HEIGHT: 62 IN | DIASTOLIC BLOOD PRESSURE: 70 MMHG | HEART RATE: 67 BPM | TEMPERATURE: 97.8 F

## 2019-12-06 DIAGNOSIS — D49.7 NONINVASIVE FOLLICULAR NEOPLASM OF THYROID WITH PAPILLARY-LIKE NUCLEAR FEATURES: Primary | ICD-10-CM

## 2019-12-06 PROCEDURE — 99024 POSTOP FOLLOW-UP VISIT: CPT | Performed by: SURGERY

## 2019-12-06 NOTE — PROGRESS NOTES
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  -     Thyroid Panel With TSH; Future        Advance Care Planning/Advance Directives:  Discussed disease status, cancer treatment plans and/or cancer treatment goals with the patient  Intraductal carcinoma in situ of right breast    5/9/2018 Initial Diagnosis     Intraductal carcinoma in situ of right breast      5/9/2018 Biopsy     Right breast stereotactic biopsy x2 sites:   Ductal carcinoma in-situ (DCIS), features suggestive of involvement of underlying intraductal papilloma  Nuclear Grade: II-III (intermediate to high)  % positive  OH 70-75% positive        6/21/2018 Surgery     Right-sided needle localized partial mastectomy with intraoperative ultrasound guidance:  DCIS, grade 2-3/3, calcifications present  Margins - free of malignancy, closest margin is inferior with 0 6 mm of clearance  Posterior margin with 2 1 mm of clearance    Stage 0, pTis (DCIS),pN0,pMX  - Dr Shasha Gallagher      7/10/2018 Surgery     Re-excision of right breast inferior margin tissue:  - Foci of ductal epithelial hyperplasia are seen ranging from usual ductal hyperplasia to atypical ductal hyperplasia to a few foci having features which are felt to be compatible with ductal carcinoma in situ, primarily solid type, nuclear grade 1-2/3   - None of the foci of atypia or probable ductal carcinoma in situ are seen at or within 2 mm the outer margins of excision, though it is noted that such foci are seen in 3 of 5 slides  - One intraductal papilloma is identified   - Fat necrosis is seen along the inner surface of this reexcision  8/16/2018 -  Hormone Therapy     None recommended by Dr Josemanuel Jaeger      8/16/2018 - 9/14/2018 Radiation     hypo fractionated course directed to the right breast to a total dose of 4256 centigray with an additional 1000 centigray to the lumpectomy cavity         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    11/21/2019 Surgery     Total Thyroidectomy:  - 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 (NIFTP), 0 3 mm, in superior to mid-right lobe         History of Present Illness:  Patient is a 42-year-old woman here for postop check status post total thyroidectomy for multiple thyroid nodules  She had a little shortness of breath after the surgery  However she is saturating well on not in respiratory distress  She went to the emergency room to have this evaluated  She is feeling somewhat better now  She does get somewhat short winded when she exerts herself however  She is able to speak, talk, and swallow otherwise without issue  Review of Systems   Constitutional: Negative  HENT: Negative  Eyes: Negative  Respiratory: Positive for shortness of breath  Negative for cough, choking, chest tightness, wheezing and stridor  Cardiovascular: Negative  Gastrointestinal: Negative  Endocrine: Negative  Genitourinary: Negative  Musculoskeletal: Negative  Skin: Negative  Allergic/Immunologic: Negative  Neurological: Negative  Hematological: Negative  Psychiatric/Behavioral: Negative            Patient Active Problem List   Diagnosis    Abnormal mammogram    Paroxysmal atrial fibrillation (HCC)    Chronic coronary artery disease    Cervical spinal stenosis    Closed fracture of maxilla with routine healing    Conduction disorder of the heart    DDD (degenerative disc disease), cervical    Degeneration of intervertebral disc    Dizziness and giddiness    Headache    Hypokalemia    Fracture of multiple ribs    Intraductal carcinoma in situ of right breast    Long term current use of anticoagulant therapy    Latent tuberculosis    Low back pain    Noncompliance with treatment    Obesity    Osteoarthritis of finger of right hand    Osteoarthritis of knee    Type 2 diabetes mellitus (HCC)    Thyroid nodule    Vertigo    Vitamin D deficiency    Hypertension    Pre-operative clearance    Overweight (BMI 25 0-29  9)    Pure hypercholesterolemia    Neuralgia    Bunion of unspecified foot    Pre-op examination    Rib pain on right side    Malignant neoplasm of central portion of right breast in female, estrogen receptor positive ([address] )    Bug bite    Encounter for well adult exam with abnormal findings    Pain of left lower extremity    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     Past Medical History:   Diagnosis Date    Allergic rhinitis     Arthritis     Atrial fibrillation ([address] )     Breast cancer ([address]     Cataracts, bilateral     Chronic headaches     pulsatile daily headaches    Colitis     Coronary artery disease     Diabetes mellitus ([address] )     Disease of thyroid gland     DJD (degenerative joint disease), cervical     Facial neuralgia     left frontal facial post traumatic neuralgia    Fibromyalgia     Glaucoma     History of external beam radiation therapy     8/16/18 to 9/14/2018 Right breast    Hypertension     Hypokalemia     Hypovitaminosis D     Lupus ([address] ) 7/19/2018    Obesity     Osteoarthritis     Prediabetes     SDH (subdural hematoma) ([address] ) 1/31/2017    Sleep apnea     no cpap    Syncope and collapse 12/1/2018    Vertigo      Past Surgical History:   Procedure Laterality Date    BREAST BIOPSY      BREAST LUMPECTOMY      CHOLECYSTECTOMY      COLONOSCOPY  2013    HYSTERECTOMY      MAMMO NEEDLE LOCALIZATION RIGHT (ALL INC) Right 6/21/2018    MAMMO STEREOTACTIC BREAST BIOPSY RIGHT (ALL INC) Right 5/9/2018    THYROIDECTOMY N/A 11/21/2019    Procedure: THYROIDECTOMY, total;  Surgeon: Vanda Aguilar MD;  [facility]: [facility];  Service: Surgical Oncology    TONSILLECTOMY      US GUIDED BREAST BIOPSY RIGHT COMPLETE Right 5/9/2018    US GUIDED THYROID BIOPSY  6/12/2019    US GUIDED THYROID BIOPSY  9/13/2019     Family History   Problem Relation Age of Onset    Heart attack Sister     Hypertension Family     Diabetes Family     Stroke Family     Migraines Family     Breast cancer Daughter 28     Social History     Socioeconomic History    Marital status: Single     Spouse name: Not on file    Number of children: Not on file    Years of education: Not on file    Highest education level: Not on file   Occupational History    Not on file   Social Needs    Financial resource strain: Not on file    Food insecurity:     Worry: Never true     Inability: Never true    Transportation needs:     Medical: No     Non-medical: No   Tobacco Use    Smoking status: Never Smoker    Smokeless tobacco: Never Used    Tobacco comment: no smoke exposure   Substance and Sexual Activity    Alcohol use: Yes     Frequency: Monthly or less    Drug use: No    Sexual activity: Not on file   Lifestyle    Physical activity:     Days per week: 0 days     Minutes per session: 0 min    Stress: Very much   Relationships    Social connections:     Talks on phone: Once a week     Gets together: More than three times a week     Attends Islam service: More than 4 times per year     Active member of club or organization: Yes     Attends meetings of clubs or organizations: More than 4 times per year     Relationship status: Never     Intimate partner violence:     Fear of current or ex partner: No     Emotionally abused: No     Physically abused: No     Forced sexual activity: No   Other Topics Concern    Not on file   Social History Narrative    Not on file       Current Outpatient Medications:     apixaban (ELIQUIS) 5 mg, Take 1 tablet (5 mg total) by mouth 2 (two) times a day Restart 11/23/19, Disp: 60 tablet, Rfl: 0    aspirin (ECOTRIN LOW STRENGTH) 81 mg EC tablet, Take 81 mg by mouth daily, Disp: , Rfl:     atorvastatin (LIPITOR) 10 mg tablet, Take 1 tablet (10 mg total) by mouth daily, Disp: 90 tablet, Rfl: 1    gabapentin (NEURONTIN) 100 mg capsule, Take 1 capsule (100 mg total) by mouth 2 (two) times a day, Disp: 180 capsule, Rfl: 1    irbesartan-hydrochlorothiazide (AVALIDE) 150-12 5 MG per tablet, Take 1 tablet by mouth daily, Disp: 30 tablet, Rfl: 2    levothyroxine 125 mcg tablet, Take 1 tablet (125 mcg total) by mouth daily Take on an empty stomach in the morning  Start after surgery  , Disp: 30 tablet, Rfl: 3    metFORMIN (GLUCOPHAGE) 500 mg tablet, Take 1 tablet (500 mg total) by mouth 2 (two) times a day with meals, Disp: 180 tablet, Rfl: 2    metoprolol tartrate (LOPRESSOR) 25 mg tablet, Take 1 tablet (25 mg total) by mouth every 12 (twelve) hours, Disp: 60 tablet, Rfl: 0    traMADol (ULTRAM) 50 mg tablet, Take 1 tablet (50 mg total) by mouth every 6 (six) hours as needed for moderate pain, Disp: 10 tablet, Rfl: 0    meclizine (ANTIVERT) 25 mg tablet, Take 1 tablet (25 mg total) by mouth daily (Patient not taking: Reported on 12/6/2019), Disp: 15 tablet, Rfl: 0  No Known Allergies  Vitals:    12/06/19 1428   BP: 120/70   Pulse: 67   Resp: 16   Temp: 97 8 °F (36 6 °C)       Physical Exam   Neck: Normal range of motion  Neck supple  No JVD present  No tracheal deviation present  No thyromegaly present  Incision clean dry intact  Pulmonary/Chest: Effort normal and breath sounds normal  No stridor  No respiratory distress  She has no wheezes     Lymphadenopathy:     She has no cervical adenopathy  Pathology:  Case Report   Surgical Pathology Report                         Case: [id number]                                    Authorizing Provider: Tammi León MD        Collected:           11/21/2019 1504               Ordering Location:     [address]      Received:            11/21/2019 [facility]                                      Hospital Operating Room                                                       Pathologist:           Rebeca Herbert DO                                                       Specimen:    Thyroid, total thyroidectomy                                                               Final Diagnosis   A  Thyroid, Total Thyroidectomy:  - 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 (NIFTP), 0 3 mm, in superior to mid-right lobe  - Multiple adenomatous nodules in a background of multinodular thyroid hyperplasia  Electronically signed by Rebeca Herbert DO on 12/3/2019 at  4:11 PM   Additional Information    All controls performed with the immunohistochemical stains reported above reacted appropriately  These tests were developed and their performance characteristics determined by [facility] or [facility]  They may not be cleared or approved by the [place]  Food and Drug Administration  The FDA has determined that such clearance or approval is not necessary  These tests are used for clinical purposes  They should not be regarded as investigational or for research  This laboratory has been approved by [facility], designated as a high-complexity laboratory and is qualified to perform these tests         Interpretation performed at [facility], [address]         Labs:  none    Imaging  Xr Spine Lumbar Minimum 4 Views Non Injury    Result Date: 11/12/2019  Narrative: LUMBAR SPINE INDICATION:   [id number]: Pain in left leg   COMPARISON:  Lumbar spine radiograph 6/5/2017 VIEWS:  XR SPINE LUMBAR MINIMUM 4 VIEWS NON INJURY FINDINGS: There are 5 non rib bearing lumbar vertebral bodies  There is no evidence of acute fracture or destructive osseous lesion  Mild scoliotic deformity is noted  Alignment is otherwise unremarkable  There are scattered endplate and facet joint degenerative changes throughout the lumbar spine  Prominent anterior osteophytes at L2-3  The pedicles appear intact  There are atherosclerotic calcifications  There are surgical clips in the right upper quadrant  Soft tissues are otherwise unremarkable  Impression: No acute osseous abnormality  Degenerative changes as described  Workstation performed: [id number]     I reviewed the above laboratory and imaging data  Discussion/Summary:  60-year-old woman, multiple large thyroid nodules, status post total thyroidectomy  These turned out to be benign  She had what appears to be and FDP  Pathology report discussed with patient  All questions answered  Plan on follow-up in 6 months to assess adequacy of thyroid replacement via TSH panel

## 2019-12-06 NOTE — LETTER
December 6, 2019     Keren Stevens MD  [address] [facility]  [address]    Patient: Malika Rosa   YOB: 1940   Date of Visit: 12/6/2019       Dear Dr Bessie Gibbs: Thank you for referring Malika Rosa to me for evaluation  Below are my notes for this consultation  If you have questions, please do not hesitate to call me  I look forward to following your patient along with you  Sincerely,        Puma French MD        CC: MD Best Reilly MD Karry Pap, MD Alston Moras, MD  12/6/2019  2:56 PM  Sign at close encounter               Surgical Oncology Follow Up       [address]  1940  [id number]  [facility]  [id number]    Chief Complaint   Patient presents with    Post-op     Post-op total thyroidectomy  Assessment/Plan:    No problem-specific Assessment & Plan notes found for this encounter  Diagnoses and all orders for this visit:    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  -     Thyroid Panel With TSH; Future        Advance Care Planning/Advance Directives:  Discussed disease status, cancer treatment plans and/or cancer treatment goals with the patient  Intraductal carcinoma in situ of right breast    5/9/2018 Initial Diagnosis     Intraductal carcinoma in situ of right breast      5/9/2018 Biopsy     Right breast stereotactic biopsy x2 sites:   Ductal carcinoma in-situ (DCIS), features suggestive of involvement of underlying intraductal papilloma  Nuclear Grade: II-III (intermediate to high)     % positive  WV 70-75% positive        6/21/2018 Surgery     Right-sided needle localized partial mastectomy with intraoperative ultrasound guidance:  DCIS, grade 2-3/3, calcifications present  Margins - free of malignancy, closest margin is inferior with 0 6 mm of clearance  Posterior margin with 2 1 mm of clearance  Stage 0, pTis (DCIS),pN0,pMX  - Dr Saintclair Chen      7/10/2018 Surgery     Re-excision of right breast inferior margin tissue:  - Foci of ductal epithelial hyperplasia are seen ranging from usual ductal hyperplasia to atypical ductal hyperplasia to a few foci having features which are felt to be compatible with ductal carcinoma in situ, primarily solid type, nuclear grade 1-2/3   - None of the foci of atypia or probable ductal carcinoma in situ are seen at or within 2 mm the outer margins of excision, though it is noted that such foci are seen in 3 of 5 slides  - One intraductal papilloma is identified   - Fat necrosis is seen along the inner surface of this reexcision  8/16/2018 -  Hormone Therapy     None recommended by Dr Bailey Hernandez      8/16/2018 - 9/14/2018 Radiation     hypo fractionated course directed to the right breast to a total dose of 4256 centigray with an additional 1000 centigray to the lumpectomy cavity         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    11/21/2019 Surgery     Total Thyroidectomy:  - 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 (NIFTP), 0 3 mm, in superior to mid-right lobe         History of Present Illness:  Patient is a 69-year-old woman here for postop check status post total thyroidectomy for multiple thyroid nodules  She had a little shortness of breath after the surgery  However she is saturating well on not in respiratory distress  She went to the emergency room to have this evaluated  She is feeling somewhat better now  She does get somewhat short winded when she exerts herself however  She is able to speak, talk, and swallow otherwise without issue  Review of Systems   Constitutional: Negative  HENT: Negative  Eyes: Negative  Respiratory: Positive for shortness of breath  Negative for cough, choking, chest tightness, wheezing and stridor  Cardiovascular: Negative  Gastrointestinal: Negative  Endocrine: Negative  Genitourinary: Negative  Musculoskeletal: Negative  Skin: Negative  Allergic/Immunologic: Negative  Neurological: Negative  Hematological: Negative  Psychiatric/Behavioral: Negative  Patient Active Problem List   Diagnosis    Abnormal mammogram    Paroxysmal atrial fibrillation (HCC)    Chronic coronary artery disease    Cervical spinal stenosis    Closed fracture of maxilla with routine healing    Conduction disorder of the heart    DDD (degenerative disc disease), cervical    Degeneration of intervertebral disc    Dizziness and giddiness    Headache    Hypokalemia    Fracture of multiple ribs    Intraductal carcinoma in situ of right breast    Long term current use of anticoagulant therapy    Latent tuberculosis    Low back pain    Noncompliance with treatment    Obesity    Osteoarthritis of finger of right hand    Osteoarthritis of knee    Type 2 diabetes mellitus ([address] )    Thyroid nodule    Vertigo    Vitamin D deficiency    Hypertension    Pre-operative clearance    Overweight (BMI 25 0-29  9)    Pure hypercholesterolemia    Neuralgia    Bunion of unspecified foot    Pre-op examination    Rib pain on right side    Malignant neoplasm of central portion of right breast in female, estrogen receptor positive ([address] )    Bug bite    Encounter for well adult exam with abnormal findings    Pain of left lower extremity    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     Past Medical History:   Diagnosis Date    Allergic rhinitis     Arthritis     Atrial fibrillation ([address] )     Breast cancer ([address] )     Cataracts, bilateral     Chronic headaches     pulsatile daily headaches    Colitis     Coronary artery disease     Diabetes mellitus ([facility] )     Disease of thyroid gland     DJD (degenerative joint disease), cervical     Facial neuralgia     left frontal facial post traumatic neuralgia    Fibromyalgia     Glaucoma     History of external beam radiation therapy     8/16/18 to 9/14/2018 Right breast    Hypertension     Hypokalemia     Hypovitaminosis D     Lupus ([place] [address] ) 7/19/2018    Obesity     Osteoarthritis     Prediabetes     SDH (subdural hematoma) ([place] [address] ) 1/31/2017    Sleep apnea     no cpap    Syncope and collapse 12/1/2018    Vertigo      Past Surgical History:   Procedure Laterality Date    BREAST BIOPSY      BREAST LUMPECTOMY      CHOLECYSTECTOMY      COLONOSCOPY  2013    HYSTERECTOMY      MAMMO NEEDLE LOCALIZATION RIGHT ([employer] INC) Right 6/21/2018    MAMMO STEREOTACTIC BREAST BIOPSY RIGHT (ALL INC) Right 5/9/2018    THYROIDECTOMY N/A 11/21/2019    Procedure: THYROIDECTOMY, total;  Surgeon: Staci Sorto MD;  [facility]: [facility];  Service: Surgical Oncology    TONSILLECTOMY      US GUIDED BREAST BIOPSY RIGHT COMPLETE Right 5/9/2018    US GUIDED THYROID BIOPSY  6/12/2019    US GUIDED THYROID BIOPSY  9/13/2019     Family History   Problem Relation Age of Onset    Heart attack Sister     Hypertension Family     Diabetes Family     Stroke Family     Migraines Family     Breast cancer Daughter 28     Social History     Socioeconomic History    Marital status: Single     Spouse name: Not on file    Number of children: Not on file    Years of education: Not on file    Highest education level: Not on file   Occupational History    Not on file   Social Needs    Financial resource strain: Not on file    Food insecurity:     Worry: Never true     Inability: Never true    Transportation needs:     Medical: No     Non-medical: No   Tobacco Use    Smoking status: Never Smoker    Smokeless tobacco: Never Used    Tobacco comment: no smoke exposure   Substance and Sexual Activity    Alcohol use: Yes     Frequency: Monthly or less    Drug use: No    Sexual activity: Not on file   Lifestyle    Physical activity:     Days per week: 0 days     Minutes per session: 0 min    Stress: Very much   Relationships    Social connections:     Talks on phone: Once a week     Gets together: More than three times a week     Attends Hoah[employer] service: More than 4 times per year     Active member of club or organization: Yes     Attends meetings of clubs or organizations: More than 4 times per year     Relationship status: Never     Intimate partner violence:     Fear of current or ex partner: No     Emotionally abused: No     Physically abused: No     Forced sexual activity: No   Other Topics Concern    Not on file   Social History Narrative    Not on file       Current Outpatient Medications:     apixaban (ELIQUIS) 5 mg, Take 1 tablet (5 mg total) by mouth 2 (two) times a day Restart 11/23/19, Disp: 60 tablet, Rfl: 0    aspirin (ECOTRIN LOW STRENGTH) 81 mg EC tablet, Take 81 mg by mouth daily, Disp: , Rfl:     atorvastatin (LIPITOR) 10 mg tablet, Take 1 tablet (10 mg total) by mouth daily, Disp: 90 tablet, Rfl: 1    gabapentin (NEURONTIN) 100 mg capsule, Take 1 capsule (100 mg total) by mouth 2 (two) times a day, Disp: 180 capsule, Rfl: 1    irbesartan-hydrochlorothiazide (AVALIDE) 150-12 5 MG per tablet, Take 1 tablet by mouth daily, Disp: 30 tablet, Rfl: 2    levothyroxine 125 mcg tablet, Take 1 tablet (125 mcg total) by mouth daily Take on an empty stomach in the morning  Start after surgery  , Disp: 30 tablet, Rfl: 3    metFORMIN (GLUCOPHAGE) 500 mg tablet, Take 1 tablet (500 mg total) by mouth 2 (two) times a day with meals, Disp: 180 tablet, Rfl: 2    metoprolol tartrate (LOPRESSOR) 25 mg tablet, Take 1 tablet (25 mg total) by mouth every 12 (twelve) hours, Disp: 60 tablet, Rfl: 0    traMADol (ULTRAM) 50 mg tablet, Take 1 tablet (50 mg total) by mouth every 6 (six) hours as needed for moderate pain, Disp: 10 tablet, Rfl: 0    meclizine (ANTIVERT) 25 mg tablet, Take 1 tablet (25 mg total) by mouth daily (Patient not taking: Reported on 12/6/2019), Disp: 15 tablet, Rfl: 0  No Known Allergies  Vitals:    12/06/19 1428   BP: 120/70   Pulse: 67   Resp: 16   Temp: 97 8 °F (36 6 °C)       Physical Exam   Neck: Normal range of motion  Neck supple  No JVD present  No tracheal deviation present  No thyromegaly present  Incision clean dry intact  Pulmonary/Chest: Effort normal and breath sounds normal  No stridor  No respiratory distress  She has no wheezes  Lymphadenopathy:     She has no cervical adenopathy  Pathology:  Case Report   Surgical Pathology Report                         Case: [id number]                                    Authorizing Provider: Odell Younger MD        Collected:           11/21/2019 1504               Ordering Location:     [address]      Received:            11/21/2019 [address] [place] [address] Operating Room                                                       Pathologist:           Tomas King DO                                                       Specimen:    Thyroid, total thyroidectomy                                                               Final Diagnosis   A  Thyroid, Total Thyroidectomy:  - 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 (NIFTP), 0 3 mm, in superior to mid-right lobe  - Multiple adenomatous nodules in a background of multinodular thyroid hyperplasia  Electronically signed by Tomas King DO on 12/3/2019 at  4:11 PM   Additional Information    All controls performed with the immunohistochemical stains reported above reacted appropriately  These tests were developed and their performance characteristics determined by [facility] or [facility]  They may not be cleared or approved by the [place]   Food and Drug Administration  The FDA has determined that such clearance or approval is not necessary  These tests are used for clinical purposes  They should not be regarded as investigational or for research  This laboratory has been approved by [place], designated as a high-complexity laboratory and is qualified to perform these tests         Interpretation performed at [facility], [address]         Labs:  none    Imaging  Xr Spine Lumbar Minimum 4 Views Non Injury    Result Date: 11/12/2019  Narrative: LUMBAR SPINE INDICATION:   M79 [id number]: Pain in left leg  COMPARISON:  Lumbar spine radiograph 6/5/2017 VIEWS:  XR SPINE LUMBAR MINIMUM 4 VIEWS NON INJURY FINDINGS: There are 5 non rib bearing lumbar vertebral bodies  There is no evidence of acute fracture or destructive osseous lesion  Mild scoliotic deformity is noted  Alignment is otherwise unremarkable  There are scattered endplate and facet joint degenerative changes throughout the lumbar spine  Prominent anterior osteophytes at L2-3  The pedicles appear intact  There are atherosclerotic calcifications  There are surgical clips in the right upper quadrant  Soft tissues are otherwise unremarkable  Impression: No acute osseous abnormality  Degenerative changes as described  Workstation performed: [id number]     I reviewed the above laboratory and imaging data  Discussion/Summary:  76-year-old woman, multiple large thyroid nodules, status post total thyroidectomy  These turned out to be benign  She had what appears to be and FDP  Pathology report discussed with patient  All questions answered  Plan on follow-up in 6 months to assess adequacy of thyroid replacement via TSH panel

## 2019-12-31 ENCOUNTER — OFFICE VISIT (OUTPATIENT)
Dept: FAMILY MEDICINE CLINIC | Facility: CLINIC | Age: 79
End: 2019-12-31
Payer: MEDICARE

## 2019-12-31 VITALS
SYSTOLIC BLOOD PRESSURE: 142 MMHG | WEIGHT: 154 LBS | HEART RATE: 74 BPM | BODY MASS INDEX: 28.34 KG/M2 | DIASTOLIC BLOOD PRESSURE: 96 MMHG | TEMPERATURE: 97.4 F | RESPIRATION RATE: 16 BRPM | HEIGHT: 62 IN | OXYGEN SATURATION: 98 %

## 2019-12-31 DIAGNOSIS — I10 ESSENTIAL HYPERTENSION: ICD-10-CM

## 2019-12-31 DIAGNOSIS — R20.0 NUMBNESS AND TINGLING OF HAND: ICD-10-CM

## 2019-12-31 DIAGNOSIS — E78.00 PURE HYPERCHOLESTEROLEMIA: ICD-10-CM

## 2019-12-31 DIAGNOSIS — I48.91 ATRIAL FIBRILLATION, UNSPECIFIED TYPE (HCC): ICD-10-CM

## 2019-12-31 DIAGNOSIS — E89.0 POSTOPERATIVE HYPOTHYROIDISM: Primary | ICD-10-CM

## 2019-12-31 DIAGNOSIS — E11.9 TYPE 2 DIABETES MELLITUS WITHOUT COMPLICATION, WITHOUT LONG-TERM CURRENT USE OF INSULIN (HCC): ICD-10-CM

## 2019-12-31 DIAGNOSIS — D05.11 INTRADUCTAL CARCINOMA IN SITU OF RIGHT BREAST: ICD-10-CM

## 2019-12-31 DIAGNOSIS — R20.2 NUMBNESS AND TINGLING OF HAND: ICD-10-CM

## 2019-12-31 PROCEDURE — 99214 OFFICE O/P EST MOD 30 MIN: CPT | Performed by: FAMILY MEDICINE

## 2019-12-31 RX ORDER — LEVOTHYROXINE SODIUM 0.12 MG/1
125 TABLET ORAL DAILY
Qty: 30 TABLET | Refills: 0 | Status: SHIPPED | OUTPATIENT
Start: 2019-12-31 | End: 2020-02-10 | Stop reason: SDUPTHER

## 2019-12-31 RX ORDER — ATORVASTATIN CALCIUM 10 MG/1
10 TABLET, FILM COATED ORAL DAILY
Qty: 90 TABLET | Refills: 1 | Status: SHIPPED | OUTPATIENT
Start: 2019-12-31 | End: 2021-02-19 | Stop reason: SDUPTHER

## 2019-12-31 RX ORDER — IRBESARTAN AND HYDROCHLOROTHIAZIDE 150; 12.5 MG/1; MG/1
1 TABLET, FILM COATED ORAL DAILY
Qty: 30 TABLET | Refills: 2 | Status: SHIPPED | OUTPATIENT
Start: 2019-12-31 | End: 2020-02-28 | Stop reason: SDUPTHER

## 2020-01-02 PROBLEM — R20.0 NUMBNESS AND TINGLING OF HAND: Status: ACTIVE | Noted: 2019-12-31

## 2020-01-02 PROBLEM — R20.2 NUMBNESS AND TINGLING OF HAND: Status: ACTIVE | Noted: 2020-01-02

## 2020-01-02 PROBLEM — R20.0 NUMBNESS AND TINGLING OF HAND: Status: ACTIVE | Noted: 2020-01-02

## 2020-01-02 PROBLEM — E89.0 POSTOPERATIVE HYPOTHYROIDISM: Status: ACTIVE | Noted: 2019-12-31

## 2020-01-02 PROBLEM — R20.2 NUMBNESS AND TINGLING OF HAND: Status: ACTIVE | Noted: 2019-12-31

## 2020-01-02 PROBLEM — E89.0 POSTOPERATIVE HYPOTHYROIDISM: Status: ACTIVE | Noted: 2020-01-02

## 2020-01-02 NOTE — ASSESSMENT & PLAN NOTE
New diagnosis S/P Thyroidectomy on 11/2019   Patient already started on Levothyroxine 125 Meq S/P surgery,will check TSH level  Discuss with patient

## 2020-01-02 NOTE — PROGRESS NOTES
Subjective:   Chief Complaint   Patient presents with    Follow-up     patient had a thyroidectomy on 11/21/2019     Hand Pain     Bilateral hand pain radiating up the arms states she had a EMG study before and was diagnosed with Carpal tunnel but it is getting worse         Patient ID: Marquis Lee is a 67 y o  female  Patient and office concerned about numbness tingling in bilateral hand worse in the right side she does do repetitive movement and her hand and sometimes she feel warm sensation and numbness specially at nighttime and it is worse on the middle and 4th finger and no swelling no redness no recent fall no recent trauma deny any dryness in the eye dryness in the mouth and no muscle pain a deny also any neck pain  Patient recently had the thyroidectomy and she was started on levothyroxine 125 mcg by surgeon she tolerated well without any side effect a deny any heat or cold intolerance mood is stable      The following portions of the patient's history were reviewed and updated as appropriate: allergies, current medications, past family history, past medical history, past social history, past surgical history and problem list     Review of Systems   Constitutional: Negative for fatigue and fever  HENT: Negative for ear pain, sinus pressure, sinus pain and sore throat  Eyes: Negative for pain and redness  Respiratory: Negative for cough, chest tightness and shortness of breath  Cardiovascular: Negative for chest pain, palpitations and leg swelling  Gastrointestinal: Negative for abdominal pain, blood in stool, constipation, diarrhea and nausea  Genitourinary: Negative for flank pain, frequency and hematuria  Musculoskeletal: Negative for back pain and joint swelling  Skin: Negative for rash  Neurological: Positive for numbness  Negative for dizziness and headaches  Hematological: Does not bruise/bleed easily               Objective:  Vitals:    12/31/19 0748   BP: 142/96 BP Location: Left arm   Patient Position: Sitting   Cuff Size: Adult   Pulse: 74   Resp: 16   Temp: (!) 97 4 °F (36 3 °C)   TempSrc: Tympanic   SpO2: 98%   Weight: 69 9 kg (154 lb)   Height: 5' 2" (1 575 m)      Physical Exam   Constitutional: She is oriented to person, place, and time  She appears well-developed and well-nourished  HENT:   Head: Normocephalic  Right Ear: External ear normal    Left Ear: External ear normal    Eyes: Conjunctivae and EOM are normal  Right eye exhibits no discharge  Left eye exhibits no discharge  Neck: No JVD present  Cardiovascular: Normal rate, regular rhythm and normal heart sounds  Exam reveals no gallop  No murmur heard  Pulmonary/Chest: Effort normal  No respiratory distress  She has no wheezes  She has no rales  She exhibits no tenderness  Abdominal: She exhibits no mass  There is no tenderness  There is no rebound  Musculoskeletal: She exhibits no edema  Tiel sign is positive in right hand   Neurological: She is alert and oriented to person, place, and time  Skin: No rash noted  No erythema  Assessment/Plan:    Postoperative hypothyroidism  New diagnosis S/P Thyroidectomy on 11/2019   Patient already started on Levothyroxine 125 Meq S/P surgery,will check TSH level  Discuss with patient     Numbness and tingling of hand  New diagnosis most probably 2 to carpal tunnel ,script for Cock up wrist splint   Also plan for EMG of b/l upper extremity       Diagnoses and all orders for this visit:    Postoperative hypothyroidism  -     levothyroxine 125 mcg tablet; Take 1 tablet (125 mcg total) by mouth daily Take on an empty stomach in the morning  Start after surgery  -     CBC and differential; Future  -     Basic metabolic panel; Future  -     Lipid Panel with Direct LDL reflex; Future  -     TSH, 3rd generation with Free T4 reflex; Future    Numbness and tingling of hand  -     EMG 2 Limb Upper Extremity;  Future  -     Cock Up Wrist Splint  -     CBC and differential; Future  -     Basic metabolic panel; Future  -     Lipid Panel with Direct LDL reflex; Future  -     TSH, 3rd generation with Free T4 reflex; Future    Atrial fibrillation, unspecified type (HCC)  -     apixaban (ELIQUIS) 5 mg; Take 1 tablet (5 mg total) by mouth 2 (two) times a day Restart 11/23/19    Essential hypertension  -     irbesartan-hydrochlorothiazide (AVALIDE) 150-12 5 MG per tablet; Take 1 tablet by mouth daily  -     CBC and differential; Future  -     Basic metabolic panel; Future  -     Lipid Panel with Direct LDL reflex; Future  -     TSH, 3rd generation with Free T4 reflex; Future    Type 2 diabetes mellitus without complication, without long-term current use of insulin (HCC)  -     atorvastatin (LIPITOR) 10 mg tablet; Take 1 tablet (10 mg total) by mouth daily  -     metFORMIN (GLUCOPHAGE) 500 mg tablet; Take 1 tablet (500 mg total) by mouth 2 (two) times a day with meals  -     CBC and differential; Future  -     Basic metabolic panel; Future  -     Lipid Panel with Direct LDL reflex; Future  -     TSH, 3rd generation with Free T4 reflex; Future    Pure hypercholesterolemia  -     atorvastatin (LIPITOR) 10 mg tablet; Take 1 tablet (10 mg total) by mouth daily  -     CBC and differential; Future  -     Basic metabolic panel; Future  -     Lipid Panel with Direct LDL reflex; Future  -     TSH, 3rd generation with Free T4 reflex;  Future    Intraductal carcinoma in situ of right breast

## 2020-01-02 NOTE — ASSESSMENT & PLAN NOTE
New diagnosis most probably 2 to carpal tunnel ,script for Cock up wrist splint   Also plan for EMG of b/l upper extremity

## 2020-01-28 ENCOUNTER — OFFICE VISIT (OUTPATIENT)
Dept: FAMILY MEDICINE CLINIC | Facility: CLINIC | Age: 80
End: 2020-01-28
Payer: MEDICARE

## 2020-01-28 VITALS
SYSTOLIC BLOOD PRESSURE: 130 MMHG | DIASTOLIC BLOOD PRESSURE: 70 MMHG | HEART RATE: 62 BPM | TEMPERATURE: 97.1 F | HEIGHT: 62 IN | BODY MASS INDEX: 28.16 KG/M2 | WEIGHT: 153 LBS | OXYGEN SATURATION: 97 %

## 2020-01-28 DIAGNOSIS — C50.111 MALIGNANT NEOPLASM OF CENTRAL PORTION OF RIGHT BREAST IN FEMALE, ESTROGEN RECEPTOR POSITIVE (HCC): ICD-10-CM

## 2020-01-28 DIAGNOSIS — G89.29 CHRONIC RIGHT-SIDED LOW BACK PAIN WITH RIGHT-SIDED SCIATICA: Primary | ICD-10-CM

## 2020-01-28 DIAGNOSIS — Z79.01 LONG TERM CURRENT USE OF ANTICOAGULANT THERAPY: ICD-10-CM

## 2020-01-28 DIAGNOSIS — M21.611 BUNION OF GREAT TOE OF RIGHT FOOT: ICD-10-CM

## 2020-01-28 DIAGNOSIS — Z17.0 MALIGNANT NEOPLASM OF CENTRAL PORTION OF RIGHT BREAST IN FEMALE, ESTROGEN RECEPTOR POSITIVE (HCC): ICD-10-CM

## 2020-01-28 DIAGNOSIS — I48.0 PAROXYSMAL ATRIAL FIBRILLATION (HCC): ICD-10-CM

## 2020-01-28 DIAGNOSIS — M54.41 CHRONIC RIGHT-SIDED LOW BACK PAIN WITH RIGHT-SIDED SCIATICA: Primary | ICD-10-CM

## 2020-01-28 LAB
BACTERIA UR QL AUTO: ABNORMAL /HPF
BILIRUB UR QL STRIP: NEGATIVE
CLARITY UR: ABNORMAL
COLOR UR: ABNORMAL
GLUCOSE UR STRIP-MCNC: NEGATIVE MG/DL
HGB UR QL STRIP.AUTO: NEGATIVE
HYALINE CASTS #/AREA URNS LPF: ABNORMAL /LPF
KETONES UR STRIP-MCNC: NEGATIVE MG/DL
LEUKOCYTE ESTERASE UR QL STRIP: ABNORMAL
NITRITE UR QL STRIP: NEGATIVE
NON-SQ EPI CELLS URNS QL MICRO: ABNORMAL /HPF
PH UR STRIP.AUTO: 6 [PH]
PROT UR STRIP-MCNC: NEGATIVE MG/DL
RBC #/AREA URNS AUTO: ABNORMAL /HPF
SL AMB  POCT GLUCOSE, UA: ABNORMAL
SL AMB LEUKOCYTE ESTERASE,UA: ABNORMAL
SL AMB POCT BILIRUBIN,UA: ABNORMAL
SL AMB POCT BLOOD,UA: ABNORMAL
SL AMB POCT CLARITY,UA: CLEAR
SL AMB POCT COLOR,UA: YELLOW
SL AMB POCT KETONES,UA: ABNORMAL
SL AMB POCT NITRITE,UA: ABNORMAL
SL AMB POCT PH,UA: 6
SL AMB POCT SPECIFIC GRAVITY,UA: 1.02
SL AMB POCT URINE PROTEIN: ABNORMAL
SL AMB POCT UROBILINOGEN: 2
SP GR UR STRIP.AUTO: 1.02 (ref 1–1.03)
UROBILINOGEN UR QL STRIP.AUTO: 2 E.U./DL
WBC #/AREA URNS AUTO: ABNORMAL /HPF

## 2020-01-28 PROCEDURE — 81002 URINALYSIS NONAUTO W/O SCOPE: CPT | Performed by: FAMILY MEDICINE

## 2020-01-28 PROCEDURE — 81001 URINALYSIS AUTO W/SCOPE: CPT | Performed by: FAMILY MEDICINE

## 2020-01-28 PROCEDURE — 87086 URINE CULTURE/COLONY COUNT: CPT | Performed by: FAMILY MEDICINE

## 2020-01-28 PROCEDURE — 99214 OFFICE O/P EST MOD 30 MIN: CPT | Performed by: FAMILY MEDICINE

## 2020-01-28 NOTE — ASSESSMENT & PLAN NOTE
A chronic improved the patient losing weight gradually encouraged patient to continue with her diet recommend increased physical activity

## 2020-01-28 NOTE — ASSESSMENT & PLAN NOTE
Chronic asymptomatic heart rate is control patient on anticoagulation Eliquis and beta-blocker patient does follow up with the Cardiology periodically

## 2020-01-28 NOTE — ASSESSMENT & PLAN NOTE
A new diagnosis asymptomatic proper shoes wear discussed with the patient refer the patient to see podiatric

## 2020-01-28 NOTE — ASSESSMENT & PLAN NOTE
A new diagnosis the chronic pain on and off no history of trauma plan for x-ray of the lumbar spine recommend patient to take Tylenol for the pain proper postural discussed with the patient

## 2020-01-28 NOTE — PROGRESS NOTES
BMI Counseling: Body mass index is 27 98 kg/m²  The BMI is above normal  Nutrition recommendations include decreasing portion sizes, encouraging healthy choices of fruits and vegetables and limiting drinks that contain sugar  Exercise recommendations include exercising 3-5 times per week  No pharmacotherapy was ordered  Patient referred to PCP due to patient being overweight  Subjective:   Chief Complaint   Patient presents with    Back Pain     R side        Patient ID: Cruz Hernandez is a 67 y o  female  Patient had here concerned about the low back pain in the right side and she described it as achy 5/10 localized in the area with radiate to the right lower extremity no numbness no muscle weakness no lose control of the urine or stool no fever no trauma or fall no drop on the foot and pain is worse when she stand for long time and go up the step patient history of breast cancer right-sided status post surgery and radiation she does continue to follow up with the Hematology Oncology currently not on any hormone therapy patient history of atrial fibrillation she is on beta-blocker and Eliquis as a blood thinner she deny any chest pain short of breath no palpitation and no light headache or dizziness patient does follow with the Cardiology periodically      The following portions of the patient's history were reviewed and updated as appropriate: allergies, current medications, past family history, past medical history, past social history, past surgical history and problem list     Review of Systems   Constitutional: Negative for fatigue and fever  HENT: Negative for ear pain, sinus pressure, sinus pain and sore throat  Eyes: Negative for pain and redness  Respiratory: Negative for cough, chest tightness and shortness of breath  Cardiovascular: Negative for chest pain, palpitations and leg swelling     Gastrointestinal: Negative for abdominal pain, blood in stool, constipation, diarrhea and nausea  Genitourinary: Negative for flank pain, frequency and hematuria  Musculoskeletal: Positive for back pain  Negative for joint swelling  Low back pain   Skin: Negative for rash  Neurological: Negative for dizziness, numbness and headaches  Hematological: Does not bruise/bleed easily  Objective:  Vitals:    01/28/20 0822   BP: 130/70   Pulse: 62   Temp: (!) 97 1 °F (36 2 °C)   TempSrc: Tympanic   SpO2: 97%   Weight: 69 4 kg (153 lb)   Height: 5' 2" (1 575 m)      Physical Exam   Constitutional: She is oriented to person, place, and time  She appears well-developed and well-nourished  HENT:   Head: Normocephalic  Right Ear: External ear normal    Left Ear: External ear normal    Eyes: Conjunctivae and EOM are normal  Right eye exhibits no discharge  Left eye exhibits no discharge  Neck: No JVD present  Cardiovascular: Normal rate, regular rhythm and normal heart sounds  Exam reveals no gallop  Pulses are no weak pulses  No murmur heard  Pulses:       Dorsalis pedis pulses are 2+ on the right side, and 2+ on the left side  Pulmonary/Chest: Effort normal  No respiratory distress  She has no wheezes  She has no rales  She exhibits no tenderness  Abdominal: She exhibits no mass  There is no tenderness  There is no rebound  Musculoskeletal: She exhibits tenderness  She exhibits no edema  Feet:    Positive tenderness in the lumbar spine at L 3 4 and 5 did decreased deep tendom reflex on the bilateral patella area leg raise test is negative bilateral   Feet:   Right Foot:   Skin Integrity: Negative for warmth  Left Foot:   Skin Integrity: Negative for warmth  Neurological: She is alert and oriented to person, place, and time  Skin: No rash noted  No erythema  Patient's shoes and socks removed  Right Foot/Ankle   Right Foot Inspection  Skin Exam: skin intact no warmth and no pre-ulcer                          Toe Exam: no swelling and erythema  Sensory Monofilament testing: intact  Vascular  Capillary refills: < 3 seconds  The right DP pulse is 2+  Left Foot/Ankle  Left Foot Inspection  Skin Exam: skin intactno warmth and no pre-ulcer                         Toe Exam: no swelling and no erythema                   Sensory       Monofilament: intact  Vascular  Capillary refills: < 3 seconds  The left DP pulse is 2+  Assign Risk Category:  No deformity present; No loss of protective sensation; No weak pulses       Risk: 0      Assessment/Plan:    Low back pain  A new diagnosis the chronic pain on and off no history of trauma plan for x-ray of the lumbar spine recommend patient to take Tylenol for the pain proper postural discussed with the patient    Bunion of great toe of right foot  A new diagnosis asymptomatic proper shoes wear discussed with the patient refer the patient to see podiatric    Paroxysmal atrial fibrillation ([facility] [address] )  Chronic asymptomatic heart rate is control patient on anticoagulation Eliquis and beta-blocker patient does follow up with the Cardiology periodically    Malignant neoplasm of central portion of right breast in female, estrogen receptor positive ([facility] [address] )  A status post the radiation and surgery patient still follow-up with the Oncology periodically    BMI 27 0-27 9,adult  A chronic improved the patient losing weight gradually encouraged patient to continue with her diet recommend increased physical activity       Diagnoses and all orders for this visit:    Chronic right-sided low back pain with right-sided sciatica  -     UA (URINE) with reflex to Scope  -     Urine culture  -     POCT urine dip  -     XR spine lumbar 2 or 3 views injury; Future    Paroxysmal atrial fibrillation (HCC)    Malignant neoplasm of central portion of right breast in female, estrogen receptor positive ([facility] [address] )    Bunion of great toe of right foot  -     Ambulatory referral to Podiatry;  Future    Long term current use of anticoagulant therapy    BMI 27 0-27 9,adult

## 2020-01-29 LAB — BACTERIA UR CULT: NORMAL

## 2020-02-10 DIAGNOSIS — E89.0 POSTOPERATIVE HYPOTHYROIDISM: ICD-10-CM

## 2020-02-10 RX ORDER — LEVOTHYROXINE SODIUM 0.12 MG/1
125 TABLET ORAL DAILY
Qty: 30 TABLET | Refills: 2 | Status: SHIPPED | OUTPATIENT
Start: 2020-02-10 | End: 2020-02-21 | Stop reason: ALTCHOICE

## 2020-02-14 ENCOUNTER — OFFICE VISIT (OUTPATIENT)
Dept: FAMILY MEDICINE CLINIC | Facility: CLINIC | Age: 80
End: 2020-02-14
Payer: MEDICARE

## 2020-02-14 VITALS
WEIGHT: 155 LBS | TEMPERATURE: 98.3 F | DIASTOLIC BLOOD PRESSURE: 80 MMHG | SYSTOLIC BLOOD PRESSURE: 138 MMHG | HEART RATE: 53 BPM | BODY MASS INDEX: 28.52 KG/M2 | OXYGEN SATURATION: 96 % | HEIGHT: 62 IN

## 2020-02-14 DIAGNOSIS — R42 VERTIGO: Primary | ICD-10-CM

## 2020-02-14 DIAGNOSIS — I10 ESSENTIAL HYPERTENSION: ICD-10-CM

## 2020-02-14 DIAGNOSIS — R82.90 ABNORMAL URINE FINDING: ICD-10-CM

## 2020-02-14 LAB
BILIRUB UR QL STRIP: NEGATIVE
CLARITY UR: CLEAR
COLOR UR: YELLOW
GLUCOSE UR STRIP-MCNC: NEGATIVE MG/DL
HGB UR QL STRIP.AUTO: NEGATIVE
KETONES UR STRIP-MCNC: NEGATIVE MG/DL
LEUKOCYTE ESTERASE UR QL STRIP: NEGATIVE
NITRITE UR QL STRIP: NEGATIVE
PH UR STRIP.AUTO: 6.5 [PH]
PROT UR STRIP-MCNC: NEGATIVE MG/DL
SP GR UR STRIP.AUTO: 1.01 (ref 1–1.03)
UROBILINOGEN UR QL STRIP.AUTO: 1 E.U./DL

## 2020-02-14 PROCEDURE — 2022F DILAT RTA XM EVC RTNOPTHY: CPT | Performed by: FAMILY MEDICINE

## 2020-02-14 PROCEDURE — 4040F PNEUMOC VAC/ADMIN/RCVD: CPT | Performed by: FAMILY MEDICINE

## 2020-02-14 PROCEDURE — 1036F TOBACCO NON-USER: CPT | Performed by: FAMILY MEDICINE

## 2020-02-14 PROCEDURE — 87086 URINE CULTURE/COLONY COUNT: CPT | Performed by: FAMILY MEDICINE

## 2020-02-14 PROCEDURE — 3079F DIAST BP 80-89 MM HG: CPT | Performed by: FAMILY MEDICINE

## 2020-02-14 PROCEDURE — 3008F BODY MASS INDEX DOCD: CPT | Performed by: FAMILY MEDICINE

## 2020-02-14 PROCEDURE — 1160F RVW MEDS BY RX/DR IN RCRD: CPT | Performed by: FAMILY MEDICINE

## 2020-02-14 PROCEDURE — 3075F SYST BP GE 130 - 139MM HG: CPT | Performed by: FAMILY MEDICINE

## 2020-02-14 PROCEDURE — 99214 OFFICE O/P EST MOD 30 MIN: CPT | Performed by: FAMILY MEDICINE

## 2020-02-14 PROCEDURE — 81003 URINALYSIS AUTO W/O SCOPE: CPT | Performed by: FAMILY MEDICINE

## 2020-02-14 NOTE — PROGRESS NOTES
Subjective:   Chief Complaint   Patient presents with    Blood Pressure Check    Dizziness        Patient ID: Wendy Martinez is a 67 y o  female  Patient here with her daughter concerned about blood pressure patient was at the depressed specialist and her blood pressure was high 170 /100 she been asked to be seen by her primary care the patient and office a blood pressure 140/80 and I check it personally 138/80 patient deny any chest pain short of breath no palpitation no dyspnea on exertion no lower extremity edema deny any headache and no blurred vision no numbness no weakness no lose control of the urine or stool medication patient supposed to be on avalid 150-12 5 and supposed to be on metoprolol 25 mg twice a day patient has not been taking it twice a day she been doing it once a day in the morning patient did take her medication after she left the doctor office which was around 7:30 in the morning  Patient also concerned about the dizziness she described it as floating sensation worse when she turn in the bed associated with the nausea no ringing in the ear no headache no numbness no weakness no head trauma no lose control of the urine or stool she did have a similar condition before and she had history of vertigo before      The following portions of the patient's history were reviewed and updated as appropriate: allergies, current medications, past family history, past medical history, past social history, past surgical history and problem list     Review of Systems   Constitutional: Negative for fatigue and fever  HENT: Negative for ear pain, sinus pressure, sinus pain and sore throat  Eyes: Negative for pain and redness  Respiratory: Negative for cough, chest tightness and shortness of breath  Cardiovascular: Negative for chest pain, palpitations and leg swelling  Gastrointestinal: Negative for abdominal pain, blood in stool, constipation, diarrhea and nausea     Genitourinary: Negative for flank pain, frequency and hematuria  Musculoskeletal: Negative for back pain and joint swelling  Skin: Negative for rash  Neurological: Positive for dizziness  Negative for tremors, seizures, syncope, numbness and headaches  Hematological: Does not bruise/bleed easily  Objective:  Vitals:    02/14/20 0935 02/14/20 0944   BP: 140/80 138/80   Pulse: (!) 53    Temp: 98 3 °F (36 8 °C)    TempSrc: Tympanic    SpO2: 96%    Weight: 70 3 kg (155 lb)    Height: 5' 2" (1 575 m)       Physical Exam   Constitutional: She is oriented to person, place, and time  She appears well-developed and well-nourished  HENT:   Head: Normocephalic  Right Ear: External ear normal    Left Ear: External ear normal    Eyes: Conjunctivae and EOM are normal  Right eye exhibits no discharge  Left eye exhibits no discharge  Neck: No JVD present  Cardiovascular: Normal rate and normal heart sounds  Exam reveals no gallop  No murmur heard  Pulmonary/Chest: Effort normal  No respiratory distress  She has no wheezes  She has no rales  She exhibits no tenderness  Abdominal: She exhibits no mass  There is no tenderness  There is no rebound  Musculoskeletal: She exhibits no edema or tenderness  Neurological: She is alert and oriented to person, place, and time  Skin: No rash noted  No erythema           Assessment/Plan:    Vertigo  Recurrent symptomatic start her on meclizine 25 mg once a day proper use of medication possible side effect discussed with the patient recommend low salt low caffeine diet    Hypertension  A chronic asymptomatic fair control blood pressure and office now 138/80 patient has not been taking her medication properly we discussed the patient proper taking the medication low-salt diet important lose weight       Diagnoses and all orders for this visit:    Vertigo    Essential hypertension    Abnormal urine finding  -     UA (URINE) with reflex to Scope  -     Urine culture

## 2020-02-15 LAB — BACTERIA UR CULT: NORMAL

## 2020-02-20 ENCOUNTER — APPOINTMENT (OUTPATIENT)
Dept: LAB | Facility: HOSPITAL | Age: 80
End: 2020-02-20
Payer: MEDICARE

## 2020-02-20 ENCOUNTER — HOSPITAL ENCOUNTER (OUTPATIENT)
Dept: RADIOLOGY | Facility: HOSPITAL | Age: 80
Discharge: HOME | End: 2020-02-20
Payer: MEDICARE

## 2020-02-20 DIAGNOSIS — M54.41 CHRONIC RIGHT-SIDED LOW BACK PAIN WITH RIGHT-SIDED SCIATICA: ICD-10-CM

## 2020-02-20 DIAGNOSIS — R20.0 NUMBNESS AND TINGLING OF HAND: ICD-10-CM

## 2020-02-20 DIAGNOSIS — I10 ESSENTIAL HYPERTENSION: ICD-10-CM

## 2020-02-20 DIAGNOSIS — G89.29 CHRONIC RIGHT-SIDED LOW BACK PAIN WITH RIGHT-SIDED SCIATICA: ICD-10-CM

## 2020-02-20 DIAGNOSIS — R20.2 NUMBNESS AND TINGLING OF HAND: ICD-10-CM

## 2020-02-20 DIAGNOSIS — E11.9 TYPE 2 DIABETES MELLITUS WITHOUT COMPLICATION, WITHOUT LONG-TERM CURRENT USE OF INSULIN (HCC): ICD-10-CM

## 2020-02-20 DIAGNOSIS — E78.00 PURE HYPERCHOLESTEROLEMIA: ICD-10-CM

## 2020-02-20 DIAGNOSIS — E89.0 POSTOPERATIVE HYPOTHYROIDISM: ICD-10-CM

## 2020-02-20 LAB
ANION GAP SERPL CALCULATED.3IONS-SCNC: 8 MMOL/L (ref 5–14)
BASOPHILS # BLD AUTO: 0 THOUSANDS/ΜL (ref 0–0.1)
BASOPHILS NFR BLD AUTO: 0 % (ref 0–1)
BUN SERPL-MCNC: 17 MG/DL (ref 5–25)
CALCIUM SERPL-MCNC: 9.5 MG/DL (ref 8.4–10.2)
CHLORIDE SERPL-SCNC: 101 MMOL/L (ref 97–108)
CHOLEST SERPL-MCNC: 104 MG/DL
CO2 SERPL-SCNC: 30 MMOL/L (ref 22–30)
CREAT SERPL-MCNC: 0.9 MG/DL (ref 0.6–1.2)
EOSINOPHIL # BLD AUTO: 0 THOUSAND/ΜL (ref 0–0.4)
EOSINOPHIL NFR BLD AUTO: 0 % (ref 0–6)
ERYTHROCYTE [DISTWIDTH] IN BLOOD BY AUTOMATED COUNT: 13.7 %
GFR SERPL CREATININE-BSD FRML MDRD: 71 ML/MIN/1.73SQ M
GLUCOSE P FAST SERPL-MCNC: 95 MG/DL (ref 70–99)
HCT VFR BLD AUTO: 42.9 % (ref 36–46)
HDLC SERPL-MCNC: 54 MG/DL
HGB BLD-MCNC: 14.2 G/DL (ref 12–16)
LDLC SERPL CALC-MCNC: 35 MG/DL
LYMPHOCYTES # BLD AUTO: 1.4 THOUSANDS/ΜL (ref 0.5–4)
LYMPHOCYTES NFR BLD AUTO: 36 % (ref 25–45)
MCH RBC QN AUTO: 31.1 PG (ref 26–34)
MCHC RBC AUTO-ENTMCNC: 33.1 G/DL (ref 31–36)
MCV RBC AUTO: 94 FL (ref 80–100)
MONOCYTES # BLD AUTO: 0.4 THOUSAND/ΜL (ref 0.2–0.9)
MONOCYTES NFR BLD AUTO: 11 % (ref 1–10)
NEUTROPHILS # BLD AUTO: 2 THOUSANDS/ΜL (ref 1.8–7.8)
NEUTS SEG NFR BLD AUTO: 52 % (ref 45–65)
PLATELET # BLD AUTO: 203 THOUSANDS/UL (ref 150–450)
PMV BLD AUTO: 9.3 FL (ref 8.9–12.7)
POTASSIUM SERPL-SCNC: 3.7 MMOL/L (ref 3.6–5)
RBC # BLD AUTO: 4.55 MILLION/UL (ref 4–5.2)
SODIUM SERPL-SCNC: 139 MMOL/L (ref 137–147)
T4 FREE SERPL-MCNC: 1.87 NG/DL (ref 0.76–1.46)
TRIGL SERPL-MCNC: 75 MG/DL
TSH SERPL DL<=0.05 MIU/L-ACNC: <0.015 UIU/ML (ref 0.47–4.68)
WBC # BLD AUTO: 3.9 THOUSAND/UL (ref 4.5–11)

## 2020-02-20 PROCEDURE — 36415 COLL VENOUS BLD VENIPUNCTURE: CPT

## 2020-02-20 PROCEDURE — 80048 BASIC METABOLIC PNL TOTAL CA: CPT

## 2020-02-20 PROCEDURE — 72110 X-RAY EXAM L-2 SPINE 4/>VWS: CPT

## 2020-02-20 PROCEDURE — 80061 LIPID PANEL: CPT

## 2020-02-20 PROCEDURE — 84443 ASSAY THYROID STIM HORMONE: CPT

## 2020-02-20 PROCEDURE — 85025 COMPLETE CBC W/AUTO DIFF WBC: CPT

## 2020-02-20 PROCEDURE — 84439 ASSAY OF FREE THYROXINE: CPT

## 2020-02-21 DIAGNOSIS — E89.0 POSTOPERATIVE HYPOTHYROIDISM: Primary | ICD-10-CM

## 2020-02-21 RX ORDER — LEVOTHYROXINE SODIUM 0.1 MG/1
100 TABLET ORAL DAILY
Qty: 30 TABLET | Refills: 1 | Status: SHIPPED | OUTPATIENT
Start: 2020-02-21 | End: 2020-05-18 | Stop reason: SDUPTHER

## 2020-02-21 NOTE — TELEPHONE ENCOUNTER
----- Message from Kanwal Albright MD sent at 2/20/2020  6:57 PM EST -----  Stop Levothyroxine 25 ,start Levothyroxine 100 meq/day #30

## 2020-02-22 DIAGNOSIS — R42 VERTIGO: ICD-10-CM

## 2020-02-22 NOTE — TELEPHONE ENCOUNTER
Regarding: medication refill   ----- Message from Priscilla Bolanos sent at 2/22/2020 11:28 AM EST -----  " My meclizine is not a the pharmacy and I need it "

## 2020-02-22 NOTE — TELEPHONE ENCOUNTER
Pt requesting refill because she felt dizziness last week  Believes caused by vertigo     Out of meclizine

## 2020-02-23 RX ORDER — MECLIZINE HYDROCHLORIDE 25 MG/1
25 TABLET ORAL DAILY
Qty: 15 TABLET | Refills: 0 | Status: SHIPPED | OUTPATIENT
Start: 2020-02-23 | End: 2020-02-28 | Stop reason: SDUPTHER

## 2020-02-28 ENCOUNTER — OFFICE VISIT (OUTPATIENT)
Dept: FAMILY MEDICINE CLINIC | Facility: CLINIC | Age: 80
End: 2020-02-28
Payer: MEDICARE

## 2020-02-28 VITALS
OXYGEN SATURATION: 97 % | SYSTOLIC BLOOD PRESSURE: 148 MMHG | WEIGHT: 154 LBS | TEMPERATURE: 97.4 F | BODY MASS INDEX: 28.34 KG/M2 | RESPIRATION RATE: 16 BRPM | HEART RATE: 57 BPM | HEIGHT: 62 IN | DIASTOLIC BLOOD PRESSURE: 80 MMHG

## 2020-02-28 DIAGNOSIS — I10 ESSENTIAL HYPERTENSION: ICD-10-CM

## 2020-02-28 DIAGNOSIS — E11.9 TYPE 2 DIABETES MELLITUS WITHOUT COMPLICATION, WITHOUT LONG-TERM CURRENT USE OF INSULIN (HCC): Primary | ICD-10-CM

## 2020-02-28 DIAGNOSIS — R42 VERTIGO: ICD-10-CM

## 2020-02-28 DIAGNOSIS — Z79.01 LONG TERM CURRENT USE OF ANTICOAGULANT THERAPY: ICD-10-CM

## 2020-02-28 DIAGNOSIS — E55.9 VITAMIN D DEFICIENCY: ICD-10-CM

## 2020-02-28 DIAGNOSIS — E89.0 POSTOPERATIVE HYPOTHYROIDISM: ICD-10-CM

## 2020-02-28 DIAGNOSIS — I48.0 PAROXYSMAL ATRIAL FIBRILLATION (HCC): ICD-10-CM

## 2020-02-28 PROCEDURE — 4010F ACE/ARB THERAPY RXD/TAKEN: CPT | Performed by: FAMILY MEDICINE

## 2020-02-28 PROCEDURE — 4040F PNEUMOC VAC/ADMIN/RCVD: CPT | Performed by: FAMILY MEDICINE

## 2020-02-28 PROCEDURE — 99214 OFFICE O/P EST MOD 30 MIN: CPT | Performed by: FAMILY MEDICINE

## 2020-02-28 PROCEDURE — 2022F DILAT RTA XM EVC RTNOPTHY: CPT | Performed by: FAMILY MEDICINE

## 2020-02-28 PROCEDURE — 3079F DIAST BP 80-89 MM HG: CPT | Performed by: FAMILY MEDICINE

## 2020-02-28 PROCEDURE — 1160F RVW MEDS BY RX/DR IN RCRD: CPT | Performed by: FAMILY MEDICINE

## 2020-02-28 PROCEDURE — 3077F SYST BP >= 140 MM HG: CPT | Performed by: FAMILY MEDICINE

## 2020-02-28 PROCEDURE — 1036F TOBACCO NON-USER: CPT | Performed by: FAMILY MEDICINE

## 2020-02-28 RX ORDER — IRBESARTAN AND HYDROCHLOROTHIAZIDE 150; 12.5 MG/1; MG/1
1 TABLET, FILM COATED ORAL DAILY
Qty: 90 TABLET | Refills: 0 | Status: SHIPPED | OUTPATIENT
Start: 2020-02-28 | End: 2020-05-14 | Stop reason: DRUGHIGH

## 2020-02-28 RX ORDER — MECLIZINE HYDROCHLORIDE 25 MG/1
25 TABLET ORAL DAILY
Qty: 30 TABLET | Refills: 1 | Status: SHIPPED | OUTPATIENT
Start: 2020-02-28 | End: 2021-08-16

## 2020-02-28 NOTE — PROGRESS NOTES
Subjective:   Chief Complaint   Patient presents with    Follow-up     chronic conditions        Patient ID: Laurel Bains is a 67 y o  female  Patient here with her daughter follow-up with a chronic condition patient history of non-insulin-dependent diabetic on metformin tolerated well without any side effect hemoglobin A1c fair control no sign of symptom of hypoglycemia patient deny increased thirsty increased frequency urination no dizziness no headache and no abdomen pain she is already on Arb and she is already on statin patient was history of proximal atrial fibrillation she is on anticoagulation patient aware of the side effect of bleeding deny any chest pain short of breath no no palpitation no dizziness or light headache patient was history of hypothyroidism post surgical she is on levothyroxine 125 mcg once a day patient denies heat or cold intolerance mood is stable patient history of vitamin-D deficiency on vitamin-D supplement deny any bone pain joint pain no muscle pain no fatigue recent blood work discussed with the patient      The following portions of the patient's history were reviewed and updated as appropriate: allergies, current medications, past family history, past medical history, past social history, past surgical history and problem list     Review of Systems   Constitutional: Negative for fatigue and fever  HENT: Negative for ear pain, sinus pressure, sinus pain and sore throat  Eyes: Negative for pain and redness  Respiratory: Negative for cough, chest tightness and shortness of breath  Cardiovascular: Negative for chest pain, palpitations and leg swelling  Gastrointestinal: Negative for abdominal pain, blood in stool, constipation, diarrhea and nausea  Genitourinary: Negative for flank pain, frequency and hematuria  Musculoskeletal: Negative for back pain and joint swelling  Skin: Negative for rash     Neurological: Negative for dizziness, numbness and headaches  Hematological: Does not bruise/bleed easily  Objective:  Vitals:    02/28/20 0818   BP: 148/80   BP Location: Left arm   Patient Position: Sitting   Cuff Size: Large   Pulse: 57   Resp: 16   Temp: (!) 97 4 °F (36 3 °C)   TempSrc: Tympanic   SpO2: 97%   Weight: 69 9 kg (154 lb)   Height: 5' 2" (1 575 m)      Physical Exam   Constitutional: She is oriented to person, place, and time  She appears well-developed and well-nourished  HENT:   Head: Normocephalic  Right Ear: External ear normal    Left Ear: External ear normal    Eyes: Conjunctivae and EOM are normal  Right eye exhibits no discharge  Left eye exhibits no discharge  Neck: No JVD present  Cardiovascular: Normal rate and regular rhythm  Exam reveals no gallop  Pulmonary/Chest: Effort normal  No respiratory distress  She has no wheezes  She has no rales  She exhibits no tenderness  Abdominal: She exhibits no mass  There is no tenderness  There is no rebound  Musculoskeletal: She exhibits no edema or tenderness  Neurological: She is alert and oriented to person, place, and time  Skin: No rash noted  No erythema           Assessment/Plan:    Postoperative hypothyroidism  Chronic asymptomatic over treated will decrease levothyroxine from 125-100 mcg once a day proper use and possible side effect discussed with the patient    Type 2 diabetes mellitus without complication, without long-term current use of insulin ([facility] [place])    Lab Results   Component Value Date    HGBA1C 6 1 11/12/2019    Chronic asymptomatic fair control continue current management encouraged patient to lose weight low carb diet discussed with the patient patient already on statin and already on Arb sign of symptom of hypoglycemia discussed with the patient    Vitamin D deficiency  Chronic asymptomatic encouraged patient to continue with vitamin-D rich diet       Diagnoses and all orders for this visit:    Type 2 diabetes mellitus without complication, without long-term current use of insulin (HCC)    Essential hypertension  -     irbesartan-hydrochlorothiazide (AVALIDE) 150-12 5 MG per tablet; Take 1 tablet by mouth daily  -     metoprolol tartrate (LOPRESSOR) 25 mg tablet; Take 1 tablet (25 mg total) by mouth every 12 (twelve) hours  -     CBC and differential; Future  -     Basic metabolic panel; Future  -     Lipid Panel with Direct LDL reflex; Future  -     TSH, 3rd generation with Free T4 reflex; Future    Vertigo  -     meclizine (ANTIVERT) 25 mg tablet;  Take 1 tablet (25 mg total) by mouth daily    Paroxysmal atrial fibrillation (HCC)    Postoperative hypothyroidism    Long term current use of anticoagulant therapy    Vitamin D deficiency

## 2020-03-01 NOTE — ASSESSMENT & PLAN NOTE
Lab Results   Component Value Date    HGBA1C 6 1 11/12/2019    Chronic asymptomatic fair control continue current management encouraged patient to lose weight low carb diet discussed with the patient patient already on statin and already on Arb sign of symptom of hypoglycemia discussed with the patient

## 2020-03-01 NOTE — ASSESSMENT & PLAN NOTE
Chronic asymptomatic over treated will decrease levothyroxine from 125-100 mcg once a day proper use and possible side effect discussed with the patient

## 2020-03-10 ENCOUNTER — OFFICE VISIT (OUTPATIENT)
Dept: CARDIOLOGY CLINIC | Facility: CLINIC | Age: 80
End: 2020-03-10
Payer: MEDICARE

## 2020-03-10 VITALS
DIASTOLIC BLOOD PRESSURE: 74 MMHG | HEART RATE: 53 BPM | BODY MASS INDEX: 27.97 KG/M2 | WEIGHT: 152 LBS | SYSTOLIC BLOOD PRESSURE: 132 MMHG | HEIGHT: 62 IN

## 2020-03-10 DIAGNOSIS — I10 ESSENTIAL HYPERTENSION: ICD-10-CM

## 2020-03-10 DIAGNOSIS — I48.0 PAROXYSMAL ATRIAL FIBRILLATION (HCC): Primary | ICD-10-CM

## 2020-03-10 DIAGNOSIS — E78.00 PURE HYPERCHOLESTEROLEMIA: ICD-10-CM

## 2020-03-10 DIAGNOSIS — E11.9 TYPE 2 DIABETES MELLITUS WITHOUT COMPLICATION, WITHOUT LONG-TERM CURRENT USE OF INSULIN (HCC): ICD-10-CM

## 2020-03-10 PROCEDURE — 99214 OFFICE O/P EST MOD 30 MIN: CPT | Performed by: INTERNAL MEDICINE

## 2020-03-10 PROCEDURE — 3008F BODY MASS INDEX DOCD: CPT | Performed by: INTERNAL MEDICINE

## 2020-03-10 PROCEDURE — 3078F DIAST BP <80 MM HG: CPT | Performed by: INTERNAL MEDICINE

## 2020-03-10 PROCEDURE — 3075F SYST BP GE 130 - 139MM HG: CPT | Performed by: INTERNAL MEDICINE

## 2020-03-10 PROCEDURE — 1036F TOBACCO NON-USER: CPT | Performed by: INTERNAL MEDICINE

## 2020-03-10 PROCEDURE — 1160F RVW MEDS BY RX/DR IN RCRD: CPT | Performed by: INTERNAL MEDICINE

## 2020-03-10 PROCEDURE — 2022F DILAT RTA XM EVC RTNOPTHY: CPT | Performed by: INTERNAL MEDICINE

## 2020-03-10 PROCEDURE — 93000 ELECTROCARDIOGRAM COMPLETE: CPT | Performed by: INTERNAL MEDICINE

## 2020-03-10 PROCEDURE — 4040F PNEUMOC VAC/ADMIN/RCVD: CPT | Performed by: INTERNAL MEDICINE
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
## 2020-03-24 ENCOUNTER — TELEPHONE (OUTPATIENT)
Dept: NEUROLOGY | Facility: CLINIC | Age: 80
End: 2020-03-24

## 2020-03-24 NOTE — TELEPHONE ENCOUNTER
The patient was last seen a full year ago and never apparently came to suggested follow-ups  Will have office here check with her to see why she is returning and whether that can be done by telephone or needs to be done in person

## 2020-03-24 NOTE — TELEPHONE ENCOUNTER
Patient calling because she has an appointment on 3/27 and is wondering if she should reschedule her appointment  Next available was in July  Patient does not want to wait that long  Dr Pearlie Peabody: Are you doing virtual/telephone appointments?

## 2020-03-27 ENCOUNTER — OFFICE VISIT (OUTPATIENT)
Dept: NEUROLOGY | Facility: CLINIC | Age: 80
End: 2020-03-27
Payer: MEDICARE

## 2020-03-27 ENCOUNTER — TELEPHONE (OUTPATIENT)
Dept: NEUROLOGY | Facility: CLINIC | Age: 80
End: 2020-03-27

## 2020-03-27 VITALS
SYSTOLIC BLOOD PRESSURE: 143 MMHG | RESPIRATION RATE: 14 BRPM | WEIGHT: 155.4 LBS | DIASTOLIC BLOOD PRESSURE: 64 MMHG | HEIGHT: 61 IN | HEART RATE: 56 BPM | BODY MASS INDEX: 29.34 KG/M2

## 2020-03-27 DIAGNOSIS — R20.2 NUMBNESS AND TINGLING OF HAND: ICD-10-CM

## 2020-03-27 DIAGNOSIS — R20.0 NUMBNESS AND TINGLING OF HAND: ICD-10-CM

## 2020-03-27 DIAGNOSIS — R51.9 CHRONIC DAILY HEADACHE: Primary | ICD-10-CM

## 2020-03-27 DIAGNOSIS — M79.2 NEURALGIA: ICD-10-CM

## 2020-03-27 PROCEDURE — 3077F SYST BP >= 140 MM HG: CPT | Performed by: PSYCHIATRY & NEUROLOGY

## 2020-03-27 PROCEDURE — 1160F RVW MEDS BY RX/DR IN RCRD: CPT | Performed by: PSYCHIATRY & NEUROLOGY

## 2020-03-27 PROCEDURE — 99214 OFFICE O/P EST MOD 30 MIN: CPT | Performed by: PSYCHIATRY & NEUROLOGY

## 2020-03-27 PROCEDURE — 3078F DIAST BP <80 MM HG: CPT | Performed by: PSYCHIATRY & NEUROLOGY

## 2020-03-27 PROCEDURE — 3008F BODY MASS INDEX DOCD: CPT | Performed by: PSYCHIATRY & NEUROLOGY

## 2020-03-27 PROCEDURE — 2022F DILAT RTA XM EVC RTNOPTHY: CPT | Performed by: PSYCHIATRY & NEUROLOGY

## 2020-03-27 PROCEDURE — 1036F TOBACCO NON-USER: CPT | Performed by: PSYCHIATRY & NEUROLOGY

## 2020-03-27 PROCEDURE — 4040F PNEUMOC VAC/ADMIN/RCVD: CPT | Performed by: PSYCHIATRY & NEUROLOGY

## 2020-03-27 NOTE — ASSESSMENT & PLAN NOTE
Intermittent presence  Carpal tunnel syndromes a consideration as to the origin, although patient at this point in time does not exhibit any mechanical signs of median nerve irritation at the wrist (negative Tinel's and Phalen's maneuvers)  --will reschedule patient's upper extremity electrodiagnostic study

## 2020-03-27 NOTE — ASSESSMENT & PLAN NOTE
Left super orbital, posttraumatic appearing  Although still present, less problematic than in the recent past   --hopefully the advancing gabapentin schedule also provide benefit here

## 2020-03-27 NOTE — LETTER
March 27, 2020     Keren Stevens MD  [address] [facility]  [address]    Patient: Malika Rosa   YOB: 1940   Date of Visit: 3/27/2020       Dear Dr Bessie Gibbs: Thank you for referring Malika Rosa to me for evaluation  Below are my notes for this consultation  If you have questions, please do not hesitate to call me  I look forward to following your patient along with you  Sincerely,        Erika Prince MD        CC: No Recipients  Erika Prince MD  3/27/2020 12:09 PM  Sign at close encounter  Patient ID: Malika Rosa is a 67 y o  female  Assessment/Plan:    Chronic daily headache  Longstanding presence, predating her 2017 trauma  As described appears to be predominantly myofascial or tension-type in origin  Currently tolerating a low dose of gabapentin and hopefully can slowly advance her gabapentin to provide overall improved control, as gabapentin will not interfere with her ongoing use of apixaban  --advanced gabapentin 100 mg capsules to 1 capsule 3 times daily for 1 week, then 1 capsule twice daily and 2 at bedtime daily  --scheduled for an upcoming CBC and BMP by PCP  Will add hepatic function panel  --patient to call the office in 2-3 weeks with a status report and to discuss possible further advancement in her gabapentin schedule  She will call before then should she have questions or concerns or difficulties tolerating the slow increase  Neuralgia  Left super orbital, posttraumatic appearing  Although still present, less problematic than in the recent past   --hopefully the advancing gabapentin schedule also provide benefit here  Numbness and tingling of hands  Intermittent presence  Carpal tunnel syndromes a consideration as to the origin, although patient at this point in time does not exhibit any mechanical signs of median nerve irritation at the wrist (negative Tinel's and Phalen's maneuvers)    --will reschedule patient's upper extremity electrodiagnostic study  She will follow up in 10-12 weeks  Subjective:    HPI  Patient, 67years of age and right-handed, presents for further evaluation regarding several ongoing issues  She was accompanied today by her daughter, her Parveen Anderson and by cell for on her other daughter, Rodolfo Quezada  Her 1st issue is that of ongoing headaches  Her headaches are present all day, every day and have been so for a number of years, even predating her trauma in 2017  The headache is described as a tightness, holocranial, exaggerated in the frontal regions  She also has a history of a left supraorbital neuralgia, 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 in the aftermath of her trauma in January of 2017  That in the past was much more significantly apparent although still present, is of a much lesser intensity  Her last issue is that of intermittent pain and tingling involving her hands bilaterally  She describes no specific distribution  The potential for carpal tunnel syndromes are being considered and an EMG/NCV was ordered and scheduled, but patient was unable to attend earlier this month  Recent blood work results reviewed:  -CBC with hemoglobin 14 2, hematocrit 42 9, white count 3 90 platelet count 636  -BMP unremarkable including creatinine 0 90  -TSH suppressed at 0 015 and free T4 elevated at 1 87, resulting in an adjustment in her levothyroxine by her PCP  EMG bilateral upper extremities as noted above missed and will need to be rescheduled  Also reviewed imaging studies from the past   MRI brain done in April of 2017, in the aftermath of her described trauma, was remarkable only for minor chronic small vessel change      Past Medical History:   Diagnosis Date    Allergic rhinitis     Arthritis     Atrial fibrillation ([address] )     Breast cancer (HCC)     Cataracts, bilateral     Chronic headaches     pulsatile daily headaches    Colitis     Coronary artery disease     Diabetes mellitus ([facility] )     Disease of thyroid gland     DJD (degenerative joint disease), cervical     Facial neuralgia     left frontal facial post traumatic neuralgia    Fibromyalgia     Glaucoma     History of external beam radiation therapy     8/16/18 to 9/14/2018 Right breast    Hypertension     Hypokalemia     Hypovitaminosis D     Lupus ([facility] ) 7/19/2018    Obesity     Osteoarthritis     Prediabetes     SDH (subdural hematoma) ([place] [id number] ) 1/31/2017    Sleep apnea     no cpap    Syncope and collapse 12/1/2018    Vertigo      Past Surgical History:   Procedure Laterality Date    BREAST BIOPSY      BREAST LUMPECTOMY      CHOLECYSTECTOMY      COLONOSCOPY  2013    HYSTERECTOMY      MAMMO NEEDLE LOCALIZATION RIGHT (ALL INC) Right 6/21/2018    MAMMO STEREOTACTIC BREAST BIOPSY RIGHT (ALL INC) Right 5/9/2018    THYROIDECTOMY N/A 11/21/2019    Procedure: THYROIDECTOMY, total;  Surgeon: Brooks Zuñiga MD;  [facility];  Service: Surgical Oncology    TONSILLECTOMY      US GUIDED BREAST BIOPSY RIGHT COMPLETE Right 5/9/2018    US GUIDED THYROID BIOPSY  6/12/2019    US GUIDED THYROID BIOPSY  9/13/2019     Social History     Socioeconomic History    Marital status: Single     Spouse name: None    Number of children: None    Years of education: None    Highest education level: None   Occupational History    None   Social Needs    Financial resource strain: None    Food insecurity:     Worry: Never true     Inability: Never true    Transportation needs:     Medical: No     Non-medical: No   Tobacco Use    Smoking status: Never Smoker    Smokeless tobacco: Never Used    Tobacco comment: no smoke exposure   Substance and Sexual Activity    Alcohol use: Yes     Frequency: Monthly or less    Drug use: No    Sexual activity: None   Lifestyle    Physical activity:     Days per week: 0 days     Minutes per session: 0 min    Stress: Very much   Relationships    Social connections:     Talks on phone: Once a week     Gets together: More than three times a week     Attends L[employer] service: More than 4 times per year     Active member of club or organization: Yes     Attends meetings of clubs or organizations: More than 4 times per year     Relationship status: Never     Intimate partner violence:     Fear of current or ex partner: No     Emotionally abused: No     Physically abused: No     Forced sexual activity: No   Other Topics Concern    None   Social History Narrative    None     Family History   Problem Relation Age of Onset    Heart attack Sister     Hypertension Family     Diabetes Family     Stroke Family     Migraines Family     Breast cancer Daughter 28     Allergies   Allergen Reactions    No Known Allergies        Current Outpatient Medications:     apixaban (ELIQUIS) 5 mg, Take 1 tablet (5 mg total) by mouth 2 (two) times a day Restart 11/23/19, Disp: 60 tablet, Rfl: 3    atorvastatin (LIPITOR) 10 mg tablet, Take 1 tablet (10 mg total) by mouth daily, Disp: 90 tablet, Rfl: 1    gabapentin (NEURONTIN) 100 mg capsule, Take 1 capsule (100 mg total) by mouth 2 (two) times a day, Disp: 180 capsule, Rfl: 1    irbesartan-hydrochlorothiazide (AVALIDE) 150-12 5 MG per tablet, Take 1 tablet by mouth daily, Disp: 90 tablet, Rfl: 0    levothyroxine 100 mcg tablet, Take 1 tablet (100 mcg total) by mouth daily, Disp: 30 tablet, Rfl: 1    meclizine (ANTIVERT) 25 mg tablet, Take 1 tablet (25 mg total) by mouth daily, Disp: 30 tablet, Rfl: 1    metFORMIN (GLUCOPHAGE) 500 mg tablet, Take 1 tablet (500 mg total) by mouth 2 (two) times a day with meals, Disp: 180 tablet, Rfl: 2    metoprolol tartrate (LOPRESSOR) 25 mg tablet, Take 1 tablet (25 mg total) by mouth every 12 (twelve) hours, Disp: 180 tablet, Rfl: 0    Objective:    Blood pressure 143/64, pulse 56, resp  rate 14, height 5' 1" (1 549 m), weight 70 5 kg (155 lb 6 4 oz), not currently breastfeeding      Physical Exam  Head normocephalic  Eyes nonicteric  No audible anterior neck bruits  Lungs clear to auscultation  Rhythm irregular  GI (abdomen) soft nontender  Bowel sounds present  No significant lower extremity edema  Negative median Tinel's and Phalen's maneuver bilaterally  Neurological Exam  Alert  Pleasantly and appropriately conversational   Fully oriented  Unremarkable spontaneous gait  Romberg maneuver performed unremarkably  Cranial Nerves:   I: Olfactory sense intact bilaterally  II: Visual fields full to confrontation  Left pupil with evidence of past cataract surgery  Left fundus with marginated discs  Right fundus not well visualized  III,IV,VI: Extraocular muscles EOMI, no nystagmus  V: Masseter and pterygoid strength  Sensation in the V1 through V3 distributions intact to pinprick and light touch bilaterally  VII:  Soft left facial asymmetry, unchanged from the past     VIII: Audition intact to finger rub bilaterally  IX/X: Uvula midline  Soft palate elevation symmetric  XI: Trapezius and SCM strength 5/5 bilaterally  XII: Tongue midline with no atrophy or fasciculations with appropriate movement  Accurate with finger-to-nose and heel-to-shin maneuvers bilaterally  Good symmetrical strength throughout the 4 extremities including fully retained distal median motor function bilaterally  With sensory testing, pin intact throughout, including the median distributions bilaterally  Position intact in the distal 4 extremities  Muscle stretch reflexes bilaterally 1+ at biceps and triceps, bilaterally 1 at brachioradialis, bilaterally 1+ at knees and bilaterally 1 at ankles  Toe response downgoing bilaterally  ROS:    Review of Systems   Constitutional: Negative  Negative for appetite change and fever  HENT: Negative  Negative for hearing loss, tinnitus, trouble swallowing and voice change  Eyes: Negative  Negative for photophobia and pain  Respiratory: Negative    Negative for shortness of breath  Cardiovascular: Negative  Negative for palpitations  Gastrointestinal: Negative  Negative for nausea and vomiting  Endocrine: Negative  Negative for cold intolerance  Genitourinary: Negative  Negative for dysuria, frequency and urgency  Musculoskeletal: Negative  Negative for myalgias and neck pain  Skin: Negative  Negative for rash  Neurological: Positive for dizziness, light-headedness and headaches  Negative for tremors, seizures, syncope, facial asymmetry, speech difficulty, weakness and numbness  Hematological: Negative  Does not bruise/bleed easily  Psychiatric/Behavioral: Positive for confusion (at times) and sleep disturbance  Negative for hallucinations  Memory issues       I personally reviewed the ROS as entered by the medical assistant  *Please note this document was created using voice recognition software and may contain sound-alike word errors  *

## 2020-03-27 NOTE — ASSESSMENT & PLAN NOTE
Longstanding presence, predating her 2017 trauma  As described appears to be predominantly myofascial or tension-type in origin  Currently tolerating a low dose of gabapentin and hopefully can slowly advance her gabapentin to provide overall improved control, as gabapentin will not interfere with her ongoing use of apixaban  --advanced gabapentin 100 mg capsules to 1 capsule 3 times daily for 1 week, then 1 capsule twice daily and 2 at bedtime daily  --scheduled for an upcoming CBC and BMP by PCP  Will add hepatic function panel  --patient to call the office in 2-3 weeks with a status report and to discuss possible further advancement in her gabapentin schedule  She will call before then should she have questions or concerns or difficulties tolerating the slow increase

## 2020-03-27 NOTE — PROGRESS NOTES
Patient ID: Daphne Jones is a 67 y o  female  Assessment/Plan:    Chronic daily headache  Longstanding presence, predating her 2017 trauma  As described appears to be predominantly myofascial or tension-type in origin  Currently tolerating a low dose of gabapentin and hopefully can slowly advance her gabapentin to provide overall improved control, as gabapentin will not interfere with her ongoing use of apixaban  --advanced gabapentin 100 mg capsules to 1 capsule 3 times daily for 1 week, then 1 capsule twice daily and 2 at bedtime daily  --scheduled for an upcoming CBC and BMP by PCP  Will add hepatic function panel  --patient to call the office in 2-3 weeks with a status report and to discuss possible further advancement in her gabapentin schedule  She will call before then should she have questions or concerns or difficulties tolerating the slow increase  Neuralgia  Left super orbital, posttraumatic appearing  Although still present, less problematic than in the recent past   --hopefully the advancing gabapentin schedule also provide benefit here  Numbness and tingling of hands  Intermittent presence  Carpal tunnel syndromes a consideration as to the origin, although patient at this point in time does not exhibit any mechanical signs of median nerve irritation at the wrist (negative Tinel's and Phalen's maneuvers)  --will reschedule patient's upper extremity electrodiagnostic study  She will follow up in 10-12 weeks  Subjective:    HPI  Patient, 67years of age and right-handed, presents for further evaluation regarding several ongoing issues  She was accompanied today by her daughter, her Marixa Christiansen and by cell for on her other daughter, Vikki García  Her 1st issue is that of ongoing headaches  Her headaches are present all day, every day and have been so for a number of years, even predating her trauma in 2017    The headache is described as a tightness, holocranial, exaggerated in the frontal regions  She also has a history of a left supraorbital neuralgia, 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 in the aftermath of her trauma in January of 2017  That in the past was much more significantly apparent although still present, is of a much lesser intensity  Her last issue is that of intermittent pain and tingling involving her hands bilaterally  She describes no specific distribution  The potential for carpal tunnel syndromes are being considered and an EMG/NCV was ordered and scheduled, but patient was unable to attend earlier this month  Recent blood work results reviewed:  -CBC with hemoglobin 14 2, hematocrit 42 9, white count 3 90 platelet count 894  -BMP unremarkable including creatinine 0 90  -TSH suppressed at 0 015 and free T4 elevated at 1 87, resulting in an adjustment in her levothyroxine by her PCP  EMG bilateral upper extremities as noted above missed and will need to be rescheduled  Also reviewed imaging studies from the past   MRI brain done in April of 2017, in the aftermath of her described trauma, was remarkable only for minor chronic small vessel change      Past Medical History:   Diagnosis Date    Allergic rhinitis     Arthritis     Atrial fibrillation ([address] )     Breast cancer (HCC)     Cataracts, bilateral     Chronic headaches     pulsatile daily headaches    Colitis     Coronary artery disease     Diabetes mellitus ([address] )     Disease of thyroid gland     DJD (degenerative joint disease), cervical     Facial neuralgia     left frontal facial post traumatic neuralgia    Fibromyalgia     Glaucoma     History of external beam radiation therapy     8/16/18 to 9/14/2018 Right breast    Hypertension     Hypokalemia     Hypovitaminosis D     Lupus ([address] ) 7/19/2018    Obesity     Osteoarthritis     Prediabetes     [facility] 1/31/2017    Sleep apnea     no cpap    Syncope and collapse 12/1/2018    Vertigo      Past Surgical History: Procedure Laterality Date    BREAST BIOPSY      BREAST LUMPECTOMY      CHOLECYSTECTOMY      COLONOSCOPY  2013    HYSTERECTOMY      MAMMO NEEDLE LOCALIZATION RIGHT (ALL INC) Right 6/21/2018    MAMMO STEREOTACTIC BREAST BIOPSY RIGHT (ALL INC) Right 5/9/2018    THYROIDECTOMY N/A 11/21/2019    Procedure: THYROIDECTOMY, total;  Surgeon: Luis Angel Salinas MD;  [facility];  Service: Surgical Oncology    TONSILLECTOMY      US GUIDED BREAST BIOPSY RIGHT COMPLETE Right 5/9/2018    US GUIDED THYROID BIOPSY  6/12/2019    US GUIDED THYROID BIOPSY  9/13/2019     Social History     Socioeconomic History    Marital status: Single     Spouse name: None    Number of children: None    Years of education: None    Highest education level: None   Occupational History    None   Social Needs    Financial resource strain: None    Food insecurity:     Worry: Never true     Inability: Never true    Transportation needs:     Medical: No     Non-medical: No   Tobacco Use    Smoking status: Never Smoker    Smokeless tobacco: Never Used    Tobacco comment: no smoke exposure   Substance and Sexual Activity    Alcohol use: Yes     Frequency: Monthly or less    Drug use: No    Sexual activity: None   Lifestyle    Physical activity:     Days per week: 0 days     Minutes per session: 0 min    Stress: Very much   Relationships    Social connections:     Talks on phone: Once a week     Gets together: More than three times a week     Attends Hinduism service: More than 4 times per year     Active member of club or organization: Yes     Attends meetings of clubs or organizations: More than 4 times per year     Relationship status: Never     Intimate partner violence:     Fear of current or ex partner: No     Emotionally abused: No     Physically abused: No     Forced sexual activity: No   Other Topics Concern    None   Social History Narrative    None     Family History   Problem Relation Age of Onset    Heart attack Sister     Hypertension Family     Diabetes Family     Stroke Family     Migraines Family     Breast cancer Daughter 28     Allergies   Allergen Reactions    No Known Allergies        Current Outpatient Medications:     apixaban (ELIQUIS) 5 mg, Take 1 tablet (5 mg total) by mouth 2 (two) times a day Restart 11/23/19, Disp: 60 tablet, Rfl: 3    atorvastatin (LIPITOR) 10 mg tablet, Take 1 tablet (10 mg total) by mouth daily, Disp: 90 tablet, Rfl: 1    gabapentin (NEURONTIN) 100 mg capsule, Take 1 capsule (100 mg total) by mouth 2 (two) times a day, Disp: 180 capsule, Rfl: 1    irbesartan-hydrochlorothiazide (AVALIDE) 150-12 5 MG per tablet, Take 1 tablet by mouth daily, Disp: 90 tablet, Rfl: 0    levothyroxine 100 mcg tablet, Take 1 tablet (100 mcg total) by mouth daily, Disp: 30 tablet, Rfl: 1    meclizine (ANTIVERT) 25 mg tablet, Take 1 tablet (25 mg total) by mouth daily, Disp: 30 tablet, Rfl: 1    metFORMIN (GLUCOPHAGE) 500 mg tablet, Take 1 tablet (500 mg total) by mouth 2 (two) times a day with meals, Disp: 180 tablet, Rfl: 2    metoprolol tartrate (LOPRESSOR) 25 mg tablet, Take 1 tablet (25 mg total) by mouth every 12 (twelve) hours, Disp: 180 tablet, Rfl: 0    Objective:    Blood pressure 143/64, pulse 56, resp  rate 14, height 5' 1" (1 549 m), weight 70 5 kg (155 lb 6 4 oz), not currently breastfeeding  Physical Exam  Head normocephalic  Eyes nonicteric  No audible anterior neck bruits  Lungs clear to auscultation  Rhythm irregular  GI (abdomen) soft nontender  Bowel sounds present  No significant lower extremity edema  Negative median Tinel's and Phalen's maneuver bilaterally  Neurological Exam  Alert  Pleasantly and appropriately conversational   Fully oriented  Unremarkable spontaneous gait  Romberg maneuver performed unremarkably  Cranial Nerves:   I: Olfactory sense intact bilaterally  II: Visual fields full to confrontation    Left pupil with evidence of past cataract surgery  Left fundus with marginated discs  Right fundus not well visualized  III,IV,VI: Extraocular muscles EOMI, no nystagmus  V: Masseter and pterygoid strength  Sensation in the V1 through V3 distributions intact to pinprick and light touch bilaterally  VII:  Soft left facial asymmetry, unchanged from the past     VIII: Audition intact to finger rub bilaterally  IX/X: Uvula midline  Soft palate elevation symmetric  XI: Trapezius and SCM strength 5/5 bilaterally  XII: Tongue midline with no atrophy or fasciculations with appropriate movement  Accurate with finger-to-nose and heel-to-shin maneuvers bilaterally  Good symmetrical strength throughout the 4 extremities including fully retained distal median motor function bilaterally  With sensory testing, pin intact throughout, including the median distributions bilaterally  Position intact in the distal 4 extremities  Muscle stretch reflexes bilaterally 1+ at biceps and triceps, bilaterally 1 at brachioradialis, bilaterally 1+ at knees and bilaterally 1 at ankles  Toe response downgoing bilaterally  ROS:    Review of Systems   Constitutional: Negative  Negative for appetite change and fever  HENT: Negative  Negative for hearing loss, tinnitus, trouble swallowing and voice change  Eyes: Negative  Negative for photophobia and pain  Respiratory: Negative  Negative for shortness of breath  Cardiovascular: Negative  Negative for palpitations  Gastrointestinal: Negative  Negative for nausea and vomiting  Endocrine: Negative  Negative for cold intolerance  Genitourinary: Negative  Negative for dysuria, frequency and urgency  Musculoskeletal: Negative  Negative for myalgias and neck pain  Skin: Negative  Negative for rash  Neurological: Positive for dizziness, light-headedness and headaches  Negative for tremors, seizures, syncope, facial asymmetry, speech difficulty, weakness and numbness     Hematological: Negative  Does not bruise/bleed easily  Psychiatric/Behavioral: Positive for confusion (at times) and sleep disturbance  Negative for hallucinations  Memory issues       I personally reviewed the ROS as entered by the medical assistant  *Please note this document was created using voice recognition software and may contain sound-alike word errors  *

## 2020-03-27 NOTE — TELEPHONE ENCOUNTER
Spoke with patient and confirmed her appointment today with Dr Darrell Sy at [facility] here in the [place] office

## 2020-03-27 NOTE — PATIENT INSTRUCTIONS
Gabapentin 100 mg capsules take 1 capsule 3 times daily for 1 week and then 1 capsule twice daily and 2 at bedtime  Reschedule your EMG  Call in 2-3 weeks with a status report and to discuss possible further advancement in your gabapentin schedule  Call before that if he have any questions or concerns

## 2020-04-02 ENCOUNTER — HOSPITAL ENCOUNTER (OUTPATIENT)
Dept: NEUROLOGY | Facility: CLINIC | Age: 80
Discharge: HOME | End: 2020-04-02
Payer: MEDICARE

## 2020-04-02 ENCOUNTER — TELEPHONE (OUTPATIENT)
Dept: FAMILY MEDICINE CLINIC | Facility: CLINIC | Age: 80
End: 2020-04-02

## 2020-04-02 DIAGNOSIS — R20.0 NUMBNESS AND TINGLING OF HAND: ICD-10-CM

## 2020-04-02 DIAGNOSIS — R20.2 NUMBNESS AND TINGLING OF HAND: ICD-10-CM

## 2020-04-02 DIAGNOSIS — G56.03 BILATERAL CARPAL TUNNEL SYNDROME: Primary | ICD-10-CM

## 2020-04-02 PROCEDURE — 95913 NRV CNDJ TEST 13/> STUDIES: CPT | Performed by: PHYSICAL MEDICINE & REHABILITATION

## 2020-04-02 PROCEDURE — 95886 MUSC TEST DONE W/N TEST COMP: CPT | Performed by: PHYSICAL MEDICINE & REHABILITATION

## 2020-05-14 ENCOUNTER — TELEMEDICINE (OUTPATIENT)
Dept: FAMILY MEDICINE CLINIC | Facility: CLINIC | Age: 80
End: 2020-05-14
Payer: MEDICARE

## 2020-05-14 VITALS — TEMPERATURE: 98.9 F | SYSTOLIC BLOOD PRESSURE: 157 MMHG | DIASTOLIC BLOOD PRESSURE: 67 MMHG

## 2020-05-14 DIAGNOSIS — I10 ESSENTIAL HYPERTENSION: Primary | ICD-10-CM

## 2020-05-14 PROCEDURE — 99213 OFFICE O/P EST LOW 20 MIN: CPT | Performed by: FAMILY MEDICINE

## 2020-05-14 RX ORDER — IRBESARTAN AND HYDROCHLOROTHIAZIDE 300; 12.5 MG/1; MG/1
1 TABLET, FILM COATED ORAL DAILY
Qty: 30 TABLET | Refills: 1 | Status: SHIPPED | OUTPATIENT
Start: 2020-05-14 | End: 2020-06-16 | Stop reason: SDUPTHER

## 2020-05-18 DIAGNOSIS — I48.91 ATRIAL FIBRILLATION, UNSPECIFIED TYPE (HCC): ICD-10-CM

## 2020-05-18 DIAGNOSIS — E89.0 POSTOPERATIVE HYPOTHYROIDISM: ICD-10-CM

## 2020-05-18 RX ORDER — LEVOTHYROXINE SODIUM 0.1 MG/1
100 TABLET ORAL DAILY
Qty: 30 TABLET | Refills: 2 | Status: SHIPPED | OUTPATIENT
Start: 2020-05-18 | End: 2020-06-16 | Stop reason: DRUGHIGH

## 2020-05-26 ENCOUNTER — APPOINTMENT (OUTPATIENT)
Dept: LAB | Facility: HOSPITAL | Age: 80
End: 2020-05-26
Payer: MEDICARE

## 2020-05-26 DIAGNOSIS — M79.2 NEURALGIA: ICD-10-CM

## 2020-05-26 DIAGNOSIS — R51.9 CHRONIC DAILY HEADACHE: ICD-10-CM

## 2020-05-26 DIAGNOSIS — D49.7 NONINVASIVE FOLLICULAR NEOPLASM OF THYROID WITH PAPILLARY-LIKE NUCLEAR FEATURES: ICD-10-CM

## 2020-05-26 DIAGNOSIS — I10 ESSENTIAL HYPERTENSION: ICD-10-CM

## 2020-05-26 LAB
ALBUMIN SERPL BCP-MCNC: 4.1 G/DL (ref 3–5.2)
ALP SERPL-CCNC: 84 U/L (ref 43–122)
ALT SERPL W P-5'-P-CCNC: 22 U/L (ref 9–52)
ANION GAP SERPL CALCULATED.3IONS-SCNC: 8 MMOL/L (ref 5–14)
AST SERPL W P-5'-P-CCNC: 21 U/L (ref 14–36)
BASOPHILS # BLD AUTO: 0 THOUSANDS/ΜL (ref 0–0.1)
BASOPHILS NFR BLD AUTO: 1 % (ref 0–1)
BILIRUB DIRECT SERPL-MCNC: 0.1 MG/DL
BILIRUB SERPL-MCNC: 1.4 MG/DL
BUN SERPL-MCNC: 16 MG/DL (ref 5–25)
CALCIUM SERPL-MCNC: 9.6 MG/DL (ref 8.4–10.2)
CHLORIDE SERPL-SCNC: 103 MMOL/L (ref 97–108)
CHOLEST SERPL-MCNC: 129 MG/DL
CO2 SERPL-SCNC: 27 MMOL/L (ref 22–30)
CREAT SERPL-MCNC: 0.85 MG/DL (ref 0.6–1.2)
EOSINOPHIL # BLD AUTO: 0 THOUSAND/ΜL (ref 0–0.4)
EOSINOPHIL NFR BLD AUTO: 0 % (ref 0–6)
ERYTHROCYTE [DISTWIDTH] IN BLOOD BY AUTOMATED COUNT: 14.7 %
GFR SERPL CREATININE-BSD FRML MDRD: 76 ML/MIN/1.73SQ M
GLUCOSE P FAST SERPL-MCNC: 120 MG/DL (ref 70–99)
HCT VFR BLD AUTO: 43.1 % (ref 36–46)
HDLC SERPL-MCNC: 60 MG/DL
HGB BLD-MCNC: 14.4 G/DL (ref 12–16)
LDLC SERPL CALC-MCNC: 51 MG/DL
LYMPHOCYTES # BLD AUTO: 2 THOUSANDS/ΜL (ref 0.5–4)
LYMPHOCYTES NFR BLD AUTO: 41 % (ref 25–45)
MCH RBC QN AUTO: 31.8 PG (ref 26–34)
MCHC RBC AUTO-ENTMCNC: 33.5 G/DL (ref 31–36)
MCV RBC AUTO: 95 FL (ref 80–100)
MONOCYTES # BLD AUTO: 0.5 THOUSAND/ΜL (ref 0.2–0.9)
MONOCYTES NFR BLD AUTO: 11 % (ref 1–10)
NEUTROPHILS # BLD AUTO: 2.3 THOUSANDS/ΜL (ref 1.8–7.8)
NEUTS SEG NFR BLD AUTO: 47 % (ref 45–65)
PLATELET # BLD AUTO: 212 THOUSANDS/UL (ref 150–450)
PMV BLD AUTO: 9.2 FL (ref 8.9–12.7)
POTASSIUM SERPL-SCNC: 3.9 MMOL/L (ref 3.6–5)
PROT SERPL-MCNC: 7.6 G/DL (ref 5.9–8.4)
RBC # BLD AUTO: 4.55 MILLION/UL (ref 4–5.2)
SODIUM SERPL-SCNC: 138 MMOL/L (ref 137–147)
T3 SERPL-MCNC: 0.9 NG/ML (ref 0.6–1.8)
T4 FREE SERPL-MCNC: 1.46 NG/DL (ref 0.76–1.46)
T4 FREE SERPL-MCNC: 1.5 NG/DL (ref 0.76–1.46)
TRIGL SERPL-MCNC: 91 MG/DL
TSH SERPL DL<=0.05 MIU/L-ACNC: 0.02 UIU/ML (ref 0.47–4.68)
WBC # BLD AUTO: 4.9 THOUSAND/UL (ref 4.5–11)

## 2020-05-26 PROCEDURE — 36415 COLL VENOUS BLD VENIPUNCTURE: CPT

## 2020-05-26 PROCEDURE — 84439 ASSAY OF FREE THYROXINE: CPT

## 2020-05-26 PROCEDURE — 80076 HEPATIC FUNCTION PANEL: CPT

## 2020-05-26 PROCEDURE — 84443 ASSAY THYROID STIM HORMONE: CPT

## 2020-05-26 PROCEDURE — 80061 LIPID PANEL: CPT

## 2020-05-26 PROCEDURE — 85025 COMPLETE CBC W/AUTO DIFF WBC: CPT

## 2020-05-26 PROCEDURE — 84480 ASSAY TRIIODOTHYRONINE (T3): CPT

## 2020-05-26 PROCEDURE — 80048 BASIC METABOLIC PNL TOTAL CA: CPT

## 2020-06-02 LAB
LEFT EYE DIABETIC RETINOPATHY: NORMAL
RIGHT EYE DIABETIC RETINOPATHY: NORMAL

## 2020-06-12 ENCOUNTER — OFFICE VISIT (OUTPATIENT)
Dept: SURGICAL ONCOLOGY | Facility: CLINIC | Age: 80
End: 2020-06-12
Payer: MEDICARE

## 2020-06-12 VITALS
HEART RATE: 59 BPM | TEMPERATURE: 98.6 F | RESPIRATION RATE: 16 BRPM | HEIGHT: 61 IN | WEIGHT: 167 LBS | SYSTOLIC BLOOD PRESSURE: 140 MMHG | DIASTOLIC BLOOD PRESSURE: 88 MMHG | BODY MASS INDEX: 31.53 KG/M2

## 2020-06-12 DIAGNOSIS — D49.7 NONINVASIVE FOLLICULAR NEOPLASM OF THYROID WITH PAPILLARY-LIKE NUCLEAR FEATURES: Primary | ICD-10-CM

## 2020-06-12 PROCEDURE — 1036F TOBACCO NON-USER: CPT | Performed by: SURGERY

## 2020-06-12 PROCEDURE — 4040F PNEUMOC VAC/ADMIN/RCVD: CPT | Performed by: SURGERY

## 2020-06-12 PROCEDURE — 3077F SYST BP >= 140 MM HG: CPT | Performed by: SURGERY

## 2020-06-12 PROCEDURE — 3008F BODY MASS INDEX DOCD: CPT | Performed by: SURGERY

## 2020-06-12 PROCEDURE — 2022F DILAT RTA XM EVC RTNOPTHY: CPT | Performed by: SURGERY

## 2020-06-12 PROCEDURE — 99213 OFFICE O/P EST LOW 20 MIN: CPT | Performed by: SURGERY

## 2020-06-12 PROCEDURE — 1160F RVW MEDS BY RX/DR IN RCRD: CPT | Performed by: SURGERY

## 2020-06-12 PROCEDURE — 3079F DIAST BP 80-89 MM HG: CPT | Performed by: SURGERY

## 2020-06-16 ENCOUNTER — OFFICE VISIT (OUTPATIENT)
Dept: FAMILY MEDICINE CLINIC | Facility: CLINIC | Age: 80
End: 2020-06-16
Payer: MEDICARE

## 2020-06-16 ENCOUNTER — TELEPHONE (OUTPATIENT)
Dept: FAMILY MEDICINE CLINIC | Facility: CLINIC | Age: 80
End: 2020-06-16

## 2020-06-16 VITALS
DIASTOLIC BLOOD PRESSURE: 70 MMHG | WEIGHT: 165 LBS | HEART RATE: 54 BPM | BODY MASS INDEX: 30.36 KG/M2 | OXYGEN SATURATION: 99 % | HEIGHT: 62 IN | SYSTOLIC BLOOD PRESSURE: 110 MMHG | TEMPERATURE: 96.7 F

## 2020-06-16 DIAGNOSIS — E78.00 PURE HYPERCHOLESTEROLEMIA: ICD-10-CM

## 2020-06-16 DIAGNOSIS — E55.9 VITAMIN D DEFICIENCY: ICD-10-CM

## 2020-06-16 DIAGNOSIS — I10 ESSENTIAL HYPERTENSION: ICD-10-CM

## 2020-06-16 DIAGNOSIS — M48.02 CERVICAL SPINAL STENOSIS: ICD-10-CM

## 2020-06-16 DIAGNOSIS — E11.9 TYPE 2 DIABETES MELLITUS WITHOUT COMPLICATION, WITHOUT LONG-TERM CURRENT USE OF INSULIN (HCC): Primary | ICD-10-CM

## 2020-06-16 DIAGNOSIS — E89.0 POSTOPERATIVE HYPOTHYROIDISM: ICD-10-CM

## 2020-06-16 LAB — SL AMB POCT HEMOGLOBIN AIC: 6.2 (ref ?–6.5)

## 2020-06-16 PROCEDURE — 1160F RVW MEDS BY RX/DR IN RCRD: CPT | Performed by: FAMILY MEDICINE

## 2020-06-16 PROCEDURE — 2022F DILAT RTA XM EVC RTNOPTHY: CPT | Performed by: FAMILY MEDICINE

## 2020-06-16 PROCEDURE — 3008F BODY MASS INDEX DOCD: CPT | Performed by: FAMILY MEDICINE

## 2020-06-16 PROCEDURE — 1036F TOBACCO NON-USER: CPT | Performed by: FAMILY MEDICINE

## 2020-06-16 PROCEDURE — 3044F HG A1C LEVEL LT 7.0%: CPT | Performed by: FAMILY MEDICINE

## 2020-06-16 PROCEDURE — 4010F ACE/ARB THERAPY RXD/TAKEN: CPT | Performed by: FAMILY MEDICINE

## 2020-06-16 PROCEDURE — 99214 OFFICE O/P EST MOD 30 MIN: CPT | Performed by: FAMILY MEDICINE

## 2020-06-16 PROCEDURE — 3074F SYST BP LT 130 MM HG: CPT | Performed by: FAMILY MEDICINE

## 2020-06-16 PROCEDURE — 3078F DIAST BP <80 MM HG: CPT | Performed by: FAMILY MEDICINE

## 2020-06-16 PROCEDURE — 4040F PNEUMOC VAC/ADMIN/RCVD: CPT | Performed by: FAMILY MEDICINE

## 2020-06-16 RX ORDER — GABAPENTIN 100 MG/1
100 CAPSULE ORAL 2 TIMES DAILY
Qty: 180 CAPSULE | Refills: 1 | Status: CANCELLED | OUTPATIENT
Start: 2020-06-16

## 2020-06-16 RX ORDER — GABAPENTIN 100 MG/1
100 CAPSULE ORAL 2 TIMES DAILY
Qty: 180 CAPSULE | Refills: 1 | Status: SHIPPED | OUTPATIENT
Start: 2020-06-16 | End: 2020-06-26 | Stop reason: SDUPTHER

## 2020-06-16 RX ORDER — IRBESARTAN AND HYDROCHLOROTHIAZIDE 300; 12.5 MG/1; MG/1
1 TABLET, FILM COATED ORAL DAILY
Qty: 30 TABLET | Refills: 2 | Status: CANCELLED | OUTPATIENT
Start: 2020-06-16

## 2020-06-16 RX ORDER — IRBESARTAN AND HYDROCHLOROTHIAZIDE 300; 12.5 MG/1; MG/1
1 TABLET, FILM COATED ORAL DAILY
Qty: 30 TABLET | Refills: 2 | Status: SHIPPED | OUTPATIENT
Start: 2020-06-16 | End: 2020-10-01 | Stop reason: SDUPTHER

## 2020-06-16 RX ORDER — LEVOTHYROXINE SODIUM 88 UG/1
88 TABLET ORAL DAILY
Qty: 30 TABLET | Refills: 2 | Status: SHIPPED | OUTPATIENT
Start: 2020-06-16 | End: 2020-09-30 | Stop reason: SDUPTHER

## 2020-06-18 ENCOUNTER — TELEPHONE (OUTPATIENT)
Dept: FAMILY MEDICINE CLINIC | Facility: CLINIC | Age: 80
End: 2020-06-18

## 2020-06-18 DIAGNOSIS — M79.2 NEURALGIA: ICD-10-CM

## 2020-06-18 DIAGNOSIS — R20.0 NUMBNESS AND TINGLING OF HAND: ICD-10-CM

## 2020-06-18 DIAGNOSIS — G44.219 EPISODIC TENSION-TYPE HEADACHE, NOT INTRACTABLE: Primary | ICD-10-CM

## 2020-06-18 DIAGNOSIS — R20.2 NUMBNESS AND TINGLING OF HAND: ICD-10-CM

## 2020-06-25 ENCOUNTER — TELEPHONE (OUTPATIENT)
Dept: NEUROLOGY | Facility: CLINIC | Age: 80
End: 2020-06-25

## 2020-06-26 ENCOUNTER — OFFICE VISIT (OUTPATIENT)
Dept: NEUROLOGY | Facility: CLINIC | Age: 80
End: 2020-06-26
Payer: MEDICARE

## 2020-06-26 VITALS
BODY MASS INDEX: 31.13 KG/M2 | WEIGHT: 170.2 LBS | DIASTOLIC BLOOD PRESSURE: 72 MMHG | SYSTOLIC BLOOD PRESSURE: 151 MMHG | TEMPERATURE: 98.1 F | HEART RATE: 63 BPM

## 2020-06-26 DIAGNOSIS — M48.02 CERVICAL SPINAL STENOSIS: ICD-10-CM

## 2020-06-26 DIAGNOSIS — R20.0 NUMBNESS AND TINGLING OF HAND: ICD-10-CM

## 2020-06-26 DIAGNOSIS — G44.219 EPISODIC TENSION-TYPE HEADACHE, NOT INTRACTABLE: ICD-10-CM

## 2020-06-26 DIAGNOSIS — M79.2 NEURALGIA: ICD-10-CM

## 2020-06-26 DIAGNOSIS — R51.9 CHRONIC DAILY HEADACHE: Primary | ICD-10-CM

## 2020-06-26 DIAGNOSIS — R20.2 NUMBNESS AND TINGLING OF HAND: ICD-10-CM

## 2020-06-26 PROCEDURE — 3078F DIAST BP <80 MM HG: CPT | Performed by: PSYCHIATRY & NEUROLOGY

## 2020-06-26 PROCEDURE — 99214 OFFICE O/P EST MOD 30 MIN: CPT | Performed by: PSYCHIATRY & NEUROLOGY

## 2020-06-26 PROCEDURE — 1160F RVW MEDS BY RX/DR IN RCRD: CPT | Performed by: PSYCHIATRY & NEUROLOGY

## 2020-06-26 PROCEDURE — 4040F PNEUMOC VAC/ADMIN/RCVD: CPT | Performed by: PSYCHIATRY & NEUROLOGY

## 2020-06-26 PROCEDURE — 3077F SYST BP >= 140 MM HG: CPT | Performed by: PSYCHIATRY & NEUROLOGY

## 2020-06-26 PROCEDURE — 1036F TOBACCO NON-USER: CPT | Performed by: PSYCHIATRY & NEUROLOGY

## 2020-06-26 PROCEDURE — 3044F HG A1C LEVEL LT 7.0%: CPT | Performed by: PSYCHIATRY & NEUROLOGY

## 2020-06-26 PROCEDURE — 2022F DILAT RTA XM EVC RTNOPTHY: CPT | Performed by: PSYCHIATRY & NEUROLOGY

## 2020-06-26 RX ORDER — GABAPENTIN 100 MG/1
CAPSULE ORAL
Qty: 540 CAPSULE | Refills: 1 | Status: SHIPPED | OUTPATIENT
Start: 2020-06-26 | End: 2021-03-09 | Stop reason: SDUPTHER

## 2020-07-21 DIAGNOSIS — I10 ESSENTIAL HYPERTENSION: ICD-10-CM

## 2020-08-01 ENCOUNTER — NURSE TRIAGE (OUTPATIENT)
Dept: OTHER | Facility: OTHER | Age: 80
End: 2020-08-01

## 2020-08-01 NOTE — TELEPHONE ENCOUNTER
Reason for Disposition   [8] Systolic BP  >= 488 OR Diastolic >= 086 AND [8] cardiac or neurologic symptoms (e g , chest pain, difficulty breathing, unsteady gait, blurred vision)    Answer Assessment - Initial Assessment Questions  1  BLOOD PRESSURE: "What is the blood pressure?" "Did you take at least two measurements 5 minutes apart?"      Blood pressure of 187/190 within the hour  Had her recheck BP right now and it is 213/112     2  ONSET: "When did you take your blood pressure?"      Today is the highest      3  HOW: "How did you obtain the blood pressure?" (e g , visiting nurse, automatic home BP monitor)      Home BP monitor  4  HISTORY: "Do you have a history of high blood pressure?"      Hypertension    5  MEDICATIONS: "Are you taking any medications for blood pressure?" "Have you missed any doses recently?"      Metoprolol  6  OTHER SYMPTOMS: "Do you have any symptoms?" (e g , headache, chest pain, blurred vision, difficulty breathing, weakness)      Headache  No other symptoms other then a headache at this time      Protocols used: HIGH BLOOD PRESSURE-ADULT-AH

## 2020-08-01 NOTE — TELEPHONE ENCOUNTER
Regarding: /190  ----- Message from Shari Hillman sent at 8/1/2020 11:37 AM EDT -----  "We are currently out of town  I just checked my mother's BP and it was 187/190    What can we do to lower it?"

## 2020-08-03 ENCOUNTER — OFFICE VISIT (OUTPATIENT)
Dept: FAMILY MEDICINE CLINIC | Facility: CLINIC | Age: 80
End: 2020-08-03
Payer: MEDICARE

## 2020-08-03 VITALS
WEIGHT: 171 LBS | BODY MASS INDEX: 31.47 KG/M2 | OXYGEN SATURATION: 97 % | TEMPERATURE: 98.1 F | HEIGHT: 62 IN | RESPIRATION RATE: 16 BRPM | HEART RATE: 53 BPM | DIASTOLIC BLOOD PRESSURE: 90 MMHG | SYSTOLIC BLOOD PRESSURE: 132 MMHG

## 2020-08-03 DIAGNOSIS — I10 ESSENTIAL HYPERTENSION: ICD-10-CM

## 2020-08-03 DIAGNOSIS — I48.0 PAROXYSMAL ATRIAL FIBRILLATION (HCC): Primary | ICD-10-CM

## 2020-08-03 PROCEDURE — 1036F TOBACCO NON-USER: CPT | Performed by: FAMILY MEDICINE

## 2020-08-03 PROCEDURE — 93000 ELECTROCARDIOGRAM COMPLETE: CPT | Performed by: FAMILY MEDICINE

## 2020-08-03 PROCEDURE — 4040F PNEUMOC VAC/ADMIN/RCVD: CPT | Performed by: FAMILY MEDICINE

## 2020-08-03 PROCEDURE — 99214 OFFICE O/P EST MOD 30 MIN: CPT | Performed by: FAMILY MEDICINE

## 2020-08-03 PROCEDURE — 4010F ACE/ARB THERAPY RXD/TAKEN: CPT | Performed by: FAMILY MEDICINE

## 2020-08-03 PROCEDURE — 3075F SYST BP GE 130 - 139MM HG: CPT | Performed by: FAMILY MEDICINE

## 2020-08-03 PROCEDURE — 3008F BODY MASS INDEX DOCD: CPT | Performed by: FAMILY MEDICINE

## 2020-08-03 PROCEDURE — 1160F RVW MEDS BY RX/DR IN RCRD: CPT | Performed by: FAMILY MEDICINE

## 2020-08-03 PROCEDURE — 3044F HG A1C LEVEL LT 7.0%: CPT | Performed by: FAMILY MEDICINE

## 2020-08-03 PROCEDURE — 3080F DIAST BP >= 90 MM HG: CPT | Performed by: FAMILY MEDICINE

## 2020-08-03 PROCEDURE — 2022F DILAT RTA XM EVC RTNOPTHY: CPT | Performed by: FAMILY MEDICINE

## 2020-08-03 RX ORDER — AMLODIPINE BESYLATE 5 MG/1
5 TABLET ORAL DAILY
Qty: 30 TABLET | Refills: 1 | Status: SHIPPED | OUTPATIENT
Start: 2020-08-03 | End: 2020-09-30 | Stop reason: SDUPTHER

## 2020-08-03 RX ORDER — OLOPATADINE HYDROCHLORIDE 7 MG/ML
SOLUTION OPHTHALMIC
COMMUNITY
Start: 2020-07-22 | End: 2021-10-06

## 2020-08-03 NOTE — PROGRESS NOTES
Subjective:   Chief Complaint   Patient presents with    Hypertension     patient has been having elevated blood pressure top over 200 since friday 7/31/20    Headache    Dizziness        Patient ID: Richard Villegas is a 67 y o  female  The patient known to have history of for chronic hypertension and she been having her blood pressure been elevated the patient has been monitor blood pressure and been running high averaged between 400-041 systolic and the 80 to 95 head diastolic and patient has been having some headache and dizziness deny any chest pain short of breath no palpitation no dyspnea on exertion and no lower extremity edema patient been compliant medication but she is not compliant with the low salt diet deny any blood in the stool      The following portions of the patient's history were reviewed and updated as appropriate: allergies, current medications, past family history, past medical history, past social history, past surgical history and problem list     Review of Systems   Constitutional: Negative for activity change, appetite change, fatigue and fever  HENT: Negative for congestion, ear pain, sinus pressure, sinus pain and sore throat  Eyes: Negative for pain, discharge, redness and itching  Respiratory: Negative for cough, chest tightness, shortness of breath and stridor  Cardiovascular: Negative for chest pain, palpitations and leg swelling  Gastrointestinal: Negative for abdominal pain, blood in stool, constipation, diarrhea and nausea  Genitourinary: Negative for dysuria, flank pain, frequency and hematuria  Musculoskeletal: Negative for back pain, joint swelling and neck pain  Skin: Negative for pallor and rash  Neurological: Positive for dizziness and headaches  Negative for tremors, weakness and numbness  Hematological: Does not bruise/bleed easily               Objective:  Vitals:    08/03/20 0919   BP: 132/90   BP Location: Left arm   Patient Position: Sitting   Cuff Size: Large   Pulse: (!) 53   Resp: 16   Temp: 98 1 °F (36 7 °C)   TempSrc: Tympanic   SpO2: 97%   Weight: 77 6 kg (171 lb)   Height: 5' 2"      Physical Exam   Constitutional: She is oriented to person, place, and time  She appears well-developed  No distress  HENT:   Head: Normocephalic  Right Ear: Tympanic membrane, external ear and ear canal normal    Left Ear: Tympanic membrane, external ear and ear canal normal    Nose: No rhinorrhea or congestion  Mouth/Throat: Mucous membranes are moist  No oropharyngeal exudate or posterior oropharyngeal erythema  Oropharynx is clear  Eyes: Conjunctivae are normal  Right eye exhibits no discharge  Left eye exhibits no discharge  Neck: Normal range of motion  Neck supple  No JVD present  Cardiovascular: Normal rate, regular rhythm and normal heart sounds  Exam reveals no gallop  No murmur heard  Pulmonary/Chest: Effort normal and breath sounds normal  No stridor  No respiratory distress  She has no wheezes  She has no rales  She exhibits no tenderness  Abdominal: Soft  She exhibits no distension and no mass  There is no abdominal tenderness  There is no rebound  Musculoskeletal:         General: No tenderness  Lymphadenopathy:     She has no cervical adenopathy  Neurological: She is alert and oriented to person, place, and time  She displays no weakness and normal reflexes  No cranial nerve deficit  Gait normal    Skin: Skin is warm  No rash noted  She is not diaphoretic  No erythema           Assessment/Plan:    Hypertension  Chronic uncontrolled symptomatic with headache and dizziness will add the amlodipine 5 mg once a day continue metoprolol 25 mg twice a day and continue a Avalide 300-12 5  Proper use of medication possible side effect discussed the patient low-salt diet increase physical activity the discussed the patient if symptom get worse to call the office or go to emergency room       Diagnoses and all orders for this visit:    Paroxysmal atrial fibrillation (HCC)    Essential hypertension  -     amLODIPine (NORVASC) 5 mg tablet;  Take 1 tablet (5 mg total) by mouth daily  -     POCT ECG    Other orders  -     Pazeo 0 7 % SOLN

## 2020-08-03 NOTE — ASSESSMENT & PLAN NOTE
Chronic uncontrolled symptomatic with headache and dizziness will add the amlodipine 5 mg once a day continue metoprolol 25 mg twice a day and continue a Avalide 300-12 5  Proper use of medication possible side effect discussed the patient low-salt diet increase physical activity the discussed the patient if symptom get worse to call the office or go to emergency room

## 2020-08-31 DIAGNOSIS — I48.91 ATRIAL FIBRILLATION, UNSPECIFIED TYPE (HCC): ICD-10-CM

## 2020-08-31 RX ORDER — APIXABAN 5 MG/1
TABLET, FILM COATED ORAL
Qty: 60 TABLET | Refills: 1 | Status: SHIPPED | OUTPATIENT
Start: 2020-08-31 | End: 2020-10-28 | Stop reason: SDUPTHER

## 2020-09-04 ENCOUNTER — TELEPHONE (OUTPATIENT)
Dept: NEUROLOGY | Facility: CLINIC | Age: 80
End: 2020-09-04

## 2020-09-04 NOTE — TELEPHONE ENCOUNTER
Confirmed 9/8/2020 8AM [address] at [facility]  Registration completed  Covid-screening questions completed  Patient's daughter will accompany her to the office visit  Patient verbalized understanding for consents and verbal consents obtained for the following documents (Financial Policy and Insurance authorization)  Aware of all policies - mask, visitor, temperature and no show fee

## 2020-09-08 ENCOUNTER — OFFICE VISIT (OUTPATIENT)
Dept: NEUROLOGY | Facility: CLINIC | Age: 80
End: 2020-09-08
Payer: MEDICARE

## 2020-09-08 VITALS
DIASTOLIC BLOOD PRESSURE: 74 MMHG | BODY MASS INDEX: 32.02 KG/M2 | SYSTOLIC BLOOD PRESSURE: 136 MMHG | HEART RATE: 55 BPM | WEIGHT: 174 LBS | HEIGHT: 62 IN

## 2020-09-08 DIAGNOSIS — G56.02 CARPAL TUNNEL SYNDROME OF LEFT WRIST: ICD-10-CM

## 2020-09-08 DIAGNOSIS — G50.0 SUPRAORBITAL NEURALGIA: ICD-10-CM

## 2020-09-08 DIAGNOSIS — G44.329 CHRONIC POST-TRAUMATIC HEADACHE, NOT INTRACTABLE: Primary | ICD-10-CM

## 2020-09-08 PROCEDURE — 99214 OFFICE O/P EST MOD 30 MIN: CPT | Performed by: PHYSICIAN ASSISTANT

## 2020-09-08 RX ORDER — CHLORHEXIDINE GLUCONATE 0.12 MG/ML
RINSE ORAL
COMMUNITY
Start: 2020-09-01

## 2020-09-08 RX ORDER — IBUPROFEN 800 MG/1
TABLET ORAL
COMMUNITY
Start: 2020-09-01 | End: 2020-09-16 | Stop reason: ALTCHOICE

## 2020-09-08 RX ORDER — CLINDAMYCIN HYDROCHLORIDE 300 MG/1
300 CAPSULE ORAL 2 TIMES DAILY
COMMUNITY
Start: 2020-09-01 | End: 2021-08-16 | Stop reason: ALTCHOICE

## 2020-09-08 NOTE — PROGRESS NOTES
Patient ID: Michell Turner is a 67 y o  female  Assessment/Plan:     Diagnoses and all orders for this visit:    Chronic post-traumatic headache, not intractable    Carpal tunnel syndrome of left wrist    Supraorbital neuralgia    Other orders  -     clindamycin (CLEOCIN) 300 MG capsule; Take 300 mg by mouth 2 (two) times a day   -     chlorhexidine (PERIDEX) 0 12 % solution  -     ibuprofen (MOTRIN) 800 mg tablet         Since last seen headaches are stable  She denies any new or worsening neurologic symptoms, and no focal or lateralizing deficits  Since there are no new complaints and gabapentin is working well, she will continue the current dose at 200 mg t i d   Any doses higher than that caused side effects  I discussed carpal tunnel-like sxs, in her left worse than right, and if she would like an orthopedic surgery consultation or physical therapy she should let me know  She declined these options now because her activities of daily living are not interrupted by her symptoms  She will also let me know if she needs anything further in regard to headache prevention or abortive treatment  Would suggest Tylenol, and staying away from ibuprofen if possible due to current Eliquis therapy, although infrequent ibuprofen use is okay  Fioricet would be another option  Also discussed supraorbital nerve block as a potential treatment  Discussed staying as active as possible both physically and cognitively to maintain current function  The patient should not hesitate to call me prior to her follow up with any questions or concerns  The patient was instructed to urgently call 911 or present to the nearest emergency room with any new or worsening neurological deficits  Subjective:    HPI    Ms Michell Turner is an extremely pleasant 67-year-old right-handed female here for neurological follow-up  Since last seen in June by   [facility] [place] she has been doing fine in terms of headaches    She continues to have tingling in the left hand, sometimes the right, secondary to carpal tunnel  She wears braces bilaterally every day  She denies dropping things or decreased  strength  She states she used to lift heavy things when she worked for [employer] before [employer], but now she does not work because of the pandemic  She is not overly bothered by either her headaches or the carpal tunnel syndrome  She has constant numbness and tingling in the left hand, but she states it does not bother her  Prior documentation:  Additional diagnoses of hypertension, diabetes type 2 and paroxysmal atrial fibrillation returns to review the status of several issues  Her 1st issue is that of longstanding difficulties with chronic daily headache  That has been apparent for number of years even predating her trauma in 2017  She has done well from that standpoint and satisfied with the control on her current gabapentin schedule of 100 mg 3 times daily  She also has a history of a left supraorbital neuralgia 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 in the aftermath of her trauma in January 2017  Over time has become less evident, although still present and at times problematic  Plenty of room for further advancement in her gabapentin schedule for hopefully yet better control  She also has a history of intermittent numbness tingling and discomfort involving her hands, much more so on the left  Carpal tunnel syndromes considered and she underwent electrodiagnostic evaluation  The EMG/NCV demonstrated evidence of mild left carpal tunnel syndrome  No more wide ranging abnormalities apparent      Studies reviewed:  -EMG/NCV as above  -Recent blood work personally reviewed  PCP is ordering this on a regular basis      The following portions of the patient's history were reviewed and updated as appropriate:   She  has a past medical history of Allergic rhinitis, Arthritis, Atrial fibrillation ([address] ), Breast cancer ([facility] [address] ), Cataracts, bilateral, Chronic headaches, Colitis, Coronary artery disease, Diabetes mellitus ([address] ), Disease of thyroid gland, DJD (degenerative joint disease), cervical, Facial neuralgia, Fibromyalgia, Glaucoma, History of external beam radiation therapy, Hypertension, Hypokalemia, Hypovitaminosis D, Lupus ([address] ) (7/19/2018), Obesity, Osteoarthritis, Prediabetes, SDH (subdural hematoma) ([address] ) (1/31/2017), Sleep apnea, Syncope and collapse (12/1/2018), and Vertigo    She   Patient Active Problem List    Diagnosis Date Noted    Chronic post-traumatic headache, not intractable 09/08/2020    Supraorbital neuralgia 09/08/2020    Carpal tunnel syndrome     Chronic daily headache 03/27/2020    Postoperative hypothyroidism 12/31/2019    Numbness and tingling of hands 12/31/2019    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/04/2019    Encounter for well adult exam with abnormal findings 10/14/2019    Pain of left lower extremity 10/14/2019    Malignant neoplasm of central portion of right breast in female, estrogen receptor positive ([address] ) 05/21/2019    Bug bite 05/21/2019    Pre-op examination 02/25/2019    Rib pain on right side 02/25/2019    Bunion of great toe of right foot 01/16/2019    Neuralgia 12/13/2018    Pure hypercholesterolemia 12/01/2018    BMI 30 0-30 9,adult 10/11/2018    Pre-operative clearance 08/21/2018    Chronic coronary artery disease 07/19/2018    Headache 07/19/2018    Obesity 07/19/2018    Osteoarthritis of knee 07/19/2018    Vertigo 07/19/2018    Intraductal carcinoma in situ of right breast 06/01/2018    Abnormal mammogram 05/08/2018    Long term current use of anticoagulant therapy 04/23/2018    Noncompliance with treatment 01/15/2018    Osteoarthritis of finger of right hand 01/15/2018    Paroxysmal atrial fibrillation ([address] ) 10/10/2017    Cervical spinal stenosis 07/19/2017    Thyroid nodule 04/11/2017    Fracture of multiple ribs 02/13/2017    Closed fracture of maxilla with routine healing 01/31/2017    Vitamin D deficiency 01/27/2017    Hypokalemia 10/19/2016    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    Conduction disorder of the heart 04/23/2014    DDD (degenerative disc disease), cervical 04/23/2014    Dizziness and giddiness 04/23/2014    Low back pain 04/23/2014    Type 2 diabetes mellitus without complication, without long-term current use of insulin ([facility] [address] ) 04/23/2014    Hypertension 08/24/2012    Degeneration of intervertebral disc 05/20/2008     She  has a past surgical history that includes Colonoscopy (2013); Hysterectomy; Tonsillectomy; Cholecystectomy; Breast biopsy; Breast lumpectomy; US guided breast biopsy right complete (Right, 5/9/2018); Mammo stereotactic breast biopsy right (all inc) (Right, 5/9/2018); Mammo needle localization right (all inc) (Right, 6/21/2018); US guided thyroid biopsy (6/12/2019); US guided thyroid biopsy (9/13/2019); and Thyroidectomy (N/A, 11/21/2019)  Her family history includes Breast cancer (age of onset: 28) in her daughter; Diabetes in her family; Heart attack in her sister; Hypertension in her family; Migraines in her family; Stroke in her family  She  reports that she has never smoked  She has never used smokeless tobacco  She reports current alcohol use  She reports that she does not use drugs    Current Outpatient Medications   Medication Sig Dispense Refill    amLODIPine (NORVASC) 5 mg tablet Take 1 tablet (5 mg total) by mouth daily 30 tablet 1    atorvastatin (LIPITOR) 10 mg tablet Take 1 tablet (10 mg total) by mouth daily 90 tablet 1    chlorhexidine (PERIDEX) 0 12 % solution       clindamycin (CLEOCIN) 300 MG capsule Take 300 mg by mouth 2 (two) times a day       Eliquis 5 MG Take 1 tablet by mouth twice a day 60 tablet 1    gabapentin (NEURONTIN) 100 mg capsule By mouth take 2 capsules 3 times daily or as directed 540 capsule 1    ibuprofen (MOTRIN) 800 mg tablet       irbesartan-hydrochlorothiazide (AVALIDE) 300-12 5 MG per tablet Take 1 tablet by mouth daily 30 tablet 2    levothyroxine 88 mcg tablet Take 1 tablet (88 mcg total) by mouth daily 30 tablet 2    meclizine (ANTIVERT) 25 mg tablet Take 1 tablet (25 mg total) by mouth daily (Patient taking differently: Take 25 mg by mouth as needed for dizziness ) 30 tablet 1    metFORMIN (GLUCOPHAGE) 500 mg tablet Take 1 tablet (500 mg total) by mouth 2 (two) times a day with meals 180 tablet 1    metoprolol tartrate (LOPRESSOR) 25 mg tablet Take 1 tablet (25 mg total) by mouth every 12 (twelve) hours 180 tablet 0    Pazeo 0 7 % SOLN        No current facility-administered medications for this visit  She is allergic to no known allergies            Objective:    Blood pressure 136/74, pulse 55, height 5' 2" (1 575 m), weight 78 9 kg (174 lb), not currently breastfeeding  Body mass index is 31 83 kg/m²  Physical Exam    Neurological Exam  On neurologic exam, the patient is alert and oriented to time and place  Speech is fluent and articulate, and the patient follows commands appropriately  Judgment and affect appear normal  Pupils are equally round and reactive to light and extraocular muscles are intact without nystagmus  Face is symmetric, and tongue, uvula, and palate are midline  Facial sensation is normal and symmetric, in all 3 divisions of the trigeminal nerve  Hearing is intact  Motor examination reveals intact strength throughout, except in the left hand:  5-/5 finger abduction  She is wearing braces on both hands  No sensory changes to light touch in both upper extremities  Normal gait is steady  She cannot perform tandem gait  Romberg is negative  ROS:    Review of Systems   Constitutional: Negative  Negative for appetite change and fever  HENT: Negative  Negative for hearing loss, tinnitus, trouble swallowing and voice change  Eyes: Negative  Negative for photophobia and pain     Respiratory: Negative  Negative for shortness of breath  Cardiovascular: Negative  Negative for palpitations  Gastrointestinal: Negative  Negative for nausea and vomiting  Endocrine: Negative  Negative for cold intolerance  Genitourinary: Negative  Negative for dysuria, frequency and urgency  Musculoskeletal: Negative  Negative for myalgias and neck pain  Skin: Negative  Negative for rash  Neurological: Positive for headaches  Negative for dizziness, tremors, seizures, syncope, facial asymmetry, speech difficulty, weakness, light-headedness and numbness  Hematological: Negative  Does not bruise/bleed easily  Psychiatric/Behavioral: Negative  Negative for confusion, hallucinations and sleep disturbance  The following portions of the patient's history were reviewed and updated as appropriate: allergies, current medications/ medication history, past family history, past medical history, past social history, past surgical history and problem list     Review of systems was reviewed and otherwise unremarkable from a neurological perspective

## 2020-09-12 ENCOUNTER — APPOINTMENT (OUTPATIENT)
Dept: LAB | Facility: HOSPITAL | Age: 80
End: 2020-09-12
Payer: MEDICARE

## 2020-09-12 DIAGNOSIS — E89.0 POSTOPERATIVE HYPOTHYROIDISM: ICD-10-CM

## 2020-09-12 DIAGNOSIS — E55.9 VITAMIN D DEFICIENCY: ICD-10-CM

## 2020-09-12 DIAGNOSIS — E11.9 TYPE 2 DIABETES MELLITUS WITHOUT COMPLICATION, WITHOUT LONG-TERM CURRENT USE OF INSULIN (HCC): ICD-10-CM

## 2020-09-12 DIAGNOSIS — I10 ESSENTIAL HYPERTENSION: ICD-10-CM

## 2020-09-12 LAB
ANION GAP SERPL CALCULATED.3IONS-SCNC: 8 MMOL/L (ref 4–13)
BASOPHILS # BLD AUTO: 0.02 THOUSANDS/ΜL (ref 0–0.1)
BASOPHILS NFR BLD AUTO: 0 % (ref 0–1)
BUN SERPL-MCNC: 15 MG/DL (ref 5–25)
CALCIUM SERPL-MCNC: 8.7 MG/DL (ref 8.3–10.1)
CHLORIDE SERPL-SCNC: 103 MMOL/L (ref 100–108)
CO2 SERPL-SCNC: 29 MMOL/L (ref 21–32)
CREAT SERPL-MCNC: 0.95 MG/DL (ref 0.6–1.3)
EOSINOPHIL # BLD AUTO: 0.01 THOUSAND/ΜL (ref 0–0.61)
EOSINOPHIL NFR BLD AUTO: 0 % (ref 0–6)
ERYTHROCYTE [DISTWIDTH] IN BLOOD BY AUTOMATED COUNT: 12.8 % (ref 11.6–15.1)
GFR SERPL CREATININE-BSD FRML MDRD: 66 ML/MIN/1.73SQ M
GLUCOSE P FAST SERPL-MCNC: 104 MG/DL (ref 65–99)
HCT VFR BLD AUTO: 42.7 % (ref 34.8–46.1)
HGB BLD-MCNC: 13.9 G/DL (ref 11.5–15.4)
IMM GRANULOCYTES # BLD AUTO: 0.01 THOUSAND/UL (ref 0–0.2)
IMM GRANULOCYTES NFR BLD AUTO: 0 % (ref 0–2)
LYMPHOCYTES # BLD AUTO: 1.94 THOUSANDS/ΜL (ref 0.6–4.47)
LYMPHOCYTES NFR BLD AUTO: 39 % (ref 14–44)
MCH RBC QN AUTO: 31.6 PG (ref 26.8–34.3)
MCHC RBC AUTO-ENTMCNC: 32.6 G/DL (ref 31.4–37.4)
MCV RBC AUTO: 97 FL (ref 82–98)
MONOCYTES # BLD AUTO: 0.49 THOUSAND/ΜL (ref 0.17–1.22)
MONOCYTES NFR BLD AUTO: 10 % (ref 4–12)
NEUTROPHILS # BLD AUTO: 2.53 THOUSANDS/ΜL (ref 1.85–7.62)
NEUTS SEG NFR BLD AUTO: 51 % (ref 43–75)
NRBC BLD AUTO-RTO: 0 /100 WBCS
PLATELET # BLD AUTO: 205 THOUSANDS/UL (ref 149–390)
PMV BLD AUTO: 10.4 FL (ref 8.9–12.7)
POTASSIUM SERPL-SCNC: 3.6 MMOL/L (ref 3.5–5.3)
RBC # BLD AUTO: 4.4 MILLION/UL (ref 3.81–5.12)
SODIUM SERPL-SCNC: 140 MMOL/L (ref 136–145)
TSH SERPL DL<=0.05 MIU/L-ACNC: 0.41 UIU/ML (ref 0.36–3.74)
WBC # BLD AUTO: 5 THOUSAND/UL (ref 4.31–10.16)

## 2020-09-12 PROCEDURE — 80048 BASIC METABOLIC PNL TOTAL CA: CPT

## 2020-09-12 PROCEDURE — 85025 COMPLETE CBC W/AUTO DIFF WBC: CPT

## 2020-09-12 PROCEDURE — 36415 COLL VENOUS BLD VENIPUNCTURE: CPT

## 2020-09-12 PROCEDURE — 84443 ASSAY THYROID STIM HORMONE: CPT

## 2020-09-16 ENCOUNTER — OFFICE VISIT (OUTPATIENT)
Dept: FAMILY MEDICINE CLINIC | Facility: CLINIC | Age: 80
End: 2020-09-16
Payer: MEDICARE

## 2020-09-16 VITALS
HEIGHT: 62 IN | DIASTOLIC BLOOD PRESSURE: 80 MMHG | BODY MASS INDEX: 32.39 KG/M2 | RESPIRATION RATE: 16 BRPM | TEMPERATURE: 97.9 F | SYSTOLIC BLOOD PRESSURE: 134 MMHG | WEIGHT: 176 LBS | OXYGEN SATURATION: 98 % | HEART RATE: 62 BPM

## 2020-09-16 DIAGNOSIS — G89.29 CHRONIC RIGHT-SIDED LOW BACK PAIN WITH RIGHT-SIDED SCIATICA: ICD-10-CM

## 2020-09-16 DIAGNOSIS — M54.41 CHRONIC RIGHT-SIDED LOW BACK PAIN WITH RIGHT-SIDED SCIATICA: ICD-10-CM

## 2020-09-16 DIAGNOSIS — E78.00 PURE HYPERCHOLESTEROLEMIA: ICD-10-CM

## 2020-09-16 DIAGNOSIS — I10 ESSENTIAL HYPERTENSION: ICD-10-CM

## 2020-09-16 DIAGNOSIS — Z23 NEED FOR INFLUENZA VACCINATION: Primary | ICD-10-CM

## 2020-09-16 DIAGNOSIS — Z79.01 LONG TERM CURRENT USE OF ANTICOAGULANT THERAPY: ICD-10-CM

## 2020-09-16 DIAGNOSIS — E55.9 VITAMIN D DEFICIENCY: ICD-10-CM

## 2020-09-16 DIAGNOSIS — I48.0 PAROXYSMAL ATRIAL FIBRILLATION (HCC): ICD-10-CM

## 2020-09-16 DIAGNOSIS — E11.9 TYPE 2 DIABETES MELLITUS WITHOUT COMPLICATION, WITHOUT LONG-TERM CURRENT USE OF INSULIN (HCC): ICD-10-CM

## 2020-09-16 PROCEDURE — G0008 ADMIN INFLUENZA VIRUS VAC: HCPCS

## 2020-09-16 PROCEDURE — 90662 IIV NO PRSV INCREASED AG IM: CPT

## 2020-09-16 PROCEDURE — 99214 OFFICE O/P EST MOD 30 MIN: CPT | Performed by: FAMILY MEDICINE

## 2020-09-16 NOTE — ASSESSMENT & PLAN NOTE
Chronic asymptomatic heart rate is fair controlled patient on anticoagulation Eliquis and patient on metoprolol 25 mg twice a day she does follow up with the Cardiology periodically

## 2020-09-16 NOTE — ASSESSMENT & PLAN NOTE
Acute on chronic symptomatic previous the a LE patient does have x-ray of the lumbar spine show arthritis the the recommend Tylenol for the pain  Proper postural discussed with the patient

## 2020-09-16 NOTE — ASSESSMENT & PLAN NOTE
A chronic asymptomatic fair control will continue with  Amlodipine 5 mg and continue with the metoprolol a territory to 25 mg twice a day and patient on Avalide 300-12 5 mg once a day low-salt diet discussed the patient

## 2020-09-16 NOTE — PROGRESS NOTES
Subjective:   Chief Complaint   Patient presents with    Follow-up     chronic conditions        Patient ID: Shelbi Bonilla is a 67 y o  female  Patient here with her daughter follow-up with a chronic condition and concerned about the low back pain and she described it as achy start 1 week ago and the graded as 5/10 localized in the lower back no radiation no recent fall or trauma no numbness no muscle weakness no drop on the foot no rash or muscle pain and no lose control of the urine or stool and no fever patient had history of chronic back pain previously in January 2020 x-ray it show she had degenerative disease in the lumbar spine  Patient was history of the a diabetic non-insulin on metformin tolerated well without side effect deny increased thirsty increased frequency urination no dizziness no headache and no abdomen pain  Patient has history of hyperlipidemia on atorvastatin tolerated well no rash or muscle pain and she is asymptomatic no chest pain or short of breath no palpitation no TIA symptom patient was history of hypertension recently on August we add amlodipine knee and she tolerated well without side effect and blood pressure fair control and she is asymptomatic no headache no chest pain no short of breath no palpitation no dyspnea on exertion and no lower extremity edema  Recent blood work discussed with the patient      The following portions of the patient's history were reviewed and updated as appropriate: allergies, current medications, past family history, past medical history, past social history, past surgical history and problem list     Review of Systems   Constitutional: Negative for activity change, appetite change, fatigue and fever  HENT: Negative for congestion, ear pain, sinus pressure, sinus pain and sore throat  Eyes: Negative for pain, discharge, redness and itching  Respiratory: Negative for cough, chest tightness, shortness of breath and stridor  Cardiovascular: Negative for chest pain, palpitations and leg swelling  Gastrointestinal: Negative for abdominal pain, blood in stool, constipation, diarrhea and nausea  Genitourinary: Negative for dysuria, flank pain, frequency and hematuria  Musculoskeletal: Positive for back pain  Negative for joint swelling and neck pain  Skin: Negative for pallor and rash  Neurological: Negative for dizziness, tremors, weakness, numbness and headaches  Hematological: Does not bruise/bleed easily  Objective:  Vitals:    09/16/20 0839 09/16/20 0857   BP: 146/82 134/80   BP Location: Left arm    Patient Position: Sitting    Cuff Size: Large    Pulse: 62    Resp: 16    Temp: 97 9 °F (36 6 °C)    TempSrc: Tympanic    SpO2: 98%    Weight: 79 8 kg (176 lb)    Height: 5' 2" (1 575 m)       Physical Exam  Vitals signs and nursing note reviewed  Constitutional:       General: She is not in acute distress  Appearance: She is well-developed  She is not diaphoretic  HENT:      Head: Normocephalic  Right Ear: Tympanic membrane, ear canal and external ear normal       Left Ear: Tympanic membrane, ear canal and external ear normal       Nose: Nose normal  No congestion or rhinorrhea  Mouth/Throat:      Mouth: Mucous membranes are moist       Pharynx: Oropharynx is clear  No oropharyngeal exudate or posterior oropharyngeal erythema  Eyes:      General:         Right eye: No discharge  Left eye: No discharge  Conjunctiva/sclera: Conjunctivae normal       Pupils: Pupils are equal, round, and reactive to light  Neck:      Musculoskeletal: Normal range of motion and neck supple  Vascular: No JVD  Cardiovascular:      Rate and Rhythm: Normal rate and regular rhythm  Heart sounds: Normal heart sounds  No murmur  No gallop  Pulmonary:      Effort: Pulmonary effort is normal  No respiratory distress  Breath sounds: Normal breath sounds  No stridor  No wheezing or rales  Chest:      Chest wall: No tenderness  Abdominal:      General: There is no distension  Palpations: Abdomen is soft  There is no mass  Tenderness: There is no abdominal tenderness  There is no rebound  Musculoskeletal:         General: No tenderness  Right shoulder: She exhibits normal range of motion, no tenderness, no deformity and no spasm  Lymphadenopathy:      Cervical: No cervical adenopathy  Skin:     General: Skin is warm  Findings: No erythema or rash  Neurological:      Mental Status: She is alert and oriented to person, place, and time  Motor: No weakness        Gait: Gait normal            Assessment/Plan:    Type 2 diabetes mellitus without complication, without long-term current use of insulin (HCC)    Lab Results   Component Value Date    HGBA1C 6 2 06/16/2020   A chronic asymptomatic fair control continue metformin 500 twice a day low carb diet discussed the patient discussed the continue monitor blood sugar patient already on statin and she is already on Arb  A no diabetic retinopathy    Paroxysmal atrial fibrillation (HCC)  Chronic asymptomatic heart rate is fair controlled patient on anticoagulation Eliquis and patient on metoprolol 25 mg twice a day she does follow up with the Cardiology periodically    Pure hypercholesterolemia  A chronic asymptomatic fair control continue with the current dose of atorvastatin 10 mg once a day    Low back pain  Acute on chronic symptomatic previous the manfred LE patient does have x-ray of the lumbar spine show arthritis the the recommend Tylenol for the pain  Proper postural discussed with the patient    Hypertension  A chronic asymptomatic fair control will continue with  Amlodipine 5 mg and continue with the metoprolol a territory to 25 mg twice a day and patient on Avalide 300-12 5 mg once a day low-salt diet discussed the patient       Diagnoses and all orders for this visit:    Need for influenza vaccination  -     influenza vaccine, high-dose, PF 0 7 mL (FLUZONE HIGH-DOSE)    Type 2 diabetes mellitus without complication, without long-term current use of insulin (HCC)    Paroxysmal atrial fibrillation (HCC)    Long term current use of anticoagulant therapy    Pure hypercholesterolemia    Chronic right-sided low back pain with right-sided sciatica    Vitamin D deficiency    Essential hypertension

## 2020-09-30 DIAGNOSIS — I10 ESSENTIAL HYPERTENSION: ICD-10-CM

## 2020-09-30 DIAGNOSIS — E89.0 POSTOPERATIVE HYPOTHYROIDISM: ICD-10-CM

## 2020-09-30 RX ORDER — LEVOTHYROXINE SODIUM 88 UG/1
88 TABLET ORAL DAILY
Qty: 30 TABLET | Refills: 2 | Status: SHIPPED | OUTPATIENT
Start: 2020-09-30 | End: 2020-12-16 | Stop reason: DRUGHIGH

## 2020-09-30 RX ORDER — AMLODIPINE BESYLATE 5 MG/1
5 TABLET ORAL DAILY
Qty: 30 TABLET | Refills: 2 | Status: SHIPPED | OUTPATIENT
Start: 2020-09-30 | End: 2021-03-13

## 2020-10-01 DIAGNOSIS — I10 ESSENTIAL HYPERTENSION: ICD-10-CM

## 2020-10-01 RX ORDER — IRBESARTAN AND HYDROCHLOROTHIAZIDE 300; 12.5 MG/1; MG/1
1 TABLET, FILM COATED ORAL DAILY
Qty: 30 TABLET | Refills: 2 | Status: SHIPPED | OUTPATIENT
Start: 2020-10-01 | End: 2020-12-26

## 2020-10-20 ENCOUNTER — TELEMEDICINE (OUTPATIENT)
Dept: RADIATION ONCOLOGY | Facility: CLINIC | Age: 80
End: 2020-10-20
Attending: RADIOLOGY

## 2020-10-20 ENCOUNTER — CLINICAL SUPPORT (OUTPATIENT)
Dept: RADIATION ONCOLOGY | Facility: CLINIC | Age: 80
End: 2020-10-20
Attending: RADIOLOGY

## 2020-10-20 DIAGNOSIS — Z17.0 MALIGNANT NEOPLASM OF CENTRAL PORTION OF RIGHT BREAST IN FEMALE, ESTROGEN RECEPTOR POSITIVE (HCC): ICD-10-CM

## 2020-10-20 DIAGNOSIS — D05.11 INTRADUCTAL CARCINOMA IN SITU OF RIGHT BREAST: Primary | ICD-10-CM

## 2020-10-20 DIAGNOSIS — C50.111 MALIGNANT NEOPLASM OF CENTRAL PORTION OF RIGHT BREAST IN FEMALE, ESTROGEN RECEPTOR POSITIVE (HCC): ICD-10-CM

## 2020-10-20 RX ORDER — AMOXICILLIN 500 MG/1
500 TABLET, FILM COATED ORAL EVERY 8 HOURS
COMMUNITY
End: 2020-12-16 | Stop reason: ALTCHOICE

## 2020-10-20 RX ORDER — IBUPROFEN 800 MG/1
800 TABLET ORAL EVERY 8 HOURS PRN
COMMUNITY
End: 2020-10-28

## 2020-10-21 ENCOUNTER — OFFICE VISIT (OUTPATIENT)
Dept: FAMILY MEDICINE CLINIC | Facility: CLINIC | Age: 80
End: 2020-10-21
Payer: MEDICARE

## 2020-10-21 VITALS
TEMPERATURE: 96.7 F | WEIGHT: 177 LBS | HEIGHT: 62 IN | SYSTOLIC BLOOD PRESSURE: 110 MMHG | HEART RATE: 63 BPM | OXYGEN SATURATION: 98 % | DIASTOLIC BLOOD PRESSURE: 80 MMHG | BODY MASS INDEX: 32.57 KG/M2

## 2020-10-21 DIAGNOSIS — Z00.00 MEDICARE ANNUAL WELLNESS VISIT, SUBSEQUENT: Primary | ICD-10-CM

## 2020-10-21 DIAGNOSIS — E11.9 TYPE 2 DIABETES MELLITUS WITHOUT COMPLICATION, WITHOUT LONG-TERM CURRENT USE OF INSULIN (HCC): ICD-10-CM

## 2020-10-21 DIAGNOSIS — I10 ESSENTIAL HYPERTENSION: ICD-10-CM

## 2020-10-21 DIAGNOSIS — E78.00 PURE HYPERCHOLESTEROLEMIA: ICD-10-CM

## 2020-10-21 DIAGNOSIS — E89.0 POSTOPERATIVE HYPOTHYROIDISM: ICD-10-CM

## 2020-10-21 PROBLEM — Z13.6 SCREENING FOR AAA (ABDOMINAL AORTIC ANEURYSM): Status: ACTIVE | Noted: 2020-10-21

## 2020-10-21 LAB — SL AMB POCT HEMOGLOBIN AIC: 6.1 (ref ?–6.5)

## 2020-10-21 PROCEDURE — G0439 PPPS, SUBSEQ VISIT: HCPCS | Performed by: FAMILY MEDICINE

## 2020-10-21 PROCEDURE — 83036 HEMOGLOBIN GLYCOSYLATED A1C: CPT | Performed by: FAMILY MEDICINE

## 2020-10-26 DIAGNOSIS — I10 ESSENTIAL HYPERTENSION: ICD-10-CM

## 2020-10-28 DIAGNOSIS — I48.91 ATRIAL FIBRILLATION, UNSPECIFIED TYPE (HCC): ICD-10-CM

## 2020-12-09 ENCOUNTER — LAB (OUTPATIENT)
Dept: LAB | Facility: HOSPITAL | Age: 80
End: 2020-12-09
Payer: MEDICARE

## 2020-12-09 DIAGNOSIS — E78.00 PURE HYPERCHOLESTEROLEMIA: ICD-10-CM

## 2020-12-09 DIAGNOSIS — I10 ESSENTIAL HYPERTENSION: ICD-10-CM

## 2020-12-09 DIAGNOSIS — E11.9 TYPE 2 DIABETES MELLITUS WITHOUT COMPLICATION, WITHOUT LONG-TERM CURRENT USE OF INSULIN (HCC): ICD-10-CM

## 2020-12-09 DIAGNOSIS — E89.0 POSTOPERATIVE HYPOTHYROIDISM: ICD-10-CM

## 2020-12-09 LAB
ANION GAP SERPL CALCULATED.3IONS-SCNC: 7 MMOL/L (ref 5–14)
BASOPHILS # BLD AUTO: 0 THOUSANDS/ΜL (ref 0–0.1)
BASOPHILS NFR BLD AUTO: 0 % (ref 0–1)
BUN SERPL-MCNC: 19 MG/DL (ref 5–25)
CALCIUM SERPL-MCNC: 9.4 MG/DL (ref 8.4–10.2)
CHLORIDE SERPL-SCNC: 103 MMOL/L (ref 97–108)
CHOLEST SERPL-MCNC: 115 MG/DL
CO2 SERPL-SCNC: 31 MMOL/L (ref 22–30)
CREAT SERPL-MCNC: 0.9 MG/DL (ref 0.6–1.2)
EOSINOPHIL # BLD AUTO: 0 THOUSAND/ΜL (ref 0–0.4)
EOSINOPHIL NFR BLD AUTO: 0 % (ref 0–6)
ERYTHROCYTE [DISTWIDTH] IN BLOOD BY AUTOMATED COUNT: 13.9 %
GFR SERPL CREATININE-BSD FRML MDRD: 71 ML/MIN/1.73SQ M
GLUCOSE P FAST SERPL-MCNC: 113 MG/DL (ref 70–99)
HCT VFR BLD AUTO: 42.4 % (ref 36–46)
HDLC SERPL-MCNC: 48 MG/DL
HGB BLD-MCNC: 14.1 G/DL (ref 12–16)
LDLC SERPL CALC-MCNC: 48 MG/DL
LYMPHOCYTES # BLD AUTO: 2.1 THOUSANDS/ΜL (ref 0.5–4)
LYMPHOCYTES NFR BLD AUTO: 39 % (ref 25–45)
MCH RBC QN AUTO: 32.3 PG (ref 26–34)
MCHC RBC AUTO-ENTMCNC: 33.3 G/DL (ref 31–36)
MCV RBC AUTO: 97 FL (ref 80–100)
MONOCYTES # BLD AUTO: 0.6 THOUSAND/ΜL (ref 0.2–0.9)
MONOCYTES NFR BLD AUTO: 11 % (ref 1–10)
NEUTROPHILS # BLD AUTO: 2.6 THOUSANDS/ΜL (ref 1.8–7.8)
NEUTS SEG NFR BLD AUTO: 49 % (ref 45–65)
NONHDLC SERPL-MCNC: 67 MG/DL
PLATELET # BLD AUTO: 196 THOUSANDS/UL (ref 150–450)
PMV BLD AUTO: 9.7 FL (ref 8.9–12.7)
POTASSIUM SERPL-SCNC: 4.1 MMOL/L (ref 3.6–5)
RBC # BLD AUTO: 4.37 MILLION/UL (ref 4–5.2)
SODIUM SERPL-SCNC: 141 MMOL/L (ref 137–147)
T4 FREE SERPL-MCNC: 1.54 NG/DL (ref 0.76–1.46)
TRIGL SERPL-MCNC: 94 MG/DL
TSH SERPL DL<=0.05 MIU/L-ACNC: 0.14 UIU/ML (ref 0.47–4.68)
WBC # BLD AUTO: 5.2 THOUSAND/UL (ref 4.5–11)

## 2020-12-09 PROCEDURE — 84439 ASSAY OF FREE THYROXINE: CPT

## 2020-12-09 PROCEDURE — 36415 COLL VENOUS BLD VENIPUNCTURE: CPT

## 2020-12-09 PROCEDURE — 80061 LIPID PANEL: CPT

## 2020-12-09 PROCEDURE — 85025 COMPLETE CBC W/AUTO DIFF WBC: CPT

## 2020-12-09 PROCEDURE — 84443 ASSAY THYROID STIM HORMONE: CPT

## 2020-12-09 PROCEDURE — 80048 BASIC METABOLIC PNL TOTAL CA: CPT

## 2020-12-16 ENCOUNTER — TELEMEDICINE (OUTPATIENT)
Dept: FAMILY MEDICINE CLINIC | Facility: CLINIC | Age: 80
End: 2020-12-16
Payer: MEDICARE

## 2020-12-16 VITALS — TEMPERATURE: 98.1 F

## 2020-12-16 DIAGNOSIS — E78.00 PURE HYPERCHOLESTEROLEMIA: ICD-10-CM

## 2020-12-16 DIAGNOSIS — E55.9 VITAMIN D DEFICIENCY: ICD-10-CM

## 2020-12-16 DIAGNOSIS — I10 ESSENTIAL HYPERTENSION: ICD-10-CM

## 2020-12-16 DIAGNOSIS — E11.9 TYPE 2 DIABETES MELLITUS WITHOUT COMPLICATION, WITHOUT LONG-TERM CURRENT USE OF INSULIN (HCC): Primary | ICD-10-CM

## 2020-12-16 DIAGNOSIS — I48.0 PAROXYSMAL ATRIAL FIBRILLATION (HCC): ICD-10-CM

## 2020-12-16 DIAGNOSIS — E89.0 POSTOPERATIVE HYPOTHYROIDISM: ICD-10-CM

## 2020-12-16 PROCEDURE — 99214 OFFICE O/P EST MOD 30 MIN: CPT | Performed by: FAMILY MEDICINE

## 2020-12-16 RX ORDER — LEVOTHYROXINE SODIUM 0.07 MG/1
75 TABLET ORAL DAILY
Qty: 30 TABLET | Refills: 2 | Status: SHIPPED | OUTPATIENT
Start: 2020-12-16 | End: 2021-04-12 | Stop reason: SDUPTHER

## 2020-12-26 DIAGNOSIS — I10 ESSENTIAL HYPERTENSION: ICD-10-CM

## 2020-12-26 RX ORDER — IRBESARTAN AND HYDROCHLOROTHIAZIDE 300; 12.5 MG/1; MG/1
1 TABLET, FILM COATED ORAL DAILY
Qty: 30 TABLET | Refills: 1 | Status: SHIPPED | OUTPATIENT
Start: 2020-12-26 | End: 2021-03-10

## 2021-01-28 ENCOUNTER — IMMUNIZATIONS (OUTPATIENT)
Dept: FAMILY MEDICINE CLINIC | Facility: HOSPITAL | Age: 81
End: 2021-01-28

## 2021-01-28 DIAGNOSIS — Z23 ENCOUNTER FOR IMMUNIZATION: Primary | ICD-10-CM

## 2021-01-28 PROCEDURE — 91301 SARS-COV-2 / COVID-19 MRNA VACCINE (MODERNA) 100 MCG: CPT

## 2021-01-28 PROCEDURE — 0011A SARS-COV-2 / COVID-19 MRNA VACCINE (MODERNA) 100 MCG: CPT

## 2021-02-01 DIAGNOSIS — I10 ESSENTIAL HYPERTENSION: ICD-10-CM

## 2021-02-17 DIAGNOSIS — I48.91 ATRIAL FIBRILLATION, UNSPECIFIED TYPE (HCC): ICD-10-CM

## 2021-02-19 DIAGNOSIS — E11.9 TYPE 2 DIABETES MELLITUS WITHOUT COMPLICATION, WITHOUT LONG-TERM CURRENT USE OF INSULIN (HCC): ICD-10-CM

## 2021-02-19 DIAGNOSIS — E78.00 PURE HYPERCHOLESTEROLEMIA: ICD-10-CM

## 2021-02-19 RX ORDER — ATORVASTATIN CALCIUM 10 MG/1
10 TABLET, FILM COATED ORAL DAILY
Qty: 90 TABLET | Refills: 0 | Status: SHIPPED | OUTPATIENT
Start: 2021-02-19 | End: 2021-05-26

## 2021-02-23 ENCOUNTER — IMMUNIZATIONS (OUTPATIENT)
Dept: FAMILY MEDICINE CLINIC | Facility: HOSPITAL | Age: 81
End: 2021-02-23

## 2021-02-23 DIAGNOSIS — Z23 ENCOUNTER FOR IMMUNIZATION: Primary | ICD-10-CM

## 2021-02-23 PROCEDURE — 0012A SARS-COV-2 / COVID-19 MRNA VACCINE (MODERNA) 100 MCG: CPT

## 2021-02-23 PROCEDURE — 91301 SARS-COV-2 / COVID-19 MRNA VACCINE (MODERNA) 100 MCG: CPT

## 2021-03-02 ENCOUNTER — TELEPHONE (OUTPATIENT)
Dept: NEUROLOGY | Facility: CLINIC | Age: 81
End: 2021-03-02

## 2021-03-03 DIAGNOSIS — M48.02 CERVICAL SPINAL STENOSIS: ICD-10-CM

## 2021-03-03 RX ORDER — GABAPENTIN 100 MG/1
CAPSULE ORAL
Qty: 540 CAPSULE | Refills: 1 | OUTPATIENT
Start: 2021-03-03

## 2021-03-03 NOTE — TELEPHONE ENCOUNTER
Gabapentin QLE approved from 1/1/21-12/31/21  up to 540 capsules every 90 days    Pharmacy made aware

## 2021-03-08 ENCOUNTER — TELEPHONE (OUTPATIENT)
Dept: NEUROLOGY | Facility: CLINIC | Age: 81
End: 2021-03-08

## 2021-03-08 NOTE — TELEPHONE ENCOUNTER
Registration completed 3/9/2021 8AM [address] at [facility]  Covid-screening questions completed for patient and daughter  Aware of all policies - mask, visitor and no show fee

## 2021-03-09 ENCOUNTER — OFFICE VISIT (OUTPATIENT)
Dept: NEUROLOGY | Facility: CLINIC | Age: 81
End: 2021-03-09
Payer: MEDICARE

## 2021-03-09 ENCOUNTER — TELEPHONE (OUTPATIENT)
Dept: NEUROLOGY | Facility: CLINIC | Age: 81
End: 2021-03-09

## 2021-03-09 VITALS
HEART RATE: 65 BPM | SYSTOLIC BLOOD PRESSURE: 110 MMHG | HEIGHT: 61 IN | BODY MASS INDEX: 34.74 KG/M2 | DIASTOLIC BLOOD PRESSURE: 60 MMHG | WEIGHT: 184 LBS

## 2021-03-09 DIAGNOSIS — M48.02 CERVICAL SPINAL STENOSIS: ICD-10-CM

## 2021-03-09 DIAGNOSIS — G44.329 CHRONIC POST-TRAUMATIC HEADACHE, NOT INTRACTABLE: Primary | ICD-10-CM

## 2021-03-09 DIAGNOSIS — G50.0 SUPRAORBITAL NEURALGIA: ICD-10-CM

## 2021-03-09 DIAGNOSIS — G56.02 CARPAL TUNNEL SYNDROME OF LEFT WRIST: ICD-10-CM

## 2021-03-09 DIAGNOSIS — M19.031 OSTEOARTHRITIS OF BOTH WRISTS, UNSPECIFIED OSTEOARTHRITIS TYPE: ICD-10-CM

## 2021-03-09 DIAGNOSIS — M19.032 OSTEOARTHRITIS OF BOTH WRISTS, UNSPECIFIED OSTEOARTHRITIS TYPE: ICD-10-CM

## 2021-03-09 PROCEDURE — 99214 OFFICE O/P EST MOD 30 MIN: CPT | Performed by: PHYSICIAN ASSISTANT

## 2021-03-09 RX ORDER — NAPROXEN 250 MG/1
TABLET ORAL
Qty: 20 TABLET | Refills: 0 | Status: SHIPPED | OUTPATIENT
Start: 2021-03-09 | End: 2021-03-22 | Stop reason: ALTCHOICE

## 2021-03-09 RX ORDER — GABAPENTIN 100 MG/1
CAPSULE ORAL
Qty: 270 CAPSULE | Refills: 1 | Status: SHIPPED | OUTPATIENT
Start: 2021-03-09 | End: 2021-08-16

## 2021-03-09 NOTE — PATIENT INSTRUCTIONS
Naproxen:  For a severe headache only, can increase the risk of bleeding so hold off on left headache is severe  If headache is moderate prefer to use Tylenol  If headache is mild to moderate, trial ice pack, Voltaren cream and/or transdermal therapeutic cream which I have ordered today for mail delivery  For hand pain we can try occupational therapy if needed, please call for order  Decrease/ hold gabapentin morning dose, and increase to 300 mg gabapentin q h s     Take this dose for 2 weeks, then call back with any updates if there are more headaches, more back pain or neck pain during the day  Consider acupuncture as alternative for pain/ headache/ back pain

## 2021-03-09 NOTE — TELEPHONE ENCOUNTER
Received call from pharmacy stating Diclofenac ointment needs PA     ID: [id number]  Bin: [id number]  PCN: [id number]  Group: [id number]  [phone]    Submitted PA on CMM, awaiting determination   Key: [id number]

## 2021-03-09 NOTE — PROGRESS NOTES
Soledad Linton is a 79 y o  female    [id number]        Assessment/Recommendations:    1  Chronic post-traumatic headache, not intractable  naproxen (NAPROSYN) 250 mg tablet   2  Supraorbital neuralgia  naproxen (NAPROSYN) 250 mg tablet   3  Carpal tunnel syndrome of left wrist     4  Osteoarthritis of both wrists, unspecified osteoarthritis type  Diclofenac Sodium (VOLTAREN) 1 %    naproxen (NAPROSYN) 250 mg tablet   5  Cervical spinal stenosis  gabapentin (NEURONTIN) 100 mg capsule       She has a low-grade headache daily at 5/10 as noted below  Her migraine headaches are not as severe as they were when she was younger  She is not sure if the gabapentin is helpful  She is not necessarily asking for anything different for prevention of headaches, and she states that she just deals with them  We are going to change around the dose of gabapentin since it does help with sleep, and causes mild drowsiness during the day  She currently takes 200 mg q 12 hours, she will change to 300 mg q h s  and hold the morning dose  She should call with an update after 2 weeks if the headaches, other pain or other symptoms worsen after this change in gabapentin dosing  The patient declined supraorbital nerve block for her headache  Other recommendations:  Naproxen:  For a severe headache only, can increase the risk of bleeding so hold off on left headache is severe  If headache is moderate prefer to use Tylenol  If headache is mild to moderate, trial ice pack, Voltaren cream and/or transdermal therapeutic cream which I have ordered today for mail delivery  For hand pain we can try occupational therapy if needed, please call for order  Decrease/ hold gabapentin morning dose, and increase to 300 mg gabapentin q h s     Take this dose for 2 weeks, then call back with any updates if there are more headaches, more back pain or neck pain during the day      Consider acupuncture as alternative for pain/ headache/ back pain     The patient should not hesitate to call me prior to her follow up with any questions or concerns  Chief Complaint:  Patient states she has a headache everyday, but they headaches have improved   Subjective:     Ms Trixie Wylie is an extremely pleasant 43-year-old right-handed female here for neurological follow-up  Her daughter Jaxon Whitaker is here  The patient reports stability of headaches since last seen  They are not worse  She reports a 5/10 headache on a daily basis  Her headache is constant in the left frontal and supraorbital region  She denies light or sound sensitivity and left the headache becomes severe, then she goes into her room to lay down  The headache is characterized by a shooting or throbbing sensation in the left supraorbital region, the area of previous trauma  Severe headaches are only a few times per month  Her daughter is here today  And states that she rarely takes anything for her headaches  If it is severe she uses either Tylenol or Aleve   She complains of nose bleeds from time to time, but very rare  She states that there is just a little drop of blood in the nostril, but it is not hemorrhaging  She reports headaches growing up, and at that point they were migraines associated with nausea and vomiting  She does not have nausea and vomiting with her headaches anymore  She states headaches run in the family, on her mom's side more so  She has 14 siblings, but the only living sibling is her youngest brother  She describes some pain and both wrists  Today she feels that it is more on the right side  Previous EMG suggested left-sided carpal tunnel  She does not use the braces anymore because she does not feel that they work  Sometimes pain in her wrist wakes her up at night  If she shakes out her hand feels better  She denies weakness in  strength  She denies neck pain  She defers an orthopedic referral at this time      She states she used to lift heavy things when she worked for [employer] before [employer], but now she does not work because of the pandemic  She is not overly bothered by either her headaches or the carpal tunnel syndrome  Prior documentation:  Additional diagnoses of hypertension, diabetes type 2 and paroxysmal atrial fibrillation returns to review the status of several issues  Her 1st issue is that of longstanding difficulties with chronic daily headache   That has been apparent for number of years even predating her trauma in 2017  She has done well from that standpoint and satisfied with the control on her current gabapentin schedule of 100 mg 3 times daily  She also has a history of a left supraorbital neuralgia 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 in the aftermath of her trauma in January 2017  Over time has become less evident, although still present and at times problematic   Plenty of room for further advancement in her gabapentin schedule for hopefully yet better control  She also has a history of intermittent numbness tingling and discomfort involving her hands, much more so on the left   Carpal tunnel syndromes considered and she underwent electrodiagnostic evaluation   The EMG/NCV demonstrated evidence of mild left carpal tunnel syndrome   No more wide ranging abnormalities apparent      Studies reviewed:  -EMG/NCV as above  -Recent blood work personally reviewed  PCP is ordering this on a regular basis      Patient Active Problem List   Diagnosis    Abnormal mammogram    Paroxysmal atrial fibrillation (HCC)    Chronic coronary artery disease    Cervical spinal stenosis    Closed fracture of maxilla with routine healing    Conduction disorder of the heart    DDD (degenerative disc disease), cervical    Degeneration of intervertebral disc    Dizziness and giddiness    Headache    Hypokalemia    Fracture of multiple ribs    Intraductal carcinoma in situ of right breast    Long term current use of anticoagulant therapy    Latent tuberculosis    Low back pain    Noncompliance with treatment    Obesity    Osteoarthritis of finger of right hand    Osteoarthritis of knee    Type 2 diabetes mellitus without complication, without long-term current use of insulin [facility])    Thyroid nodule    Vertigo    Vitamin D deficiency    Hypertension    Pre-operative clearance    BMI 32 0-32 9,adult    Pure hypercholesterolemia    Neuralgia    Bunion of great toe of right foot    Pre-op examination    Rib pain on right side    Malignant neoplasm of central portion of right breast in female, estrogen receptor positive ([facility] [address] )    Bug bite    Encounter for well adult exam with abnormal findings    Pain of left lower extremity    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    Postoperative hypothyroidism    Numbness and tingling of hands    Chronic daily headache    Carpal tunnel syndrome    Chronic post-traumatic headache, not intractable    Supraorbital neuralgia    Medicare annual wellness visit, subsequent    Osteoarthritis of both wrists     Past Medical History:   Diagnosis Date    Allergic rhinitis     Arthritis     Atrial fibrillation ([facility])     Breast cancer ([facility] [address] [facility]     Cataracts, bilateral     Chronic headaches     pulsatile daily headaches    Colitis     Coronary artery disease     Diabetes mellitus ([address] )     Disease of thyroid gland     DJD (degenerative joint disease), cervical     Facial neuralgia     left frontal facial post traumatic neuralgia    Fibromyalgia     Glaucoma     History of external beam radiation therapy     8/16/18 to 9/14/2018 Right breast    Hypertension     Hypokalemia     Hypovitaminosis D     Lupus (HCC) 7/19/2018    Obesity     Osteoarthritis     Prediabetes     SDH (subdural hematoma) ([facility] [address] ) 1/31/2017    Sleep apnea     no cpap    Syncope and collapse 12/1/2018    Vertigo      Social History     Socioeconomic History    Marital status: Single     Spouse name: Not on file    Number of children: Not on file    Years of education: Not on file    Highest education level: Not on file   Occupational History    Not on file   Social Needs    Financial resource strain: Not on file    Food insecurity     Worry: Never true     Inability: Never true    Transportation needs     Medical: No     Non-medical: No   Tobacco Use    Smoking status: Never Smoker    Smokeless tobacco: Never Used    Tobacco comment: no smoke exposure   Substance and Sexual Activity    Alcohol use: Yes     Frequency: Monthly or less    Drug use: No    Sexual activity: Not on file   Lifestyle    Physical activity     Days per week: 0 days     Minutes per session: 0 min    Stress: Very much   Relationships    Social connections     Talks on phone: Once a week     Gets together: More than three times a week     Attends Taoist service: More than 4 times per year     Active member of club or organization: Yes     Attends meetings of clubs or organizations: More than 4 times per year     Relationship status: Never     Intimate partner violence     Fear of current or ex partner: No     Emotionally abused: No     Physically abused: No     Forced sexual activity: No   Other Topics Concern    Not on file   Social History Narrative    Not on file     Family History   Problem Relation Age of Onset    Heart attack Sister     Hypertension Family     Diabetes Family     Stroke Family     Migraines Family     Breast cancer Daughter 35     Past Surgical History:   Procedure Laterality Date    BREAST BIOPSY      BREAST LUMPECTOMY      CHOLECYSTECTOMY      COLONOSCOPY  2013    HYSTERECTOMY      MAMMO NEEDLE LOCALIZATION RIGHT (ALL INC) Right 6/21/2018    MAMMO STEREOTACTIC BREAST BIOPSY RIGHT (ALL INC) Right 5/9/2018    THYROIDECTOMY N/A 11/21/2019    Procedure: THYROIDECTOMY, total;  Surgeon: Staci Sorto MD;  Location: [facility]; Service: Surgical Oncology    TONSILLECTOMY      US GUIDED BREAST BIOPSY RIGHT COMPLETE Right 5/9/2018    US GUIDED THYROID BIOPSY  6/12/2019    US GUIDED THYROID BIOPSY  9/13/2019     Current Outpatient Medications on File Prior to Visit   Medication Sig Dispense Refill    amLODIPine (NORVASC) 5 mg tablet Take 1 tablet (5 mg total) by mouth daily 30 tablet 2    apixaban (Eliquis) 5 mg Take 1 tablet (5 mg total) by mouth 2 (two) times a day 60 tablet 1    atorvastatin (LIPITOR) 10 mg tablet Take 1 tablet (10 mg total) by mouth daily 90 tablet 0    levothyroxine 75 mcg tablet Take 1 tablet (75 mcg total) by mouth daily 30 tablet 2    meclizine (ANTIVERT) 25 mg tablet Take 1 tablet (25 mg total) by mouth daily (Patient taking differently: Take 25 mg by mouth as needed for dizziness ) 30 tablet 1    metFORMIN (GLUCOPHAGE) 500 mg tablet Take 1 tablet (500 mg total) by mouth 2 (two) times a day with meals 180 tablet 1    metoprolol tartrate (LOPRESSOR) 25 mg tablet Take 1 tablet (25 mg total) by mouth every 12 (twelve) hours 180 tablet 0    [DISCONTINUED] irbesartan-hydrochlorothiazide (AVALIDE) 300-12 5 MG per tablet Take 1 tablet by mouth daily 30 tablet 1    chlorhexidine (PERIDEX) 0 12 % solution       clindamycin (CLEOCIN) 300 MG capsule Take 300 mg by mouth 2 (two) times a day       P[place]o 0 7 % SOLN        No current facility-administered medications on file prior to visit  Allergies   Allergen Reactions    No Known Allergies            ROS:    Review of Systems   Constitutional: Negative  Negative for appetite change and fever  HENT: Negative  Negative for hearing loss, tinnitus, trouble swallowing and voice change  Eyes: Negative  Negative for photophobia and pain  Respiratory: Negative  Negative for shortness of breath  Cardiovascular: Negative  Negative for palpitations  Gastrointestinal: Negative  Negative for nausea and vomiting  Endocrine: Negative    Negative for cold intolerance  Genitourinary: Negative  Negative for dysuria, frequency and urgency  Musculoskeletal: Positive for myalgias  Negative for neck pain  Skin: Negative  Negative for rash  Neurological: Positive for dizziness, light-headedness and headaches  Negative for tremors, seizures, syncope, facial asymmetry, speech difficulty, weakness and numbness  Patient states she gets headaches everyday   Hematological: Negative  Does not bruise/bleed easily  Psychiatric/Behavioral: Negative  Negative for confusion, hallucinations and sleep disturbance  Objective:  /60 (BP Location: Left arm, Patient Position: Sitting, Cuff Size: Adult)   Pulse 65   Ht 5' 1" (1 549 m)   Wt 83 5 kg (184 lb)   BMI 34 77 kg/m²     Neurological exam:  On neurologic exam, the patient is alert and oriented to time and place  Speech is fluent and articulate, and the patient follows commands appropriately  Judgment and affect appear normal   Extraocular muscles are intact without nystagmus  Face is symmetric, and tongue, uvula, and palate are midline  Hearing is intact bilaterally  Motor examination reveals adequate range of motion  Normal gait is steady  she can arise from a chair without difficulty        Labs:      Lab Results   Component Value Date    WBC 5 20 12/09/2020    HGB 14 1 12/09/2020    HCT 42 4 12/09/2020    MCV 97 12/09/2020     12/09/2020     Lab Results   Component Value Date    HGBA1C 6 1 10/21/2020     Lab Results   Component Value Date    ALT 22 05/26/2020    AST 21 05/26/2020    ALKPHOS 84 05/26/2020    BILITOT 1 9 (H) 05/31/2018     Lab Results   Component Value Date    GLUCOSE 86 05/31/2018    CALCIUM 9 4 12/09/2020     05/31/2018    K 4 1 12/09/2020    CO2 31 (H) 12/09/2020     12/09/2020    BUN 19 12/09/2020    CREATININE 0 90 12/09/2020           The following portions of the patient's history were reviewed and updated as appropriate:  allergies, current medications and medication history, family history, past medical history, social history and active problem list   Review of systems was reviewed and otherwise unremarkable from a neurological perspective  I have spent 40 minutes with the patient today in which greater than 50% of this time was spent in counseling and/or coordination of care regarding diagnoses, test results, impression, plan and potential medication side effects

## 2021-03-10 DIAGNOSIS — I10 ESSENTIAL HYPERTENSION: ICD-10-CM

## 2021-03-10 RX ORDER — IRBESARTAN AND HYDROCHLOROTHIAZIDE 300; 12.5 MG/1; MG/1
1 TABLET, FILM COATED ORAL DAILY
Qty: 30 TABLET | Refills: 0 | Status: SHIPPED | OUTPATIENT
Start: 2021-03-10 | End: 2021-04-22

## 2021-03-13 DIAGNOSIS — I10 ESSENTIAL HYPERTENSION: ICD-10-CM

## 2021-03-13 RX ORDER — AMLODIPINE BESYLATE 5 MG/1
5 TABLET ORAL DAILY
Qty: 30 TABLET | Refills: 1 | Status: SHIPPED | OUTPATIENT
Start: 2021-03-13 | End: 2021-03-22 | Stop reason: DRUGHIGH

## 2021-03-15 ENCOUNTER — OFFICE VISIT (OUTPATIENT)
Dept: CARDIOLOGY CLINIC | Facility: CLINIC | Age: 81
End: 2021-03-15
Payer: MEDICARE

## 2021-03-15 VITALS
TEMPERATURE: 97.1 F | HEART RATE: 61 BPM | DIASTOLIC BLOOD PRESSURE: 88 MMHG | HEIGHT: 61 IN | WEIGHT: 185.2 LBS | OXYGEN SATURATION: 97 % | SYSTOLIC BLOOD PRESSURE: 144 MMHG | BODY MASS INDEX: 34.97 KG/M2

## 2021-03-15 DIAGNOSIS — Z79.01 LONG TERM CURRENT USE OF ANTICOAGULANT THERAPY: ICD-10-CM

## 2021-03-15 DIAGNOSIS — E78.00 PURE HYPERCHOLESTEROLEMIA: ICD-10-CM

## 2021-03-15 DIAGNOSIS — E11.9 TYPE 2 DIABETES MELLITUS WITHOUT COMPLICATION, WITHOUT LONG-TERM CURRENT USE OF INSULIN (HCC): ICD-10-CM

## 2021-03-15 DIAGNOSIS — I25.10 CHRONIC CORONARY ARTERY DISEASE: ICD-10-CM

## 2021-03-15 DIAGNOSIS — I48.0 PAROXYSMAL ATRIAL FIBRILLATION (HCC): Primary | ICD-10-CM

## 2021-03-15 PROCEDURE — 93246 EXT ECG>7D<15D RECORDING: CPT | Performed by: INTERNAL MEDICINE

## 2021-03-15 PROCEDURE — 99215 OFFICE O/P EST HI 40 MIN: CPT | Performed by: INTERNAL MEDICINE
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
## 2021-03-22 ENCOUNTER — APPOINTMENT (OUTPATIENT)
Dept: LAB | Facility: HOSPITAL | Age: 81
End: 2021-03-22
Payer: MEDICARE

## 2021-03-22 ENCOUNTER — OFFICE VISIT (OUTPATIENT)
Dept: FAMILY MEDICINE CLINIC | Facility: CLINIC | Age: 81
End: 2021-03-22
Payer: MEDICARE

## 2021-03-22 ENCOUNTER — TELEPHONE (OUTPATIENT)
Dept: FAMILY MEDICINE CLINIC | Facility: CLINIC | Age: 81
End: 2021-03-22

## 2021-03-22 VITALS
OXYGEN SATURATION: 95 % | HEIGHT: 62 IN | HEART RATE: 55 BPM | TEMPERATURE: 96.7 F | SYSTOLIC BLOOD PRESSURE: 150 MMHG | DIASTOLIC BLOOD PRESSURE: 80 MMHG | BODY MASS INDEX: 33.13 KG/M2 | WEIGHT: 180 LBS

## 2021-03-22 DIAGNOSIS — M21.619 BUNION OF UNSPECIFIED FOOT: ICD-10-CM

## 2021-03-22 DIAGNOSIS — E89.0 POSTOPERATIVE HYPOTHYROIDISM: ICD-10-CM

## 2021-03-22 DIAGNOSIS — E87.6 HYPOKALEMIA: Primary | ICD-10-CM

## 2021-03-22 DIAGNOSIS — Z17.0 MALIGNANT NEOPLASM OF CENTRAL PORTION OF RIGHT BREAST IN FEMALE, ESTROGEN RECEPTOR POSITIVE (HCC): ICD-10-CM

## 2021-03-22 DIAGNOSIS — C50.111 MALIGNANT NEOPLASM OF CENTRAL PORTION OF RIGHT BREAST IN FEMALE, ESTROGEN RECEPTOR POSITIVE (HCC): ICD-10-CM

## 2021-03-22 DIAGNOSIS — I10 ESSENTIAL HYPERTENSION: ICD-10-CM

## 2021-03-22 DIAGNOSIS — I48.91 ATRIAL FIBRILLATION, UNSPECIFIED TYPE (HCC): ICD-10-CM

## 2021-03-22 DIAGNOSIS — E55.9 VITAMIN D DEFICIENCY: ICD-10-CM

## 2021-03-22 DIAGNOSIS — I48.0 PAROXYSMAL ATRIAL FIBRILLATION (HCC): ICD-10-CM

## 2021-03-22 DIAGNOSIS — E11.9 TYPE 2 DIABETES MELLITUS WITHOUT COMPLICATION, WITHOUT LONG-TERM CURRENT USE OF INSULIN (HCC): ICD-10-CM

## 2021-03-22 DIAGNOSIS — E78.00 PURE HYPERCHOLESTEROLEMIA: ICD-10-CM

## 2021-03-22 LAB
ALBUMIN SERPL BCP-MCNC: 4.4 G/DL (ref 3–5.2)
ALP SERPL-CCNC: 90 U/L (ref 43–122)
ALT SERPL W P-5'-P-CCNC: 8 U/L (ref 9–52)
ANION GAP SERPL CALCULATED.3IONS-SCNC: 7 MMOL/L (ref 5–14)
AST SERPL W P-5'-P-CCNC: 21 U/L (ref 14–36)
BASOPHILS # BLD AUTO: 0 THOUSANDS/ΜL (ref 0–0.1)
BASOPHILS NFR BLD AUTO: 0 % (ref 0–1)
BILIRUB SERPL-MCNC: 1.9 MG/DL
BUN SERPL-MCNC: 14 MG/DL (ref 5–25)
CALCIUM SERPL-MCNC: 8.9 MG/DL (ref 8.4–10.2)
CHLORIDE SERPL-SCNC: 100 MMOL/L (ref 97–108)
CHOLEST SERPL-MCNC: 121 MG/DL
CO2 SERPL-SCNC: 34 MMOL/L (ref 22–30)
CREAT SERPL-MCNC: 0.91 MG/DL (ref 0.6–1.2)
CREAT UR-MCNC: 163 MG/DL
EOSINOPHIL # BLD AUTO: 0 THOUSAND/ΜL (ref 0–0.4)
EOSINOPHIL NFR BLD AUTO: 0 % (ref 0–6)
ERYTHROCYTE [DISTWIDTH] IN BLOOD BY AUTOMATED COUNT: 14.3 %
EST. AVERAGE GLUCOSE BLD GHB EST-MCNC: 140 MG/DL
GFR SERPL CREATININE-BSD FRML MDRD: 69 ML/MIN/1.73SQ M
GLUCOSE P FAST SERPL-MCNC: 111 MG/DL (ref 70–99)
HBA1C MFR BLD: 6.5 %
HCT VFR BLD AUTO: 42 % (ref 36–46)
HDLC SERPL-MCNC: 52 MG/DL
HGB BLD-MCNC: 14.4 G/DL (ref 12–16)
LDLC SERPL CALC-MCNC: 49 MG/DL
LYMPHOCYTES # BLD AUTO: 1.7 THOUSANDS/ΜL (ref 0.5–4)
LYMPHOCYTES NFR BLD AUTO: 33 % (ref 25–45)
MCH RBC QN AUTO: 32.6 PG (ref 26–34)
MCHC RBC AUTO-ENTMCNC: 34.4 G/DL (ref 31–36)
MCV RBC AUTO: 95 FL (ref 80–100)
MICROALBUMIN UR-MCNC: 44.7 MG/L (ref 0–20)
MICROALBUMIN/CREAT 24H UR: 27 MG/G CREATININE (ref 0–30)
MONOCYTES # BLD AUTO: 0.4 THOUSAND/ΜL (ref 0.2–0.9)
MONOCYTES NFR BLD AUTO: 9 % (ref 1–10)
NEUTROPHILS # BLD AUTO: 2.8 THOUSANDS/ΜL (ref 1.8–7.8)
NEUTS SEG NFR BLD AUTO: 57 % (ref 45–65)
NONHDLC SERPL-MCNC: 69 MG/DL
PLATELET # BLD AUTO: 200 THOUSANDS/UL (ref 150–450)
PMV BLD AUTO: 9.3 FL (ref 8.9–12.7)
POTASSIUM SERPL-SCNC: 3.3 MMOL/L (ref 3.6–5)
PROT SERPL-MCNC: 8.3 G/DL (ref 5.9–8.4)
RBC # BLD AUTO: 4.42 MILLION/UL (ref 4–5.2)
SODIUM SERPL-SCNC: 141 MMOL/L (ref 137–147)
TRIGL SERPL-MCNC: 99 MG/DL
WBC # BLD AUTO: 5 THOUSAND/UL (ref 4.5–11)

## 2021-03-22 PROCEDURE — 83036 HEMOGLOBIN GLYCOSYLATED A1C: CPT

## 2021-03-22 PROCEDURE — 82570 ASSAY OF URINE CREATININE: CPT

## 2021-03-22 PROCEDURE — 82043 UR ALBUMIN QUANTITATIVE: CPT

## 2021-03-22 PROCEDURE — 36415 COLL VENOUS BLD VENIPUNCTURE: CPT

## 2021-03-22 PROCEDURE — 80061 LIPID PANEL: CPT

## 2021-03-22 PROCEDURE — 85025 COMPLETE CBC W/AUTO DIFF WBC: CPT

## 2021-03-22 PROCEDURE — 80053 COMPREHEN METABOLIC PANEL: CPT

## 2021-03-22 PROCEDURE — 99214 OFFICE O/P EST MOD 30 MIN: CPT | Performed by: FAMILY MEDICINE

## 2021-03-22 RX ORDER — AMLODIPINE BESYLATE 5 MG/1
5 TABLET ORAL DAILY
Qty: 30 TABLET | Refills: 2 | Status: CANCELLED | OUTPATIENT
Start: 2021-03-22

## 2021-03-22 RX ORDER — AMLODIPINE BESYLATE 10 MG/1
10 TABLET ORAL DAILY
Qty: 30 TABLET | Refills: 2 | Status: SHIPPED | OUTPATIENT
Start: 2021-03-22 | End: 2021-08-16 | Stop reason: ALTCHOICE

## 2021-03-22 NOTE — PROGRESS NOTES
BMI Counseling: Body mass index is 32 92 kg/m²  The BMI is above normal  Nutrition recommendations include decreasing portion sizes, decreasing fast food intake and limiting drinks that contain sugar  Exercise recommendations include exercising 3-5 times per week  No pharmacotherapy was ordered  Patient referred to PCP due to patient being overweight  Subjective:   Chief Complaint   Patient presents with    Hypertension        Patient ID: Marquis Lee is a 79 y o  female  Patient here with her daughter concerned about the high blood pressure patient recently seen by Cardiology who had the blood pressure high at his office and he recommend to monitor blood pressure at home log book show her blood pressure running 160/90 average patient deny any chest pain short of breath no palpitation no dyspnea on exertion her current medication amlodipine 5 mg Avalide 300-12 5 mg and she is already on metoprolol tartrate 25 twice a day a she compliant with the medication and she try to watch for the low salt diet   the patient history of atrial fibrillation she tolerated the medication well without the side effects she is on metoprolol tartrate 25 mg twice a day patient on anticoagulation deny any light headache no chest pain and no dyspnea on exertion      The following portions of the patient's history were reviewed and updated as appropriate: allergies, current medications, past family history, past medical history, past social history, past surgical history and problem list     Review of Systems   Constitutional: Negative for activity change, appetite change, fatigue and fever  HENT: Negative for congestion, ear pain, sinus pressure, sinus pain and sore throat  Eyes: Negative for pain, discharge, redness and itching  Respiratory: Negative for cough, chest tightness, shortness of breath and stridor  Cardiovascular: Negative for chest pain, palpitations and leg swelling     Gastrointestinal: Negative for abdominal pain, blood in stool, constipation, diarrhea and nausea  Genitourinary: Negative for dysuria, flank pain, frequency and hematuria  Musculoskeletal: Negative for back pain, joint swelling and neck pain  Skin: Negative for pallor and rash  Neurological: Negative for dizziness, tremors, weakness, numbness and headaches  Hematological: Does not bruise/bleed easily  Objective:  Vitals:    03/22/21 0817   BP: 150/80   Pulse: 55   Temp: (!) 96 7 °F (35 9 °C)   TempSrc: Tympanic   SpO2: 95%   Weight: 81 6 kg (180 lb)   Height: 5' 2" (1 575 m)      Physical Exam  Vitals signs and nursing note reviewed  Constitutional:       General: She is not in acute distress  Appearance: She is well-developed  She is not diaphoretic  HENT:      Head: Normocephalic  Right Ear: Tympanic membrane, ear canal and external ear normal       Left Ear: Tympanic membrane, ear canal and external ear normal       Nose: Nose normal  No congestion or rhinorrhea  Mouth/Throat:      Mouth: Mucous membranes are moist       Pharynx: Oropharynx is clear  No oropharyngeal exudate or posterior oropharyngeal erythema  Eyes:      General:         Right eye: No discharge  Left eye: No discharge  Conjunctiva/sclera: Conjunctivae normal       Pupils: Pupils are equal, round, and reactive to light  Neck:      Musculoskeletal: Normal range of motion and neck supple  Vascular: No JVD  Cardiovascular:      Rate and Rhythm: Normal rate and regular rhythm  Pulses: no weak pulses          Dorsalis pedis pulses are 2+ on the right side and 2+ on the left side  Heart sounds: Normal heart sounds  No murmur  No gallop  Pulmonary:      Effort: Pulmonary effort is normal  No respiratory distress  Breath sounds: Normal breath sounds  No stridor  No wheezing or rales  Chest:      Chest wall: No tenderness  Abdominal:      General: There is no distension        Palpations: Abdomen is soft  There is no mass  Tenderness: There is no abdominal tenderness  There is no rebound  Musculoskeletal:         General: No tenderness  Feet:    Feet:      Right foot:      Skin integrity: No warmth  Left foot:      Skin integrity: No warmth  Lymphadenopathy:      Cervical: No cervical adenopathy  Skin:     General: Skin is warm  Findings: No erythema or rash  Neurological:      Mental Status: She is alert and oriented to person, place, and time  Motor: No weakness  Gait: Gait normal          Patient's shoes and socks removed  Right Foot/Ankle   Right Foot Inspection  Skin Exam: skin intact no warmth and no pre-ulcer                          Toe Exam: no swelling and erythema  Sensory       Monofilament testing: intact  Vascular  Capillary refills: < 3 seconds  The right DP pulse is 2+  Left Foot/Ankle  Left Foot Inspection  Skin Exam: skin intactno warmth and no pre-ulcer                         Toe Exam: no swelling and no erythema                   Sensory       Monofilament: intact  Vascular  Capillary refills: < 3 seconds  The left DP pulse is 2+  Assign Risk Category:  No deformity present; No loss of protective sensation;  No weak pulses       Risk: 0      Assessment/Plan:    Hypertension   Chronic uncontrolled will increase the amlodipine to 10 mg once a day continue with the Avalide 300-12 5 mg once a day continue with the metoprolol tartrate 25 mg twice a day the continue monitor blood sugar number keep log book low-salt diet important lose weight discussed with the patient    BMI 32 0-32 9,adult   A chronic patient did lose 5 lb from previous visit discussed the patient continue portion control low carb low-fat diet and increased physical activity    Malignant neoplasm of central portion of right breast in female, estrogen receptor positive ([facility] [address] )   A status post the surgery and radiation patient continued to follow-up with Surgical Oncology periodically a    Atrial fibrillation (HCC)   A chronic asymptomatic rate is control continue with the metoprolol tartrate 25 mg once a day patient on Eliquis tolerated well aware of the complication of bleeding    Bunion of unspecified foot   A bunion and bilateral foot worse in the right side the discussed the patient refer to podiatric and she agree she will go to somebody she knows the her daughter usually go to him and the a diabetic foot care and proper shoes wear discussed with the patient       Diagnoses and all orders for this visit:    BMI 32 0-32 9,adult    Essential hypertension  -     amLODIPine (NORVASC) 10 mg tablet; Take 1 tablet (10 mg total) by mouth daily    Atrial fibrillation, unspecified type (HCC)  -     apixaban (Eliquis) 5 mg; Take 1 tablet (5 mg total) by mouth 2 (two) times a day    Malignant neoplasm of central portion of right breast in female, estrogen receptor positive ([facility] [address] )    Bunion of unspecified foot  -     Ambulatory referral to Podiatry; Future    Other orders  -     Cancel: amLODIPine (NORVASC) 5 mg tablet;  Take 1 tablet (5 mg total) by mouth daily

## 2021-03-22 NOTE — TELEPHONE ENCOUNTER
----- Message from Margoth Johnson MD sent at 3/22/2021 12:30 PM EDT -----  patient potasium low to recheck in 2 days

## 2021-03-23 DIAGNOSIS — I48.91 ATRIAL FIBRILLATION, UNSPECIFIED TYPE (HCC): ICD-10-CM

## 2021-03-23 NOTE — ASSESSMENT & PLAN NOTE
A status post the surgery and radiation patient continued to follow-up with Surgical Oncology periodically a

## 2021-03-23 NOTE — ASSESSMENT & PLAN NOTE
Chronic uncontrolled will increase the amlodipine to 10 mg once a day continue with the Avalide 300-12 5 mg once a day continue with the metoprolol tartrate 25 mg twice a day the continue monitor blood sugar number keep log book low-salt diet important lose weight discussed with the patient

## 2021-03-23 NOTE — ASSESSMENT & PLAN NOTE
A bunion and bilateral foot worse in the right side the discussed the patient refer to podiatric and she agree she will go to somebody she knows the her daughter usually go to him and the a diabetic foot care and proper shoes wear discussed with the patient

## 2021-03-23 NOTE — ASSESSMENT & PLAN NOTE
A chronic patient did lose 5 lb from previous visit discussed the patient continue portion control low carb low-fat diet and increased physical activity

## 2021-03-23 NOTE — ASSESSMENT & PLAN NOTE
A chronic asymptomatic rate is control continue with the metoprolol tartrate 25 mg once a day patient on Eliquis tolerated well aware of the complication of bleeding

## 2021-03-24 ENCOUNTER — TELEPHONE (OUTPATIENT)
Dept: FAMILY MEDICINE CLINIC | Facility: CLINIC | Age: 81
End: 2021-03-24

## 2021-03-24 ENCOUNTER — LAB (OUTPATIENT)
Dept: LAB | Facility: HOSPITAL | Age: 81
End: 2021-03-24
Payer: MEDICARE

## 2021-03-24 DIAGNOSIS — E87.6 HYPOKALEMIA: ICD-10-CM

## 2021-03-24 LAB — POTASSIUM SERPL-SCNC: 3.4 MMOL/L (ref 3.6–5)

## 2021-03-24 PROCEDURE — 84132 ASSAY OF SERUM POTASSIUM: CPT

## 2021-03-24 PROCEDURE — 36415 COLL VENOUS BLD VENIPUNCTURE: CPT

## 2021-03-24 NOTE — TELEPHONE ENCOUNTER
----- Message from Nigel Damon MD sent at 3/24/2021  2:59 PM EDT -----  Potassium is low encourage to eat potassium rich diet

## 2021-04-07 ENCOUNTER — CLINICAL SUPPORT (OUTPATIENT)
Dept: CARDIOLOGY CLINIC | Facility: CLINIC | Age: 81
End: 2021-04-07
Payer: MEDICARE

## 2021-04-07 DIAGNOSIS — I48.0 PAROXYSMAL ATRIAL FIBRILLATION (HCC): ICD-10-CM

## 2021-04-07 PROCEDURE — 93248 EXT ECG>7D<15D REV&INTERPJ: CPT | Performed by: INTERNAL MEDICINE

## 2021-04-12 ENCOUNTER — OFFICE VISIT (OUTPATIENT)
Dept: FAMILY MEDICINE CLINIC | Facility: CLINIC | Age: 81
End: 2021-04-12
Payer: MEDICARE

## 2021-04-12 VITALS
SYSTOLIC BLOOD PRESSURE: 150 MMHG | DIASTOLIC BLOOD PRESSURE: 80 MMHG | TEMPERATURE: 96.9 F | BODY MASS INDEX: 32.94 KG/M2 | OXYGEN SATURATION: 96 % | HEART RATE: 60 BPM | HEIGHT: 62 IN | WEIGHT: 179 LBS

## 2021-04-12 DIAGNOSIS — E78.00 PURE HYPERCHOLESTEROLEMIA: ICD-10-CM

## 2021-04-12 DIAGNOSIS — E89.0 POSTOPERATIVE HYPOTHYROIDISM: ICD-10-CM

## 2021-04-12 DIAGNOSIS — I10 ESSENTIAL HYPERTENSION: ICD-10-CM

## 2021-04-12 DIAGNOSIS — E87.6 HYPOKALEMIA: ICD-10-CM

## 2021-04-12 DIAGNOSIS — E11.9 TYPE 2 DIABETES MELLITUS WITHOUT COMPLICATION, WITHOUT LONG-TERM CURRENT USE OF INSULIN (HCC): Primary | ICD-10-CM

## 2021-04-12 DIAGNOSIS — E55.9 VITAMIN D DEFICIENCY: ICD-10-CM

## 2021-04-12 PROCEDURE — 99214 OFFICE O/P EST MOD 30 MIN: CPT | Performed by: FAMILY MEDICINE

## 2021-04-12 RX ORDER — LEVOTHYROXINE SODIUM 0.07 MG/1
75 TABLET ORAL DAILY
Qty: 30 TABLET | Refills: 2 | Status: SHIPPED | OUTPATIENT
Start: 2021-04-12 | End: 2021-06-14 | Stop reason: SDUPTHER

## 2021-04-12 NOTE — PROGRESS NOTES
Subjective:   Chief Complaint   Patient presents with    Follow-up     chronic conditions        Patient ID: Aashish Mahajan is a 79 y o  female  The patient here follow-up with a chronic condition patient history of non-insulin-dependent diabetic on metformin tolerated well without any side effect deny any sign of symptom of hypoglycemia she is compliant with the medication and compliant with the low carb diet deny increased thirsty increased frequency urination no dizziness no headache and no abdomen pain patient was history of hyperlipidemia on statin deny any rash or muscle pain deny any chest pain short of breath no palpitation no TIA symptom   patient also has hypertension blood pressure today is uncontrolled we just recently adjust her amlodipine she did not start the new dose until 1 week ago she deny any chest pain short of breath no palpitation and no lower extremity edema recent blood work review with the patient         The following portions of the patient's history were reviewed and updated as appropriate: allergies, current medications, past family history, past medical history, past social history, past surgical history and problem list     Review of Systems   Constitutional: Negative for activity change, appetite change, fatigue and fever  HENT: Negative for congestion, ear pain, sinus pressure, sinus pain and sore throat  Eyes: Negative for pain, discharge, redness and itching  Respiratory: Negative for cough, chest tightness, shortness of breath and stridor  Cardiovascular: Negative for chest pain, palpitations and leg swelling  Gastrointestinal: Negative for abdominal pain, blood in stool, constipation, diarrhea and nausea  Genitourinary: Negative for dysuria, flank pain, frequency and hematuria  Musculoskeletal: Negative for back pain, joint swelling and neck pain  Skin: Negative for pallor and rash     Neurological: Negative for dizziness, tremors, weakness, numbness and headaches  Hematological: Does not bruise/bleed easily  Objective:  Vitals:    04/12/21 0811   BP: 150/80   Pulse: 60   Temp: (!) 96 9 °F (36 1 °C)   TempSrc: Tympanic   SpO2: 96%   Weight: 81 2 kg (179 lb)   Height: 5' 2" (1 575 m)      Physical Exam  Vitals signs and nursing note reviewed  Constitutional:       General: She is not in acute distress  Appearance: She is well-developed  She is not diaphoretic  HENT:      Head: Normocephalic  Right Ear: Tympanic membrane, ear canal and external ear normal       Left Ear: Tympanic membrane, ear canal and external ear normal       Nose: Nose normal  No congestion or rhinorrhea  Mouth/Throat:      Mouth: Mucous membranes are moist       Pharynx: Oropharynx is clear  No oropharyngeal exudate or posterior oropharyngeal erythema  Eyes:      General:         Right eye: No discharge  Left eye: No discharge  Conjunctiva/sclera: Conjunctivae normal       Pupils: Pupils are equal, round, and reactive to light  Neck:      Musculoskeletal: Normal range of motion and neck supple  Vascular: No JVD  Cardiovascular:      Rate and Rhythm: Normal rate and regular rhythm  Heart sounds: Normal heart sounds  No murmur  No gallop  Pulmonary:      Effort: Pulmonary effort is normal  No respiratory distress  Breath sounds: Normal breath sounds  No stridor  No wheezing or rales  Chest:      Chest wall: No tenderness  Abdominal:      General: There is no distension  Palpations: Abdomen is soft  There is no mass  Tenderness: There is no abdominal tenderness  There is no rebound  Musculoskeletal:         General: No tenderness  Lymphadenopathy:      Cervical: No cervical adenopathy  Skin:     General: Skin is warm  Findings: No erythema or rash  Neurological:      Mental Status: She is alert and oriented to person, place, and time  Motor: No weakness        Gait: Gait normal  Assessment/Plan:    Type 2 diabetes mellitus without complication, without long-term current use of insulin (HCC)    Lab Results   Component Value Date    HGBA1C 6 5 (H) 03/22/2021     Chronic asymptomatic fair control continue with the metformin 500 mg twice a day encouraged patient to continue with the low carb diet and important lose weight patient already on Arb and she is already on statin patient up-to-date with diabetic eye and foot exam    Pure hypercholesterolemia   Chronic asymptomatic LDL therapeutic in diabetic patient encouraged patient to continue current dose of atorvastatin 10 mg once a day low-fat diet discussed with the patient    Hypertension   The hypertension blood pressure today uncontrolled patient she just the adjust her medication almost 1 week ago her amlodipine increased to 10 mg and she continues to take metoprolol tartrate 25 mg twice a day and the Avalide 300-12 5 mg once a day will re-evaluate blood pressure in 1 months of continued to be high will adjust medication again discussed important compliant medication low  Salt diet review with the patient    Hypokalemia   Finding on the blood work will repeat potassium level is continued to be slightly low encouraged patient to increase the potassium rich diet patient asymptomatic    Postoperative hypothyroidism   Chronic asymptomatic fair control encouraged patient to continue current dose of levothyroxine 75 mcg proper use discussed with the patient       Diagnoses and all orders for this visit:    Type 2 diabetes mellitus without complication, without long-term current use of insulin ([facility])    Postoperative hypothyroidism  -     levothyroxine 75 mcg tablet;  Take 1 tablet (75 mcg total) by mouth daily    Vitamin D deficiency    Pure hypercholesterolemia    Essential hypertension    Hypokalemia

## 2021-04-13 NOTE — ASSESSMENT & PLAN NOTE
Finding on the blood work will repeat potassium level is continued to be slightly low encouraged patient to increase the potassium rich diet patient asymptomatic

## 2021-04-13 NOTE — ASSESSMENT & PLAN NOTE
Chronic asymptomatic LDL therapeutic in diabetic patient encouraged patient to continue current dose of atorvastatin 10 mg once a day low-fat diet discussed with the patient

## 2021-04-13 NOTE — ASSESSMENT & PLAN NOTE
The hypertension blood pressure today uncontrolled patient she just the adjust her medication almost 1 week ago her amlodipine increased to 10 mg and she continues to take metoprolol tartrate 25 mg twice a day and the Avalide 300-12 5 mg once a day will re-evaluate blood pressure in 1 months of continued to be high will adjust medication again discussed important compliant medication low  Salt diet review with the patient

## 2021-04-13 NOTE — ASSESSMENT & PLAN NOTE
Lab Results   Component Value Date    HGBA1C 6 5 (H) 03/22/2021     Chronic asymptomatic fair control continue with the metformin 500 mg twice a day encouraged patient to continue with the low carb diet and important lose weight patient already on Arb and she is already on statin patient up-to-date with diabetic eye and foot exam

## 2021-04-13 NOTE — ASSESSMENT & PLAN NOTE
Chronic asymptomatic fair control encouraged patient to continue current dose of levothyroxine 75 mcg proper use discussed with the patient

## 2021-04-22 DIAGNOSIS — G44.329 CHRONIC POST-TRAUMATIC HEADACHE, NOT INTRACTABLE: Primary | ICD-10-CM

## 2021-04-22 DIAGNOSIS — M19.032 OSTEOARTHRITIS OF BOTH WRISTS, UNSPECIFIED OSTEOARTHRITIS TYPE: ICD-10-CM

## 2021-04-22 DIAGNOSIS — I10 ESSENTIAL HYPERTENSION: ICD-10-CM

## 2021-04-22 DIAGNOSIS — G50.0 SUPRAORBITAL NEURALGIA: ICD-10-CM

## 2021-04-22 DIAGNOSIS — M19.031 OSTEOARTHRITIS OF BOTH WRISTS, UNSPECIFIED OSTEOARTHRITIS TYPE: ICD-10-CM

## 2021-04-22 RX ORDER — IRBESARTAN AND HYDROCHLOROTHIAZIDE 300; 12.5 MG/1; MG/1
1 TABLET, FILM COATED ORAL DAILY
Qty: 30 TABLET | Refills: 0 | Status: SHIPPED | OUTPATIENT
Start: 2021-04-22 | End: 2021-06-15

## 2021-04-22 RX ORDER — NAPROXEN 250 MG/1
TABLET ORAL
Qty: 15 TABLET | Refills: 0 | Status: SHIPPED | OUTPATIENT
Start: 2021-04-22 | End: 2021-11-09 | Stop reason: SDUPTHER

## 2021-04-22 NOTE — TELEPHONE ENCOUNTER
Patient's pharmacy calling in requesting a prescription for naproxen  Seems that last prescription was documented as discontinued by another provider       Order pended below, please send if agreeable

## 2021-05-03 ENCOUNTER — OFFICE VISIT (OUTPATIENT)
Dept: FAMILY MEDICINE CLINIC | Facility: CLINIC | Age: 81
End: 2021-05-03
Payer: MEDICARE

## 2021-05-03 VITALS
SYSTOLIC BLOOD PRESSURE: 120 MMHG | OXYGEN SATURATION: 98 % | WEIGHT: 181 LBS | BODY MASS INDEX: 33.31 KG/M2 | DIASTOLIC BLOOD PRESSURE: 80 MMHG | TEMPERATURE: 97.3 F | HEIGHT: 62 IN | HEART RATE: 59 BPM

## 2021-05-03 DIAGNOSIS — I10 ESSENTIAL HYPERTENSION: Primary | ICD-10-CM

## 2021-05-03 DIAGNOSIS — M85.80 OSTEOPENIA, UNSPECIFIED LOCATION: ICD-10-CM

## 2021-05-03 DIAGNOSIS — E11.9 TYPE 2 DIABETES MELLITUS WITHOUT COMPLICATION, WITHOUT LONG-TERM CURRENT USE OF INSULIN (HCC): ICD-10-CM

## 2021-05-03 DIAGNOSIS — E55.9 VITAMIN D DEFICIENCY: ICD-10-CM

## 2021-05-03 PROCEDURE — 99213 OFFICE O/P EST LOW 20 MIN: CPT | Performed by: FAMILY MEDICINE

## 2021-05-03 NOTE — ASSESSMENT & PLAN NOTE
Chronic asymptomatic the patient on calcium and vitamin-D she is due for DEXA scan will order today recommend increased physical activity

## 2021-05-03 NOTE — PROGRESS NOTES
Subjective:   Chief Complaint   Patient presents with    Blood Pressure Check    Leg Swelling     and feet        Patient ID: Claudene Other is a 79 y o  female  Patient here follow-up with the a blood pressure a patient blood pressure today 120/80 and looking at the log book at home still running high by question about the mesh E she use check her blood pressure she use a wrist 1 which she get the increase and blood pressure in accurate reading and patient asymptomatic deny any chest pain short of breath no palpitation no dyspnea on exertion the patient had mild swelling in her lower extremity she notice it after we adjust the amlodipine to 10 mg      The following portions of the patient's history were reviewed and updated as appropriate: allergies, current medications, past family history, past medical history, past social history, past surgical history and problem list     Review of Systems   Constitutional: Negative for activity change, appetite change, fatigue and fever  HENT: Negative for congestion, ear pain, sinus pressure, sinus pain and sore throat  Eyes: Negative for pain, discharge, redness and itching  Respiratory: Negative for cough, chest tightness, shortness of breath and stridor  Cardiovascular: Positive for leg swelling  Negative for chest pain and palpitations  Gastrointestinal: Negative for abdominal pain, blood in stool, constipation, diarrhea and nausea  Genitourinary: Negative for dysuria, flank pain, frequency and hematuria  Musculoskeletal: Negative for back pain, joint swelling and neck pain  Skin: Negative for pallor and rash  Neurological: Negative for dizziness, tremors, weakness, numbness and headaches  Hematological: Does not bruise/bleed easily               Objective:  Vitals:    05/03/21 0828   BP: 120/80   Pulse: 59   Temp: (!) 97 3 °F (36 3 °C)   TempSrc: Tympanic   SpO2: 98%   Weight: 82 1 kg (181 lb)   Height: 5' 2" (1 575 m)      Physical Exam  Vitals signs and nursing note reviewed  Constitutional:       General: She is not in acute distress  Appearance: She is well-developed  She is not diaphoretic  HENT:      Head: Normocephalic  Right Ear: Tympanic membrane, ear canal and external ear normal       Left Ear: Tympanic membrane, ear canal and external ear normal       Nose: Nose normal  No congestion or rhinorrhea  Mouth/Throat:      Mouth: Mucous membranes are moist       Pharynx: Oropharynx is clear  No oropharyngeal exudate or posterior oropharyngeal erythema  Eyes:      General:         Right eye: No discharge  Left eye: No discharge  Conjunctiva/sclera: Conjunctivae normal       Pupils: Pupils are equal, round, and reactive to light  Neck:      Musculoskeletal: Normal range of motion and neck supple  Vascular: No JVD  Cardiovascular:      Rate and Rhythm: Normal rate and regular rhythm  Heart sounds: Normal heart sounds  No murmur  No gallop  Pulmonary:      Effort: Pulmonary effort is normal  No respiratory distress  Breath sounds: Normal breath sounds  No stridor  No wheezing or rales  Chest:      Chest wall: No tenderness  Abdominal:      General: There is no distension  Palpations: Abdomen is soft  There is no mass  Tenderness: There is no abdominal tenderness  There is no rebound  Musculoskeletal:         General: No tenderness  Right lower le+ Edema present  Left lower le+ Edema present  Lymphadenopathy:      Cervical: No cervical adenopathy  Skin:     General: Skin is warm  Findings: No erythema or rash  Neurological:      Mental Status: She is alert and oriented to person, place, and time  Motor: No weakness        Gait: Gait normal            Assessment/Plan:    Hypertension   A chronic fair control encouraged patient to continue current management her mild lower swelling a secondary to adjust the dose of amlodipine recommend to take her Avalide which include diuretic low-salt diet elevate the leg above the level of the heart    Osteopenia   Chronic asymptomatic the patient on calcium and vitamin-D she is due for DEXA scan will order today recommend increased physical activity    Vitamin D deficiency   A chronic asymptomatic continue vitamin-D supplement vit D rich diet discuss with patient       Diagnoses and all orders for this visit:    Essential hypertension  -     CBC and differential; Future  -     Basic metabolic panel; Future  -     Lipid Panel with Direct LDL reflex; Future  -     TSH, 3rd generation with Free T4 reflex; Future  -     Hemoglobin A1C; Future    Osteopenia, unspecified location  -     DXA bone density spine hip and pelvis; Future    Type 2 diabetes mellitus without complication, without long-term current use of insulin (HCC)  -     CBC and differential; Future  -     Basic metabolic panel; Future  -     Lipid Panel with Direct LDL reflex; Future  -     TSH, 3rd generation with Free T4 reflex; Future  -     Hemoglobin A1C; Future    BMI 33 0-33 9,adult  -     CBC and differential; Future  -     Basic metabolic panel; Future  -     Lipid Panel with Direct LDL reflex; Future  -     TSH, 3rd generation with Free T4 reflex; Future  -     Hemoglobin A1C; Future    Vitamin D deficiency    Other orders  -     Cancel: DXA bone density spine hip and pelvis;  Future

## 2021-05-03 NOTE — ASSESSMENT & PLAN NOTE
A chronic fair control encouraged patient to continue current management her mild lower swelling a secondary to adjust the dose of amlodipine recommend to take her Avalide which include diuretic low-salt diet elevate the leg above the level of the heart

## 2021-05-04 NOTE — RESULT ENCOUNTER NOTE
Predominant underlying rhythm was Sinus Rhythm at a min HR of 48 bpm,   max HR of 110 bpm, and avg HR of 62 bpm       One run of  Supraventricular Tachycardia occurred lasting 7 beats with a max rate of  138 bpm (avg 129 bpm)  Isolated SVEs were rare (<1 0%), SVE Couplets  were rare (<1 0%), and no SVE Triplets were present  Isolated VEs were  rare (<1 0%), and no VE Couplets or VE Triplets were present

## 2021-05-12 DIAGNOSIS — I10 ESSENTIAL HYPERTENSION: ICD-10-CM

## 2021-05-18 ENCOUNTER — OFFICE VISIT (OUTPATIENT)
Dept: CARDIOLOGY CLINIC | Facility: CLINIC | Age: 81
End: 2021-05-18
Payer: MEDICARE

## 2021-05-18 ENCOUNTER — APPOINTMENT (OUTPATIENT)
Dept: LAB | Facility: HOSPITAL | Age: 81
End: 2021-05-18
Payer: MEDICARE

## 2021-05-18 VITALS
HEART RATE: 60 BPM | SYSTOLIC BLOOD PRESSURE: 116 MMHG | HEIGHT: 62 IN | DIASTOLIC BLOOD PRESSURE: 70 MMHG | WEIGHT: 183 LBS | BODY MASS INDEX: 33.68 KG/M2 | OXYGEN SATURATION: 98 %

## 2021-05-18 DIAGNOSIS — I10 ESSENTIAL HYPERTENSION: ICD-10-CM

## 2021-05-18 DIAGNOSIS — I48.0 PAROXYSMAL ATRIAL FIBRILLATION (HCC): ICD-10-CM

## 2021-05-18 DIAGNOSIS — E11.9 TYPE 2 DIABETES MELLITUS WITHOUT COMPLICATION, WITHOUT LONG-TERM CURRENT USE OF INSULIN (HCC): ICD-10-CM

## 2021-05-18 DIAGNOSIS — I25.10 CHRONIC CORONARY ARTERY DISEASE: Primary | ICD-10-CM

## 2021-05-18 DIAGNOSIS — Z79.01 LONG TERM CURRENT USE OF ANTICOAGULANT THERAPY: ICD-10-CM

## 2021-05-18 DIAGNOSIS — E78.00 PURE HYPERCHOLESTEROLEMIA: ICD-10-CM

## 2021-05-18 LAB
ANION GAP SERPL CALCULATED.3IONS-SCNC: 8 MMOL/L (ref 5–14)
BASOPHILS # BLD AUTO: 0 THOUSANDS/ΜL (ref 0–0.1)
BASOPHILS NFR BLD AUTO: 0 % (ref 0–1)
BUN SERPL-MCNC: 16 MG/DL (ref 5–25)
CALCIUM SERPL-MCNC: 9.4 MG/DL (ref 8.4–10.2)
CHLORIDE SERPL-SCNC: 103 MMOL/L (ref 97–108)
CHOLEST SERPL-MCNC: 112 MG/DL
CO2 SERPL-SCNC: 29 MMOL/L (ref 22–30)
CREAT SERPL-MCNC: 0.87 MG/DL (ref 0.6–1.2)
EOSINOPHIL # BLD AUTO: 0 THOUSAND/ΜL (ref 0–0.4)
EOSINOPHIL NFR BLD AUTO: 0 % (ref 0–6)
ERYTHROCYTE [DISTWIDTH] IN BLOOD BY AUTOMATED COUNT: 14.5 %
EST. AVERAGE GLUCOSE BLD GHB EST-MCNC: 140 MG/DL
GFR SERPL CREATININE-BSD FRML MDRD: 73 ML/MIN/1.73SQ M
GLUCOSE P FAST SERPL-MCNC: 109 MG/DL (ref 70–99)
HBA1C MFR BLD: 6.5 %
HCT VFR BLD AUTO: 41.6 % (ref 36–46)
HDLC SERPL-MCNC: 42 MG/DL
HGB BLD-MCNC: 13.9 G/DL (ref 12–16)
LDLC SERPL CALC-MCNC: 49 MG/DL
LYMPHOCYTES # BLD AUTO: 1.7 THOUSANDS/ΜL (ref 0.5–4)
LYMPHOCYTES NFR BLD AUTO: 35 % (ref 25–45)
MCH RBC QN AUTO: 32.4 PG (ref 26–34)
MCHC RBC AUTO-ENTMCNC: 33.5 G/DL (ref 31–36)
MCV RBC AUTO: 97 FL (ref 80–100)
MONOCYTES # BLD AUTO: 0.5 THOUSAND/ΜL (ref 0.2–0.9)
MONOCYTES NFR BLD AUTO: 11 % (ref 1–10)
NEUTROPHILS # BLD AUTO: 2.7 THOUSANDS/ΜL (ref 1.8–7.8)
NEUTS SEG NFR BLD AUTO: 54 % (ref 45–65)
PLATELET # BLD AUTO: 213 THOUSANDS/UL (ref 150–450)
PMV BLD AUTO: 9.1 FL (ref 8.9–12.7)
POTASSIUM SERPL-SCNC: 4.1 MMOL/L (ref 3.6–5)
RBC # BLD AUTO: 4.3 MILLION/UL (ref 4–5.2)
SODIUM SERPL-SCNC: 140 MMOL/L (ref 137–147)
TRIGL SERPL-MCNC: 104 MG/DL
TSH SERPL DL<=0.05 MIU/L-ACNC: 3.29 UIU/ML (ref 0.47–4.68)
WBC # BLD AUTO: 5 THOUSAND/UL (ref 4.5–11)

## 2021-05-18 PROCEDURE — 36415 COLL VENOUS BLD VENIPUNCTURE: CPT

## 2021-05-18 PROCEDURE — 99214 OFFICE O/P EST MOD 30 MIN: CPT | Performed by: INTERNAL MEDICINE

## 2021-05-18 PROCEDURE — 80061 LIPID PANEL: CPT

## 2021-05-18 PROCEDURE — 80048 BASIC METABOLIC PNL TOTAL CA: CPT

## 2021-05-18 PROCEDURE — 85025 COMPLETE CBC W/AUTO DIFF WBC: CPT

## 2021-05-18 PROCEDURE — 84443 ASSAY THYROID STIM HORMONE: CPT

## 2021-05-18 PROCEDURE — 83036 HEMOGLOBIN GLYCOSYLATED A1C: CPT
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
## 2021-05-26 DIAGNOSIS — E78.00 PURE HYPERCHOLESTEROLEMIA: ICD-10-CM

## 2021-05-26 DIAGNOSIS — E11.9 TYPE 2 DIABETES MELLITUS WITHOUT COMPLICATION, WITHOUT LONG-TERM CURRENT USE OF INSULIN (HCC): ICD-10-CM

## 2021-05-26 RX ORDER — ATORVASTATIN CALCIUM 10 MG/1
10 TABLET, FILM COATED ORAL DAILY
Qty: 90 TABLET | Refills: 0 | Status: SHIPPED | OUTPATIENT
Start: 2021-05-26 | End: 2021-09-07

## 2021-06-02 ENCOUNTER — HOSPITAL ENCOUNTER (OUTPATIENT)
Dept: BONE DENSITY | Facility: CLINIC | Age: 81
Discharge: HOME | End: 2021-06-02
Payer: MEDICARE

## 2021-06-02 DIAGNOSIS — M85.80 OSTEOPENIA, UNSPECIFIED LOCATION: ICD-10-CM

## 2021-06-02 PROCEDURE — 77080 DXA BONE DENSITY AXIAL: CPT

## 2021-06-14 DIAGNOSIS — I48.91 ATRIAL FIBRILLATION, UNSPECIFIED TYPE (HCC): ICD-10-CM

## 2021-06-14 DIAGNOSIS — E89.0 POSTOPERATIVE HYPOTHYROIDISM: ICD-10-CM

## 2021-06-14 DIAGNOSIS — E11.9 TYPE 2 DIABETES MELLITUS WITHOUT COMPLICATION, WITHOUT LONG-TERM CURRENT USE OF INSULIN (HCC): ICD-10-CM

## 2021-06-14 RX ORDER — LEVOTHYROXINE SODIUM 0.07 MG/1
75 TABLET ORAL DAILY
Qty: 90 TABLET | Refills: 0 | Status: SHIPPED | OUTPATIENT
Start: 2021-06-14 | End: 2021-09-07

## 2021-06-15 DIAGNOSIS — I10 ESSENTIAL HYPERTENSION: ICD-10-CM

## 2021-06-15 RX ORDER — IRBESARTAN AND HYDROCHLOROTHIAZIDE 300; 12.5 MG/1; MG/1
1 TABLET, FILM COATED ORAL DAILY
Qty: 30 TABLET | Refills: 0 | Status: SHIPPED | OUTPATIENT
Start: 2021-06-15 | End: 2021-07-19 | Stop reason: SDUPTHER

## 2021-07-19 DIAGNOSIS — I10 ESSENTIAL HYPERTENSION: ICD-10-CM

## 2021-07-19 RX ORDER — IRBESARTAN AND HYDROCHLOROTHIAZIDE 300; 12.5 MG/1; MG/1
1 TABLET, FILM COATED ORAL DAILY
Qty: 30 TABLET | Refills: 2 | Status: SHIPPED | OUTPATIENT
Start: 2021-07-19 | End: 2021-11-03 | Stop reason: SDUPTHER

## 2021-08-02 DIAGNOSIS — E11.9 TYPE 2 DIABETES MELLITUS WITHOUT COMPLICATION, WITHOUT LONG-TERM CURRENT USE OF INSULIN (HCC): ICD-10-CM

## 2021-08-09 ENCOUNTER — OFFICE VISIT (OUTPATIENT)
Dept: FAMILY MEDICINE CLINIC | Facility: CLINIC | Age: 81
End: 2021-08-09
Payer: MEDICARE

## 2021-08-09 VITALS
WEIGHT: 172 LBS | DIASTOLIC BLOOD PRESSURE: 70 MMHG | TEMPERATURE: 97.9 F | HEART RATE: 65 BPM | BODY MASS INDEX: 31.65 KG/M2 | SYSTOLIC BLOOD PRESSURE: 120 MMHG | OXYGEN SATURATION: 97 % | HEIGHT: 62 IN

## 2021-08-09 DIAGNOSIS — E55.9 VITAMIN D DEFICIENCY: ICD-10-CM

## 2021-08-09 DIAGNOSIS — M25.50 POLYARTHRALGIA: Primary | ICD-10-CM

## 2021-08-09 DIAGNOSIS — G56.02 CARPAL TUNNEL SYNDROME OF LEFT WRIST: ICD-10-CM

## 2021-08-09 PROCEDURE — 99214 OFFICE O/P EST MOD 30 MIN: CPT | Performed by: FAMILY MEDICINE

## 2021-08-09 NOTE — PROGRESS NOTES
Subjective:   Chief Complaint   Patient presents with    Joint Pain        Patient ID: Giuliano Sorensen is a 79 y o  female  Patient is here with her daughter with concern about pain and swelling in small joints n B/L hands no rash no muscle weakness no dryness in eye or mouth, pain is achy with stiffness no recent fall       The following portions of the patient's history were reviewed and updated as appropriate: allergies, current medications, past family history, past medical history, past social history, past surgical history and problem list     Review of Systems   Constitutional: Negative for activity change, appetite change, fatigue and fever  HENT: Negative for congestion, ear pain, sinus pressure, sinus pain and sore throat  Eyes: Negative for pain, discharge, redness and itching  Respiratory: Negative for cough, chest tightness, shortness of breath and stridor  Cardiovascular: Negative for chest pain, palpitations and leg swelling  Gastrointestinal: Negative for abdominal pain, blood in stool, constipation, diarrhea and nausea  Genitourinary: Negative for dysuria, flank pain, frequency and hematuria  Musculoskeletal: Positive for arthralgias and joint swelling  Negative for back pain and neck pain  Joints pain and swelling in B/L hands   Skin: Negative for pallor and rash  Neurological: Negative for dizziness, tremors, weakness, numbness and headaches  Hematological: Does not bruise/bleed easily  Objective:  Vitals:    08/09/21 0849   BP: 120/70   BP Location: Left arm   Patient Position: Sitting   Cuff Size: Standard   Pulse: 65   Temp: 97 9 °F (36 6 °C)   TempSrc: Tympanic   SpO2: 97%   Weight: 78 kg (172 lb)   Height: 5' 2" (1 575 m)      Physical Exam  Vitals and nursing note reviewed  Constitutional:       General: She is not in acute distress  Appearance: She is well-developed  She is not diaphoretic  HENT:      Head: Normocephalic        Right Ear: Tympanic membrane, ear canal and external ear normal       Left Ear: Tympanic membrane, ear canal and external ear normal       Nose: Nose normal  No congestion or rhinorrhea  Mouth/Throat:      Mouth: Mucous membranes are moist       Pharynx: Oropharynx is clear  No oropharyngeal exudate or posterior oropharyngeal erythema  Eyes:      General:         Right eye: No discharge  Left eye: No discharge  Conjunctiva/sclera: Conjunctivae normal       Pupils: Pupils are equal, round, and reactive to light  Neck:      Vascular: No JVD  Cardiovascular:      Rate and Rhythm: Normal rate and regular rhythm  Heart sounds: Normal heart sounds  No murmur heard  No gallop  Pulmonary:      Effort: Pulmonary effort is normal  No respiratory distress  Breath sounds: Normal breath sounds  No stridor  No wheezing or rales  Chest:      Chest wall: No tenderness  Abdominal:      General: There is no distension  Palpations: Abdomen is soft  There is no mass  Tenderness: There is no abdominal tenderness  There is no rebound  Musculoskeletal:         General: No tenderness  Cervical back: Normal range of motion and neck supple  Comments: B/L hands small joints swelling and deformity ,also B/L wrist swelling worse on right side    Lymphadenopathy:      Cervical: No cervical adenopathy  Skin:     General: Skin is warm  Findings: No erythema or rash  Neurological:      Mental Status: She is alert and oriented to person, place, and time  Motor: No weakness        Gait: Gait normal            Assessment/Plan:    Polyarthralgia  New diagnosis symptomatic multiple small joints pain and swelling ,patient on Anticoagulation I did recommend not to use NSAID ,recomened Tylenol for pain ,we discuss joints work up include CRP RF ,DARIUSZ ,Lye titer ,Vit D level and Uric acid  Also plan for Xray of B/L hands       Diagnoses and all orders for this visit:    Polyarthralgia  - DARIUSZ Screen w/ Reflex to Titer/Pattern  -     C-reactive protein  -     Cyclic citrul peptide antibody, IgG  -     Lyme Total Antibody Profile with reflex to WB  -     RF Screen w/ Reflex to Titer  -     Vitamin D 25 hydroxy; Future  -     Uric acid; Future  -     XR hand 3+ vw left; Future  -     XR hand 3+ vw right; Future    Vitamin D deficiency  -     DARIUSZ Screen w/ Reflex to Titer/Pattern  -     C-reactive protein  -     Cyclic citrul peptide antibody, IgG  -     Lyme Total Antibody Profile with reflex to WB  -     RF Screen w/ Reflex to Titer  -     Vitamin D 25 hydroxy; Future  -     Uric acid; Future  -     XR hand 3+ vw left;  Future  -     XR hand 3+ vw right; Future

## 2021-08-10 ENCOUNTER — TELEPHONE (OUTPATIENT)
Dept: ADMINISTRATIVE | Facility: OTHER | Age: 81
End: 2021-08-10

## 2021-08-10 NOTE — ASSESSMENT & PLAN NOTE
New diagnosis symptomatic multiple small joints pain and swelling ,patient on Anticoagulation I did recommend not to use NSAID ,recomened Tylenol for pain ,we discuss joints work up include CRP RF ,DARIUSZ ,Lye titer ,Vit D level and Uric acid  Also plan for Xray of B/L hands

## 2021-08-10 NOTE — TELEPHONE ENCOUNTER
----- Message from Rachel Jerome sent at 8/9/2021  9:06 AM EDT -----  08/09/21 9:06 AM    Hello, our patient Tiki Lord has had Mammogram completed/performed  Please assist in updating the patient chart by pulling the Care Everywhere (CE) document  The date of service is 07/07/2021       Thank you,  Jhonathan Cartwright MA  [address]

## 2021-08-12 ENCOUNTER — APPOINTMENT (OUTPATIENT)
Dept: LAB | Facility: HOSPITAL | Age: 81
End: 2021-08-12
Payer: MEDICARE

## 2021-08-12 ENCOUNTER — HOSPITAL ENCOUNTER (OUTPATIENT)
Dept: RADIOLOGY | Facility: HOSPITAL | Age: 81
Discharge: HOME | End: 2021-08-12
Payer: MEDICARE

## 2021-08-12 ENCOUNTER — TELEPHONE (OUTPATIENT)
Dept: FAMILY MEDICINE CLINIC | Facility: CLINIC | Age: 81
End: 2021-08-12

## 2021-08-12 DIAGNOSIS — E55.9 VITAMIN D DEFICIENCY: ICD-10-CM

## 2021-08-12 DIAGNOSIS — M19.031 OSTEOARTHRITIS OF BOTH WRISTS, UNSPECIFIED OSTEOARTHRITIS TYPE: ICD-10-CM

## 2021-08-12 DIAGNOSIS — M25.50 POLYARTHRALGIA: ICD-10-CM

## 2021-08-12 DIAGNOSIS — M19.032 OSTEOARTHRITIS OF BOTH WRISTS, UNSPECIFIED OSTEOARTHRITIS TYPE: ICD-10-CM

## 2021-08-12 DIAGNOSIS — E55.9 VITAMIN D DEFICIENCY: Primary | ICD-10-CM

## 2021-08-12 DIAGNOSIS — R20.2 NUMBNESS AND TINGLING OF HAND: Primary | ICD-10-CM

## 2021-08-12 DIAGNOSIS — R20.0 NUMBNESS AND TINGLING OF HAND: Primary | ICD-10-CM

## 2021-08-12 LAB
25(OH)D3 SERPL-MCNC: 19.9 NG/ML (ref 30–100)
CRP SERPL QL: <5 MG/L
RHEUMATOID FACT SER QL LA: NEGATIVE
URATE SERPL-MCNC: 7.9 MG/DL (ref 2.7–7.5)

## 2021-08-12 PROCEDURE — 86430 RHEUMATOID FACTOR TEST QUAL: CPT | Performed by: FAMILY MEDICINE

## 2021-08-12 PROCEDURE — 86140 C-REACTIVE PROTEIN: CPT | Performed by: FAMILY MEDICINE

## 2021-08-12 PROCEDURE — 82306 VITAMIN D 25 HYDROXY: CPT

## 2021-08-12 PROCEDURE — 86038 ANTINUCLEAR ANTIBODIES: CPT | Performed by: FAMILY MEDICINE

## 2021-08-12 PROCEDURE — 84550 ASSAY OF BLOOD/URIC ACID: CPT

## 2021-08-12 PROCEDURE — 36415 COLL VENOUS BLD VENIPUNCTURE: CPT | Performed by: FAMILY MEDICINE

## 2021-08-12 PROCEDURE — 86618 LYME DISEASE ANTIBODY: CPT | Performed by: FAMILY MEDICINE

## 2021-08-12 PROCEDURE — 86200 CCP ANTIBODY: CPT | Performed by: FAMILY MEDICINE

## 2021-08-12 PROCEDURE — 73130 X-RAY EXAM OF HAND: CPT

## 2021-08-12 RX ORDER — ERGOCALCIFEROL 1.25 MG/1
50000 CAPSULE ORAL WEEKLY
Qty: 4 CAPSULE | Refills: 2 | Status: SHIPPED | OUTPATIENT
Start: 2021-08-12 | End: 2021-10-06 | Stop reason: ALTCHOICE

## 2021-08-12 NOTE — TELEPHONE ENCOUNTER
marcia from [facility] patient was there getting fitted for her wirat splint and was expecting a Bilateral wrist splint, faxed over additional script for her right wrist

## 2021-08-12 NOTE — TELEPHONE ENCOUNTER
----- Message from Nguyễn Romero MD sent at 8/12/2021  3:38 PM EDT -----  Low Vit D start Vit D 59065 iu/w for 12 w

## 2021-08-13 LAB
B BURGDOR IGG+IGM SER-ACNC: 48
CCP AB SER IA-ACNC: 1.7
RYE IGE QN: NEGATIVE

## 2021-08-16 ENCOUNTER — OFFICE VISIT (OUTPATIENT)
Dept: CARDIOLOGY CLINIC | Facility: CLINIC | Age: 81
End: 2021-08-16
Payer: MEDICARE

## 2021-08-16 VITALS
DIASTOLIC BLOOD PRESSURE: 80 MMHG | SYSTOLIC BLOOD PRESSURE: 118 MMHG | HEART RATE: 57 BPM | BODY MASS INDEX: 32.02 KG/M2 | HEIGHT: 62 IN | WEIGHT: 174 LBS

## 2021-08-16 DIAGNOSIS — R42 VERTIGO: ICD-10-CM

## 2021-08-16 DIAGNOSIS — E78.00 PURE HYPERCHOLESTEROLEMIA: ICD-10-CM

## 2021-08-16 DIAGNOSIS — I48.0 PAROXYSMAL ATRIAL FIBRILLATION (HCC): ICD-10-CM

## 2021-08-16 DIAGNOSIS — E11.9 TYPE 2 DIABETES MELLITUS WITHOUT COMPLICATION, WITHOUT LONG-TERM CURRENT USE OF INSULIN (HCC): ICD-10-CM

## 2021-08-16 DIAGNOSIS — I25.10 CHRONIC CORONARY ARTERY DISEASE: Primary | ICD-10-CM

## 2021-08-16 DIAGNOSIS — M48.02 CERVICAL SPINAL STENOSIS: ICD-10-CM

## 2021-08-16 DIAGNOSIS — I10 ESSENTIAL HYPERTENSION: ICD-10-CM

## 2021-08-16 PROCEDURE — 99214 OFFICE O/P EST MOD 30 MIN: CPT | Performed by: INTERNAL MEDICINE

## 2021-08-16 PROCEDURE — 93000 ELECTROCARDIOGRAM COMPLETE: CPT | Performed by: INTERNAL MEDICINE

## 2021-08-16 RX ORDER — MECLIZINE HYDROCHLORIDE 25 MG/1
25 TABLET ORAL
Qty: 30 TABLET | Refills: 1 | Status: SHIPPED | OUTPATIENT
Start: 2021-08-16

## 2021-08-16 RX ORDER — GABAPENTIN 100 MG/1
200 CAPSULE ORAL
Start: 2021-08-16 | End: 2021-11-09 | Stop reason: DRUGHIGH
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
## 2021-08-17 DIAGNOSIS — I10 ESSENTIAL HYPERTENSION: ICD-10-CM

## 2021-08-24 ENCOUNTER — OFFICE VISIT (OUTPATIENT)
Dept: FAMILY MEDICINE CLINIC | Facility: CLINIC | Age: 81
End: 2021-08-24
Payer: MEDICARE

## 2021-08-24 VITALS
WEIGHT: 173 LBS | SYSTOLIC BLOOD PRESSURE: 120 MMHG | TEMPERATURE: 97.2 F | OXYGEN SATURATION: 97 % | HEIGHT: 62 IN | DIASTOLIC BLOOD PRESSURE: 80 MMHG | BODY MASS INDEX: 31.83 KG/M2 | HEART RATE: 56 BPM

## 2021-08-24 DIAGNOSIS — G56.01 CARPAL TUNNEL SYNDROME OF RIGHT WRIST: ICD-10-CM

## 2021-08-24 DIAGNOSIS — E04.1 THYROID NODULE: ICD-10-CM

## 2021-08-24 DIAGNOSIS — E11.9 TYPE 2 DIABETES MELLITUS WITHOUT COMPLICATION, WITHOUT LONG-TERM CURRENT USE OF INSULIN (HCC): ICD-10-CM

## 2021-08-24 DIAGNOSIS — E55.9 VITAMIN D DEFICIENCY: Primary | ICD-10-CM

## 2021-08-24 DIAGNOSIS — E79.0 HYPERURICEMIA: ICD-10-CM

## 2021-08-24 PROBLEM — M18.0 BILATERAL PRIMARY OSTEOARTHRITIS OF FIRST CARPOMETACARPAL JOINTS: Status: ACTIVE | Noted: 2021-08-24

## 2021-08-24 PROCEDURE — 99214 OFFICE O/P EST MOD 30 MIN: CPT | Performed by: FAMILY MEDICINE

## 2021-08-24 RX ORDER — ALLOPURINOL 100 MG/1
100 TABLET ORAL DAILY
Qty: 90 TABLET | Refills: 1 | Status: SHIPPED | OUTPATIENT
Start: 2021-08-24 | End: 2022-03-09 | Stop reason: SDUPTHER

## 2021-08-24 NOTE — PROGRESS NOTES
Subjective:   Chief Complaint   Patient presents with    Follow-up     2 week f/u        Patient ID: Yvonne Mireles is a 79 y o  female  Patient here with the daughter and the her follow-up with blood work result order as the workup for multiple joint pain and she is concerned about the numbness tingling in her right hand mostly on the tip of the fingers and patient known to have history of carpal tunnel on the left side and she had a surgical repair and the she described her symptom similar to the symptoms she had in her other hand the and and is worse at nighttime with some swelling no muscle weakness the deny any blurred vision and no headache no rash the recent blood work review with the patient show her vitamin-D below 21 and she had increase in the uric acid a no history of kidney stone and no history of gout       The following portions of the patient's history were reviewed and updated as appropriate: allergies, current medications, past family history, past medical history, past social history, past surgical history and problem list     Review of Systems   Constitutional: Negative for activity change, appetite change, fatigue and fever  HENT: Negative for congestion, ear pain, sinus pressure, sinus pain and sore throat  Eyes: Negative for pain, discharge, redness and itching  Respiratory: Negative for cough, chest tightness, shortness of breath and stridor  Cardiovascular: Negative for chest pain, palpitations and leg swelling  Gastrointestinal: Negative for abdominal pain, blood in stool, constipation, diarrhea and nausea  Genitourinary: Negative for dysuria, flank pain, frequency and hematuria  Musculoskeletal: Negative for back pain, joint swelling and neck pain  Skin: Negative for pallor and rash  Neurological: Positive for numbness  Negative for dizziness, tremors, weakness and headaches  Numbness in right hand    Hematological: Does not bruise/bleed easily  Objective:  Vitals:    08/24/21 0827   BP: 120/80   Pulse: 56   Temp: (!) 97 2 °F (36 2 °C)   TempSrc: Tympanic   SpO2: 97%   Weight: 78 5 kg (173 lb)   Height: 5' 2" (1 575 m)      Physical Exam  Vitals and nursing note reviewed  Constitutional:       General: She is not in acute distress  Appearance: She is well-developed  She is not diaphoretic  HENT:      Head: Normocephalic  Right Ear: Tympanic membrane, ear canal and external ear normal       Left Ear: Tympanic membrane, ear canal and external ear normal       Nose: Nose normal  No congestion or rhinorrhea  Mouth/Throat:      Mouth: Mucous membranes are moist       Pharynx: Oropharynx is clear  No oropharyngeal exudate or posterior oropharyngeal erythema  Eyes:      General:         Right eye: No discharge  Left eye: No discharge  Conjunctiva/sclera: Conjunctivae normal       Pupils: Pupils are equal, round, and reactive to light  Neck:      Vascular: No JVD  Cardiovascular:      Rate and Rhythm: Normal rate and regular rhythm  Heart sounds: Normal heart sounds  No murmur heard  No gallop  Pulmonary:      Effort: Pulmonary effort is normal  No respiratory distress  Breath sounds: Normal breath sounds  No stridor  No wheezing or rales  Chest:      Chest wall: No tenderness  Abdominal:      General: There is no distension  Palpations: Abdomen is soft  There is no mass  Tenderness: There is no abdominal tenderness  There is no rebound  Musculoskeletal:         General: No tenderness  Cervical back: Normal range of motion and neck supple  Comments: Tinel sign positive in right wrist   Lymphadenopathy:      Cervical: No cervical adenopathy  Skin:     General: Skin is warm  Findings: No erythema or rash  Neurological:      Mental Status: She is alert and oriented to person, place, and time  Motor: No weakness        Gait: Gait normal  Assessment/Plan:    Vitamin D deficiency   A chronic vitamin-D below 20 will start her on vitamin-D supplement 07572 International Units once a week for 12 week proper use and possible side effect discussed with the patient vitamin-D rich diet also review with the patient    Hyperuricemia   A new diagnosis finding on recent blood work done for multiple joint pain increase in uric acid patient was in normal kidney function the a discussed with the patient and the daughter increase the risk for gout and kidney stone plan to start her on allopurinol 100 mg once a day proper use and possible side effect discussed the patient    Carpal tunnel syndrome of right wrist   A new diagnosis the carpal tunnel in the right hand who recommend to wear the this splint and plan for EMG study we discussed the patient show problem patient already had diagnosis of carpal tunnel on the the left hand and had surgical repair    Type 2 diabetes mellitus without complication, without long-term current use of insulin ([facility])    Lab Results   Component Value Date    HGBA1C 6 5 (H) 05/18/2021        Diagnoses and all orders for this visit:    Vitamin D deficiency  -     CBC and differential; Future  -     Basic metabolic panel; Future  -     Lipid panel; Future  -     TSH, 3rd generation with Free T4 reflex; Future  -     Hemoglobin A1C; Future    Hyperuricemia  -     allopurinol (ZYLOPRIM) 100 mg tablet; Take 1 tablet (100 mg total) by mouth daily  -     CBC and differential; Future  -     Basic metabolic panel; Future  -     Lipid panel; Future  -     TSH, 3rd generation with Free T4 reflex; Future  -     Hemoglobin A1C; Future    Carpal tunnel syndrome of right wrist  -     EMG 1 Limb; Future    Type 2 diabetes mellitus without complication, without long-term current use of insulin ([facility])  -     Hemoglobin A1C; Future    Thyroid nodule  -     TSH, 3rd generation with Free T4 reflex;  Future

## 2021-08-26 PROBLEM — Z01.818 PRE-OP EXAMINATION: Status: RESOLVED | Noted: 2019-02-25 | Resolved: 2021-08-26
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
## 2021-08-26 PROBLEM — G44.329 CHRONIC POST-TRAUMATIC HEADACHE, NOT INTRACTABLE: Status: RESOLVED | Noted: 2020-09-08 | Resolved: 2021-08-26

## 2021-08-26 PROBLEM — Z01.818 PRE-OPERATIVE CLEARANCE: Status: RESOLVED | Noted: 2018-08-21 | Resolved: 2021-08-26

## 2021-08-26 PROBLEM — R92.8 ABNORMAL MAMMOGRAM: Status: RESOLVED | Noted: 2018-05-08 | Resolved: 2021-08-26

## 2021-08-26 PROBLEM — R07.81 RIB PAIN ON RIGHT SIDE: Status: RESOLVED | Noted: 2019-02-25 | Resolved: 2021-08-26

## 2021-08-26 NOTE — ASSESSMENT & PLAN NOTE
A new diagnosis finding on recent blood work done for multiple joint pain increase in uric acid patient was in normal kidney function the a discussed with the patient and the daughter increase the risk for gout and kidney stone plan to start her on allopurinol 100 mg once a day proper use and possible side effect discussed the patient

## 2021-08-26 NOTE — ASSESSMENT & PLAN NOTE
A new diagnosis the carpal tunnel in the right hand who recommend to wear the this splint and plan for EMG study we discussed the patient show problem patient already had diagnosis of carpal tunnel on the the left hand and had surgical repair

## 2021-08-26 NOTE — ASSESSMENT & PLAN NOTE
A chronic vitamin-D below 20 will start her on vitamin-D supplement 19650 International Units once a week for 12 week proper use and possible side effect discussed with the patient vitamin-D rich diet also review with the patient

## 2021-08-27 ENCOUNTER — TELEPHONE (OUTPATIENT)
Dept: NEUROLOGY | Facility: CLINIC | Age: 81
End: 2021-08-27

## 2021-09-04 DIAGNOSIS — E11.9 TYPE 2 DIABETES MELLITUS WITHOUT COMPLICATION, WITHOUT LONG-TERM CURRENT USE OF INSULIN (HCC): ICD-10-CM

## 2021-09-04 DIAGNOSIS — E78.00 PURE HYPERCHOLESTEROLEMIA: ICD-10-CM

## 2021-09-04 DIAGNOSIS — E89.0 POSTOPERATIVE HYPOTHYROIDISM: ICD-10-CM

## 2021-09-07 RX ORDER — LEVOTHYROXINE SODIUM 0.07 MG/1
75 TABLET ORAL DAILY
Qty: 90 TABLET | Refills: 0 | Status: SHIPPED | OUTPATIENT
Start: 2021-09-07 | End: 2021-12-15

## 2021-09-07 RX ORDER — ATORVASTATIN CALCIUM 10 MG/1
10 TABLET, FILM COATED ORAL DAILY
Qty: 90 TABLET | Refills: 0 | Status: SHIPPED | OUTPATIENT
Start: 2021-09-07 | End: 2021-09-27 | Stop reason: SDUPTHER

## 2021-09-09 ENCOUNTER — TELEPHONE (OUTPATIENT)
Dept: FAMILY MEDICINE CLINIC | Facility: CLINIC | Age: 81
End: 2021-09-09

## 2021-09-09 DIAGNOSIS — E55.9 VITAMIN D DEFICIENCY: Primary | ICD-10-CM

## 2021-09-09 DIAGNOSIS — G56.01 CARPAL TUNNEL SYNDROME OF RIGHT WRIST: ICD-10-CM

## 2021-09-09 DIAGNOSIS — E79.0 HYPERURICEMIA: ICD-10-CM

## 2021-09-09 NOTE — TELEPHONE ENCOUNTER
marcia from [facility] neuro [facility] ambulatory referral for patient,

## 2021-09-27 ENCOUNTER — TELEPHONE (OUTPATIENT)
Dept: FAMILY MEDICINE CLINIC | Facility: CLINIC | Age: 81
End: 2021-09-27

## 2021-09-27 ENCOUNTER — OFFICE VISIT (OUTPATIENT)
Dept: FAMILY MEDICINE CLINIC | Facility: CLINIC | Age: 81
End: 2021-09-27
Payer: MEDICARE

## 2021-09-27 ENCOUNTER — APPOINTMENT (OUTPATIENT)
Dept: LAB | Facility: HOSPITAL | Age: 81
End: 2021-09-27
Payer: MEDICARE

## 2021-09-27 VITALS
HEART RATE: 51 BPM | BODY MASS INDEX: 32.06 KG/M2 | TEMPERATURE: 97.9 F | WEIGHT: 174.2 LBS | OXYGEN SATURATION: 97 % | HEIGHT: 62 IN | DIASTOLIC BLOOD PRESSURE: 88 MMHG | SYSTOLIC BLOOD PRESSURE: 152 MMHG

## 2021-09-27 DIAGNOSIS — E04.1 THYROID NODULE: ICD-10-CM

## 2021-09-27 DIAGNOSIS — T78.40XA ALLERGIC REACTION, INITIAL ENCOUNTER: Primary | ICD-10-CM

## 2021-09-27 DIAGNOSIS — E79.0 HYPERURICEMIA: ICD-10-CM

## 2021-09-27 DIAGNOSIS — T78.40XA ALLERGIC REACTION, INITIAL ENCOUNTER: ICD-10-CM

## 2021-09-27 DIAGNOSIS — E55.9 VITAMIN D DEFICIENCY: ICD-10-CM

## 2021-09-27 DIAGNOSIS — E87.6 LOW BLOOD POTASSIUM: Primary | ICD-10-CM

## 2021-09-27 DIAGNOSIS — E11.9 TYPE 2 DIABETES MELLITUS WITHOUT COMPLICATION, WITHOUT LONG-TERM CURRENT USE OF INSULIN (HCC): ICD-10-CM

## 2021-09-27 DIAGNOSIS — I10 ESSENTIAL HYPERTENSION: ICD-10-CM

## 2021-09-27 DIAGNOSIS — Z23 INFLUENZA VACCINE NEEDED: ICD-10-CM

## 2021-09-27 DIAGNOSIS — E78.00 PURE HYPERCHOLESTEROLEMIA: ICD-10-CM

## 2021-09-27 LAB
ANION GAP SERPL CALCULATED.3IONS-SCNC: 8 MMOL/L (ref 5–14)
BASOPHILS # BLD AUTO: 0 THOUSANDS/ΜL (ref 0–0.1)
BASOPHILS NFR BLD AUTO: 0 % (ref 0–1)
BUN SERPL-MCNC: 13 MG/DL (ref 5–25)
CALCIUM SERPL-MCNC: 8.7 MG/DL (ref 8.4–10.2)
CHLORIDE SERPL-SCNC: 99 MMOL/L (ref 97–108)
CHOLEST SERPL-MCNC: 95 MG/DL
CO2 SERPL-SCNC: 34 MMOL/L (ref 22–30)
CREAT SERPL-MCNC: 0.89 MG/DL (ref 0.6–1.2)
EOSINOPHIL # BLD AUTO: 0 THOUSAND/ΜL (ref 0–0.4)
EOSINOPHIL NFR BLD AUTO: 0 % (ref 0–6)
ERYTHROCYTE [DISTWIDTH] IN BLOOD BY AUTOMATED COUNT: 14.1 %
EST. AVERAGE GLUCOSE BLD GHB EST-MCNC: 143 MG/DL
GFR SERPL CREATININE-BSD FRML MDRD: 71 ML/MIN/1.73SQ M
GLUCOSE P FAST SERPL-MCNC: 122 MG/DL (ref 70–99)
HBA1C MFR BLD: 6.6 %
HCT VFR BLD AUTO: 41.5 % (ref 36–46)
HDLC SERPL-MCNC: 44 MG/DL
HGB BLD-MCNC: 13.5 G/DL (ref 12–16)
LDLC SERPL CALC-MCNC: 33 MG/DL
LYMPHOCYTES # BLD AUTO: 1.3 THOUSANDS/ΜL (ref 0.5–4)
LYMPHOCYTES NFR BLD AUTO: 32 % (ref 25–45)
MCH RBC QN AUTO: 31.3 PG (ref 26–34)
MCHC RBC AUTO-ENTMCNC: 32.7 G/DL (ref 31–36)
MCV RBC AUTO: 96 FL (ref 80–100)
MONOCYTES # BLD AUTO: 0.4 THOUSAND/ΜL (ref 0.2–0.9)
MONOCYTES NFR BLD AUTO: 10 % (ref 1–10)
NEUTROPHILS # BLD AUTO: 2.4 THOUSANDS/ΜL (ref 1.8–7.8)
NEUTS SEG NFR BLD AUTO: 57 % (ref 45–65)
NONHDLC SERPL-MCNC: 51 MG/DL
PLATELET # BLD AUTO: 195 THOUSANDS/UL (ref 150–450)
PMV BLD AUTO: 9.5 FL (ref 8.9–12.7)
POTASSIUM SERPL-SCNC: 2.9 MMOL/L (ref 3.6–5)
RBC # BLD AUTO: 4.32 MILLION/UL (ref 4–5.2)
SODIUM SERPL-SCNC: 141 MMOL/L (ref 137–147)
TRIGL SERPL-MCNC: 92 MG/DL
TSH SERPL DL<=0.05 MIU/L-ACNC: 3.99 UIU/ML (ref 0.47–4.68)
WBC # BLD AUTO: 4.1 THOUSAND/UL (ref 4.5–11)

## 2021-09-27 PROCEDURE — G0008 ADMIN INFLUENZA VIRUS VAC: HCPCS

## 2021-09-27 PROCEDURE — 36415 COLL VENOUS BLD VENIPUNCTURE: CPT

## 2021-09-27 PROCEDURE — 83036 HEMOGLOBIN GLYCOSYLATED A1C: CPT

## 2021-09-27 PROCEDURE — 80061 LIPID PANEL: CPT

## 2021-09-27 PROCEDURE — 99214 OFFICE O/P EST MOD 30 MIN: CPT | Performed by: FAMILY MEDICINE

## 2021-09-27 PROCEDURE — 80048 BASIC METABOLIC PNL TOTAL CA: CPT

## 2021-09-27 PROCEDURE — 90662 IIV NO PRSV INCREASED AG IM: CPT

## 2021-09-27 PROCEDURE — 85025 COMPLETE CBC W/AUTO DIFF WBC: CPT

## 2021-09-27 PROCEDURE — 84443 ASSAY THYROID STIM HORMONE: CPT

## 2021-09-27 RX ORDER — HYDROXYZINE HYDROCHLORIDE 25 MG/1
25 TABLET, FILM COATED ORAL 3 TIMES DAILY
Qty: 30 TABLET | Refills: 0 | Status: SHIPPED | OUTPATIENT
Start: 2021-09-27 | End: 2021-11-09 | Stop reason: ALTCHOICE

## 2021-09-27 RX ORDER — HYDROXYZINE HYDROCHLORIDE 25 MG/1
25 TABLET, FILM COATED ORAL 3 TIMES DAILY
Qty: 30 TABLET | Refills: 0 | Status: SHIPPED | OUTPATIENT
Start: 2021-09-27 | End: 2021-09-27

## 2021-09-27 RX ORDER — ATORVASTATIN CALCIUM 10 MG/1
10 TABLET, FILM COATED ORAL DAILY
Qty: 90 TABLET | Refills: 0 | Status: SHIPPED | OUTPATIENT
Start: 2021-09-27 | End: 2022-03-14

## 2021-09-27 NOTE — TELEPHONE ENCOUNTER
----- Message from Imelda Morocho MD sent at 9/27/2021  3:03 PM EDT -----  Low potasium start Klor -con 20 meq po ,BID for 3 days ,then recheck potassium level on Thursday 09/30/21

## 2021-09-28 DIAGNOSIS — E87.6 LOW SERUM POTASSIUM: Primary | ICD-10-CM

## 2021-09-28 DIAGNOSIS — E87.6 LOW SERUM POTASSIUM: ICD-10-CM

## 2021-09-28 RX ORDER — POTASSIUM CHLORIDE 20 MEQ/1
20 TABLET, EXTENDED RELEASE ORAL 2 TIMES DAILY
COMMUNITY

## 2021-09-28 RX ORDER — POTASSIUM CHLORIDE 1.5 G/1.77G
20 POWDER, FOR SOLUTION ORAL 2 TIMES DAILY
Qty: 60 EACH | Refills: 0 | Status: SHIPPED | OUTPATIENT
Start: 2021-09-28 | End: 2021-09-28 | Stop reason: CLARIF

## 2021-09-28 NOTE — TELEPHONE ENCOUNTER
Spoke with patient advised to take potassium BID for 3 days then recheck levels   Medication sent to pharmacy

## 2021-09-29 PROBLEM — T78.40XA ALLERGIC REACTION: Status: ACTIVE | Noted: 2021-09-27

## 2021-09-29 PROBLEM — T78.40XA ALLERGIC REACTION: Status: ACTIVE | Noted: 2021-09-29

## 2021-09-29 NOTE — PROGRESS NOTES
Subjective:   Chief Complaint   Patient presents with    Facial Swelling        Patient ID: Romana Nova is a 79 y o  female  The patient here with her daughter concerned about the rash on her face the mostly on the the her right side and swelling on the right side of the face start the after she dye her hair patient she deny using any new products the and no cough no wheezing no short of breath no blood in the urine and no abdomen pain      The following portions of the patient's history were reviewed and updated as appropriate: allergies, current medications, past family history, past medical history, past social history, past surgical history and problem list     Review of Systems   Constitutional: Negative for activity change, appetite change, fatigue and fever  HENT: Negative for congestion, ear pain, sinus pressure, sinus pain and sore throat  Eyes: Negative for pain, discharge, redness and itching  Respiratory: Negative for cough, chest tightness, shortness of breath and stridor  Cardiovascular: Negative for chest pain, palpitations and leg swelling  Gastrointestinal: Negative for abdominal pain, blood in stool, constipation, diarrhea and nausea  Genitourinary: Negative for dysuria, flank pain, frequency and hematuria  Musculoskeletal: Negative for back pain, joint swelling and neck pain  Skin: Positive for rash  Negative for pallor  Neurological: Negative for dizziness, tremors, weakness, numbness and headaches  Hematological: Does not bruise/bleed easily  Objective:  Vitals:    09/27/21 1035   BP: 152/88   BP Location: Right arm   Patient Position: Sitting   Cuff Size: Large   Pulse: (!) 51   Temp: 97 9 °F (36 6 °C)   TempSrc: Tympanic   SpO2: 97%   Weight: 79 kg (174 lb 3 2 oz)   Height: 5' 2" (1 575 m)      Physical Exam  Vitals and nursing note reviewed  Constitutional:       General: She is not in acute distress  Appearance: She is well-developed   She is not diaphoretic  HENT:      Head: Normocephalic  Right Ear: Tympanic membrane, ear canal and external ear normal       Left Ear: Tympanic membrane, ear canal and external ear normal       Nose: Nose normal  No congestion or rhinorrhea  Mouth/Throat:      Mouth: Mucous membranes are moist       Pharynx: Oropharynx is clear  No oropharyngeal exudate or posterior oropharyngeal erythema  Eyes:      General:         Right eye: No discharge  Left eye: No discharge  Conjunctiva/sclera: Conjunctivae normal       Pupils: Pupils are equal, round, and reactive to light  Neck:      Vascular: No JVD  Cardiovascular:      Rate and Rhythm: Normal rate and regular rhythm  Heart sounds: Murmur heard  No gallop  Pulmonary:      Effort: Pulmonary effort is normal  No respiratory distress  Breath sounds: Normal breath sounds  No stridor  No wheezing or rales  Chest:      Chest wall: No tenderness  Abdominal:      General: There is no distension  Palpations: Abdomen is soft  There is no mass  Tenderness: There is no abdominal tenderness  There is no rebound  Musculoskeletal:         General: No tenderness  Cervical back: Normal range of motion and neck supple  Lymphadenopathy:      Cervical: No cervical adenopathy  Skin:     General: Skin is warm  Findings: Erythema and rash present  Neurological:      Mental Status: She is alert and oriented to person, place, and time  Motor: No weakness        Gait: Gait normal            Assessment/Plan:    Allergic reaction  A new diagnosis acute symptomatic the rash and swelling on the right side of the face reaction to hair dye itchy and no wheezing no swelling in the tongue and the lips the patient does have history of diabetic will hold on the Medrol pack recommend the hydroxyzine 25 milligram up to 4 times a day proper use and possible side effect discussed the patient there is no improvement to call the office    Hypertension  Chronic asymptomatic uncontrolled patient stressed out with the rash and she did not sleep well and she been compliant with the medication deny any short of breath or chest pain and no TIA symptom recommend to continue current management continue monitor blood pressure low-salt diet important lose weight review the patient       Diagnoses and all orders for this visit:    Allergic reaction, initial encounter  -     Discontinue: hydrOXYzine HCL (ATARAX) 25 mg tablet; Take 1 tablet (25 mg total) by mouth 3 (three) times a day    Essential hypertension    Type 2 diabetes mellitus without complication, without long-term current use of insulin (HCC)  -     atorvastatin (LIPITOR) 10 mg tablet; Take 1 tablet (10 mg total) by mouth daily    Pure hypercholesterolemia  -     atorvastatin (LIPITOR) 10 mg tablet;  Take 1 tablet (10 mg total) by mouth daily    Influenza vaccine needed  -     influenza vaccine, high-dose, PF 0 7 mL (FLUZONE HIGH-DOSE)

## 2021-09-29 NOTE — ASSESSMENT & PLAN NOTE
Chronic asymptomatic uncontrolled patient stressed out with the rash and she did not sleep well and she been compliant with the medication deny any short of breath or chest pain and no TIA symptom recommend to continue current management continue monitor blood pressure low-salt diet important lose weight review the patient

## 2021-09-29 NOTE — ASSESSMENT & PLAN NOTE
A new diagnosis acute symptomatic the rash and swelling on the right side of the face reaction to hair dye itchy and no wheezing no swelling in the tongue and the lips the patient does have history of diabetic will hold on the Medrol pack recommend the hydroxyzine 25 milligram up to 4 times a day proper use and possible side effect discussed the patient there is no improvement to call the office

## 2021-10-05 ENCOUNTER — TELEPHONE (OUTPATIENT)
Dept: RADIATION ONCOLOGY | Facility: CLINIC | Age: 81
End: 2021-10-05

## 2021-10-06 ENCOUNTER — OFFICE VISIT (OUTPATIENT)
Dept: FAMILY MEDICINE CLINIC | Facility: CLINIC | Age: 81
End: 2021-10-06
Payer: MEDICARE

## 2021-10-06 VITALS
TEMPERATURE: 98.9 F | OXYGEN SATURATION: 97 % | SYSTOLIC BLOOD PRESSURE: 130 MMHG | DIASTOLIC BLOOD PRESSURE: 90 MMHG | HEIGHT: 62 IN | HEART RATE: 59 BPM | WEIGHT: 175 LBS | BODY MASS INDEX: 32.2 KG/M2

## 2021-10-06 DIAGNOSIS — T78.40XD ALLERGIC REACTION, SUBSEQUENT ENCOUNTER: Primary | ICD-10-CM

## 2021-10-06 PROCEDURE — 99214 OFFICE O/P EST MOD 30 MIN: CPT | Performed by: FAMILY MEDICINE

## 2021-10-06 RX ORDER — METHYLPREDNISOLONE 4 MG/1
TABLET ORAL
Qty: 21 EACH | Refills: 0 | Status: SHIPPED | OUTPATIENT
Start: 2021-10-06 | End: 2021-11-09 | Stop reason: ALTCHOICE

## 2021-11-01 ENCOUNTER — CLINICAL SUPPORT (OUTPATIENT)
Dept: RADIATION ONCOLOGY | Facility: CLINIC | Age: 81
End: 2021-11-01
Attending: RADIOLOGY
Payer: MEDICARE

## 2021-11-01 VITALS
SYSTOLIC BLOOD PRESSURE: 114 MMHG | HEART RATE: 63 BPM | DIASTOLIC BLOOD PRESSURE: 62 MMHG | BODY MASS INDEX: 31.52 KG/M2 | OXYGEN SATURATION: 100 % | HEIGHT: 62 IN | TEMPERATURE: 97.2 F | RESPIRATION RATE: 18 BRPM | WEIGHT: 171.3 LBS

## 2021-11-01 DIAGNOSIS — D05.11 DUCTAL CARCINOMA IN SITU OF RIGHT BREAST: Primary | ICD-10-CM

## 2021-11-01 DIAGNOSIS — C50.111 MALIGNANT NEOPLASM OF CENTRAL PORTION OF RIGHT BREAST IN FEMALE, ESTROGEN RECEPTOR POSITIVE (HCC): Primary | ICD-10-CM

## 2021-11-01 DIAGNOSIS — Z17.0 MALIGNANT NEOPLASM OF CENTRAL PORTION OF RIGHT BREAST IN FEMALE, ESTROGEN RECEPTOR POSITIVE (HCC): Primary | ICD-10-CM

## 2021-11-01 PROCEDURE — 99213 OFFICE O/P EST LOW 20 MIN: CPT | Performed by: RADIOLOGY

## 2021-11-01 PROCEDURE — 99211 OFF/OP EST MAY X REQ PHY/QHP: CPT | Performed by: RADIOLOGY

## 2021-11-03 DIAGNOSIS — I10 ESSENTIAL HYPERTENSION: ICD-10-CM

## 2021-11-03 RX ORDER — IRBESARTAN AND HYDROCHLOROTHIAZIDE 300; 12.5 MG/1; MG/1
1 TABLET, FILM COATED ORAL DAILY
Qty: 30 TABLET | Refills: 2 | Status: SHIPPED | OUTPATIENT
Start: 2021-11-03 | End: 2022-02-10

## 2021-11-09 ENCOUNTER — OFFICE VISIT (OUTPATIENT)
Dept: FAMILY MEDICINE CLINIC | Facility: CLINIC | Age: 81
End: 2021-11-09
Payer: MEDICARE

## 2021-11-09 VITALS
DIASTOLIC BLOOD PRESSURE: 84 MMHG | WEIGHT: 173 LBS | HEART RATE: 74 BPM | BODY MASS INDEX: 31.83 KG/M2 | HEIGHT: 62 IN | SYSTOLIC BLOOD PRESSURE: 120 MMHG | OXYGEN SATURATION: 97 % | TEMPERATURE: 97.5 F

## 2021-11-09 DIAGNOSIS — M19.032 OSTEOARTHRITIS OF BOTH WRISTS, UNSPECIFIED OSTEOARTHRITIS TYPE: ICD-10-CM

## 2021-11-09 DIAGNOSIS — E89.0 POSTOPERATIVE HYPOTHYROIDISM: ICD-10-CM

## 2021-11-09 DIAGNOSIS — E78.00 PURE HYPERCHOLESTEROLEMIA: ICD-10-CM

## 2021-11-09 DIAGNOSIS — Z00.00 MEDICARE ANNUAL WELLNESS VISIT, SUBSEQUENT: Primary | ICD-10-CM

## 2021-11-09 DIAGNOSIS — G44.329 CHRONIC POST-TRAUMATIC HEADACHE, NOT INTRACTABLE: ICD-10-CM

## 2021-11-09 DIAGNOSIS — M18.0 BILATERAL PRIMARY OSTEOARTHRITIS OF FIRST CARPOMETACARPAL JOINTS: ICD-10-CM

## 2021-11-09 DIAGNOSIS — E11.9 TYPE 2 DIABETES MELLITUS WITHOUT COMPLICATION, WITHOUT LONG-TERM CURRENT USE OF INSULIN (HCC): ICD-10-CM

## 2021-11-09 DIAGNOSIS — M19.031 OSTEOARTHRITIS OF BOTH WRISTS, UNSPECIFIED OSTEOARTHRITIS TYPE: ICD-10-CM

## 2021-11-09 PROCEDURE — G0439 PPPS, SUBSEQ VISIT: HCPCS | Performed by: FAMILY MEDICINE

## 2021-11-09 PROCEDURE — 99214 OFFICE O/P EST MOD 30 MIN: CPT | Performed by: FAMILY MEDICINE

## 2021-11-09 PROCEDURE — 1123F ACP DISCUSS/DSCN MKR DOCD: CPT | Performed by: FAMILY MEDICINE

## 2021-11-09 RX ORDER — NAPROXEN 250 MG/1
TABLET ORAL
Qty: 30 TABLET | Refills: 0 | Status: SHIPPED | OUTPATIENT
Start: 2021-11-09 | End: 2022-05-10 | Stop reason: ALTCHOICE

## 2021-11-09 RX ORDER — GABAPENTIN 300 MG/1
300 CAPSULE ORAL
Qty: 30 CAPSULE | Refills: 2 | Status: SHIPPED | OUTPATIENT
Start: 2021-11-09 | End: 2022-06-29

## 2021-11-18 DIAGNOSIS — I10 ESSENTIAL HYPERTENSION: ICD-10-CM

## 2021-12-02 ENCOUNTER — TELEPHONE (OUTPATIENT)
Dept: NEUROLOGY | Facility: CLINIC | Age: 81
End: 2021-12-02

## 2021-12-15 DIAGNOSIS — E89.0 POSTOPERATIVE HYPOTHYROIDISM: ICD-10-CM

## 2021-12-15 RX ORDER — LEVOTHYROXINE SODIUM 0.07 MG/1
75 TABLET ORAL DAILY
Qty: 90 TABLET | Refills: 0 | Status: SHIPPED | OUTPATIENT
Start: 2021-12-15 | End: 2022-03-09 | Stop reason: SDUPTHER

## 2022-01-26 ENCOUNTER — APPOINTMENT (OUTPATIENT)
Dept: LAB | Facility: HOSPITAL | Age: 82
End: 2022-01-26
Payer: COMMERCIAL

## 2022-01-26 DIAGNOSIS — E89.0 POSTOPERATIVE HYPOTHYROIDISM: ICD-10-CM

## 2022-01-26 DIAGNOSIS — E87.6 LOW BLOOD POTASSIUM: ICD-10-CM

## 2022-01-26 DIAGNOSIS — E78.00 PURE HYPERCHOLESTEROLEMIA: ICD-10-CM

## 2022-01-26 DIAGNOSIS — E11.9 TYPE 2 DIABETES MELLITUS WITHOUT COMPLICATION, WITHOUT LONG-TERM CURRENT USE OF INSULIN (HCC): ICD-10-CM

## 2022-01-26 LAB
ALBUMIN SERPL BCP-MCNC: 3.9 G/DL (ref 3–5.2)
ALP SERPL-CCNC: 87 U/L (ref 43–122)
ALT SERPL W P-5'-P-CCNC: 10 U/L
ANION GAP SERPL CALCULATED.3IONS-SCNC: 5 MMOL/L (ref 5–14)
AST SERPL W P-5'-P-CCNC: 21 U/L (ref 14–36)
BASOPHILS # BLD AUTO: 0 THOUSANDS/ΜL (ref 0–0.1)
BASOPHILS NFR BLD AUTO: 1 % (ref 0–1)
BILIRUB SERPL-MCNC: 1.81 MG/DL
BUN SERPL-MCNC: 15 MG/DL (ref 5–25)
CALCIUM SERPL-MCNC: 8.6 MG/DL (ref 8.4–10.2)
CHLORIDE SERPL-SCNC: 103 MMOL/L (ref 97–108)
CHOLEST SERPL-MCNC: 109 MG/DL
CO2 SERPL-SCNC: 30 MMOL/L (ref 22–30)
CREAT SERPL-MCNC: 0.88 MG/DL (ref 0.6–1.2)
EOSINOPHIL # BLD AUTO: 0 THOUSAND/ΜL (ref 0–0.4)
EOSINOPHIL NFR BLD AUTO: 0 % (ref 0–6)
ERYTHROCYTE [DISTWIDTH] IN BLOOD BY AUTOMATED COUNT: 14.3 %
GFR SERPL CREATININE-BSD FRML MDRD: 62 ML/MIN/1.73SQ M
GLUCOSE P FAST SERPL-MCNC: 119 MG/DL (ref 70–99)
HCT VFR BLD AUTO: 42.2 % (ref 36–46)
HDLC SERPL-MCNC: 47 MG/DL
HGB BLD-MCNC: 13.8 G/DL (ref 12–16)
LDLC SERPL CALC-MCNC: 43 MG/DL
LYMPHOCYTES # BLD AUTO: 1.7 THOUSANDS/ΜL (ref 0.5–4)
LYMPHOCYTES NFR BLD AUTO: 39 % (ref 25–45)
MCH RBC QN AUTO: 31 PG (ref 26–34)
MCHC RBC AUTO-ENTMCNC: 32.7 G/DL (ref 31–36)
MCV RBC AUTO: 95 FL (ref 80–100)
MONOCYTES # BLD AUTO: 0.5 THOUSAND/ΜL (ref 0.2–0.9)
MONOCYTES NFR BLD AUTO: 11 % (ref 1–10)
NEUTROPHILS # BLD AUTO: 2.1 THOUSANDS/ΜL (ref 1.8–7.8)
NEUTS SEG NFR BLD AUTO: 49 % (ref 45–65)
NONHDLC SERPL-MCNC: 62 MG/DL
PLATELET # BLD AUTO: 200 THOUSANDS/UL (ref 150–450)
PMV BLD AUTO: 9.8 FL (ref 8.9–12.7)
POTASSIUM SERPL-SCNC: 3.5 MMOL/L (ref 3.6–5)
PROT SERPL-MCNC: 7.4 G/DL (ref 5.9–8.4)
RBC # BLD AUTO: 4.45 MILLION/UL (ref 4–5.2)
SODIUM SERPL-SCNC: 138 MMOL/L (ref 137–147)
TRIGL SERPL-MCNC: 95 MG/DL
TSH SERPL DL<=0.05 MIU/L-ACNC: 1.97 UIU/ML (ref 0.47–4.68)
WBC # BLD AUTO: 4.3 THOUSAND/UL (ref 4.5–11)

## 2022-01-26 PROCEDURE — 84443 ASSAY THYROID STIM HORMONE: CPT

## 2022-01-26 PROCEDURE — 36415 COLL VENOUS BLD VENIPUNCTURE: CPT

## 2022-01-26 PROCEDURE — 85025 COMPLETE CBC W/AUTO DIFF WBC: CPT

## 2022-01-26 PROCEDURE — 80053 COMPREHEN METABOLIC PANEL: CPT

## 2022-01-26 PROCEDURE — 83036 HEMOGLOBIN GLYCOSYLATED A1C: CPT

## 2022-01-26 PROCEDURE — 80061 LIPID PANEL: CPT

## 2022-01-27 LAB
EST. AVERAGE GLUCOSE BLD GHB EST-MCNC: 140 MG/DL
HBA1C MFR BLD: 6.5 %

## 2022-02-02 ENCOUNTER — RA CDI HCC (OUTPATIENT)
Dept: OTHER | Facility: HOSPITAL | Age: 82
End: 2022-02-02

## 2022-02-02 NOTE — PROGRESS NOTES
[facility]  coding opportunities        E11 40     Chart Reviewed * (Number of) Inbasket suggestions sent to Provider: 1                  Patients insurance company: [address]  (Medicare Advantage and Commercial)

## 2022-02-08 ENCOUNTER — OFFICE VISIT (OUTPATIENT)
Dept: FAMILY MEDICINE CLINIC | Facility: CLINIC | Age: 82
End: 2022-02-08
Payer: COMMERCIAL

## 2022-02-08 VITALS
TEMPERATURE: 98.4 F | DIASTOLIC BLOOD PRESSURE: 80 MMHG | HEIGHT: 62 IN | BODY MASS INDEX: 31.76 KG/M2 | WEIGHT: 172.6 LBS | OXYGEN SATURATION: 97 % | SYSTOLIC BLOOD PRESSURE: 130 MMHG | HEART RATE: 70 BPM

## 2022-02-08 DIAGNOSIS — E11.40 TYPE 2 DIABETES, CONTROLLED, WITH NEUROPATHY (HCC): Primary | ICD-10-CM

## 2022-02-08 DIAGNOSIS — E89.0 POSTOPERATIVE HYPOTHYROIDISM: ICD-10-CM

## 2022-02-08 DIAGNOSIS — E78.2 MIXED HYPERLIPIDEMIA: ICD-10-CM

## 2022-02-08 DIAGNOSIS — E11.9 TYPE 2 DIABETES MELLITUS WITHOUT COMPLICATION, WITHOUT LONG-TERM CURRENT USE OF INSULIN (HCC): ICD-10-CM

## 2022-02-08 DIAGNOSIS — Z17.0 MALIGNANT NEOPLASM OF CENTRAL PORTION OF RIGHT BREAST IN FEMALE, ESTROGEN RECEPTOR POSITIVE (HCC): ICD-10-CM

## 2022-02-08 DIAGNOSIS — I48.0 PAROXYSMAL ATRIAL FIBRILLATION (HCC): ICD-10-CM

## 2022-02-08 DIAGNOSIS — C50.111 MALIGNANT NEOPLASM OF CENTRAL PORTION OF RIGHT BREAST IN FEMALE, ESTROGEN RECEPTOR POSITIVE (HCC): ICD-10-CM

## 2022-02-08 DIAGNOSIS — E04.1 THYROID NODULE: ICD-10-CM

## 2022-02-08 DIAGNOSIS — C50.211 CARCINOMA OF UPPER-INNER QUADRANT OF RIGHT FEMALE BREAST, UNSPECIFIED ESTROGEN RECEPTOR STATUS (HCC): ICD-10-CM

## 2022-02-08 DIAGNOSIS — I10 PRIMARY HYPERTENSION: ICD-10-CM

## 2022-02-08 DIAGNOSIS — E55.9 VITAMIN D DEFICIENCY: ICD-10-CM

## 2022-02-08 PROCEDURE — 3075F SYST BP GE 130 - 139MM HG: CPT | Performed by: FAMILY MEDICINE

## 2022-02-08 PROCEDURE — 1036F TOBACCO NON-USER: CPT | Performed by: FAMILY MEDICINE

## 2022-02-08 PROCEDURE — 3079F DIAST BP 80-89 MM HG: CPT | Performed by: FAMILY MEDICINE

## 2022-02-08 PROCEDURE — 3725F SCREEN DEPRESSION PERFORMED: CPT | Performed by: FAMILY MEDICINE

## 2022-02-08 PROCEDURE — 1160F RVW MEDS BY RX/DR IN RCRD: CPT | Performed by: FAMILY MEDICINE

## 2022-02-08 PROCEDURE — 99214 OFFICE O/P EST MOD 30 MIN: CPT | Performed by: FAMILY MEDICINE

## 2022-02-08 RX ORDER — IBUPROFEN 800 MG/1
800 TABLET ORAL EVERY 6 HOURS PRN
COMMUNITY
Start: 2022-01-26 | End: 2022-05-10 | Stop reason: ALTCHOICE

## 2022-02-08 RX ORDER — AMOXICILLIN 500 MG/1
CAPSULE ORAL
COMMUNITY
Start: 2022-01-26 | End: 2022-04-19 | Stop reason: ALTCHOICE

## 2022-02-08 NOTE — PROGRESS NOTES
Subjective:   Chief Complaint   Patient presents with    Follow-up     lab review         Patient ID: June Ron is a [de-identified] y o  female  Patient here follow-up with a chronic condition including non-insulin-dependent diabetic hypertension hyperlipidemia AFib patient compliant with the medication tolerated well without side effect patient try to watch for her low carb low-fat diet blood pressure today will control recent blood work discussed with the patient      The following portions of the patient's history were reviewed and updated as appropriate: allergies, current medications, past family history, past medical history, past social history, past surgical history and problem list     Review of Systems   Constitutional: Negative for activity change, appetite change, fatigue and fever  HENT: Negative for congestion, ear pain, sinus pressure, sinus pain and sore throat  Eyes: Negative for pain, discharge, redness and itching  Respiratory: Negative for cough, chest tightness, shortness of breath and stridor  Cardiovascular: Negative for chest pain, palpitations and leg swelling  Gastrointestinal: Negative for abdominal pain, blood in stool, constipation, diarrhea and nausea  Genitourinary: Negative for dysuria, flank pain, frequency and hematuria  Musculoskeletal: Negative for back pain, joint swelling and neck pain  Skin: Negative for pallor and rash  Neurological: Negative for dizziness, tremors, weakness, numbness and headaches  Hematological: Does not bruise/bleed easily  Objective:  Vitals:    02/08/22 1019 02/08/22 1051   BP: 130/90 130/80   BP Location: Left arm    Patient Position: Sitting    Cuff Size: Large    Pulse: (!) 51 70   Temp: 98 4 °F (36 9 °C)    TempSrc: Tympanic    SpO2: 97%    Weight: 78 3 kg (172 lb 9 6 oz)    Height: 5' 1 5" (1 562 m)       Physical Exam  Vitals and nursing note reviewed     Constitutional:       General: She is not in acute distress  Appearance: She is well-developed  She is not diaphoretic  HENT:      Head: Normocephalic  Right Ear: Tympanic membrane, ear canal and external ear normal       Left Ear: Tympanic membrane, ear canal and external ear normal       Nose: Nose normal  No congestion or rhinorrhea  Mouth/Throat:      Mouth: Mucous membranes are moist       Pharynx: Oropharynx is clear  No oropharyngeal exudate or posterior oropharyngeal erythema  Eyes:      General:         Right eye: No discharge  Left eye: No discharge  Conjunctiva/sclera: Conjunctivae normal       Pupils: Pupils are equal, round, and reactive to light  Neck:      Vascular: No JVD  Cardiovascular:      Rate and Rhythm: Normal rate  Pulses: no weak pulses          Dorsalis pedis pulses are 2+ on the right side and 2+ on the left side  Heart sounds: Murmur heard  No gallop  Pulmonary:      Effort: Pulmonary effort is normal  No respiratory distress  Breath sounds: Normal breath sounds  No stridor  No wheezing or rales  Chest:      Chest wall: No tenderness  Abdominal:      General: There is no distension  Palpations: Abdomen is soft  There is no mass  Tenderness: There is no abdominal tenderness  There is no rebound  Musculoskeletal:         General: No tenderness  Cervical back: Normal range of motion and neck supple  Feet:    Feet:      Right foot:      Skin integrity: No warmth  Left foot:      Skin integrity: No warmth  Lymphadenopathy:      Cervical: No cervical adenopathy  Skin:     General: Skin is warm  Findings: No erythema or rash  Neurological:      Mental Status: She is alert and oriented to person, place, and time  Motor: No weakness  Gait: Gait normal          Patient's shoes and socks removed  Right Foot/Ankle   Right Foot Inspection  Skin Exam: skin intact  No warmth and no pre-ulcer       Toe Exam: No swelling and erythema    Sensory Monofilament testing: intact    Vascular  Capillary refills: < 3 seconds  The right DP pulse is 2+  Left Foot/Ankle  Left Foot Inspection  Skin Exam: skin intact  No warmth and no pre-ulcer  Toe Exam: No swelling and no erythema  Sensory   Monofilament testing: intact    Vascular  Capillary refills: < 3 seconds  The left DP pulse is 2+       Assign Risk Category  No deformity present  No loss of protective sensation  No weak pulses  Risk: 0        Assessment/Plan:    Type 2 diabetes mellitus without complication, without long-term current use of insulin ([facility])    Lab Results   Component Value Date    HGBA1C 6 5 (H) 01/26/2022     Chronic asymptomatic fair control continue current management including metformin 500 mg patient tolerated well without any side effect patient already on Arb and statin patient does follow-up with the Ophthalmology for diabetic eye care    Type 2 diabetes, controlled, with neuropathy ([facility] )    Lab Results   Component Value Date    HGBA1C 6 5 (H) 01/26/2022   Chronic asymptomatic hemoglobin A1c will control patient does use the gabapentin tolerated well without side effect    Paroxysmal atrial fibrillation (HCC)  Chronic asymptomatic rate is controlled patient on metoprolol tartrate 25 mg twice a day patient on anticoagulation Eliquis tolerated well aware of the side effect of bleeding the patient does follow-up with the Cardiology periodically    Malignant neoplasm of central portion of right breast in female, estrogen receptor positive ([facility] )  Status post surgery and radiation patient does follow-up with the Oncology periodically    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 [facility])  Status post surgery and radiation patient does follow-up with the Oncology periodically    Hypertension  Chronic asymptomatic fair control continue with the Avalide 300-12 5 mg once a day metoprolol tartrate 25 mg twice a day low-salt diet and important lose weight discussed with the patient    Mixed hyperlipidemia  Chronic asymptomatic a LDL therapeutic in diabetic patient continue current dose of statin atorvastatin 10 mg once a day       Diagnoses and all orders for this visit:    Type 2 diabetes, controlled, with neuropathy ([facility] )  -     CBC and differential; Future  -     Basic metabolic panel; Future  -     Lipid Panel with Direct LDL reflex; Future  -     TSH, 3rd generation with Free T4 reflex; Future  -     Hemoglobin A1C; Future    Paroxysmal atrial fibrillation (HCC)  -     Basic metabolic panel; Future  -     Lipid Panel with Direct LDL reflex; Future  -     TSH, 3rd generation with Free T4 reflex; Future  -     Hemoglobin A1C; Future    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, unspecified estrogen receptor status (HCC)  -     CBC and differential; Future  -     Basic metabolic panel; Future  -     Lipid Panel with Direct LDL reflex; Future  -     TSH, 3rd generation with Free T4 reflex; Future  -     Hemoglobin A1C; Future    Type 2 diabetes mellitus without complication, without long-term current use of insulin (HCC)  -     CBC and differential; Future  -     Basic metabolic panel; Future  -     Lipid Panel with Direct LDL reflex; Future  -     TSH, 3rd generation with Free T4 reflex; Future  -     Hemoglobin A1C; Future    Thyroid nodule    Postoperative hypothyroidism  -     CBC and differential; Future  -     Basic metabolic panel; Future  -     Lipid Panel with Direct LDL reflex; Future  -     TSH, 3rd generation with Free T4 reflex; Future  -     Hemoglobin A1C; Future    Primary hypertension  -     CBC and differential; Future  -     Basic metabolic panel; Future  -     Lipid Panel with Direct LDL reflex; Future  -     TSH, 3rd generation with Free T4 reflex; Future  -     Hemoglobin A1C; Future    Vitamin D deficiency  -     CBC and differential; Future  -     Basic metabolic panel; Future  -     Lipid Panel with Direct LDL reflex;  Future  -     TSH, 3rd generation with Free T4 reflex;  Future  -     Hemoglobin A1C; Future    Malignant neoplasm of central portion of right breast in female, estrogen receptor positive (HCC)    Mixed hyperlipidemia    Other orders  -     ibuprofen (MOTRIN) 800 mg tablet; 800 mg every 6 (six) hours as needed    -     amoxicillin (AMOXIL) 500 mg capsule

## 2022-02-10 PROBLEM — C50.211 CARCINOMA OF UPPER-INNER QUADRANT OF RIGHT FEMALE BREAST (HCC): Status: ACTIVE | Noted: 2022-02-10

## 2022-02-10 PROBLEM — E78.2 MIXED HYPERLIPIDEMIA: Status: ACTIVE | Noted: 2018-12-01

## 2022-02-10 NOTE — ASSESSMENT & PLAN NOTE
Lab Results   Component Value Date    HGBA1C 6 5 (H) 01/26/2022   Chronic asymptomatic hemoglobin A1c will control patient does use the gabapentin tolerated well without side effect

## 2022-02-10 NOTE — ASSESSMENT & PLAN NOTE
Lab Results   Component Value Date    HGBA1C 6 5 (H) 01/26/2022     Chronic asymptomatic fair control continue current management including metformin 500 mg patient tolerated well without any side effect patient already on Arb and statin patient does follow-up with the Ophthalmology for diabetic eye care

## 2022-02-10 NOTE — ASSESSMENT & PLAN NOTE
Chronic asymptomatic rate is controlled patient on metoprolol tartrate 25 mg twice a day patient on anticoagulation Eliquis tolerated well aware of the side effect of bleeding the patient does follow-up with the Cardiology periodically

## 2022-02-10 NOTE — ASSESSMENT & PLAN NOTE
Chronic asymptomatic a LDL therapeutic in diabetic patient continue current dose of statin atorvastatin 10 mg once a day

## 2022-02-10 NOTE — ASSESSMENT & PLAN NOTE
Chronic asymptomatic fair control continue with the Avalide 300-12 5 mg once a day metoprolol tartrate 25 mg twice a day low-salt diet and important lose weight discussed with the patient

## 2022-02-17 DIAGNOSIS — I10 ESSENTIAL HYPERTENSION: ICD-10-CM

## 2022-02-23 ENCOUNTER — HOSPITAL ENCOUNTER (OUTPATIENT)
Dept: NEUROLOGY | Facility: CLINIC | Age: 82
Discharge: HOME | End: 2022-02-23
Payer: COMMERCIAL

## 2022-02-23 DIAGNOSIS — G56.01 CARPAL TUNNEL SYNDROME OF RIGHT WRIST: ICD-10-CM

## 2022-02-23 PROCEDURE — 95909 NRV CNDJ TST 5-6 STUDIES: CPT | Performed by: PSYCHIATRY & NEUROLOGY

## 2022-02-23 PROCEDURE — 95886 MUSC TEST DONE W/N TEST COMP: CPT | Performed by: PSYCHIATRY & NEUROLOGY

## 2022-03-09 DIAGNOSIS — E89.0 POSTOPERATIVE HYPOTHYROIDISM: ICD-10-CM

## 2022-03-09 DIAGNOSIS — E79.0 HYPERURICEMIA: ICD-10-CM

## 2022-03-09 RX ORDER — ALLOPURINOL 100 MG/1
100 TABLET ORAL DAILY
Qty: 90 TABLET | Refills: 0 | Status: SHIPPED | OUTPATIENT
Start: 2022-03-09 | End: 2022-06-10

## 2022-03-09 RX ORDER — LEVOTHYROXINE SODIUM 0.07 MG/1
75 TABLET ORAL DAILY
Qty: 90 TABLET | Refills: 0 | Status: SHIPPED | OUTPATIENT
Start: 2022-03-09 | End: 2022-06-10

## 2022-03-13 DIAGNOSIS — E78.00 PURE HYPERCHOLESTEROLEMIA: ICD-10-CM

## 2022-03-13 DIAGNOSIS — E11.9 TYPE 2 DIABETES MELLITUS WITHOUT COMPLICATION, WITHOUT LONG-TERM CURRENT USE OF INSULIN (HCC): ICD-10-CM

## 2022-03-14 RX ORDER — ATORVASTATIN CALCIUM 10 MG/1
10 TABLET, FILM COATED ORAL DAILY
Qty: 90 TABLET | Refills: 0 | Status: SHIPPED | OUTPATIENT
Start: 2022-03-14 | End: 2022-06-15

## 2022-04-06 DIAGNOSIS — I48.91 ATRIAL FIBRILLATION, UNSPECIFIED TYPE (HCC): ICD-10-CM

## 2022-04-06 NOTE — TELEPHONE ENCOUNTER
Phone call from [address] [place] [address] requesting refill for eliquis one bid #[phone]    Patient last ov 8/2021, overdue for 6 month follow up    Clerical staff to reach out to patient for follow up office visit

## 2022-04-19 ENCOUNTER — TELEMEDICINE (OUTPATIENT)
Dept: FAMILY MEDICINE CLINIC | Facility: CLINIC | Age: 82
End: 2022-04-19
Payer: COMMERCIAL

## 2022-04-19 DIAGNOSIS — J01.90 ACUTE NON-RECURRENT SINUSITIS, UNSPECIFIED LOCATION: Primary | ICD-10-CM

## 2022-04-19 PROCEDURE — 99214 OFFICE O/P EST MOD 30 MIN: CPT | Performed by: NURSE PRACTITIONER

## 2022-04-19 PROCEDURE — 1160F RVW MEDS BY RX/DR IN RCRD: CPT | Performed by: NURSE PRACTITIONER

## 2022-04-19 RX ORDER — AMOXICILLIN AND CLAVULANATE POTASSIUM 875; 125 MG/1; MG/1
1 TABLET, FILM COATED ORAL EVERY 12 HOURS SCHEDULED
Qty: 14 TABLET | Refills: 0 | Status: SHIPPED | OUTPATIENT
Start: 2022-04-19 | End: 2022-04-26

## 2022-04-19 RX ORDER — BENZONATATE 100 MG/1
100 CAPSULE ORAL 3 TIMES DAILY PRN
Qty: 20 CAPSULE | Refills: 0 | Status: SHIPPED | OUTPATIENT
Start: 2022-04-19 | End: 2022-05-10 | Stop reason: ALTCHOICE

## 2022-04-19 NOTE — ASSESSMENT & PLAN NOTE
New diagnoses acute symptomatic not responding to conservative treatment with a history of type 2 diabetes  COVID negative at home today  Will recommend increase fluids, breathing exercises, nasal saline rinses, and start Augmentin 875-125 milligrams b i d  x 7 days, and start Tessalon Perles t i d  p r n  for cough  Educated on side effects and proper use of meds and call office with any worsening of symptoms or no improvement

## 2022-04-19 NOTE — PROGRESS NOTES
COVID-19 Outpatient Progress Note    Assessment/Plan:    Problem List Items Addressed This Visit        Respiratory    Acute sinusitis - Primary     New diagnoses acute symptomatic not responding to conservative treatment with a history of type 2 diabetes  COVID negative at home today  Will recommend increase fluids, breathing exercises, nasal saline rinses, and start Augmentin 875-125 milligrams b i d  x 7 days, and start Tessalon Perles t i d  p r n  for cough  Educated on side effects and proper use of meds and call office with any worsening of symptoms or no improvement  Relevant Medications    amoxicillin-clavulanate (Augmentin) 875-125 mg per tablet    benzonatate (TESSALON PERLES) 100 mg capsule         Disposition:     After clarifying the patient's history, my suspicion for COVID-19 infection is very low  Negative home rapid test today  I have spent 15 minutes directly with the patient  Greater than 50% of this time was spent in counseling/coordination of care regarding: instructions for management and patient and family education  Encounter provider PO Morton    Provider located at [address]  [address] [facility] [address] [facility] [address] [phone]    Recent Visits  No visits were found meeting these conditions  Showing recent visits within past 7 days and meeting all other requirements  Today's Visits  Date Type Provider Dept   04/19/22 Telemedicine [place] [address] [facility], [address] [place] [address] today's visits and meeting all other requirements  Future Appointments  No visits were found meeting these conditions  Showing future appointments within next 150 days and meeting all other requirements     This virtual check-in was done via [employer] and patient was informed that this is a secure, HIPAA-compliant platform  She agrees to proceed      Patient agrees to participate in a virtual check in via telephone or video visit instead of presenting to the office to address urgent/immediate medical needs  Patient is aware this is a billable service  After connecting through [facility], the patient was identified by name and date of birth  Magalys Worley was informed that this was a telemedicine visit and that the exam was being conducted confidentially over secure lines  My office door was closed  No one else was in the room  Magalys Worley acknowledged consent and understanding of privacy and security of the telemedicine visit  I informed the patient that I have reviewed her record in Epic and presented the opportunity for her to ask any questions regarding the visit today  The patient agreed to participate  Verification of patient location:  Patient is located in the following state in which I hold an active license: [place]    Subjective:   Magalys Worley is a [de-identified] y o  female who is concerned about COVID-19  Patient's symptoms include fatigue, nasal congestion, rhinorrhea, sore throat and cough  Patient denies fever, chills, malaise, anosmia, loss of taste, shortness of breath, chest tightness, abdominal pain, nausea, vomiting, diarrhea, myalgias and headaches       - Date of symptom onset: 4/13/2022      COVID-19 vaccination status: Fully vaccinated (primary series)    Exposure:   Contact with a person who is under investigation (PUI) for or who is positive for COVID-19 within the last 14 days?: No    Hospitalized recently for fever and/or lower respiratory symptoms?: No      Currently a healthcare worker that is involved in direct patient care?: No      Works in a special setting where the risk of COVID-19 transmission may be high? (this may include long-term care, correctional and skilled nursing facilities; homeless shelters; assisted-living facilities and group homes ): No      Resident in a special setting where the risk of COVID-19 transmission may be high? (this may include long-term care, correctional and prison facilities; homeless shelters; assisted-living facilities and group homes ): No      No results found for: Margret Garcia, [address] [facility] [address], [address], [address], [address] [facility], [address] [facility] [place]  Past Medical History:   Diagnosis Date    Allergic rhinitis     Arthritis     Atrial fibrillation ([facility] )     Breast cancer ([facility] )     Bug bite 5/21/2019    Cataracts, bilateral     Chronic headaches     pulsatile daily headaches    Chronic post-traumatic headache, not intractable 9/8/2020    Colitis     Coronary artery disease     Diabetes mellitus ([facility] )     Disease of thyroid gland     DJD (degenerative joint disease), cervical     Facial neuralgia     left frontal facial post traumatic neuralgia    Fibromyalgia     Glaucoma     History of external beam radiation therapy     8/16/18 to 9/14/2018 Right breast    Hypertension     Hypokalemia     Hypovitaminosis D     Lupus ([facility] ) 7/19/2018    Obesity     Osteoarthritis     Pre-op examination 2/25/2019    Pre-operative clearance 8/21/2018    Prediabetes     Rib pain on right side 2/25/2019    SDH (subdural hematoma) ([facility] ) 1/31/2017    Sleep apnea     no cpap    Syncope and collapse 12/1/2018    Vertigo      Past Surgical History:   Procedure Laterality Date    BREAST BIOPSY      BREAST LUMPECTOMY      CHOLECYSTECTOMY      COLONOSCOPY  2013    HYSTERECTOMY      MAMMO NEEDLE LOCALIZATION RIGHT (ALL INC) Right 6/21/2018    MAMMO STEREOTACTIC BREAST BIOPSY RIGHT (ALL INC) Right 5/9/2018    THYROIDECTOMY N/A 11/21/2019    Procedure: THYROIDECTOMY, total;  Surgeon: Melissa Holly MD;  [facility]: [facility];  Service: Surgical Oncology    TONSILLECTOMY      US GUIDED BREAST BIOPSY RIGHT COMPLETE Right 5/9/2018    US GUIDED THYROID BIOPSY  6/12/2019    US GUIDED THYROID BIOPSY  9/13/2019     Current Outpatient Medications   Medication Sig Dispense Refill    allopurinol (ZYLOPRIM) 100 mg tablet Take 1 tablet (100 mg total) by mouth daily 90 tablet 0    amoxicillin-clavulanate (Augmentin) 875-125 mg per tablet Take 1 tablet by mouth every 12 (twelve) hours for 7 days 14 tablet 0    apixaban (Eliquis) 5 mg Take 1 tablet (5 mg total) by mouth 2 (two) times a day 60 tablet 3    atorvastatin (LIPITOR) 10 mg tablet Take 1 tablet (10 mg total) by mouth daily 90 tablet 0    benzonatate (TESSALON PERLES) 100 mg capsule Take 1 capsule (100 mg total) by mouth 3 (three) times a day as needed for cough 20 capsule 0    chlorhexidine (PERIDEX) 0 12 % solution  (Patient not taking: Reported on 11/1/2021)      Diclofenac Sodium (VOLTAREN) 1 % Apply a small amount on the hand / wrist p r n  pain  No more than twice per day  1 Tube 0    gabapentin (Neurontin) 300 mg capsule Take 1 capsule (300 mg total) by mouth daily at bedtime 30 capsule 2    hydrocortisone 2 5 % cream Apply topically 2 (two) times a day 30 g 0    ibuprofen (MOTRIN) 800 mg tablet 800 mg every 6 (six) hours as needed        irbesartan-hydrochlorothiazide (AVALIDE) 300-12 5 MG per tablet Take 1 tablet by mouth daily 30 tablet 2    levothyroxine 75 mcg tablet Take 1 tablet (75 mcg total) by mouth daily 90 tablet 0    meclizine (ANTIVERT) 25 mg tablet Take 1 tablet (25 mg total) by mouth daily at bedtime 30 tablet 1    metFORMIN (GLUCOPHAGE) 500 mg tablet Take 1 tablet (500 mg total) by mouth 2 (two) times a day with meals 180 tablet 0    metoprolol tartrate (LOPRESSOR) 25 mg tablet Take 1 tablet (25 mg total) by mouth every 12 (twelve) hours 180 tablet 0    naproxen (NAPROSYN) 250 mg tablet 1 tablets q 12 hours p r n  severe headache if tylenol fails  No more than 2 per week  Hold other NSAIDs  30 tablet 0    potassium chloride (K-DUR,KLOR-CON) 20 mEq tablet Take 20 mEq by mouth 2 (two) times a day (Patient not taking: Reported on 11/1/2021)       No current facility-administered medications for this visit       Allergies   Allergen Reactions    No Known Allergies        Review of Systems   Constitutional: Positive for activity change and fatigue  Negative for chills and fever  HENT: Positive for congestion, postnasal drip, rhinorrhea, sinus pressure and sore throat  Respiratory: Positive for cough  Negative for chest tightness, shortness of breath and wheezing  Cardiovascular: Negative for chest pain and palpitations  Gastrointestinal: Negative for abdominal pain, constipation, diarrhea, nausea and vomiting  Musculoskeletal: Negative for myalgias  Neurological: Negative for headaches  Objective: There were no vitals filed for this visit  Physical Exam  Vitals and nursing note reviewed  Constitutional:       General: She is not in acute distress  Appearance: Normal appearance  She is ill-appearing  She is not toxic-appearing or diaphoretic  HENT:      Head: Normocephalic and atraumatic  Nose: No rhinorrhea  Eyes:      General:         Right eye: No discharge  Left eye: No discharge  Pulmonary:      Effort: Pulmonary effort is normal  No respiratory distress  Musculoskeletal:         General: Normal range of motion  Cervical back: Normal range of motion  Skin:     Coloration: Skin is not jaundiced or pale  Findings: No bruising, erythema, lesion or rash  Neurological:      Mental Status: She is alert and oriented to person, place, and time  Psychiatric:         Mood and Affect: Mood normal          Behavior: Behavior normal          Thought Content: Thought content normal          Judgment: Judgment normal          VIRTUAL VISIT DISCLAIMER    Emely Shell verbally agrees to participate in [employer]   Pt is aware that [employer] could be limited without vital signs or the ability to perform a full hands-on physical Nam Ferrara understands she or the provider may request at any time to terminate the video visit and request the patient to seek care or treatment in person

## 2022-05-04 ENCOUNTER — RA CDI HCC (OUTPATIENT)
Dept: OTHER | Facility: HOSPITAL | Age: 82
End: 2022-05-04

## 2022-05-04 NOTE — PROGRESS NOTES
[facility]  coding opportunities       Chart reviewed, no opportunity found:   [address]        Patients Insurance     Medicare Insurance: [employer]

## 2022-05-05 ENCOUNTER — APPOINTMENT (OUTPATIENT)
Dept: LAB | Facility: HOSPITAL | Age: 82
End: 2022-05-05
Payer: COMMERCIAL

## 2022-05-05 DIAGNOSIS — E11.9 TYPE 2 DIABETES MELLITUS WITHOUT COMPLICATION, WITHOUT LONG-TERM CURRENT USE OF INSULIN (HCC): ICD-10-CM

## 2022-05-05 DIAGNOSIS — C50.211 CARCINOMA OF UPPER-INNER QUADRANT OF RIGHT FEMALE BREAST, UNSPECIFIED ESTROGEN RECEPTOR STATUS (HCC): ICD-10-CM

## 2022-05-05 DIAGNOSIS — E11.40 TYPE 2 DIABETES, CONTROLLED, WITH NEUROPATHY (HCC): ICD-10-CM

## 2022-05-05 DIAGNOSIS — E55.9 VITAMIN D DEFICIENCY: ICD-10-CM

## 2022-05-05 DIAGNOSIS — I10 PRIMARY HYPERTENSION: ICD-10-CM

## 2022-05-05 DIAGNOSIS — I48.0 PAROXYSMAL ATRIAL FIBRILLATION (HCC): ICD-10-CM

## 2022-05-05 DIAGNOSIS — E89.0 POSTOPERATIVE HYPOTHYROIDISM: ICD-10-CM

## 2022-05-05 LAB
ANION GAP SERPL CALCULATED.3IONS-SCNC: 6 MMOL/L (ref 5–14)
BASOPHILS # BLD AUTO: 0.01 THOUSANDS/ΜL (ref 0–0.1)
BASOPHILS NFR BLD AUTO: 0 % (ref 0–1)
BUN SERPL-MCNC: 15 MG/DL (ref 5–25)
CALCIUM SERPL-MCNC: 8.3 MG/DL (ref 8.4–10.2)
CHLORIDE SERPL-SCNC: 102 MMOL/L (ref 97–108)
CHOLEST SERPL-MCNC: 98 MG/DL
CO2 SERPL-SCNC: 32 MMOL/L (ref 22–30)
CREAT SERPL-MCNC: 0.91 MG/DL (ref 0.6–1.2)
EOSINOPHIL # BLD AUTO: 0.01 THOUSAND/ΜL (ref 0–0.61)
EOSINOPHIL NFR BLD AUTO: 0 % (ref 0–6)
ERYTHROCYTE [DISTWIDTH] IN BLOOD BY AUTOMATED COUNT: 13.7 % (ref 11.6–15.1)
EST. AVERAGE GLUCOSE BLD GHB EST-MCNC: 143 MG/DL
GFR SERPL CREATININE-BSD FRML MDRD: 59 ML/MIN/1.73SQ M
GLUCOSE P FAST SERPL-MCNC: 112 MG/DL (ref 70–99)
HBA1C MFR BLD: 6.6 %
HCT VFR BLD AUTO: 40.8 % (ref 34.8–46.1)
HDLC SERPL-MCNC: 50 MG/DL
HGB BLD-MCNC: 13.5 G/DL (ref 11.5–15.4)
IMM GRANULOCYTES # BLD AUTO: 0.02 THOUSAND/UL (ref 0–0.2)
IMM GRANULOCYTES NFR BLD AUTO: 1 % (ref 0–2)
LDLC SERPL CALC-MCNC: 33 MG/DL
LYMPHOCYTES # BLD AUTO: 1.75 THOUSANDS/ΜL (ref 0.6–4.47)
LYMPHOCYTES NFR BLD AUTO: 41 % (ref 14–44)
MCH RBC QN AUTO: 31.5 PG (ref 26.8–34.3)
MCHC RBC AUTO-ENTMCNC: 33.1 G/DL (ref 31.4–37.4)
MCV RBC AUTO: 95 FL (ref 82–98)
MONOCYTES # BLD AUTO: 0.55 THOUSAND/ΜL (ref 0.17–1.22)
MONOCYTES NFR BLD AUTO: 13 % (ref 4–12)
NEUTROPHILS # BLD AUTO: 1.95 THOUSANDS/ΜL (ref 1.85–7.62)
NEUTS SEG NFR BLD AUTO: 45 % (ref 43–75)
NRBC BLD AUTO-RTO: 0 /100 WBCS
PLATELET # BLD AUTO: 211 THOUSANDS/UL (ref 149–390)
PMV BLD AUTO: 11.2 FL (ref 8.9–12.7)
POTASSIUM SERPL-SCNC: 3.8 MMOL/L (ref 3.6–5)
RBC # BLD AUTO: 4.28 MILLION/UL (ref 3.81–5.12)
SODIUM SERPL-SCNC: 140 MMOL/L (ref 137–147)
T4 FREE SERPL-MCNC: 1.34 NG/DL (ref 0.76–1.46)
TRIGL SERPL-MCNC: 74 MG/DL
TSH SERPL DL<=0.05 MIU/L-ACNC: 7.87 UIU/ML (ref 0.45–4.5)
WBC # BLD AUTO: 4.29 THOUSAND/UL (ref 4.31–10.16)

## 2022-05-05 PROCEDURE — 84439 ASSAY OF FREE THYROXINE: CPT

## 2022-05-05 PROCEDURE — 85025 COMPLETE CBC W/AUTO DIFF WBC: CPT

## 2022-05-05 PROCEDURE — 80048 BASIC METABOLIC PNL TOTAL CA: CPT

## 2022-05-05 PROCEDURE — 84443 ASSAY THYROID STIM HORMONE: CPT

## 2022-05-05 PROCEDURE — 36415 COLL VENOUS BLD VENIPUNCTURE: CPT

## 2022-05-05 PROCEDURE — 83036 HEMOGLOBIN GLYCOSYLATED A1C: CPT

## 2022-05-05 PROCEDURE — 80061 LIPID PANEL: CPT

## 2022-05-10 ENCOUNTER — OFFICE VISIT (OUTPATIENT)
Dept: FAMILY MEDICINE CLINIC | Facility: CLINIC | Age: 82
End: 2022-05-10
Payer: COMMERCIAL

## 2022-05-10 VITALS
SYSTOLIC BLOOD PRESSURE: 128 MMHG | OXYGEN SATURATION: 98 % | HEART RATE: 62 BPM | TEMPERATURE: 97.7 F | DIASTOLIC BLOOD PRESSURE: 78 MMHG | RESPIRATION RATE: 16 BRPM | BODY MASS INDEX: 31.47 KG/M2 | WEIGHT: 171 LBS | HEIGHT: 62 IN

## 2022-05-10 DIAGNOSIS — I10 ESSENTIAL HYPERTENSION: ICD-10-CM

## 2022-05-10 DIAGNOSIS — E66.09 CLASS 1 OBESITY DUE TO EXCESS CALORIES WITH SERIOUS COMORBIDITY AND BODY MASS INDEX (BMI) OF 31.0 TO 31.9 IN ADULT: ICD-10-CM

## 2022-05-10 DIAGNOSIS — E11.9 TYPE 2 DIABETES MELLITUS WITHOUT COMPLICATION, WITHOUT LONG-TERM CURRENT USE OF INSULIN (HCC): Primary | ICD-10-CM

## 2022-05-10 DIAGNOSIS — N18.31 STAGE 3A CHRONIC KIDNEY DISEASE (HCC): ICD-10-CM

## 2022-05-10 PROBLEM — E66.9 OBESITY: Status: RESOLVED | Noted: 2018-07-19 | Resolved: 2022-05-10

## 2022-05-10 PROBLEM — E66.811 CLASS 1 OBESITY DUE TO EXCESS CALORIES WITH SERIOUS COMORBIDITY AND BODY MASS INDEX (BMI) OF 31.0 TO 31.9 IN ADULT: Status: ACTIVE | Noted: 2018-10-11

## 2022-05-10 PROCEDURE — 1003F LEVEL OF ACTIVITY ASSESS: CPT | Performed by: FAMILY MEDICINE

## 2022-05-10 PROCEDURE — 99214 OFFICE O/P EST MOD 30 MIN: CPT | Performed by: FAMILY MEDICINE

## 2022-05-10 RX ORDER — IRBESARTAN AND HYDROCHLOROTHIAZIDE 300; 12.5 MG/1; MG/1
1 TABLET, FILM COATED ORAL DAILY
Qty: 30 TABLET | Refills: 2 | Status: SHIPPED | OUTPATIENT
Start: 2022-05-10

## 2022-05-10 NOTE — ASSESSMENT & PLAN NOTE
Lab Results   Component Value Date    HGBA1C 6 6 (H) 05/05/2022   Chronic asymptomatic fair control hemoglobin A1c 6 6 continue current management including metformin 500 mg 1 tablet twice a day low carb diet discussed the patient patient already on statin and she is on Arb

## 2022-05-10 NOTE — ASSESSMENT & PLAN NOTE
Improve in the BMI down to 31 79 discussed the patient portion control low carb low-fat diet and increased physical activity

## 2022-05-10 NOTE — ASSESSMENT & PLAN NOTE
A chronic asymptomatic fair control continue current management including Avalide and the metoprolol the low salt diet important lose weight discussed the patient

## 2022-05-10 NOTE — PROGRESS NOTES
BMI Counseling: Body mass index is 31 79 kg/m²  The BMI is above normal  Nutrition recommendations include decreasing portion sizes, decreasing fast food intake and limiting drinks that contain sugar  Exercise recommendations include exercising 3-5 times per week  No pharmacotherapy was ordered  Rationale for BMI follow-up plan is due to patient being overweight or obese  Subjective:   Chief Complaint   Patient presents with    Follow-up     chronic conditions        Patient ID: Valery gipson a [de-identified] y o  female  Patient here follow-up with a chronic condition with her daughter she has had history of diabetic hypertension patient compliant the medication tolerated well without side effect and try to watch for the diet      The following portions of the patient's history were reviewed and updated as appropriate: allergies, current medications, past family history, past medical history, past social history, past surgical history and problem list     Review of Systems   Constitutional: Negative for activity change, appetite change, fatigue and fever  HENT: Negative for congestion, ear pain, sinus pressure, sinus pain and sore throat  Eyes: Negative for pain, discharge, redness and itching  Respiratory: Negative for cough, chest tightness, shortness of breath and stridor  Cardiovascular: Negative for chest pain, palpitations and leg swelling  Gastrointestinal: Negative for abdominal pain, blood in stool, constipation, diarrhea and nausea  Genitourinary: Negative for dysuria, flank pain, frequency and hematuria  Musculoskeletal: Negative for back pain, joint swelling and neck pain  Skin: Negative for pallor and rash  Neurological: Negative for dizziness, tremors, weakness, numbness and headaches  Hematological: Does not bruise/bleed easily               Objective:  Vitals:    05/10/22 0943 05/10/22 0959   BP: 128/78    BP Location: Left arm    Patient Position: Sitting    Cuff Size: Large    Pulse: (!) 54 62   Resp: 16    Temp: 97 7 °F (36 5 °C)    TempSrc: Tympanic    SpO2: 98%    Weight: 77 6 kg (171 lb)    Height: 5' 1 5" (1 562 m)       Physical Exam  Vitals and nursing note reviewed  Constitutional:       General: She is not in acute distress  Appearance: She is well-developed  She is not diaphoretic  HENT:      Head: Normocephalic  Right Ear: Tympanic membrane, ear canal and external ear normal       Left Ear: Tympanic membrane, ear canal and external ear normal       Nose: Nose normal  No congestion or rhinorrhea  Mouth/Throat:      Mouth: Mucous membranes are moist       Pharynx: Oropharynx is clear  No oropharyngeal exudate or posterior oropharyngeal erythema  Eyes:      General:         Right eye: No discharge  Left eye: No discharge  Conjunctiva/sclera: Conjunctivae normal       Pupils: Pupils are equal, round, and reactive to light  Neck:      Vascular: No JVD  Cardiovascular:      Rate and Rhythm: Normal rate and regular rhythm  Heart sounds: Normal heart sounds  No murmur heard  No gallop  Pulmonary:      Effort: Pulmonary effort is normal  No respiratory distress  Breath sounds: Normal breath sounds  No stridor  No wheezing or rales  Chest:      Chest wall: No tenderness  Abdominal:      General: There is no distension  Palpations: Abdomen is soft  There is no mass  Tenderness: There is no abdominal tenderness  There is no rebound  Musculoskeletal:         General: No tenderness  Cervical back: Normal range of motion and neck supple  Lymphadenopathy:      Cervical: No cervical adenopathy  Skin:     General: Skin is warm  Findings: No erythema or rash  Neurological:      Mental Status: She is alert and oriented to person, place, and time  Motor: No weakness        Gait: Gait normal            Assessment/Plan:    Type 2 diabetes mellitus without complication, without long-term current use of insulin (HCC)    Lab Results   Component Value Date    HGBA1C 6 6 (H) 05/05/2022   Chronic asymptomatic fair control hemoglobin A1c 6 6 continue current management including metformin 500 mg 1 tablet twice a day low carb diet discussed the patient patient already on statin and she is on Arb    Stage 3a chronic kidney disease ([facility] )  Lab Results   Component Value Date    EGFR 59 05/05/2022    EGFR 62 01/26/2022    EGFR 71 09/27/2021    CREATININE 0 91 05/05/2022    CREATININE 0 88 01/26/2022    CREATININE 0 89 09/27/2021   New diagnosis slight decrease in the GFR discussed with the patient will hydration avoid nonsteroidal anti-inflammatory drugs the patient already at the goal for the hemoglobin A1c on the blood pressure the patient already on Arb    Class 1 obesity due to excess calories with serious comorbidity and body mass index (BMI) of 31 0 to 31 9 in adult  Improve in the BMI down to 31 79 discussed the patient portion control low carb low-fat diet and increased physical activity    Essential hypertension  A chronic asymptomatic fair control continue current management including Avalide and the metoprolol the low salt diet important lose weight discussed the patient       Diagnoses and all orders for this visit:    Type 2 diabetes mellitus without complication, without long-term current use of insulin (HCC)  -     CBC and differential; Future  -     TSH, 3rd generation with Free T4 reflex; Future  -     Vitamin B12; Future  -     Vitamin D 25 hydroxy; Future    Essential hypertension  -     irbesartan-hydrochlorothiazide (AVALIDE) 300-12 5 MG per tablet; Take 1 tablet by mouth daily  -     CBC and differential; Future  -     TSH, 3rd generation with Free T4 reflex; Future  -     Vitamin B12; Future  -     Vitamin D 25 hydroxy; Future    Stage 3a chronic kidney disease (HCC)  -     CBC and differential; Future  -     TSH, 3rd generation with Free T4 reflex;  Future  -     Vitamin B12; Future  -     Vitamin D 25 hydroxy;  Future    Class 1 obesity due to excess calories with serious comorbidity and body mass index (BMI) of 31 0 to 31 9 in adult

## 2022-05-10 NOTE — ASSESSMENT & PLAN NOTE
Lab Results   Component Value Date    EGFR 59 05/05/2022    EGFR 62 01/26/2022    EGFR 71 09/27/2021    CREATININE 0 91 05/05/2022    CREATININE 0 88 01/26/2022    CREATININE 0 89 09/27/2021   New diagnosis slight decrease in the GFR discussed with the patient will hydration avoid nonsteroidal anti-inflammatory drugs the patient already at the goal for the hemoglobin A1c on the blood pressure the patient already on Arb

## 2022-05-13 DIAGNOSIS — E11.9 TYPE 2 DIABETES MELLITUS WITHOUT COMPLICATION, WITHOUT LONG-TERM CURRENT USE OF INSULIN (HCC): ICD-10-CM

## 2022-05-19 ENCOUNTER — HOSPITAL ENCOUNTER (OUTPATIENT)
Dept: RADIOLOGY | Facility: HOSPITAL | Age: 82
Discharge: HOME | End: 2022-05-19
Payer: COMMERCIAL

## 2022-05-19 ENCOUNTER — TELEPHONE (OUTPATIENT)
Dept: FAMILY MEDICINE CLINIC | Facility: CLINIC | Age: 82
End: 2022-05-19

## 2022-05-19 ENCOUNTER — OFFICE VISIT (OUTPATIENT)
Dept: FAMILY MEDICINE CLINIC | Facility: CLINIC | Age: 82
End: 2022-05-19
Payer: COMMERCIAL

## 2022-05-19 VITALS
RESPIRATION RATE: 16 BRPM | OXYGEN SATURATION: 98 % | BODY MASS INDEX: 31.47 KG/M2 | WEIGHT: 171 LBS | DIASTOLIC BLOOD PRESSURE: 84 MMHG | SYSTOLIC BLOOD PRESSURE: 142 MMHG | HEIGHT: 62 IN | HEART RATE: 61 BPM | TEMPERATURE: 99.1 F

## 2022-05-19 DIAGNOSIS — R06.02 SOB (SHORTNESS OF BREATH): ICD-10-CM

## 2022-05-19 DIAGNOSIS — R05.9 COUGH: ICD-10-CM

## 2022-05-19 DIAGNOSIS — U07.1 COVID-19 VIRUS INFECTION: Primary | ICD-10-CM

## 2022-05-19 LAB
SARS-COV-2 AG UPPER RESP QL IA: POSITIVE
VALID CONTROL: ABNORMAL

## 2022-05-19 PROCEDURE — 99214 OFFICE O/P EST MOD 30 MIN: CPT | Performed by: NURSE PRACTITIONER

## 2022-05-19 PROCEDURE — 3079F DIAST BP 80-89 MM HG: CPT | Performed by: NURSE PRACTITIONER

## 2022-05-19 PROCEDURE — 87811 SARS-COV-2 COVID19 W/OPTIC: CPT | Performed by: NURSE PRACTITIONER

## 2022-05-19 PROCEDURE — 71046 X-RAY EXAM CHEST 2 VIEWS: CPT

## 2022-05-19 PROCEDURE — 3077F SYST BP >= 140 MM HG: CPT | Performed by: NURSE PRACTITIONER

## 2022-05-19 RX ORDER — BENZONATATE 100 MG/1
200 CAPSULE ORAL 3 TIMES DAILY PRN
Qty: 20 CAPSULE | Refills: 0 | Status: SHIPPED | OUTPATIENT
Start: 2022-05-19

## 2022-05-19 RX ORDER — ZINC SULFATE 50(220)MG
220 CAPSULE ORAL DAILY
Qty: 30 CAPSULE | Refills: 0 | Status: SHIPPED | OUTPATIENT
Start: 2022-05-19

## 2022-05-19 RX ORDER — GUAIFENESIN 600 MG
1200 TABLET, EXTENDED RELEASE 12 HR ORAL EVERY 12 HOURS SCHEDULED
Qty: 20 TABLET | Refills: 0 | Status: SHIPPED | OUTPATIENT
Start: 2022-05-19

## 2022-05-19 RX ORDER — MELATONIN
1000 DAILY
Qty: 30 TABLET | Refills: 0 | Status: SHIPPED | OUTPATIENT
Start: 2022-05-19

## 2022-05-19 RX ORDER — MULTIVIT WITH MINERALS/LUTEIN
1000 TABLET ORAL DAILY
Qty: 30 TABLET | Refills: 0 | Status: SHIPPED | OUTPATIENT
Start: 2022-05-19

## 2022-05-19 NOTE — TELEPHONE ENCOUNTER
----- Message from [employer] sent at 5/19/2022  1:47 PM EDT -----  Neg chest xray   Showed no acute cardiopulmonary disease

## 2022-05-19 NOTE — ASSESSMENT & PLAN NOTE
Acute symptomatic most likely secondary to COVID infection 05/19/2022  Will recommend start Tessalon Perles t i d  p r n  for cough    Educated on side effects and proper use of medication

## 2022-05-19 NOTE — PROGRESS NOTES
COVID-19 Outpatient Progress Note    Assessment/Plan:    Problem List Items Addressed This Visit        Other    COVID-19 virus infection - Primary     New diagnoses acute symptomatic positive for COVID in office today with covid rapid  Patient did have known household exposure and has had vaccine but the booster injection  Patient began 5/18/2022  Patient does report some shortness of breath  O2 98%  Educated patient on PACS fluid and monoclonal antibody infusion, she is a candidate, but she is refusing treatment at this time  Will recommend increase fluids COVID vitamins Mucinex every 12 hours p r n , and Tylenol for fever/bodyaches  Patient scheduled for follow-up Monday 05/23/2022  Educated patient daughter to call office with any worsening symptoms or report to ED after hours           Relevant Medications    Ascorbic Acid (vitamin C) 1000 MG tablet    cholecalciferol (VITAMIN D3) 1,000 units tablet    zinc sulfate (ZINCATE) 220 mg capsule    guaiFENesin (MUCINEX) 600 mg 12 hr tablet    benzonatate (TESSALON PERLES) 100 mg capsule    SOB (shortness of breath)     Acute symptomatic secondary to COVID infection  O2 98% will recommend breathing exercises and chest x-ray ordered  Relevant Orders    XR chest pa & lateral    Cough     Acute symptomatic most likely secondary to COVID infection 05/19/2022  Will recommend start Tessalon Perles t i d  p r n  for cough  Educated on side effects and proper use of medication           Relevant Medications    guaiFENesin (MUCINEX) 600 mg 12 hr tablet    benzonatate (TESSALON PERLES) 100 mg capsule    Other Relevant Orders    POCT Rapid Covid Ag (Completed)         Disposition:     Rapid antigen testing was performed and the result is POSITIVE for COVID-19  Discussed symptom directed medication options with patient  Discussed vitamin D, vitamin C, and/or zinc supplementation with patient  I have spent 15 minutes directly with the patient  Greater than 50% of this time was spent in counseling/coordination of care regarding: instructions for management and patient and family education  Encounter provider PO Dean    Provider located at [address] [id number] [address] [email or web address] [place] [address] [phone]    Recent Visits  No visits were found meeting these conditions  Showing recent visits within past 7 days and meeting all other requirements  Today's Visits  Date Type Provider Dept   05/19/22 Office Visit Yong Sinha, [address] [email or web address]   Showing today's visits and meeting all other requirements  Future Appointments  No visits were found meeting these conditions  Showing future appointments within next 150 days and meeting all other requirements     Subjective:   Julius Torres is a [de-identified] y o  female who is concerned about COVID-19  Patient's symptoms include fever, fatigue, nasal congestion, rhinorrhea, sore throat, cough, shortness of breath, myalgias and headache  Patient denies chills, malaise, anosmia, loss of taste, chest tightness, abdominal pain, nausea, vomiting and diarrhea       - Date of symptom onset: 5/18/2022      COVID-19 vaccination status: Fully vaccinated (primary series)    Exposure:   Contact with a person who is under investigation (PUI) for or who is positive for COVID-19 within the last 14 days?: Yes    Hospitalized recently for fever and/or lower respiratory symptoms?: No      Currently a healthcare worker that is involved in direct patient care?: No      Works in a special setting where the risk of COVID-19 transmission may be high? (this may include long-term care, correctional and snf facilities; homeless shelters; assisted-living facilities and group homes ): No      Resident in a special setting where the risk of COVID-19 transmission may be high? (this may include long-term care, correctional and longterm facilities; homeless shelters; assisted-living facilities and group homes ): No      Lab Results   Component Value Date    SARSCOVAG Positive (A) 05/19/2022     Past Medical History:   Diagnosis Date    Allergic rhinitis     Arthritis     [facility]     [facility]     Bug bite 5/21/2019    Cataracts, bilateral     Chronic headaches     pulsatile daily headaches    Chronic post-traumatic headache, not intractable 9/8/2020    Colitis     Coronary artery disease     Diabetes mellitus ([facility]     Disease of thyroid gland     DJD (degenerative joint disease), cervical     Facial neuralgia     left frontal facial post traumatic neuralgia    Fibromyalgia     Glaucoma     History of external beam radiation therapy     8/16/18 to 9/14/2018 Right breast    Hypertension     Hypokalemia     Hypovitaminosis D     Lupus ([facility] ) 7/19/2018    Obesity     Osteoarthritis     Pre-op examination 2/25/2019    Pre-operative clearance 8/21/2018    Prediabetes     Rib pain on right side 2/25/2019    SDH (subdural hematoma) (HCC) 1/31/2017    Sleep apnea     no cpap    Stage 3a chronic kidney disease ([facility] ) 5/10/2022    Syncope and collapse 12/1/2018    Vertigo      Past Surgical History:   Procedure Laterality Date    BREAST BIOPSY      BREAST LUMPECTOMY      CHOLECYSTECTOMY      COLONOSCOPY  2013    HYSTERECTOMY      MAMMO NEEDLE LOCALIZATION RIGHT (ALL INC) Right 6/21/2018    MAMMO STEREOTACTIC BREAST BIOPSY RIGHT (ALL INC) Right 5/9/2018    THYROIDECTOMY N/A 11/21/2019    Procedure: THYROIDECTOMY, total;  Surgeon: Thu Shepherd MD;  [facility];  Service: Surgical Oncology    TONSILLECTOMY      US GUIDED BREAST BIOPSY RIGHT COMPLETE Right 5/9/2018    US GUIDED THYROID BIOPSY  6/12/2019    US GUIDED THYROID BIOPSY  9/13/2019     Current Outpatient Medications   Medication Sig Dispense Refill    allopurinol (ZYLOPRIM) 100 mg tablet Take 1 tablet (100 mg total) by mouth daily 90 tablet 0    apixaban (Eliquis) 5 mg Take 1 tablet (5 mg total) by mouth 2 (two) times a day 60 tablet 3    Ascorbic Acid (vitamin C) 1000 MG tablet Take 1 tablet (1,000 mg total) by mouth in the morning  30 tablet 0    atorvastatin (LIPITOR) 10 mg tablet Take 1 tablet (10 mg total) by mouth daily 90 tablet 0    benzonatate (TESSALON PERLES) 100 mg capsule Take 2 capsules (200 mg total) by mouth as needed in the morning and 2 capsules (200 mg total) as needed at noon and 2 capsules (200 mg total) as needed in the evening for cough  20 capsule 0    chlorhexidine (PERIDEX) 0 12 % solution        cholecalciferol (VITAMIN D3) 1,000 units tablet Take 1 tablet (1,000 Units total) by mouth in the morning  30 tablet 0    Diclofenac Sodium (VOLTAREN) 1 % Apply a small amount on the hand / wrist p r n  pain  No more than twice per day  1 Tube 0    gabapentin (Neurontin) 300 mg capsule Take 1 capsule (300 mg total) by mouth daily at bedtime 30 capsule 2    guaiFENesin (MUCINEX) 600 mg 12 hr tablet Take 2 tablets (1,200 mg total) by mouth every 12 (twelve) hours 20 tablet 0    hydrocortisone 2 5 % cream Apply topically 2 (two) times a day 30 g 0    irbesartan-hydrochlorothiazide (AVALIDE) 300-12 5 MG per tablet Take 1 tablet by mouth daily 30 tablet 2    levothyroxine 75 mcg tablet Take 1 tablet (75 mcg total) by mouth daily 90 tablet 0    meclizine (ANTIVERT) 25 mg tablet Take 1 tablet (25 mg total) by mouth daily at bedtime 30 tablet 1    metFORMIN (GLUCOPHAGE) 500 mg tablet Take 1 tablet (500 mg total) by mouth 2 (two) times a day with meals 180 tablet 0    metoprolol tartrate (LOPRESSOR) 25 mg tablet Take 1 tablet (25 mg total) by mouth every 12 (twelve) hours 180 tablet 0    potassium chloride (K-DUR,KLOR-CON) 20 mEq tablet Take 20 mEq by mouth 2 (two) times a day        zinc sulfate (ZINCATE) 220 mg capsule Take 1 capsule (220 mg total) by mouth in the morning   30 capsule 0 No current facility-administered medications for this visit  Allergies   Allergen Reactions    No Known Allergies        Review of Systems   Constitutional: Positive for activity change, fatigue and fever  Negative for chills  HENT: Positive for congestion, rhinorrhea and sore throat  Negative for sneezing  Respiratory: Positive for cough and shortness of breath  Negative for chest tightness and wheezing  Cardiovascular: Negative for chest pain and palpitations  Gastrointestinal: Negative for abdominal pain, constipation, diarrhea, nausea and vomiting  Musculoskeletal: Positive for myalgias  Neurological: Positive for headaches  Objective:    Vitals:    05/19/22 1111   BP: 142/84   BP Location: Left arm   Patient Position: Sitting   Cuff Size: Large   Pulse: 61   Resp: 16   Temp: 99 1 °F (37 3 °C)   TempSrc: Tympanic   SpO2: 98%   Weight: 77 6 kg (171 lb)   Height: 5' 1 5" (1 562 m)       Physical Exam  Vitals and nursing note reviewed  Constitutional:       General: She is not in acute distress  Appearance: Normal appearance  She is not ill-appearing, toxic-appearing or diaphoretic  HENT:      Head: Normocephalic and atraumatic  Right Ear: Tympanic membrane, ear canal and external ear normal  There is no impacted cerumen  Left Ear: Tympanic membrane, ear canal and external ear normal  There is no impacted cerumen  Nose: No rhinorrhea  Mouth/Throat:      Mouth: Mucous membranes are moist       Pharynx: Oropharynx is clear  No posterior oropharyngeal erythema  Eyes:      General: No scleral icterus  Right eye: No discharge  Left eye: No discharge  Conjunctiva/sclera: Conjunctivae normal    Cardiovascular:      Rate and Rhythm: Normal rate and regular rhythm  Pulmonary:      Effort: Pulmonary effort is normal  No respiratory distress  Breath sounds: No wheezing, rhonchi or rales     Musculoskeletal:         General: Normal range of motion  Cervical back: Normal range of motion  Skin:     Coloration: Skin is not jaundiced or pale  Findings: No bruising, erythema, lesion or rash  Neurological:      Mental Status: She is alert and oriented to person, place, and time  Psychiatric:         Mood and Affect: Mood normal          Behavior: Behavior normal          Thought Content:  Thought content normal          Judgment: Judgment normal

## 2022-05-19 NOTE — ASSESSMENT & PLAN NOTE
New diagnoses acute symptomatic positive for COVID in office today with covid rapid  Patient did have known household exposure and has had vaccine but the booster injection  Patient began 5/18/2022  Patient does report some shortness of breath  O2 98%  Educated patient on PACS fluid and monoclonal antibody infusion, she is a candidate, but she is refusing treatment at this time  Will recommend increase fluids COVID vitamins Mucinex every 12 hours p r n , and Tylenol for fever/bodyaches  Patient scheduled for follow-up Monday 05/23/2022    Educated patient daughter to call office with any worsening symptoms or report to ED after hours

## 2022-05-19 NOTE — ASSESSMENT & PLAN NOTE
Acute symptomatic secondary to COVID infection  O2 98% will recommend breathing exercises and chest x-ray ordered

## 2022-05-23 ENCOUNTER — TELEMEDICINE (OUTPATIENT)
Dept: FAMILY MEDICINE CLINIC | Facility: CLINIC | Age: 82
End: 2022-05-23
Payer: COMMERCIAL

## 2022-05-23 DIAGNOSIS — U07.1 COVID-19 VIRUS INFECTION: Primary | ICD-10-CM

## 2022-05-23 PROCEDURE — 99213 OFFICE O/P EST LOW 20 MIN: CPT | Performed by: NURSE PRACTITIONER

## 2022-05-23 PROCEDURE — 1036F TOBACCO NON-USER: CPT | Performed by: NURSE PRACTITIONER

## 2022-05-23 PROCEDURE — 1160F RVW MEDS BY RX/DR IN RCRD: CPT | Performed by: NURSE PRACTITIONER

## 2022-05-23 NOTE — PROGRESS NOTES
COVID-19 Outpatient Progress Note    Assessment/Plan:    Problem List Items Addressed This Visit        Other    COVID-19 virus infection - Primary     Acute symptomatic patient positive for COVID 05/19/2022 with in office rapid COVID test   Patient began with symptoms 05/18/2022 and continues with headaches fatigue cough body aches sore throat  O2 currently 95% on room air  Denies fever shortness of breath chest pain  Will recommend continue to increase fluids, COVID vitamins, Tessalon Perles t i d  p r n  for cough, and Mucinex q 12 hours  Will re-evaluate patient 05/25/2022 educated patient call with any worsening of symptoms                Disposition:     I have spent 15 minutes directly with the patient  Greater than 50% of this time was spent in counseling/coordination of care regarding: instructions for management and patient and family education  Encounter provider PO Costello    Provider [facility] at [facility] AT [address] [address] [phone]    Recent Visits  Date Type Provider Dept   05/23/22 Telemedicine Samy Monge, [facility]   05/19/22 Telephone JIMMY IDNH, [address] [facility]   05/19/22 Office Visit Ciara Costello [facility]   Showing recent visits within past 7 days and meeting all other requirements  Future Appointments  No visits were found meeting these conditions  Showing future appointments within next 150 days and meeting all other requirements     This virtual check-in was done via [employer] and patient was informed that this is a secure, HIPAA-compliant platform  She agrees to proceed  Patient agrees to participate in a virtual check in via telephone or video visit instead of presenting to the office to address urgent/immediate medical needs   Patient is aware this is a billable service  After connecting through [facility], the patient was identified by name and date of birth  Babar Santos was informed that this was a telemedicine visit and that the exam was being conducted confidentially over secure lines  My office door was closed  No one else was in the room  Babar Santos acknowledged consent and understanding of privacy and security of the telemedicine visit  I informed the patient that I have reviewed her record in Epic and presented the opportunity for her to ask any questions regarding the visit today  The patient agreed to participate  Verification of patient location:  Patient is located in the following state in which I hold an active license: [place]    Subjective:   Babar Santos is a [de-identified] y o  female who has been screened for COVID-19  Symptom change since last report: improving  Patient's symptoms include fatigue, sore throat, cough and headache  Patient denies fever, chills, malaise, congestion, rhinorrhea, anosmia, loss of taste, shortness of breath, chest tightness, abdominal pain, nausea, vomiting, diarrhea and myalgias  - Date of symptom onset: 5/18/2022  - Date of positive COVID-19 test: 5/19/2022  Type of test: Rapid antigen  COVID-19 vaccination status: Fully vaccinated (primary series)    Janessa Bloom has been staying home and has isolated themselves in her home  She is taking care to not share personal items and is cleaning all surfaces that are touched often, like counters, tabletops, and doorknobs using household cleaning sprays or wipes  She is wearing a mask when she leaves her room       Lab Results   Component Value Date    SARSCOVAG Positive (A) 05/19/2022     Past Medical History:   Diagnosis Date    Allergic rhinitis     Arthritis     Atrial fibrillation ([address]     [facility] cancer [facility]     Bug bite 5/21/2019    Cataracts, bilateral     Chronic headaches     pulsatile daily headaches    Chronic post-traumatic headache, not intractable 9/8/2020  Colitis     Coronary artery disease     Diabetes mellitus ([facility]     Disease of thyroid gland     DJD (degenerative joint disease), cervical     Facial neuralgia     left frontal facial post traumatic neuralgia    Fibromyalgia     Glaucoma     History of external beam radiation therapy     8/16/18 to 9/14/2018 Right breast    Hypertension     Hypokalemia     Hypovitaminosis D     Lupus ([facility] 7/19/2018    Obesity     Osteoarthritis     Pre-op examination 2/25/2019    Pre-operative clearance 8/21/2018    Prediabetes     Rib pain on right side 2/25/2019    SDH [facility]subdural hematoma) ([facility]) 1/31/2017    Sleep apnea     no cpap    Stage 3a chronic kidney disease ([facility] ) 5/10/2022    Syncope and collapse 12/1/2018    Vertigo      Past Surgical History:   Procedure Laterality Date    BREAST BIOPSY      BREAST LUMPECTOMY      CHOLECYSTECTOMY      COLONOSCOPY  2013    HYSTERECTOMY      MAMMO NEEDLE LOCALIZATION RIGHT (ALL INC) Right 6/21/2018    MAMMO STEREOTACTIC BREAST BIOPSY RIGHT (ALL INC) Right 5/9/2018    THYROIDECTOMY N/A 11/21/2019    Procedure: THYROIDECTOMY, total;  Surgeon: Nomi Summers MD;  [facility];  Service: Surgical Oncology    TONSILLECTOMY      US GUIDED BREAST BIOPSY RIGHT COMPLETE Right 5/9/2018    US GUIDED THYROID BIOPSY  6/12/2019    US GUIDED THYROID BIOPSY  9/13/2019     Current Outpatient Medications   Medication Sig Dispense Refill    allopurinol (ZYLOPRIM) 100 mg tablet Take 1 tablet (100 mg total) by mouth daily 90 tablet 0    apixaban (Eliquis) 5 mg Take 1 tablet (5 mg total) by mouth 2 (two) times a day 60 tablet 3    Ascorbic Acid (vitamin C) 1000 MG tablet Take 1 tablet (1,000 mg total) by mouth in the morning   30 tablet 0    atorvastatin (LIPITOR) 10 mg tablet Take 1 tablet (10 mg total) by mouth daily 90 tablet 0    benzonatate (TESSALON PERLES) 100 mg capsule Take 2 capsules (200 mg total) by mouth as needed in the morning and 2 capsules (200 mg total) as needed at noon and 2 capsules (200 mg total) as needed in the evening for cough  20 capsule 0    chlorhexidine (PERIDEX) 0 12 % solution        cholecalciferol (VITAMIN D3) 1,000 units tablet Take 1 tablet (1,000 Units total) by mouth in the morning  30 tablet 0    Diclofenac Sodium (VOLTAREN) 1 % Apply a small amount on the hand / wrist p r n  pain  No more than twice per day  1 Tube 0    gabapentin (Neurontin) 300 mg capsule Take 1 capsule (300 mg total) by mouth daily at bedtime 30 capsule 2    guaiFENesin (MUCINEX) 600 mg 12 hr tablet Take 2 tablets (1,200 mg total) by mouth every 12 (twelve) hours 20 tablet 0    hydrocortisone 2 5 % cream Apply topically 2 (two) times a day 30 g 0    irbesartan-hydrochlorothiazide (AVALIDE) 300-12 5 MG per tablet Take 1 tablet by mouth daily 30 tablet 2    levothyroxine 75 mcg tablet Take 1 tablet (75 mcg total) by mouth daily 90 tablet 0    meclizine (ANTIVERT) 25 mg tablet Take 1 tablet (25 mg total) by mouth daily at bedtime 30 tablet 1    metFORMIN (GLUCOPHAGE) 500 mg tablet Take 1 tablet (500 mg total) by mouth 2 (two) times a day with meals 180 tablet 0    metoprolol tartrate (LOPRESSOR) 25 mg tablet Take 1 tablet (25 mg total) by mouth every 12 (twelve) hours 180 tablet 0    potassium chloride (K-DUR,KLOR-CON) 20 mEq tablet Take 20 mEq by mouth 2 (two) times a day        zinc sulfate (ZINCATE) 220 mg capsule Take 1 capsule (220 mg total) by mouth in the morning  30 capsule 0     No current facility-administered medications for this visit  Allergies   Allergen Reactions    No Known Allergies        Review of Systems   Constitutional: Positive for activity change and fatigue  Negative for chills and fever  HENT: Positive for sore throat  Negative for congestion, rhinorrhea and sneezing  Respiratory: Positive for cough  Negative for chest tightness and shortness of breath      Gastrointestinal: Negative for abdominal pain, diarrhea, nausea and vomiting  Musculoskeletal: Negative for myalgias  Neurological: Positive for headaches  Objective: There were no vitals filed for this visit  Physical Exam  Vitals and nursing note reviewed  Constitutional:       General: She is not in acute distress  Appearance: Normal appearance  She is ill-appearing  She is not toxic-appearing or diaphoretic  HENT:      Head: Normocephalic and atraumatic  Nose: No rhinorrhea  Eyes:      General:         Right eye: No discharge  Left eye: No discharge  Pulmonary:      Effort: Pulmonary effort is normal  No respiratory distress  Musculoskeletal:         General: Normal range of motion  Cervical back: Normal range of motion  Skin:     Coloration: Skin is not jaundiced or pale  Findings: No bruising, erythema, lesion or rash  Neurological:      Mental Status: She is alert and oriented to person, place, and time  Psychiatric:         Mood and Affect: Mood normal          Behavior: Behavior normal          Thought Content: Thought content normal          Judgment: Judgment normal          VIRTUAL VISIT DISCLAIMER    Chavez Seemmanuel verbally agrees to participate in [employer]  Pt is aware that [employer] could be limited without vital signs or the ability to perform a full hands-on physical Angus Saldaña understands she or the provider may request at any time to terminate the video visit and request the patient to seek care or treatment in person

## 2022-05-24 DIAGNOSIS — I10 ESSENTIAL HYPERTENSION: ICD-10-CM

## 2022-05-24 NOTE — ASSESSMENT & PLAN NOTE
Acute symptomatic patient positive for COVID 05/19/2022 with in office rapid COVID test   Patient began with symptoms 05/18/2022 and continues with headaches fatigue cough body aches sore throat  O2 currently 95% on room air  Denies fever shortness of breath chest pain  Will recommend continue to increase fluids, COVID vitamins, Tessalon Perles t i d  p r n  for cough, and Mucinex q 12 hours    Will re-evaluate patient 05/25/2022 educated patient call with any worsening of symptoms

## 2022-05-25 ENCOUNTER — TELEMEDICINE (OUTPATIENT)
Dept: FAMILY MEDICINE CLINIC | Facility: CLINIC | Age: 82
End: 2022-05-25
Payer: COMMERCIAL

## 2022-05-25 VITALS — HEART RATE: 79 BPM | OXYGEN SATURATION: 96 %

## 2022-05-25 DIAGNOSIS — U07.1 COVID-19 VIRUS INFECTION: Primary | ICD-10-CM

## 2022-05-25 PROCEDURE — 99442 PR PHYS/QHP TELEPHONE EVALUATION 11-20 MIN: CPT | Performed by: NURSE PRACTITIONER

## 2022-05-25 NOTE — ASSESSMENT & PLAN NOTE
Acute symptomatic patient positive for covid 5/19/2022 with in office rapid test  Patient began with symptoms 5/18/2022 and reports much improvement in symptoms and remains with only fatigue (which is improving) and cough  02 96%  Will recommend continue to increase fluids, covid vitamins, tessalon pearls tid prn for cough  Educated on quarantine guidelines and strict masking and can end masking 5/28/2022  Educated to call with worsening of symptoms

## 2022-05-25 NOTE — PROGRESS NOTES
COVID-19 Outpatient Progress Note    Assessment/Plan:    Problem List Items Addressed This Visit        Other    COVID-19 virus infection - Primary     Acute symptomatic patient positive for covid 5/19/2022 with in office rapid test  Patient began with symptoms 5/18/2022 and reports much improvement in symptoms and remains with only fatigue (which is improving) and cough  02 96%  Will recommend continue to increase fluids, covid vitamins, tessalon pearls tid prn for cough  Educated on quarantine guidelines and strict masking and can end masking 5/28/2022  Educated to call with worsening of symptoms  Disposition:     I recommended continued isolation until at least 24 hours have passed since recovery defined as resolution of fever without the use of fever-reducing medications AND improvement in COVID symptoms AND 10 days have passed since onset of symptoms (or 10 days have passed since date of first positive viral diagnostic test for asymptomatic patients)  I have spent 15 minutes directly with the patient  Greater than 50% of this time was spent in counseling/coordination of care regarding: instructions for management and patient and family education        Encounter provider PO Fisher    Provider located at [place] [address] [address] [address] [facility] [place] [address] [phone]    Recent Visits  Date Type Provider Dept   05/23/22 Telemedicine Myrtle Husain, [address]   05/19/22 Telephone JIMMY DINH, [address] [facility]   05/19/22 Office Visit Myrtle Husain, [address]   Showing recent visits within past 7 days and meeting all other requirements  Today's Visits  Date Type Provider Dept   05/25/22 Telemedicine [place] [address] Wojciech Sena, [address]   Showing today's visits and meeting all other requirements  Future Appointments  No visits were found meeting these conditions  Showing future appointments within next 150 days and meeting all other requirements     This virtual check-in was done via telephone and she agrees to proceed  Patient agrees to participate in a virtual check in via telephone or video visit instead of presenting to the office to address urgent/immediate medical needs  Patient is aware this is a billable service  After connecting through Telephone, the patient was identified by name and date of birth  Demetrio Salinas was informed that this was a telemedicine visit and that the exam was being conducted confidentially over secure lines  My office door was closed  No one else was in the room  Demetrio Salinas acknowledged consent and understanding of privacy and security of the telemedicine visit  I informed the patient that I have reviewed her record in Epic and presented the opportunity for her to ask any questions regarding the visit today  The patient agreed to participate  It was my intent to perform this visit via video technology but the patient was not able to do a video connection so the visit was completed via audio telephone only  Verification of patient location:  Patient is located in the following state in which I hold an active license: [place]    Subjective:   Demetrio Salinas is a [de-identified] y o  female who has been screened for COVID-19  Patient's symptoms include fatigue and cough  Patient denies fever, chills, malaise, congestion, rhinorrhea, sore throat, anosmia, loss of taste, shortness of breath, chest tightness, abdominal pain, nausea, vomiting, diarrhea, myalgias and headaches  - Date of symptom onset: 5/18/2022  - Date of positive COVID-19 test: 5/19/2022  Type of test: Rapid antigen  COVID-19 vaccination status: Fully vaccinated (primary series)    Kiet Lagunas has been staying home and has isolated themselves in her home   She is taking care to not share personal items and is cleaning all surfaces that are touched often, like counters, tabletops, and doorknobs using household cleaning sprays or wipes  She is wearing a mask when she leaves her room       Lab Results   Component Value Date    SARSCOVAG Positive (A) 05/19/2022     Past Medical History:   Diagnosis Date    Allergic rhinitis     Arthritis     [facility]rial fibrillation [facility]     [facility]     Bug bite 5/21/2019    Cataracts, bilateral     Chronic headaches     pulsatile daily headaches    Chronic post-traumatic headache, not intractable 9/8/2020    Colitis     Coronary artery disease     Diabetes mellitus ([facility]     Disease of thyroid gland     DJD (degenerative joint disease), cervical     Facial neuralgia     left frontal facial post traumatic neuralgia    Fibromyalgia     Glaucoma     History of external beam radiation therapy     8/16/18 to 9/14/2018 Right breast    Hypertension     Hypokalemia     Hypovitaminosis D     Lupus ([facility] 7/19/2018    Obesity     Osteoarthritis     Pre-op examination 2/25/2019    Pre-operative clearance 8/21/2018    Prediabetes     Rib pain on right side 2/25/2019    SDH [facility]sub[facility]ural hematoma) [facility] 1/31/2017    Sleep apnea     no cpap    Stage 3a chronic kidney disease ([facility] ) 5/10/2022    Syncope and collapse 12/1/2018    Vertigo      Past Surgical History:   Procedure Laterality Date    BREAST BIOPSY      BREAST LUMPECTOMY      CHOLECYSTECTOMY      COLONOSCOPY  2013    HYSTERECTOMY      MAMMO NEEDLE LOCALIZATION RIGHT ([employer]) Right 6/21/2018    MAMMO STEREOTACTIC BREAST BIOPSY RIGHT (ALL INC) Right 5/9/2018    THYROIDECTOMY N/A 11/21/2019    Procedure: THYROIDECTOMY, total;  Surgeon: Kelton Reddy MD;  Location: [facility];  Service: Surgical Oncology    TONSILLECTOMY      US GUIDED BREAST BIOPSY RIGHT COMPLETE Right 5/9/2018    US GUIDED THYROID BIOPSY  6/12/2019    US GUIDED THYROID BIOPSY  9/13/2019     Current Outpatient Medications Medication Sig Dispense Refill    allopurinol (ZYLOPRIM) 100 mg tablet Take 1 tablet (100 mg total) by mouth daily 90 tablet 0    apixaban (Eliquis) 5 mg Take 1 tablet (5 mg total) by mouth 2 (two) times a day 60 tablet 3    Ascorbic Acid (vitamin C) 1000 MG tablet Take 1 tablet (1,000 mg total) by mouth in the morning  30 tablet 0    atorvastatin (LIPITOR) 10 mg tablet Take 1 tablet (10 mg total) by mouth daily 90 tablet 0    benzonatate (TESSALON PERLES) 100 mg capsule Take 2 capsules (200 mg total) by mouth as needed in the morning and 2 capsules (200 mg total) as needed at noon and 2 capsules (200 mg total) as needed in the evening for cough  20 capsule 0    chlorhexidine (PERIDEX) 0 12 % solution        cholecalciferol (VITAMIN D3) 1,000 units tablet Take 1 tablet (1,000 Units total) by mouth in the morning  30 tablet 0    Diclofenac Sodium (VOLTAREN) 1 % Apply a small amount on the hand / wrist p r n  pain  No more than twice per day   1 Tube 0    gabapentin (Neurontin) 300 mg capsule Take 1 capsule (300 mg total) by mouth daily at bedtime 30 capsule 2    guaiFENesin (MUCINEX) 600 mg 12 hr tablet Take 2 tablets (1,200 mg total) by mouth every 12 (twelve) hours 20 tablet 0    hydrocortisone 2 5 % cream Apply topically 2 (two) times a day 30 g 0    irbesartan-hydrochlorothiazide (AVALIDE) 300-12 5 MG per tablet Take 1 tablet by mouth daily 30 tablet 2    levothyroxine 75 mcg tablet Take 1 tablet (75 mcg total) by mouth daily 90 tablet 0    meclizine (ANTIVERT) 25 mg tablet Take 1 tablet (25 mg total) by mouth daily at bedtime 30 tablet 1    metFORMIN (GLUCOPHAGE) 500 mg tablet Take 1 tablet (500 mg total) by mouth 2 (two) times a day with meals 180 tablet 0    metoprolol tartrate (LOPRESSOR) 25 mg tablet Take 1 tablet (25 mg total) by mouth every 12 (twelve) hours 180 tablet 0    potassium chloride (K-DUR,KLOR-CON) 20 mEq tablet Take 20 mEq by mouth 2 (two) times a day        zinc sulfate (ZINCATE) 220 mg capsule Take 1 capsule (220 mg total) by mouth in the morning  30 capsule 0     No current facility-administered medications for this visit  Allergies   Allergen Reactions    No Known Allergies        Review of Systems   Constitutional: Positive for activity change and fatigue  Negative for chills and fever  HENT: Negative for congestion, rhinorrhea, sneezing and sore throat  Respiratory: Positive for cough  Negative for chest tightness, shortness of breath and wheezing  Cardiovascular: Negative for chest pain  Gastrointestinal: Negative for abdominal pain, constipation, diarrhea, nausea and vomiting  Musculoskeletal: Negative for myalgias  Neurological: Negative for headaches  Objective:    Vitals:    05/25/22 0934   Pulse: 79   SpO2: 96%       Physical Exam    VIRTUAL VISIT DISCLAIMER    Trixie Wylie verbally agrees to participate in [employer]  Pt is aware that [employer] could be limited without vital signs or the ability to perform a full hands-on physical Jose R Roach understands she or the provider may request at any time to terminate the video visit and request the patient to seek care or treatment in person

## 2022-06-10 DIAGNOSIS — E89.0 POSTOPERATIVE HYPOTHYROIDISM: ICD-10-CM

## 2022-06-10 DIAGNOSIS — E79.0 HYPERURICEMIA: ICD-10-CM

## 2022-06-10 RX ORDER — LEVOTHYROXINE SODIUM 0.07 MG/1
75 TABLET ORAL DAILY
Qty: 90 TABLET | Refills: 0 | Status: SHIPPED | OUTPATIENT
Start: 2022-06-10

## 2022-06-10 RX ORDER — ALLOPURINOL 100 MG/1
100 TABLET ORAL DAILY
Qty: 90 TABLET | Refills: 0 | Status: SHIPPED | OUTPATIENT
Start: 2022-06-10

## 2022-06-15 DIAGNOSIS — E11.9 TYPE 2 DIABETES MELLITUS WITHOUT COMPLICATION, WITHOUT LONG-TERM CURRENT USE OF INSULIN (HCC): ICD-10-CM

## 2022-06-15 DIAGNOSIS — E78.00 PURE HYPERCHOLESTEROLEMIA: ICD-10-CM

## 2022-06-15 RX ORDER — ATORVASTATIN CALCIUM 10 MG/1
10 TABLET, FILM COATED ORAL DAILY
Qty: 90 TABLET | Refills: 0 | Status: SHIPPED | OUTPATIENT
Start: 2022-06-15

## 2022-06-29 DIAGNOSIS — M18.0 BILATERAL PRIMARY OSTEOARTHRITIS OF FIRST CARPOMETACARPAL JOINTS: ICD-10-CM

## 2022-06-29 RX ORDER — GABAPENTIN 300 MG/1
300 CAPSULE ORAL
Qty: 30 CAPSULE | Refills: 1 | Status: SHIPPED | OUTPATIENT
Start: 2022-06-29

## 2022-08-05 DIAGNOSIS — I48.91 ATRIAL FIBRILLATION, UNSPECIFIED TYPE (HCC): ICD-10-CM

## 2022-08-11 DIAGNOSIS — E11.9 TYPE 2 DIABETES MELLITUS WITHOUT COMPLICATION, WITHOUT LONG-TERM CURRENT USE OF INSULIN (HCC): ICD-10-CM

## 2022-08-17 DIAGNOSIS — I10 ESSENTIAL HYPERTENSION: ICD-10-CM

## 2022-08-17 RX ORDER — IRBESARTAN AND HYDROCHLOROTHIAZIDE 300; 12.5 MG/1; MG/1
1 TABLET, FILM COATED ORAL DAILY
Qty: 30 TABLET | Refills: 2 | Status: SHIPPED | OUTPATIENT
Start: 2022-08-17 | End: 2022-08-19 | Stop reason: SDUPTHER

## 2022-08-19 DIAGNOSIS — I10 ESSENTIAL HYPERTENSION: ICD-10-CM

## 2022-08-19 DIAGNOSIS — E78.00 PURE HYPERCHOLESTEROLEMIA: ICD-10-CM

## 2022-08-19 DIAGNOSIS — E11.9 TYPE 2 DIABETES MELLITUS WITHOUT COMPLICATION, WITHOUT LONG-TERM CURRENT USE OF INSULIN (HCC): ICD-10-CM

## 2022-08-19 RX ORDER — IRBESARTAN AND HYDROCHLOROTHIAZIDE 300; 12.5 MG/1; MG/1
1 TABLET, FILM COATED ORAL DAILY
Qty: 100 TABLET | Refills: 0 | Status: SHIPPED | OUTPATIENT
Start: 2022-08-19

## 2022-08-19 RX ORDER — ATORVASTATIN CALCIUM 10 MG/1
10 TABLET, FILM COATED ORAL DAILY
Qty: 100 TABLET | Refills: 0 | Status: SHIPPED | OUTPATIENT
Start: 2022-08-19

## 2022-08-25 DIAGNOSIS — I10 ESSENTIAL HYPERTENSION: ICD-10-CM

## 2022-08-29 ENCOUNTER — APPOINTMENT (OUTPATIENT)
Dept: LAB | Facility: HOSPITAL | Age: 82
End: 2022-08-29
Payer: COMMERCIAL

## 2022-08-29 DIAGNOSIS — E11.9 TYPE 2 DIABETES MELLITUS WITHOUT COMPLICATION, WITHOUT LONG-TERM CURRENT USE OF INSULIN (HCC): ICD-10-CM

## 2022-08-29 DIAGNOSIS — N18.31 STAGE 3A CHRONIC KIDNEY DISEASE (HCC): ICD-10-CM

## 2022-08-29 DIAGNOSIS — I10 ESSENTIAL HYPERTENSION: ICD-10-CM

## 2022-08-29 LAB
25(OH)D3 SERPL-MCNC: 26.1 NG/ML (ref 30–100)
BASOPHILS # BLD AUTO: 0.01 THOUSANDS/ΜL (ref 0–0.1)
BASOPHILS NFR BLD AUTO: 0 % (ref 0–1)
EOSINOPHIL # BLD AUTO: 0.02 THOUSAND/ΜL (ref 0–0.61)
EOSINOPHIL NFR BLD AUTO: 0 % (ref 0–6)
ERYTHROCYTE [DISTWIDTH] IN BLOOD BY AUTOMATED COUNT: 13.4 % (ref 11.6–15.1)
HCT VFR BLD AUTO: 43.2 % (ref 34.8–46.1)
HGB BLD-MCNC: 14.5 G/DL (ref 11.5–15.4)
IMM GRANULOCYTES # BLD AUTO: 0.01 THOUSAND/UL (ref 0–0.2)
IMM GRANULOCYTES NFR BLD AUTO: 0 % (ref 0–2)
LYMPHOCYTES # BLD AUTO: 1.86 THOUSANDS/ΜL (ref 0.6–4.47)
LYMPHOCYTES NFR BLD AUTO: 37 % (ref 14–44)
MCH RBC QN AUTO: 30.8 PG (ref 26.8–34.3)
MCHC RBC AUTO-ENTMCNC: 33.6 G/DL (ref 31.4–37.4)
MCV RBC AUTO: 92 FL (ref 82–98)
MONOCYTES # BLD AUTO: 0.48 THOUSAND/ΜL (ref 0.17–1.22)
MONOCYTES NFR BLD AUTO: 10 % (ref 4–12)
NEUTROPHILS # BLD AUTO: 2.6 THOUSANDS/ΜL (ref 1.85–7.62)
NEUTS SEG NFR BLD AUTO: 53 % (ref 43–75)
NRBC BLD AUTO-RTO: 0 /100 WBCS
PLATELET # BLD AUTO: 201 THOUSANDS/UL (ref 149–390)
PMV BLD AUTO: 11 FL (ref 8.9–12.7)
RBC # BLD AUTO: 4.71 MILLION/UL (ref 3.81–5.12)
TSH SERPL DL<=0.05 MIU/L-ACNC: 4.04 UIU/ML (ref 0.45–4.5)
VIT B12 SERPL-MCNC: 386 PG/ML (ref 100–900)
WBC # BLD AUTO: 4.98 THOUSAND/UL (ref 4.31–10.16)

## 2022-08-29 PROCEDURE — 82306 VITAMIN D 25 HYDROXY: CPT

## 2022-08-29 PROCEDURE — 36415 COLL VENOUS BLD VENIPUNCTURE: CPT

## 2022-08-29 PROCEDURE — 82607 VITAMIN B-12: CPT

## 2022-08-29 PROCEDURE — 85025 COMPLETE CBC W/AUTO DIFF WBC: CPT

## 2022-08-29 PROCEDURE — 84443 ASSAY THYROID STIM HORMONE: CPT

## 2022-08-30 ENCOUNTER — TELEPHONE (OUTPATIENT)
Dept: NEUROLOGY | Facility: CLINIC | Age: 82
End: 2022-08-30

## 2022-08-31 ENCOUNTER — OFFICE VISIT (OUTPATIENT)
Dept: FAMILY MEDICINE CLINIC | Facility: CLINIC | Age: 82
End: 2022-08-31
Payer: COMMERCIAL

## 2022-08-31 VITALS
BODY MASS INDEX: 31.32 KG/M2 | WEIGHT: 170.2 LBS | OXYGEN SATURATION: 94 % | HEART RATE: 59 BPM | SYSTOLIC BLOOD PRESSURE: 134 MMHG | HEIGHT: 62 IN | TEMPERATURE: 97.4 F | DIASTOLIC BLOOD PRESSURE: 78 MMHG

## 2022-08-31 DIAGNOSIS — E11.9 DIABETIC EYE EXAM (HCC): ICD-10-CM

## 2022-08-31 DIAGNOSIS — E11.40 TYPE 2 DIABETES, CONTROLLED, WITH NEUROPATHY (HCC): ICD-10-CM

## 2022-08-31 DIAGNOSIS — M19.032 OSTEOARTHRITIS OF BOTH WRISTS, UNSPECIFIED OSTEOARTHRITIS TYPE: ICD-10-CM

## 2022-08-31 DIAGNOSIS — M19.031 OSTEOARTHRITIS OF BOTH WRISTS, UNSPECIFIED OSTEOARTHRITIS TYPE: ICD-10-CM

## 2022-08-31 DIAGNOSIS — Z01.00 DIABETIC EYE EXAM (HCC): ICD-10-CM

## 2022-08-31 DIAGNOSIS — Z12.31 ENCOUNTER FOR SCREENING MAMMOGRAM FOR BREAST CANCER: Primary | ICD-10-CM

## 2022-08-31 DIAGNOSIS — E53.8 LOW VITAMIN B12 LEVEL: ICD-10-CM

## 2022-08-31 DIAGNOSIS — E78.2 MIXED HYPERLIPIDEMIA: ICD-10-CM

## 2022-08-31 DIAGNOSIS — M19.041 PRIMARY OSTEOARTHRITIS OF RIGHT HAND: ICD-10-CM

## 2022-08-31 DIAGNOSIS — E89.0 POSTOPERATIVE HYPOTHYROIDISM: ICD-10-CM

## 2022-08-31 PROCEDURE — 99214 OFFICE O/P EST MOD 30 MIN: CPT | Performed by: FAMILY MEDICINE

## 2022-08-31 RX ORDER — LANOLIN ALCOHOL/MO/W.PET/CERES
1000 CREAM (GRAM) TOPICAL DAILY
Qty: 30 TABLET | Refills: 3 | Status: SHIPPED | OUTPATIENT
Start: 2022-08-31 | End: 2022-10-14 | Stop reason: SDUPTHER

## 2022-08-31 NOTE — PROGRESS NOTES
Subjective:   Chief Complaint   Patient presents with    Follow-up     Chronic conditions        Patient ID: Lawrence Range is a [de-identified] y o  female  HPI    The following portions of the patient's history were reviewed and updated as appropriate: allergies, current medications, past family history, past medical history, past social history, past surgical history and problem list     Review of Systems   Constitutional: Negative for chills and fever  HENT: Negative for ear pain and sore throat  Eyes: Negative for pain and visual disturbance  Respiratory: Negative for cough and shortness of breath  Cardiovascular: Negative for chest pain and palpitations  Gastrointestinal: Negative for abdominal pain and vomiting  Genitourinary: Negative for dysuria and hematuria  Musculoskeletal: Negative for arthralgias and back pain  Skin: Negative for color change and rash  Neurological: Negative for seizures and syncope  All other systems reviewed and are negative  Objective:  Vitals:    08/31/22 1049   BP: 134/78   BP Location: Left arm   Patient Position: Sitting   Cuff Size: Large   Pulse: 59   Temp: (!) 97 4 °F (36 3 °C)   TempSrc: Tympanic   SpO2: 94%   Weight: 77 2 kg (170 lb 3 2 oz)   Height: 5' 1 5" (1 562 m)      Physical Exam  Vitals and nursing note reviewed  Constitutional:       General: She is not in acute distress  Appearance: She is well-developed  HENT:      Head: Normocephalic and atraumatic  Right Ear: Tympanic membrane normal       Left Ear: Tympanic membrane normal       Nose: Nose normal       Mouth/Throat:      Pharynx: Oropharynx is clear  Eyes:      Conjunctiva/sclera: Conjunctivae normal    Neck:      Vascular: No JVD  Cardiovascular:      Rate and Rhythm: Normal rate and regular rhythm  Heart sounds: Normal heart sounds  No murmur heard  No friction rub  No gallop     Pulmonary:      Effort: Pulmonary effort is normal       Breath sounds: Normal breath sounds  Abdominal:      General: Bowel sounds are normal       Palpations: Abdomen is soft  Tenderness: There is no abdominal tenderness  Musculoskeletal:      Right lower leg: No edema  Left lower leg: No edema  Skin:     Findings: No erythema or rash  Neurological:      Mental Status: She is oriented to person, place, and time  Assessment/Plan:    Type 2 diabetes, controlled, with neuropathy ([facility] )    Lab Results   Component Value Date    HGBA1C 6 6 (H) 05/05/2022   Chronic asymptomatic fair control continue current management including metformin 500 mg twice a day low carb diet important lose weight discussed the patient    Postoperative hypothyroidism  Chronic asymptomatic fair control continue with the levothyroxine 75 mcg once a day    Mixed hyperlipidemia  Chronic asymptomatic fair control continue atorvastatin 10 mg once a day low-fat diet discussed with the patient    Low vitamin B12 level  New finding B12 in the low range of the normal recommend to start taking vitamin B 12 1000 mcg once a day patient has the history of the diabetic on metformin which can affect the absorption the B12    Primary osteoarthritis of right hand  Symptomatic with the pain patient on the Eliquis she cannot take anti inflammatory nonsteroidal anti-inflammatory drugs recommend Tylenol for the pain       Diagnoses and all orders for this visit:    Encounter for screening mammogram for breast cancer  -     Mammo screening bilateral w 3d & cad; Future    Diabetic eye exam [facility])  -     Ambulatory Referral to Ophthalmology; Future    Type 2 diabetes, controlled, with neuropathy (HCC)    Postoperative hypothyroidism    Low vitamin B12 level  -     vitamin B-12 (VITAMIN B-12) 1,000 mcg tablet; Take 1 tablet (1,000 mcg total) by mouth daily    Primary osteoarthritis of right hand  -     Diclofenac Sodium (VOLTAREN) 1 %; Apply a small amount on the hand / wrist p r n  pain    No more than twice per day     Osteoarthritis of both wrists, unspecified osteoarthritis type    Mixed hyperlipidemia

## 2022-09-01 NOTE — ASSESSMENT & PLAN NOTE
Symptomatic with the pain specially in the right wrist patient on Eliquis the a recommend patient to avoid nonsteroidal anti-inflammatory drugs recommend Tylenol for the pain and patient on gabapentin

## 2022-09-01 NOTE — ASSESSMENT & PLAN NOTE
Lab Results   Component Value Date    HGBA1C 6 6 (H) 05/05/2022   Chronic asymptomatic fair control continue current management including metformin 500 mg twice a day low carb diet important lose weight discussed the patient

## 2022-09-02 PROBLEM — E53.8 LOW VITAMIN B12 LEVEL: Status: ACTIVE | Noted: 2022-09-02

## 2022-09-02 PROBLEM — R79.89 LOW VITAMIN B12 LEVEL: Status: ACTIVE | Noted: 2022-09-02

## 2022-09-02 PROBLEM — E53.8 LOW VITAMIN B12 LEVEL: Status: ACTIVE | Noted: 2022-08-31

## 2022-09-02 PROBLEM — R79.89 LOW VITAMIN B12 LEVEL: Status: ACTIVE | Noted: 2022-08-31

## 2022-09-02 NOTE — ASSESSMENT & PLAN NOTE
New finding B12 in the low range of the normal recommend to start taking vitamin B 12 1000 mcg once a day patient has the history of the diabetic on metformin which can affect the absorption the B12

## 2022-09-02 NOTE — ASSESSMENT & PLAN NOTE
Symptomatic with the pain patient on the Eliquis she cannot take anti inflammatory nonsteroidal anti-inflammatory drugs recommend Tylenol for the pain

## 2022-09-02 NOTE — ASSESSMENT & PLAN NOTE
Chronic asymptomatic fair control continue atorvastatin 10 mg once a day low-fat diet discussed with the patient

## 2022-09-08 DIAGNOSIS — M18.0 BILATERAL PRIMARY OSTEOARTHRITIS OF FIRST CARPOMETACARPAL JOINTS: ICD-10-CM

## 2022-09-08 DIAGNOSIS — E79.0 HYPERURICEMIA: ICD-10-CM

## 2022-09-08 RX ORDER — GABAPENTIN 300 MG/1
300 CAPSULE ORAL
Qty: 90 CAPSULE | Refills: 0 | Status: SHIPPED | OUTPATIENT
Start: 2022-09-08

## 2022-09-08 RX ORDER — ALLOPURINOL 100 MG/1
100 TABLET ORAL DAILY
Qty: 90 TABLET | Refills: 0 | Status: SHIPPED | OUTPATIENT
Start: 2022-09-08

## 2022-09-14 DIAGNOSIS — E89.0 POSTOPERATIVE HYPOTHYROIDISM: ICD-10-CM

## 2022-09-15 RX ORDER — LEVOTHYROXINE SODIUM 0.07 MG/1
75 TABLET ORAL DAILY
Qty: 90 TABLET | Refills: 0 | Status: SHIPPED | OUTPATIENT
Start: 2022-09-15

## 2022-09-27 ENCOUNTER — OFFICE VISIT (OUTPATIENT)
Dept: CARDIOLOGY CLINIC | Facility: CLINIC | Age: 82
End: 2022-09-27
Payer: COMMERCIAL

## 2022-09-27 VITALS
BODY MASS INDEX: 32.32 KG/M2 | HEART RATE: 50 BPM | DIASTOLIC BLOOD PRESSURE: 80 MMHG | WEIGHT: 171.2 LBS | HEIGHT: 61 IN | SYSTOLIC BLOOD PRESSURE: 150 MMHG

## 2022-09-27 DIAGNOSIS — I48.91 ATRIAL FIBRILLATION, UNSPECIFIED TYPE (HCC): ICD-10-CM

## 2022-09-27 DIAGNOSIS — N18.31 STAGE 3A CHRONIC KIDNEY DISEASE (HCC): ICD-10-CM

## 2022-09-27 DIAGNOSIS — I48.0 PAROXYSMAL ATRIAL FIBRILLATION (HCC): Primary | ICD-10-CM

## 2022-09-27 DIAGNOSIS — I25.10 CHRONIC CORONARY ARTERY DISEASE: ICD-10-CM

## 2022-09-27 DIAGNOSIS — Z91.199 NONCOMPLIANCE WITH TREATMENT: ICD-10-CM

## 2022-09-27 DIAGNOSIS — Z79.01 LONG TERM CURRENT USE OF ANTICOAGULANT THERAPY: ICD-10-CM

## 2022-09-27 DIAGNOSIS — E78.2 MIXED HYPERLIPIDEMIA: ICD-10-CM

## 2022-09-27 DIAGNOSIS — I10 ESSENTIAL HYPERTENSION: ICD-10-CM

## 2022-09-27 DIAGNOSIS — E11.9 TYPE 2 DIABETES MELLITUS WITHOUT COMPLICATION, WITHOUT LONG-TERM CURRENT USE OF INSULIN (HCC): ICD-10-CM

## 2022-09-27 PROCEDURE — 3079F DIAST BP 80-89 MM HG: CPT | Performed by: INTERNAL MEDICINE

## 2022-09-27 PROCEDURE — 93000 ELECTROCARDIOGRAM COMPLETE: CPT | Performed by: INTERNAL MEDICINE

## 2022-09-27 PROCEDURE — 99214 OFFICE O/P EST MOD 30 MIN: CPT | Performed by: INTERNAL MEDICINE

## 2022-09-27 PROCEDURE — 3077F SYST BP >= 140 MM HG: CPT | Performed by: INTERNAL MEDICINE

## 2022-09-27 PROCEDURE — 1160F RVW MEDS BY RX/DR IN RCRD: CPT | Performed by: INTERNAL MEDICINE
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
## 2022-10-11 PROBLEM — R05.9 COUGH: Status: RESOLVED | Noted: 2022-05-19 | Resolved: 2022-10-11

## 2022-10-11 PROBLEM — J01.90 ACUTE SINUSITIS: Status: RESOLVED | Noted: 2022-04-19 | Resolved: 2022-10-11

## 2022-10-14 ENCOUNTER — OFFICE VISIT (OUTPATIENT)
Dept: FAMILY MEDICINE CLINIC | Facility: CLINIC | Age: 82
End: 2022-10-14
Payer: COMMERCIAL

## 2022-10-14 ENCOUNTER — TELEPHONE (OUTPATIENT)
Dept: FAMILY MEDICINE CLINIC | Facility: CLINIC | Age: 82
End: 2022-10-14

## 2022-10-14 VITALS
TEMPERATURE: 98.2 F | SYSTOLIC BLOOD PRESSURE: 152 MMHG | OXYGEN SATURATION: 96 % | WEIGHT: 172 LBS | HEIGHT: 61 IN | DIASTOLIC BLOOD PRESSURE: 94 MMHG | HEART RATE: 52 BPM | RESPIRATION RATE: 16 BRPM | BODY MASS INDEX: 32.47 KG/M2

## 2022-10-14 DIAGNOSIS — G89.29 CHRONIC LEFT-SIDED LOW BACK PAIN WITH LEFT-SIDED SCIATICA: Primary | ICD-10-CM

## 2022-10-14 DIAGNOSIS — M54.42 CHRONIC LEFT-SIDED LOW BACK PAIN WITH LEFT-SIDED SCIATICA: Primary | ICD-10-CM

## 2022-10-14 DIAGNOSIS — Z23 NEED FOR INFLUENZA VACCINATION: ICD-10-CM

## 2022-10-14 DIAGNOSIS — M54.2 NECK PAIN: ICD-10-CM

## 2022-10-14 DIAGNOSIS — G44.209 ACUTE NON INTRACTABLE TENSION-TYPE HEADACHE: ICD-10-CM

## 2022-10-14 DIAGNOSIS — M54.2 NECK PAIN: Primary | ICD-10-CM

## 2022-10-14 DIAGNOSIS — E53.8 LOW VITAMIN B12 LEVEL: ICD-10-CM

## 2022-10-14 DIAGNOSIS — I10 ESSENTIAL HYPERTENSION: ICD-10-CM

## 2022-10-14 PROCEDURE — 90662 IIV NO PRSV INCREASED AG IM: CPT | Performed by: FAMILY MEDICINE

## 2022-10-14 PROCEDURE — G0008 ADMIN INFLUENZA VIRUS VAC: HCPCS | Performed by: FAMILY MEDICINE

## 2022-10-14 PROCEDURE — 99215 OFFICE O/P EST HI 40 MIN: CPT | Performed by: FAMILY MEDICINE

## 2022-10-14 RX ORDER — TIZANIDINE 2 MG/1
2 TABLET ORAL 2 TIMES DAILY
Qty: 20 TABLET | Refills: 0 | Status: CANCELLED | OUTPATIENT
Start: 2022-10-14

## 2022-10-14 RX ORDER — LANOLIN ALCOHOL/MO/W.PET/CERES
1000 CREAM (GRAM) TOPICAL DAILY
Qty: 30 TABLET | Refills: 3 | Status: SHIPPED | OUTPATIENT
Start: 2022-10-14

## 2022-10-14 RX ORDER — METHOCARBAMOL 500 MG/1
500 TABLET, FILM COATED ORAL 2 TIMES DAILY
Qty: 20 TABLET | Refills: 0 | Status: SHIPPED | OUTPATIENT
Start: 2022-10-14 | End: 2022-10-14 | Stop reason: CLARIF

## 2022-10-14 RX ORDER — TIZANIDINE HYDROCHLORIDE 2 MG/1
2 CAPSULE, GELATIN COATED ORAL 2 TIMES DAILY
Qty: 20 CAPSULE | Refills: 0 | Status: SHIPPED | OUTPATIENT
Start: 2022-10-14 | End: 2022-10-17

## 2022-10-16 PROBLEM — G44.209 ACUTE NON INTRACTABLE TENSION-TYPE HEADACHE: Status: ACTIVE | Noted: 2022-10-16

## 2022-10-16 PROBLEM — M54.2 NECK PAIN: Status: ACTIVE | Noted: 2022-10-14

## 2022-10-16 PROBLEM — G44.209 ACUTE NON INTRACTABLE TENSION-TYPE HEADACHE: Status: ACTIVE | Noted: 2022-10-14

## 2022-10-16 PROBLEM — M54.2 NECK PAIN: Status: ACTIVE | Noted: 2022-10-16

## 2022-10-16 NOTE — ASSESSMENT & PLAN NOTE
Acute on chronic symptomatic worse in the left side radiate to the buttock area no recent fall or trauma decrease test is positive in the left side patient on anticoagulation cannot take nonsteroidal anti-inflammatory drugs discussed the patient recommend Tylenol for the pain a plan to start her on muscle relax tizanidine 2 mg twice a day proper use and possible side effect discussed the patient  Plan for x-ray of the lumbar spine

## 2022-10-16 NOTE — ASSESSMENT & PLAN NOTE
Acute on chronic symptomatic patient known to have history of cervical discogenic disease and the cervical spinal stenosis I review with the patient and her daughter MRI done 2017 recommend muscle relax proper postural discussed the patient the patient cannot take nonsteroidal anti-inflammatory drugs secondary to be anticoagulation plan for x-ray of the cervical spine

## 2022-10-16 NOTE — ASSESSMENT & PLAN NOTE
A new diagnosis symptomatic a tension headache secondary to muscle spasm in the cervical spine and neck pain in patient's history of cervical stenosis the patient to take Tylenol 500 mg up to 3 times a day and the recommend the muscle relax tizanidine 2 mg twice a day proper use and possible side effect discussed the patient keep will hydration InCase get worse to call the office or go to the ER

## 2022-10-16 NOTE — ASSESSMENT & PLAN NOTE
A uncontrolled secondary to the pain recommend to the patient to continue current management will treat her pain recommend to monitor blood pressure if it get worse to call the office low-salt diet will hydration

## 2022-10-16 NOTE — PROGRESS NOTES
Name: Janelle Taylor      : 1940      MRN: [id number]  Encounter Provider: Merary New MD  Encounter Date: 10/14/2022   Encounter department: [place] [address]  Chronic left-sided low back pain with left-sided sciatica  Assessment & Plan:  Acute on chronic symptomatic worse in the left side radiate to the buttock area no recent fall or trauma decrease test is positive in the left side patient on anticoagulation cannot take nonsteroidal anti-inflammatory drugs discussed the patient recommend Tylenol for the pain a plan to start her on muscle relax tizanidine 2 mg twice a day proper use and possible side effect discussed the patient  Plan for x-ray of the lumbar spine    Orders:  -     XR spine lumbar minimum 4 views non injury; Future; Expected date: 10/14/2022  -     XR spine cervical complete 4 or 5 vw non injury; Future; Expected date: 10/14/2022    2  Low vitamin B12 level  -     vitamin B-12 (VITAMIN B-12) 1,000 mcg tablet; Take 1 tablet (1,000 mcg total) by mouth daily    3  Need for influenza vaccination  -     influenza vaccine, high-dose, PF 0 7 mL (FLUZONE HIGH-DOSE)    4  Neck pain  Assessment & Plan:  Acute on chronic symptomatic patient known to have history of cervical discogenic disease and the cervical spinal stenosis I review with the patient and her daughter MRI done 2017 recommend muscle relax proper postural discussed the patient the patient cannot take nonsteroidal anti-inflammatory drugs secondary to be anticoagulation plan for x-ray of the cervical spine    Orders:  -     XR spine lumbar minimum 4 views non injury; Future; Expected date: 10/14/2022  -     XR spine cervical complete 4 or 5 vw non injury; Future; Expected date: 10/14/2022    5   Essential hypertension  Assessment & Plan:  A uncontrolled secondary to the pain recommend to the patient to continue current management will treat her pain recommend to monitor blood pressure if it get worse to call the office low-salt diet will hydration      6  Acute non intractable tension-type headache  Assessment & Plan:  A new diagnosis symptomatic a tension headache secondary to muscle spasm in the cervical spine and neck pain in patient's history of cervical stenosis the patient to take Tylenol 500 mg up to 3 times a day and the recommend the muscle relax tizanidine 2 mg twice a day proper use and possible side effect discussed the patient keep will hydration InCase get worse to call the office or go to the ER             Subjective      Patient here with her daughter concerned about the neck pain she described it as achy and graded as 6/10 localized in the neck radiate to bilateral upper extremity certain range motion of the head aggravated the pain associated with the headache no blurred vision no double vision no numbness no muscle weakness does not increase with cough or sneeze no lose control of the urine or stool also had the low back pain achy worse in the left side a no numbness or muscle weakness no rash no recent fall or trauma    Review of Systems   Constitutional: Negative for chills and fever  HENT: Negative for ear pain and sore throat  Eyes: Negative for pain and visual disturbance  Respiratory: Negative for cough and shortness of breath  Cardiovascular: Negative for chest pain and palpitations  Gastrointestinal: Negative for abdominal pain and vomiting  Genitourinary: Negative for dysuria and hematuria  Musculoskeletal: Positive for back pain and neck pain  Skin: Negative for color change and rash  Neurological: Positive for headaches  Negative for dizziness, tremors, seizures, syncope, weakness, light-headedness and numbness  All other systems reviewed and are negative        Current Outpatient Medications on File Prior to Visit   Medication Sig   • allopurinol (ZYLOPRIM) 100 mg tablet Take 1 tablet (100 mg total) by mouth daily   • apixaban (Eliquis) 5 mg Take 1 tablet (5 mg total) by mouth 2 (two) times a day Patient must schedule appointment for farther refills  • atorvastatin (LIPITOR) 10 mg tablet Take 1 tablet (10 mg total) by mouth daily   • Diclofenac Sodium (VOLTAREN) 1 % Apply a small amount on the hand / wrist p r n  pain  No more than twice per day  • gabapentin (NEURONTIN) 300 mg capsule Take 1 capsule (300 mg total) by mouth daily at bedtime   • hydrocortisone 2 5 % cream Apply topically 2 (two) times a day   • irbesartan-hydrochlorothiazide (AVALIDE) 300-12 5 MG per tablet Take 1 tablet by mouth daily   • levothyroxine 75 mcg tablet Take 1 tablet (75 mcg total) by mouth daily   • meclizine (ANTIVERT) 25 mg tablet Take 1 tablet (25 mg total) by mouth daily at bedtime   • metFORMIN (GLUCOPHAGE) 500 mg tablet Take 1 tablet (500 mg total) by mouth 2 (two) times a day with meals   • metoprolol tartrate (LOPRESSOR) 25 mg tablet Take 1 tablet (25 mg total) by mouth every 12 (twelve) hours   • cholecalciferol (VITAMIN D3) 1,000 units tablet Take 1 tablet (1,000 Units total) by mouth in the morning  (Patient not taking: No sig reported)   • guaiFENesin (MUCINEX) 600 mg 12 hr tablet Take 2 tablets (1,200 mg total) by mouth every 12 (twelve) hours (Patient not taking: Reported on 10/14/2022)   • potassium chloride (K-DUR,KLOR-CON) 20 mEq tablet Take 20 mEq by mouth 2 (two) times a day   (Patient not taking: No sig reported)       Objective     /94 (BP Location: Left arm, Patient Position: Sitting, Cuff Size: Large)   Pulse (!) 52   Temp 98 2 °F (36 8 °C) (Tympanic)   Resp 16   Ht 5' 1" (1 549 m)   Wt 78 kg (172 lb)   SpO2 96%   BMI 32 50 kg/m²     Physical Exam  Vitals and nursing note reviewed  Constitutional:       General: She is not in acute distress  Appearance: She is well-developed  HENT:      Head: Normocephalic and atraumatic  Mouth/Throat:      Pharynx: Oropharynx is clear     Eyes:      Conjunctiva/sclera: Conjunctivae normal    Neck:      Vascular: No JVD  Cardiovascular:      Rate and Rhythm: Normal rate and regular rhythm  Heart sounds: Normal heart sounds  No murmur heard  No friction rub  No gallop  Pulmonary:      Effort: Pulmonary effort is normal       Breath sounds: Normal breath sounds  Abdominal:      General: Bowel sounds are normal       Palpations: Abdomen is soft  Tenderness: There is no abdominal tenderness  Musculoskeletal:      Cervical back: Spasms, tenderness and crepitus present  No signs of trauma  Lumbar back: Tenderness present  No signs of trauma  Positive left straight leg raise test       Right lower leg: No edema  Left lower leg: No edema  Skin:     Findings: No erythema or rash  Neurological:      Mental Status: She is oriented to person, place, and time  Sensory: No sensory deficit  Motor: No weakness        Gait: Gait normal       Deep Tendon Reflexes: Reflexes normal        Mukesh Mullen MD

## 2022-10-17 DIAGNOSIS — M54.2 NECK PAIN: ICD-10-CM

## 2022-10-17 RX ORDER — TIZANIDINE HYDROCHLORIDE 2 MG/1
2 CAPSULE, GELATIN COATED ORAL 2 TIMES DAILY
Qty: 20 CAPSULE | Refills: 0 | Status: SHIPPED | OUTPATIENT
Start: 2022-10-17

## 2022-10-18 ENCOUNTER — HOSPITAL ENCOUNTER (OUTPATIENT)
Dept: RADIOLOGY | Facility: HOSPITAL | Age: 82
Discharge: HOME | End: 2022-10-18
Payer: COMMERCIAL

## 2022-10-18 DIAGNOSIS — M54.2 NECK PAIN: ICD-10-CM

## 2022-10-18 DIAGNOSIS — G89.29 CHRONIC LEFT-SIDED LOW BACK PAIN WITH LEFT-SIDED SCIATICA: ICD-10-CM

## 2022-10-18 DIAGNOSIS — M54.42 CHRONIC LEFT-SIDED LOW BACK PAIN WITH LEFT-SIDED SCIATICA: ICD-10-CM

## 2022-10-18 PROCEDURE — 72050 X-RAY EXAM NECK SPINE 4/5VWS: CPT

## 2022-10-18 PROCEDURE — 72110 X-RAY EXAM L-2 SPINE 4/>VWS: CPT

## 2022-11-01 LAB
LEFT EYE DIABETIC RETINOPATHY: NORMAL
RIGHT EYE DIABETIC RETINOPATHY: NORMAL

## 2022-11-07 ENCOUNTER — APPOINTMENT (OUTPATIENT)
Dept: RADIATION ONCOLOGY | Facility: CLINIC | Age: 82
End: 2022-11-07

## 2022-11-08 ENCOUNTER — TELEPHONE (OUTPATIENT)
Dept: ADMINISTRATIVE | Facility: OTHER | Age: 82
End: 2022-11-08

## 2022-11-08 NOTE — TELEPHONE ENCOUNTER
Upon review of the In Basket request we were able to locate, review, and update the patient chart as requested for Mammogram     Any additional questions or concerns should be emailed to the Practice Liaisons via the appropriate education email address, please do not reply via In Basket      Thank you  Rajesh Awad

## 2022-11-08 NOTE — TELEPHONE ENCOUNTER
----- Message from Velasquez  sent at 11/8/2022  9:45 AM EST -----  Regarding: care gap request  11/08/22 9:45 AM    Hello, our patient attached above has had Mammogram completed/performed  Please assist in updating the patient chart by pulling the Care Everywhere (CE) document  The date of service is 9/20/2022       Thank you,  [id number] [place] [id number] albuterol /VENTOLIN  Inhaler     Last Written Prescription Date:  11/24/17  Last Fill Quantity: 1INH,   # refills: 11  Last Office Visit : 10/17/17  Future Office visit: NONE    Routing refill request to provider for review/approval because: NO ACT

## 2022-11-09 ENCOUNTER — TELEPHONE (OUTPATIENT)
Dept: OBGYN CLINIC | Facility: OTHER | Age: 82
End: 2022-11-09

## 2022-11-09 ENCOUNTER — OFFICE VISIT (OUTPATIENT)
Dept: FAMILY MEDICINE CLINIC | Facility: CLINIC | Age: 82
End: 2022-11-09

## 2022-11-09 VITALS
OXYGEN SATURATION: 98 % | HEART RATE: 54 BPM | BODY MASS INDEX: 33.04 KG/M2 | TEMPERATURE: 98.1 F | SYSTOLIC BLOOD PRESSURE: 160 MMHG | RESPIRATION RATE: 16 BRPM | DIASTOLIC BLOOD PRESSURE: 80 MMHG | HEIGHT: 61 IN | WEIGHT: 175 LBS

## 2022-11-09 DIAGNOSIS — E78.00 PURE HYPERCHOLESTEROLEMIA: ICD-10-CM

## 2022-11-09 DIAGNOSIS — M25.531 RIGHT WRIST PAIN: ICD-10-CM

## 2022-11-09 DIAGNOSIS — E11.9 TYPE 2 DIABETES MELLITUS WITHOUT COMPLICATION, WITHOUT LONG-TERM CURRENT USE OF INSULIN (HCC): ICD-10-CM

## 2022-11-09 DIAGNOSIS — E89.0 POSTOPERATIVE HYPOTHYROIDISM: ICD-10-CM

## 2022-11-09 DIAGNOSIS — I10 ESSENTIAL HYPERTENSION: ICD-10-CM

## 2022-11-09 DIAGNOSIS — Z00.00 MEDICARE ANNUAL WELLNESS VISIT, SUBSEQUENT: Primary | ICD-10-CM

## 2022-11-09 DIAGNOSIS — M18.0 BILATERAL PRIMARY OSTEOARTHRITIS OF FIRST CARPOMETACARPAL JOINTS: ICD-10-CM

## 2022-11-09 DIAGNOSIS — M47.27 OSTEOARTHRITIS OF SPINE WITH RADICULOPATHY, LUMBOSACRAL REGION: ICD-10-CM

## 2022-11-09 DIAGNOSIS — Z59.9 FINANCIAL DIFFICULTIES: ICD-10-CM

## 2022-11-09 DIAGNOSIS — U07.1 COVID-19 VIRUS INFECTION: ICD-10-CM

## 2022-11-09 DIAGNOSIS — R42 VERTIGO: ICD-10-CM

## 2022-11-09 DIAGNOSIS — E79.0 HYPERURICEMIA: ICD-10-CM

## 2022-11-09 DIAGNOSIS — M47.22 OSTEOARTHRITIS OF SPINE WITH RADICULOPATHY, CERVICAL REGION: ICD-10-CM

## 2022-11-09 DIAGNOSIS — E53.8 LOW VITAMIN B12 LEVEL: ICD-10-CM

## 2022-11-09 PROBLEM — Z00.01 ENCOUNTER FOR WELL ADULT EXAM WITH ABNORMAL FINDINGS: Status: RESOLVED | Noted: 2019-10-14 | Resolved: 2022-11-09

## 2022-11-09 RX ORDER — ATORVASTATIN CALCIUM 10 MG/1
10 TABLET, FILM COATED ORAL DAILY
Qty: 100 TABLET | Refills: 0 | Status: SHIPPED | OUTPATIENT
Start: 2022-11-09

## 2022-11-09 RX ORDER — ALLOPURINOL 100 MG/1
100 TABLET ORAL DAILY
Qty: 90 TABLET | Refills: 0 | Status: SHIPPED | OUTPATIENT
Start: 2022-11-09

## 2022-11-09 RX ORDER — LANOLIN ALCOHOL/MO/W.PET/CERES
1000 CREAM (GRAM) TOPICAL DAILY
Qty: 30 TABLET | Refills: 3 | Status: SHIPPED | OUTPATIENT
Start: 2022-11-09

## 2022-11-09 RX ORDER — GABAPENTIN 300 MG/1
300 CAPSULE ORAL 2 TIMES DAILY
Qty: 180 CAPSULE | Refills: 0 | Status: SHIPPED | OUTPATIENT
Start: 2022-11-09

## 2022-11-09 RX ORDER — IRBESARTAN AND HYDROCHLOROTHIAZIDE 300; 12.5 MG/1; MG/1
1 TABLET, FILM COATED ORAL DAILY
Qty: 100 TABLET | Refills: 0 | Status: SHIPPED | OUTPATIENT
Start: 2022-11-09

## 2022-11-09 RX ORDER — MECLIZINE HYDROCHLORIDE 25 MG/1
25 TABLET ORAL
Qty: 30 TABLET | Refills: 1 | Status: SHIPPED | OUTPATIENT
Start: 2022-11-09

## 2022-11-09 RX ORDER — LEVOTHYROXINE SODIUM 0.07 MG/1
75 TABLET ORAL DAILY
Qty: 90 TABLET | Refills: 0 | Status: SHIPPED | OUTPATIENT
Start: 2022-11-09

## 2022-11-09 RX ORDER — NIFEDIPINE 30 MG/1
30 TABLET, EXTENDED RELEASE ORAL DAILY
Qty: 30 TABLET | Refills: 1 | Status: SHIPPED | OUTPATIENT
Start: 2022-11-09

## 2022-11-09 RX ORDER — MELATONIN
1000 DAILY
Qty: 30 TABLET | Refills: 0 | Status: SHIPPED | OUTPATIENT
Start: 2022-11-09

## 2022-11-09 RX ORDER — GABAPENTIN 300 MG/1
300 CAPSULE ORAL
Qty: 90 CAPSULE | Refills: 0 | Status: SHIPPED | OUTPATIENT
Start: 2022-11-09 | End: 2022-11-09 | Stop reason: SDUPTHER

## 2022-11-09 SDOH — ECONOMIC STABILITY - INCOME SECURITY: PROBLEM RELATED TO HOUSING AND ECONOMIC CIRCUMSTANCES, UNSPECIFIED: Z59.9

## 2022-11-09 NOTE — PATIENT INSTRUCTIONS
Medicare Preventive Visit Patient Instructions  Thank you for completing your Welcome to Medicare Visit or Medicare Annual Wellness Visit today  Your next wellness visit will be due in one year (11/10/2023)  The screening/preventive services that you may require over the next 5-10 years are detailed below  Some tests may not apply to you based off risk factors and/or age  Screening tests ordered at today's visit but not completed yet may show as past due  Also, please note that scanned in results may not display below  Preventive Screenings:  Service Recommendations Previous Testing/Comments   Colorectal Cancer Screening  * Colonoscopy    * Fecal Occult Blood Test (FOBT)/Fecal Immunochemical Test (FIT)  * Fecal DNA/Cologuard Test  * Flexible Sigmoidoscopy Age: 39-70 years old   Colonoscopy: every 10 years (may be performed more frequently if at higher risk)  OR  FOBT/FIT: every 1 year  OR  Cologuard: every 3 years  OR  Sigmoidoscopy: every 5 years  Screening may be recommended earlier than age 39 if at higher risk for colorectal cancer  Also, an individualized decision between you and your healthcare provider will decide whether screening between the ages of 74-80 would be appropriate  Colonoscopy: 01/29/2018  FOBT/FIT: Not on file  Cologuard: Not on file  Sigmoidoscopy: Not on file          Breast Cancer Screening Age: 36 years old  Frequency: every 1-2 years  Not required if history of left and right mastectomy Mammogram: 09/20/2022    History Breast Cancer   Cervical Cancer Screening Between the ages of 21-29, pap smear recommended once every 3 years  Between the ages of 33-67, can perform pap smear with HPV co-testing every 5 years     Recommendations may differ for women with a history of total hysterectomy, cervical cancer, or abnormal pap smears in past  Pap Smear: Not on file    Screening Not Indicated   Hepatitis C Screening Once for adults born between 1945 and 1965  More frequently in patients at high risk for Hepatitis C Hep C Antibody: 11/12/2018    Screening Current   Diabetes Screening 1-2 times per year if you're at risk for diabetes or have pre-diabetes Fasting glucose: 112 mg/dL (5/5/2022)  A1C: 6 6 % (5/5/2022)  Screening Not Indicated  History Diabetes   Cholesterol Screening Once every 5 years if you don't have a lipid disorder  May order more often based on risk factors  Lipid panel: 05/05/2022    Screening Not Indicated  History Lipid Disorder     Other Preventive Screenings Covered by Medicare:  1  Abdominal Aortic Aneurysm (AAA) Screening: covered once if your at risk  You're considered to be at risk if you have a family history of AAA  2  Lung Cancer Screening: covers low dose CT scan once per year if you meet all of the following conditions: (1) Age 50-69; (2) No signs or symptoms of lung cancer; (3) Current smoker or have quit smoking within the last 15 years; (4) You have a tobacco smoking history of at least 20 pack years (packs per day multiplied by number of years you smoked); (5) You get a written order from a healthcare provider  3  Glaucoma Screening: covered annually if you're considered high risk: (1) You have diabetes OR (2) Family history of glaucoma OR (3)  aged 48 and older OR (3)  American aged 72 and older  3  Osteoporosis Screening: covered every 2 years if you meet one of the following conditions: (1) You're estrogen deficient and at risk for osteoporosis based off medical history and other findings; (2) Have a vertebral abnormality; (3) On glucocorticoid therapy for more than 3 months; (4) Have primary hyperparathyroidism; (5) On osteoporosis medications and need to assess response to drug therapy  · Last bone density test (DXA Scan): 06/02/2021   5  HIV Screening: covered annually if you're between the age of 15-65  Also covered annually if you are younger than 13 and older than 72 with risk factors for HIV infection   For pregnant patients, it is covered up to 3 times per pregnancy  Immunizations:  Immunization Recommendations   Influenza Vaccine Annual influenza vaccination during flu season is recommended for all persons aged >= 6 months who do not have contraindications   Pneumococcal Vaccine   * Pneumococcal conjugate vaccine = PCV13 (Prevnar 13), PCV15 (Vaxneuvance), PCV20 (Prevnar 20)  * Pneumococcal polysaccharide vaccine = PPSV23 (Pneumovax) Adults 25-60 years old: 1-3 doses may be recommended based on certain risk factors  Adults 72 years old: 1-2 doses may be recommended based off what pneumonia vaccine you previously received   Hepatitis B Vaccine 3 dose series if at intermediate or high risk (ex: diabetes, end stage renal disease, liver disease)   Tetanus (Td) Vaccine - COST NOT COVERED BY MEDICARE PART B Following completion of primary series, a booster dose should be given every 10 years to maintain immunity against tetanus  Td may also be given as tetanus wound prophylaxis  Tdap Vaccine - COST NOT COVERED BY MEDICARE PART B Recommended at least once for all adults  For pregnant patients, recommended with each pregnancy  Shingles Vaccine (Shingrix) - COST NOT COVERED BY MEDICARE PART B  2 shot series recommended in those aged 48 and above     Health Maintenance Due:      Topic Date Due   • DXA SCAN  06/02/2023   • Breast Cancer Screening: Mammogram  09/20/2023   • Hepatitis C Screening  Completed     Immunizations Due:      Topic Date Due   • COVID-19 Vaccine (3 - Booster for Alexandru Gaucher series) 07/23/2021     Advance Directives   What are advance directives? Advance directives are legal documents that state your wishes and plans for medical care  These plans are made ahead of time in case you lose your ability to make decisions for yourself  Advance directives can apply to any medical decision, such as the treatments you want, and if you want to donate organs  What are the types of advance directives?   There are many types of advance directives, and each state has rules about how to use them  You may choose a combination of any of the following:  · Living will: This is a written record of the treatment you want  You can also choose which treatments you do not want, which to limit, and which to stop at a certain time  This includes surgery, medicine, IV fluid, and tube feedings  · Durable power of  for healthcare [facility]): This is a written record that states who you want to make healthcare choices for you when you are unable to make them for yourself  This person, called a proxy, is usually a family member or a friend  You may choose more than 1 proxy  · Do not resuscitate (DNR) order:  A DNR order is used in case your heart stops beating or you stop breathing  It is a request not to have certain forms of treatment, such as CPR  A DNR order may be included in other types of advance directives  · Medical directive: This covers the care that you want if you are in a coma, near death, or unable to make decisions for yourself  You can list the treatments you want for each condition  Treatment may include pain medicine, surgery, blood transfusions, dialysis, IV or tube feedings, and a ventilator (breathing machine)  · Values history: This document has questions about your views, beliefs, and how you feel and think about life  This information can help others choose the care that you would choose  Why are advance directives important? An advance directive helps you control your care  Although spoken wishes may be used, it is better to have your wishes written down  Spoken wishes can be misunderstood, or not followed  Treatments may be given even if you do not want them  An advance directive may make it easier for your family to make difficult choices about your care     Weight Management   Why it is important to manage your weight:  Being overweight increases your risk of health conditions such as heart disease, high blood pressure, type 2 diabetes, and certain types of cancer  It can also increase your risk for osteoarthritis, sleep apnea, and other respiratory problems  Aim for a slow, steady weight loss  Even a small amount of weight loss can lower your risk of health problems  How to lose weight safely:  A safe and healthy way to lose weight is to eat fewer calories and get regular exercise  You can lose up about 1 pound a week by decreasing the number of calories you eat by 500 calories each day  Healthy meal plan for weight management:  A healthy meal plan includes a variety of foods, contains fewer calories, and helps you stay healthy  A healthy meal plan includes the following:  · Eat whole-grain foods more often  A healthy meal plan should contain fiber  Fiber is the part of grains, fruits, and vegetables that is not broken down by your body  Whole-grain foods are healthy and provide extra fiber in your diet  Some examples of whole-grain foods are whole-wheat breads and pastas, oatmeal, brown rice, and bulgur  · Eat a variety of vegetables every day  Include dark, leafy greens such as spinach, kale, nahum greens, and mustard greens  Eat yellow and orange vegetables such as carrots, sweet potatoes, and winter squash  · Eat a variety of fruits every day  Choose fresh or canned fruit (canned in its own juice or light syrup) instead of juice  Fruit juice has very little or no fiber  · Eat low-fat dairy foods  Drink fat-free (skim) milk or 1% milk  Eat fat-free yogurt and low-fat cottage cheese  Try low-fat cheeses such as mozzarella and other reduced-fat cheeses  · Choose meat and other protein foods that are low in fat  Choose beans or other legumes such as split peas or lentils  Choose fish, skinless poultry (chicken or turkey), or lean cuts of red meat (beef or pork)  Before you cook meat or poultry, cut off any visible fat  · Use less fat and oil  Try baking foods instead of frying them   Add less fat, such as margarine, sour cream, regular salad dressing and mayonnaise to foods  Eat fewer high-fat foods  Some examples of high-fat foods include french fries, doughnuts, ice cream, and cakes  · Eat fewer sweets  Limit foods and drinks that are high in sugar  This includes candy, cookies, regular soda, and sweetened drinks  Exercise:  Exercise at least 30 minutes per day on most days of the week  Some examples of exercise include walking, biking, dancing, and swimming  You can also fit in more physical activity by taking the stairs instead of the elevator or parking farther away from stores  Ask your healthcare provider about the best exercise plan for you  © Copyright [employer] 2018 Information is for End User's use only and may not be sold, redistributed or otherwise used for commercial purposes   All illustrations and images included in CareNotes® are the copyrighted property of [employer]  or [address] [place] [address]

## 2022-11-09 NOTE — PROGRESS NOTES
Assessment and Plan:     Problem List Items Addressed This Visit        Endocrine    Type 2 diabetes mellitus without complication, without long-term current use of insulin (HCC)    Relevant Medications    atorvastatin (LIPITOR) 10 mg tablet    metFORMIN (GLUCOPHAGE) 500 mg tablet    Postoperative hypothyroidism    Relevant Medications    metoprolol tartrate (LOPRESSOR) 25 mg tablet    levothyroxine 75 mcg tablet       Cardiovascular and Mediastinum    Essential hypertension     Blood pressure uncontrolled patient has been monitor blood pressure at home I review with her the log number and the running between 76789 as systolic and  as diastolic the patient been taking her medication daily including a Avalide 300-12 5 mg once a day and she is taking metoprolol tartrate 25 mg once a day the recommend to add the amlodipine 5 mg once a day low-salt diet continue monitor blood pressure a InCase symptomatic go to the emergency room         Relevant Medications    metoprolol tartrate (LOPRESSOR) 25 mg tablet    irbesartan-hydrochlorothiazide (AVALIDE) 300-12 5 MG per tablet    NIFEdipine (PROCARDIA XL) 30 mg 24 hr tablet       Nervous and Auditory    Osteoarthritis of spine with radiculopathy, cervical region     Acute on chronic symptomatic patient had the recently x-ray of the cervical spine it show discogenic disease in the lower of the C-spine patient already on gabapentin 300 mg at nighttime will add gabapentin 100 mg in the morning proper use and possible side effect discussed the patient if there is no improvement to refer the patient to pain management         Relevant Medications    gabapentin (NEURONTIN) 300 mg capsule    Osteoarthritis of spine with radiculopathy, lumbosacral region     symptomatic with low back pain recent x-ray of the lumbar spine result discussed with the patient  Patient on anticoagulation recommend to avoid nonsteroidal anti-inflammatory drugs she is already on gabapentin 300 mg at nighttime recommend to add gabapentin 100 in AM possible side effects discussed with patient         Relevant Medications    gabapentin (NEURONTIN) 300 mg capsule       Musculoskeletal and Integument    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     acute on chronic symptomatic patient has been wearing the splint and the is not helping with the pain patient on anticoagulation can not take nonsteroidal anti-inflammatory drugs recommend Tylenol for the pain refer the patient to see a hand surgeon            Other    Vertigo    Relevant Medications    meclizine (ANTIVERT) 25 mg tablet    Medicare annual wellness visit, subsequent - Primary     Advice and education were given regarding nutrition, aerobic exercises, weight bearing exercises, cardiovascular risk reduction, fall risk reduction, and age appropriate supplements  The patient was counseled regarding instructions for management, risk factor reductions, prognosis, risks and benefits of treatment options, patient and family education, and importance of compliance with treatment  Hyperuricemia    Relevant Medications    allopurinol (ZYLOPRIM) 100 mg tablet    COVID-19 virus infection    Relevant Medications    cholecalciferol (VITAMIN D3) 1,000 units tablet    Low vitamin B12 level    Relevant Medications    vitamin B-12 (VITAMIN B-12) 1,000 mcg tablet    Right wrist pain    Relevant Orders    Ambulatory Referral to Hand Surgery    Financial difficulties    Relevant Orders    Ambulatory referral to social work care management program      Other Visit Diagnoses     Pure hypercholesterolemia        Relevant Medications    atorvastatin (LIPITOR) 10 mg tablet           Preventive health issues were discussed with patient, and age appropriate screening tests were ordered as noted in patient's After Visit Summary    Personalized health advice and appropriate referrals for health education or preventive services given if needed, as noted in patient's After Visit Summary  History of Present Illness:     Patient presents for a Medicare Wellness Visit    Patient here for Medicare exam and the a complained about neck pain back pain and her wrist pain worse in the right side and this pain has been chronic achy 7/10 and sometimes can reach 10/10 no fall no trauma workup including x-ray review with the patient and her daughter also by looking at the vital sign blood pressure uncontrolled I repeated in the office continued to be elevated also review her log number for the blood pressure has been running high at home patient compliant with the medication she deny any chest been short of breath no palpitation no dyspnea on exertion no lower extremity edema     Patient Care Team:  Josef Pastrana MD as PCP - General  Sanam Sterling MD (Radiation Oncology)  Ghada Rice MD (Cardiology)  Karis Abrams MD (General Surgery)  Tiffanie Golden PA-C (Neurology)     Review of Systems:     Review of Systems   Constitutional: Negative for chills and fever  HENT: Negative for ear pain and sore throat  Eyes: Negative for pain and visual disturbance  Respiratory: Negative for cough and shortness of breath  Cardiovascular: Negative for chest pain and palpitations  Gastrointestinal: Negative for abdominal pain and vomiting  Genitourinary: Negative for dysuria and hematuria  Musculoskeletal: Positive for arthralgias and back pain  Skin: Negative for color change and rash  Neurological: Negative for seizures and syncope  All other systems reviewed and are negative         Problem List:     Patient Active Problem List   Diagnosis   • Paroxysmal atrial fibrillation (HCC)   • Chronic coronary artery disease   • Cervical spinal stenosis   • Closed fracture of maxilla with routine healing   • Osteoarthritis of spine with radiculopathy, cervical region   • Degeneration of intervertebral disc   • Dizziness and giddiness   • Headache   • Fracture of multiple ribs   • Ductal carcinoma in situ of right breast   • Long term current use of anticoagulant therapy   • Latent tuberculosis   • Low back pain   • Noncompliance with treatment   • Primary osteoarthritis of right hand   • Osteoarthritis of knee   • Type 2 diabetes mellitus without complication, without long-term current use of insulin (HCC)   • Thyroid nodule   • Vertigo   • Vitamin D deficiency   • Essential hypertension   • Class 1 obesity due to excess calories with serious comorbidity and body mass index (BMI) of 31 0 to 31 9 in adult   • Mixed hyperlipidemia   • Neuralgia   • Bunion of unspecified foot   • Malignant neoplasm of central portion of right breast in female, estrogen receptor positive (HCC)   • Pain of left lower extremity   • 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   • Postoperative hypothyroidism   • Paresthesias in right hand   • Chronic daily headache   • Carpal tunnel syndrome of left wrist   • Supraorbital neuralgia   • Medicare annual wellness visit, subsequent   • Osteoarthritis of both wrists   • Osteopenia   • Polyarthralgia   • Hyperuricemia   • Carpal tunnel syndrome of right wrist   • 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   • Allergic reaction   • Type 2 diabetes, controlled, with neuropathy ([facility] )   • 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 ([facility] )   • Stage 3a chronic kidney disease ([facility] )   • COVID-19 virus infection   • SOB (shortness of breath)   • Low vitamin B12 level   • Neck pain   • Acute non intractable tension-type headache   • Right wrist pain   • Financial difficulties   • Osteoarthritis of spine with radiculopathy, lumbosacral region      Past Medical and Surgical History:     Past Medical History:   Diagnosis Date   • Allergic rhinitis    • Arthritis    • Atrial fibrillation (HCC)    • Breast cancer ([facility] )    • Bug bite 5/21/2019   • Cataracts, bilateral    • Chronic headaches pulsatile daily headaches   • Chronic post-traumatic headache, not intractable 9/8/2020   • Colitis    • Coronary artery disease    • Diabetes mellitus ([facility] )    • Disease of thyroid gland    • DJD (degenerative joint disease), cervical    • Encounter for well adult exam with abnormal findings 10/14/2019   • Facial neuralgia     left frontal facial post traumatic neuralgia   • Fibromyalgia    • Glaucoma    • History of external beam radiation therapy     8/16/18 to 9/14/2018 Right breast   • Hypertension    • Hypokalemia    • Hypovitaminosis D    • Lupus ([facility] ) 7/19/2018   • Obesity    • Osteoarthritis    • Pre-op examination 2/25/2019   • Pre-operative clearance 8/21/2018   • Prediabetes    • Rib pain on right side 2/25/2019   • SDH (subdural hematoma) 1/31/2017   • Sleep apnea     no cpap   • Stage 3a chronic kidney disease ([facility] ) 5/10/2022   • Syncope and collapse 12/1/2018   • Vertigo      Past Surgical History:   Procedure Laterality Date   • BREAST BIOPSY     • BREAST LUMPECTOMY     • CHOLECYSTECTOMY     • COLONOSCOPY  2013   • HYSTERECTOMY     • MAMMO NEEDLE LOCALIZATION RIGHT (ALL INC) Right 6/21/2018   • MAMMO STEREOTACTIC BREAST BIOPSY RIGHT (ALL INC) Right 5/9/2018   • THYROIDECTOMY N/A 11/21/2019    Procedure: THYROIDECTOMY, total;  Surgeon: Miguelito Rico MD;  Location: [facility];  Service: Surgical Oncology   • TONSILLECTOMY     • US GUIDED BREAST BIOPSY RIGHT COMPLETE Right 5/9/2018   • US GUIDED THYROID BIOPSY  6/12/2019   • US GUIDED THYROID BIOPSY  9/13/2019      Family History:     Family History   Problem Relation Age of Onset   • Heart attack Sister    • Hypertension Family    • Diabetes Family    • Stroke Family    • Migraines Family    • Breast cancer Daughter 28      Social History:     Social History     Socioeconomic History   • Marital status: Single     Spouse name: None   • Number of children: None   • Years of education: None   • Highest education level: None   Occupational History   • None   Tobacco Use   • Smoking status: Never Smoker   • Smokeless tobacco: Never Used   • Tobacco comment: no smoke exposure   Vaping Use   • Vaping Use: Never used   Substance and Sexual Activity   • Alcohol use: Yes   • Drug use: No   • Sexual activity: Not Currently     Partners: Male   Other Topics Concern   • None   Social History Narrative   • None     Social Determinants of Health     Financial Resource Strain: High Risk   • Difficulty of Paying Living Expenses: Very hard   Food Insecurity: Food Insecurity Present   • Worried About [employer] in the Last Year: Often true   • Ran Out of Food in the Last Year: Sometimes true   Transportation Needs: No Transportation Needs   • Lack of Transportation (Medical): No   • Lack of Transportation (Non-Medical): No   Physical Activity: Inactive   • Days of Exercise per Week: 0 days   • Minutes of Exercise per Session: 0 min   Stress: Stress Concern Present   • Feeling of Stress : Very much   Social Connections: Moderately Integrated   • Frequency of Communication with Friends and Family: More than three times a week   • Frequency of Social Gatherings with Friends and Family: More than three times a week   • Attends An[employer] Services: More than 4 times per year   • Active Member of Clubs or Organizations:  Yes   • Attends Club or Organization Meetings: Never   • Marital Status: Never    Intimate Partner Violence: Not At Risk   • Fear of Current or Ex-Partner: No   • Emotionally Abused: No   • Physically Abused: No   • Sexually Abused: No   Housing Stability: Unknown   • Unable to Pay for Housing in the Last Year: No   • Number of Places Lived in the Last Year: Not on file   • Unstable Housing in the Last Year: No      Medications and Allergies:     Current Outpatient Medications   Medication Sig Dispense Refill   • allopurinol (ZYLOPRIM) 100 mg tablet Take 1 tablet (100 mg total) by mouth daily 90 tablet 0   • apixaban (Eliquis) 5 mg Take 1 tablet (5 mg total) by mouth 2 (two) times a day Patient must schedule appointment for farther refills  180 tablet 3   • atorvastatin (LIPITOR) 10 mg tablet Take 1 tablet (10 mg total) by mouth daily 100 tablet 0   • cholecalciferol (VITAMIN D3) 1,000 units tablet Take 1 tablet (1,000 Units total) by mouth daily 30 tablet 0   • Diclofenac Sodium (VOLTAREN) 1 % Apply a small amount on the hand / wrist p r n  pain  No more than twice per day  100 g 0   • gabapentin (NEURONTIN) 300 mg capsule Take 1 capsule (300 mg total) by mouth 2 (two) times a day 180 capsule 0   • guaiFENesin (MUCINEX) 600 mg 12 hr tablet Take 2 tablets (1,200 mg total) by mouth every 12 (twelve) hours 20 tablet 0   • hydrocortisone 2 5 % cream Apply topically 2 (two) times a day 30 g 0   • irbesartan-hydrochlorothiazide (AVALIDE) 300-12 5 MG per tablet Take 1 tablet by mouth daily 100 tablet 0   • levothyroxine 75 mcg tablet Take 1 tablet (75 mcg total) by mouth daily 90 tablet 0   • meclizine (ANTIVERT) 25 mg tablet Take 1 tablet (25 mg total) by mouth daily at bedtime 30 tablet 1   • metFORMIN (GLUCOPHAGE) 500 mg tablet Take 1 tablet (500 mg total) by mouth 2 (two) times a day with meals 180 tablet 0   • metoprolol tartrate (LOPRESSOR) 25 mg tablet Take 1 tablet (25 mg total) by mouth every 12 (twelve) hours 180 tablet 0   • NIFEdipine (PROCARDIA XL) 30 mg 24 hr tablet Take 1 tablet (30 mg total) by mouth daily 30 tablet 1   • potassium chloride (K-DUR,KLOR-CON) 20 mEq tablet Take 20 mEq by mouth 2 (two) times a day     • TiZANidine (ZANAFLEX) 2 MG capsule Take 1 capsule (2 mg total) by mouth 2 (two) times a day 20 capsule 0   • vitamin B-12 (VITAMIN B-12) 1,000 mcg tablet Take 1 tablet (1,000 mcg total) by mouth daily 30 tablet 3     No current facility-administered medications for this visit       Allergies   Allergen Reactions   • No Known Allergies       Immunizations:     Immunization History   Administered Date(s) Administered   • COVID-19 MODERNA VACC 0 5 ML IM 01/28/2021, 02/23/2021   • INFLUENZA 11/07/2015, 01/27/2017, 10/09/2017, 10/14/2022   • Influenza Split 10/25/2013   • Influenza Split High Dose Preservative Free IM 01/27/2017, 10/09/2017   • Influenza, high dose seasonal 0 7 mL 10/11/2018, 10/14/2019, 09/16/2020, 09/27/2021, 10/14/2022   • Influenza, seasonal, injectable 12/01/2014   • Pneumococcal Conjugate 13-Valent 01/27/2017   • Pneumococcal Polysaccharide PPV23 08/14/2017   • Tdap 02/25/2019   • Zoster 09/12/2017, 09/13/2017      Health Maintenance:         Topic Date Due   • DXA SCAN  06/02/2023   • Breast Cancer Screening: Mammogram  09/20/2023   • Hepatitis C Screening  Completed         Topic Date Due   • COVID-19 Vaccine (3 - Booster for [place] series) 07/23/2021      Medicare Screening Tests and Risk Assessments:     Jacque Lutz is here for her Subsequent Wellness visit  Last Medicare Wellness visit information reviewed, patient interviewed and updates made to the record today  Health Risk Assessment:   Patient rates overall health as fair  Patient feels that their physical health rating is slightly worse  Patient is satisfied with their life  Eyesight was rated as same  Hearing was rated as slightly worse  Patient feels that their emotional and mental health rating is same  Patients states they are never, rarely angry  Patient states they are often unusually tired/fatigued  Pain experienced in the last 7 days has been a lot  Patient's pain rating has been 7/10  Patient states that she has experienced no weight loss or gain in last 6 months  Neck ,low back pain and wrist pain    Depression Screening:   PHQ-2 Score: 0      Fall Risk Screening: In the past year, patient has experienced: no history of falling in past year      Urinary Incontinence Screening:   Patient has not leaked urine accidently in the last six months  Home Safety:  Patient has trouble with stairs inside or outside of their home   Patient has working smoke alarms and has working carbon monoxide detector  Home safety hazards include: none  Nutrition:   Current diet is Regular  Medications:   Patient is currently taking over-the-counter supplements  OTC medications include: see medication list  Patient is not able to manage medications  Activities of Daily Living (ADLs)/Instrumental Activities of Daily Living (IADLs):   Walk and transfer into and out of bed and chair?: Yes  Dress and groom yourself?: Yes    Bathe or shower yourself?: Yes    Feed yourself? Yes  Do your laundry/housekeeping?: No  Manage your money, pay your bills and track your expenses?: No  Make your own meals?: No    Do your own shopping?: No    Previous Hospitalizations:   Any hospitalizations or ED visits within the last 12 months?: No      Advance Care Planning:   Living will: Yes    Durable POA for healthcare: Yes    Advanced directive: Yes      Cognitive Screening:   Provider or family/friend/caregiver concerned regarding cognition?: No    PREVENTIVE SCREENINGS      Cardiovascular Screening:    General: Screening Not Indicated and History Lipid Disorder      Diabetes Screening:     General: Screening Not Indicated and History Diabetes      Colorectal Cancer Screening:     General: Screening Current      Breast Cancer Screening:     General: History Breast Cancer      Cervical Cancer Screening:    General: Screening Not Indicated      Osteoporosis Screening:    General: Screening Current      Abdominal Aortic Aneurysm (AAA) Screening:        General: Patient Declines      Lung Cancer Screening:     General: Screening Not Indicated      Hepatitis C Screening:    General: Screening Current    Screening, Brief Intervention, and Referral to Treatment (SBIRT)    Screening  Typical number of drinks in a day: 0  Typical number of drinks in a week: 0  Interpretation: Low risk drinking behavior      AUDIT-C Screenin) How often did you have a drink containing alcohol in the past year? never  2) How many drinks did you have on a typical day when you were drinking in the past year? 0  3) How often did you have 6 or more drinks on one occasion in the past year? never    AUDIT-C Score: 0  Interpretation: Score 0-2 (female): Negative screen for alcohol misuse    Single Item Drug Screening:  How often have you used an illegal drug (including marijuana) or a prescription medication for non-medical reasons in the past year? never    Single Item Drug Screen Score: 0  Interpretation: Negative screen for possible drug use disorder    Brief Intervention  Alcohol & drug use screenings were reviewed  No concerns regarding substance use disorder identified  No exam data present     Physical Exam:     /80 (BP Location: Left arm, Patient Position: Sitting, Cuff Size: Large)   Pulse (!) 54   Temp 98 1 °F (36 7 °C) (Tympanic)   Resp 16   Ht 5' 1" (1 549 m)   Wt 79 4 kg (175 lb)   SpO2 98%   BMI 33 07 kg/m²     Physical Exam  Vitals and nursing note reviewed  Constitutional:       General: She is not in acute distress  Appearance: She is well-developed and normal weight  She is not toxic-appearing or diaphoretic  HENT:      Head: Normocephalic and atraumatic  Right Ear: Tympanic membrane, ear canal and external ear normal  There is no impacted cerumen  Left Ear: Tympanic membrane, ear canal and external ear normal  There is no impacted cerumen  Nose: Nose normal  No congestion or rhinorrhea  Mouth/Throat:      Mouth: Mucous membranes are moist       Pharynx: Oropharynx is clear  No oropharyngeal exudate or posterior oropharyngeal erythema  Eyes:      General: No scleral icterus  Right eye: No discharge  Left eye: No discharge  Conjunctiva/sclera: Conjunctivae normal       Pupils: Pupils are equal, round, and reactive to light  Neck:      Thyroid: No thyromegaly  Cardiovascular:      Rate and Rhythm: Normal rate  Rhythm irregular  Pulses: Normal pulses  Heart sounds: Murmur heard  No friction rub  Pulmonary:      Effort: Pulmonary effort is normal  No respiratory distress  Breath sounds: Normal breath sounds  No wheezing or rales  Chest:      Chest wall: No tenderness  Abdominal:      General: There is no distension  Palpations: Abdomen is soft  There is no mass  Tenderness: There is no abdominal tenderness  Hernia: No hernia is present  There is no hernia in the left inguinal area  Genitourinary:     Labia:         Right: No rash  Left: No rash  Vagina: No foreign body  No vaginal discharge, tenderness or bleeding  Cervix: No cervical motion tenderness  Adnexa:         Right: No mass or tenderness  Left: No mass or tenderness  Musculoskeletal:         General: Normal range of motion  Right wrist: Tenderness present  Left wrist: Tenderness present  Cervical back: Normal range of motion  Tenderness present  No signs of trauma or rigidity  Lumbar back: Tenderness present  No signs of trauma  Lymphadenopathy:      Cervical: No cervical adenopathy  Skin:     General: Skin is warm  Findings: No rash  Neurological:      Mental Status: She is alert and oriented to person, place, and time  Motor: No abnormal muscle tone     Psychiatric:         Mood and Affect: Mood normal          Behavior: Behavior normal           Herlinda Harrison MD

## 2022-11-09 NOTE — TELEPHONE ENCOUNTER
Patient is being referred to a hand specialist  Please schedule accordingly      [address]   [phone]

## 2022-11-10 ENCOUNTER — TELEPHONE (OUTPATIENT)
Dept: FAMILY MEDICINE CLINIC | Facility: CLINIC | Age: 82
End: 2022-11-10

## 2022-11-10 PROBLEM — M47.27 OSTEOARTHRITIS OF SPINE WITH RADICULOPATHY, LUMBOSACRAL REGION: Status: ACTIVE | Noted: 2022-11-10

## 2022-11-10 PROBLEM — M47.27 OSTEOARTHRITIS OF SPINE WITH RADICULOPATHY, LUMBOSACRAL REGION: Status: ACTIVE | Noted: 2022-11-09

## 2022-11-10 PROBLEM — Z59.9 FINANCIAL DIFFICULTIES: Status: ACTIVE | Noted: 2022-11-10

## 2022-11-10 NOTE — ASSESSMENT & PLAN NOTE
Blood pressure uncontrolled patient has been monitor blood pressure at home I review with her the log number and the running between 89234 as systolic and  as diastolic the patient been taking her medication daily including a Avalide 300-12 5 mg once a day and she is taking metoprolol tartrate 25 mg once a day the recommend to add the amlodipine 5 mg once a day low-salt diet continue monitor blood pressure a InCase symptomatic go to the emergency room

## 2022-11-10 NOTE — ASSESSMENT & PLAN NOTE
acute on chronic symptomatic patient has been wearing the splint and the is not helping with the pain patient on anticoagulation can not take nonsteroidal anti-inflammatory drugs recommend Tylenol for the pain refer the patient to see a hand surgeon

## 2022-11-10 NOTE — ASSESSMENT & PLAN NOTE
Acute on chronic symptomatic patient had the recently x-ray of the cervical spine it show discogenic disease in the lower of the C-spine patient already on gabapentin 300 mg at nighttime will add gabapentin 100 mg in the morning proper use and possible side effect discussed the patient if there is no improvement to refer the patient to pain management

## 2022-11-21 ENCOUNTER — CLINICAL SUPPORT (OUTPATIENT)
Dept: RADIATION ONCOLOGY | Facility: CLINIC | Age: 82
End: 2022-11-21

## 2022-11-21 ENCOUNTER — RADIATION ONCOLOGY FOLLOW-UP (OUTPATIENT)
Dept: RADIATION ONCOLOGY | Facility: CLINIC | Age: 82
End: 2022-11-21
Attending: RADIOLOGY

## 2022-11-21 VITALS
SYSTOLIC BLOOD PRESSURE: 128 MMHG | WEIGHT: 172.4 LBS | BODY MASS INDEX: 32.57 KG/M2 | DIASTOLIC BLOOD PRESSURE: 83 MMHG | RESPIRATION RATE: 16 BRPM | TEMPERATURE: 98.2 F | OXYGEN SATURATION: 98 % | HEART RATE: 78 BPM

## 2022-11-21 DIAGNOSIS — D05.11 DUCTAL CARCINOMA IN SITU OF RIGHT BREAST: Primary | ICD-10-CM

## 2022-11-21 NOTE — PROGRESS NOTES
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, status post breast conservation surgery  She is stage 0 (TIS Nx)  Her tumor is intermediate to high grade with negative margins  ER/KS positive  She completed a course of radiation therapy to the right breast on 18  The pt was last seen in radiation on 21  She returns today follow up      22 Surgical Oncology Note:   PETE, f/u 6 months  C/O rt breast pain  22 Mammo:   IMPRESSION:    Stable posttreatment changes in the right breast  No mammographic evidence of malignancy in either breast     Routine follow-up mammogram in 1 year is recommended  BI-RADS Category 2:    Benign     Up Comin22 Surgical Oncology       Follow up visit     Oncology History   Ductal carcinoma in situ of right breast   2018 Initial Diagnosis    Intraductal carcinoma in situ of right breast     2018 Biopsy    Right breast stereotactic biopsy x2 sites:   Ductal carcinoma in-situ (DCIS), features suggestive of involvement of underlying intraductal papilloma  Nuclear Grade: II-III (intermediate to high)  % positive  KS 70-75% positive       2018 Surgery    Right-sided needle localized partial mastectomy with intraoperative ultrasound guidance:  DCIS, grade 2-3/3, calcifications present  Margins - free of malignancy, closest margin is inferior with 0 6 mm of clearance  Posterior margin with 2 1 mm of clearance    Stage 0, pTis (DCIS),pN0,pMX  - Dr Nika Spears     7/10/2018 Surgery    Re-excision of right breast inferior margin tissue:  - Foci of ductal epithelial hyperplasia are seen ranging from usual ductal hyperplasia to atypical ductal hyperplasia to a few foci having features which are felt to be compatible with ductal carcinoma in situ, primarily solid type, nuclear grade 1-2/3   - None of the foci of atypia or probable ductal carcinoma in situ are seen at or within 2 mm the outer margins of excision, though it is noted that such foci are seen in 3 of 5 slides  - One intraductal papilloma is identified   - Fat necrosis is seen along the inner surface of this reexcision  8/16/2018 -  Hormone Therapy    None recommended by Dr Stovall Son     8/16/2018 - 9/14/2018 Radiation    hypo fractionated course directed to the right breast to a total dose of 4256 centigray with an additional 1000 centigray to the lumpectomy cavity      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   11/21/2019 Surgery    Total Thyroidectomy:  - 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 (NIFTP), 0 3 mm, in superior to mid-right lobe                 Review of Systems:  Review of Systems   Constitutional: Negative  Negative for appetite change, fatigue and unexpected weight change  HENT: Negative  Negative for dental problem  Eyes: Negative  Wears glasses   Respiratory: Negative for cough and shortness of breath  Had Covid in May, recommended getting boosters, got Flu shot  Cardiovascular: Negative  Gastrointestinal: Negative  Negative for constipation and diarrhea  Endocrine: Negative  Genitourinary: Negative  Negative for difficulty urinating  Musculoskeletal: Positive for arthralgias (right hand, wears wrist splint as needed), back pain (hurts all the time, worsens when getting up, unchanged) and gait problem (sometimes loses balance)  Left wrist short brace d/t arthritis and carpal tunnel     Skin: Negative  Allergic/Immunologic: Negative  Neurological: Positive for numbness (hands, intermittent) and headaches  Negative for dizziness, weakness and light-headedness  Hx carpal tunnel rt  wrist with intermittent numbness and tingling, wears wrist splint as needed   Hematological: Negative      Psychiatric/Behavioral: Positive for sleep disturbance (bad sleep habits)         Clinical Trial: no     Teaching completed    Covid Vaccine Status vaccinated x 2     Health Maintenance   Topic Date Due   • Hepatitis B Vaccine (1 of 3 - 3-dose series) Never done   • COVID-19 Vaccine (3 - Booster for Moderna series) 07/23/2021   • HEMOGLOBIN A1C  11/05/2022   • Diabetic Foot Exam  02/08/2023   • BMI: Followup Plan  05/10/2023   • DXA SCAN  06/02/2023   • Breast Cancer Screening: Mammogram  09/20/2023   • Fall Risk  11/09/2023   • Urinary Incontinence Screening  11/09/2023   • Medicare Annual Wellness Visit (AWV)  11/09/2023   • Depression Screening  11/21/2023   • BMI: Adult  11/21/2023   • DM Eye Exam  11/01/2024   • Hepatitis C Screening  Completed   • Osteoporosis Screening  Completed   • Pneumococcal Vaccine: 65+ Years  Completed   • Influenza Vaccine  Completed   • HIB Vaccine  Aged Out   • IPV Vaccine  Aged Out   • Hepatitis A Vaccine  Aged Out   • Meningococcal ACWY Vaccine  Aged Out   • HPV Vaccine  Aged Out     Patient Active Problem List   Diagnosis   • Paroxysmal atrial fibrillation ([facility] [address] )   • Chronic coronary artery disease   • Cervical spinal stenosis   • Closed fracture of maxilla with routine healing   • Osteoarthritis of spine with radiculopathy, cervical region   • Degeneration of intervertebral disc   • Dizziness and giddiness   • Headache   • Fracture of multiple ribs   • Ductal carcinoma in situ of right breast   • Long term current use of anticoagulant therapy   • Latent tuberculosis   • Low back pain   • Noncompliance with treatment   • Primary osteoarthritis of right hand   • Osteoarthritis of knee   • Type 2 diabetes mellitus without complication, without long-term current use of insulin (HCC)   • Thyroid nodule   • Vertigo   • Vitamin D deficiency   • Essential hypertension   • Class 1 obesity due to excess calories with serious comorbidity and body mass index (BMI) of 31 0 to 31 9 in adult   • Mixed hyperlipidemia   • Neuralgia   • Bunion of unspecified foot   • Malignant neoplasm of central portion of right breast in female, estrogen receptor positive ([facility] [address] )   • Pain of left lower extremity   • 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   • Postoperative hypothyroidism   • Paresthesias in right hand   • Chronic daily headache   • Carpal tunnel syndrome of left wrist   • Supraorbital neuralgia   • Medicare annual wellness visit, subsequent   • Osteoarthritis of both wrists   • Osteopenia   • Polyarthralgia   • Hyperuricemia   • Carpal tunnel syndrome of right wrist   • 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   • Allergic reaction   • Type 2 diabetes, controlled, with neuropathy (HCC)   • 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 (HCC)   • Stage 3a chronic kidney disease (HCC)   • COVID-19 virus infection   • SOB (shortness of breath)   • Low vitamin B12 level   • Neck pain   • Acute non intractable tension-type headache   • Right wrist pain   • Financial difficulties   • Osteoarthritis of spine with radiculopathy, lumbosacral region     Past Medical History:   Diagnosis Date   • Allergic rhinitis    • Arthritis    • Atrial fibrillation (HCC)    • [facility] )    • Bug bite 5/21/2019   • Cataracts, bilateral    • Chronic headaches     pulsatile daily headaches   • Chronic post-traumatic headache, not intractable 9/8/2020   • Colitis    • Coronary artery disease    • Diabetes mellitus ([facility] [address] )    • Disease of thyroid gland    • DJD (degenerative joint disease), cervical    • Encounter for well adult exam with abnormal findings 10/14/2019   • Facial neuralgia     left frontal facial post traumatic neuralgia   • Fibromyalgia    • Glaucoma    • History of external beam radiation therapy     8/16/18 to 9/14/2018 Right breast   • Hypertension    • Hypokalemia    • Hypovitaminosis D    • Lupus ([facility] [address] ) 7/19/2018   • Obesity    • Osteoarthritis    • Pre-op examination 2/25/2019   • Pre-operative clearance 8/21/2018   • Prediabetes    • Rib pain on right side 2/25/2019   • SDH (subdural hematoma) 1/31/2017   • Sleep apnea     no cpap   • Stage 3a chronic kidney disease ([facility] [address] ) 5/10/2022   • Syncope and collapse 12/1/2018   • Vertigo      Past Surgical History:   Procedure Laterality Date   • BREAST BIOPSY     • BREAST LUMPECTOMY     • CHOLECYSTECTOMY     • COLONOSCOPY  2013   • HYSTERECTOMY     • MAMMO NEEDLE LOCALIZATION RIGHT (ALL INC) Right 6/21/2018   • MAMMO STEREOTACTIC BREAST BIOPSY RIGHT (ALL INC) Right 5/9/2018   • THYROIDECTOMY N/A 11/21/2019    Procedure: THYROIDECTOMY, total;  Surgeon: Crystal hSin MD;  [facility]: [facility];  Service: Surgical Oncology   • TONSILLECTOMY     • US GUIDED BREAST BIOPSY RIGHT COMPLETE Right 5/9/2018   • US GUIDED THYROID BIOPSY  6/12/2019   • US GUIDED THYROID BIOPSY  9/13/2019     Family History   Problem Relation Age of Onset   • Heart attack Sister    • Hypertension Family    • Diabetes Family    • Stroke Family    • Migraines Family    • Breast cancer Daughter 28     Social History     Socioeconomic History   • Marital status: Single     Spouse name: Not on file   • Number of children: Not on file   • Years of education: Not on file   • Highest education level: Not on file   Occupational History   • Not on file   Tobacco Use   • Smoking status: Never   • Smokeless tobacco: Never   • Tobacco comments:     no smoke exposure   Vaping Use   • Vaping Use: Never used   Substance and Sexual Activity   • Alcohol use:  Yes   • Drug use: No   • Sexual activity: Not Currently     Partners: Male   Other Topics Concern   • Not on file   Social History Narrative   • Not on file     Social Determinants of Health     Financial Resource Strain: High Risk   • Difficulty of Paying Living Expenses: Very hard   Food Insecurity: Food Insecurity Present   • Worried About [address] in the Last Year: Often true   • Ran Out of Food in the Last Year: Sometimes true Transportation Needs: No Transportation Needs   • Lack of Transportation (Medical): No   • Lack of Transportation (Non-Medical): No   Physical Activity: Inactive   • Days of Exercise per Week: 0 days   • Minutes of Exercise per Session: 0 min   Stress: Stress Concern Present   • Feeling of Stress : Very much   Social Connections: Moderately Integrated   • Frequency of Communication with Friends and Family: More than three times a week   • Frequency of Social Gatherings with Friends and Family: More than three times a week   • Attends Orthodox Services: More than 4 times per year   • Active Member of Clubs or Organizations: Yes   • Attends Club or Organization Meetings: Never   • Marital Status: Never    Intimate Partner Violence: Not At Risk   • Fear of Current or Ex-Partner: No   • Emotionally Abused: No   • Physically Abused: No   • Sexually Abused: No   Housing Stability: Unknown   • Unable to Pay for Housing in the Last Year: No   • Number of Places Lived in the Last Year: Not on file   • Unstable Housing in the Last Year: No       Current Outpatient Medications:   •  allopurinol (ZYLOPRIM) 100 mg tablet, Take 1 tablet (100 mg total) by mouth daily, Disp: 90 tablet, Rfl: 0  •  apixaban (Eliquis) 5 mg, Take 1 tablet (5 mg total) by mouth 2 (two) times a day Patient must schedule appointment for farther refills  , Disp: 180 tablet, Rfl: 3  •  atorvastatin (LIPITOR) 10 mg tablet, Take 1 tablet (10 mg total) by mouth daily, Disp: 100 tablet, Rfl: 0  •  cholecalciferol (VITAMIN D3) 1,000 units tablet, Take 1 tablet (1,000 Units total) by mouth daily, Disp: 30 tablet, Rfl: 0  •  Diclofenac Sodium (VOLTAREN) 1 %, Apply a small amount on the hand / wrist p r n  pain    No more than twice per day , Disp: 100 g, Rfl: 0  •  gabapentin (NEURONTIN) 300 mg capsule, Take 1 capsule (300 mg total) by mouth 2 (two) times a day, Disp: 180 capsule, Rfl: 0  •  guaiFENesin (MUCINEX) 600 mg 12 hr tablet, Take 2 tablets (1,200 mg total) by mouth every 12 (twelve) hours, Disp: 20 tablet, Rfl: 0  •  hydrocortisone 2 5 % cream, Apply topically 2 (two) times a day, Disp: 30 g, Rfl: 0  •  irbesartan-hydrochlorothiazide (AVALIDE) 300-12 5 MG per tablet, Take 1 tablet by mouth daily, Disp: 100 tablet, Rfl: 0  •  levothyroxine 75 mcg tablet, Take 1 tablet (75 mcg total) by mouth daily, Disp: 90 tablet, Rfl: 0  •  meclizine (ANTIVERT) 25 mg tablet, Take 1 tablet (25 mg total) by mouth daily at bedtime, Disp: 30 tablet, Rfl: 1  •  metFORMIN (GLUCOPHAGE) 500 mg tablet, Take 1 tablet (500 mg total) by mouth 2 (two) times a day with meals, Disp: 180 tablet, Rfl: 0  •  metoprolol tartrate (LOPRESSOR) 25 mg tablet, Take 1 tablet (25 mg total) by mouth every 12 (twelve) hours, Disp: 180 tablet, Rfl: 0  •  NIFEdipine (PROCARDIA XL) 30 mg 24 hr tablet, Take 1 tablet (30 mg total) by mouth daily, Disp: 30 tablet, Rfl: 1  •  potassium chloride (K-DUR,KLOR-CON) 20 mEq tablet, Take 20 mEq by mouth 2 (two) times a day, Disp: , Rfl:   •  TiZANidine (ZANAFLEX) 2 MG capsule, Take 1 capsule (2 mg total) by mouth 2 (two) times a day, Disp: 20 capsule, Rfl: 0  •  vitamin B-12 (VITAMIN B-12) 1,000 mcg tablet, Take 1 tablet (1,000 mcg total) by mouth daily, Disp: 30 tablet, Rfl: 3  Allergies   Allergen Reactions   • No Known Allergies      Vitals:    11/21/22 0940   BP: 128/83   Pulse: 78   Resp: 16   Temp: 98 2 °F (36 8 °C)   SpO2: 98%   Weight: 78 2 kg (172 lb 6 4 oz)      Pain Score:   8

## 2022-11-21 NOTE — PROGRESS NOTES
Follow-up - Radiation Oncology   Jt Quintero 1940 [de-identified] y o  female [id number]      History of Present Illness   Cancer Staging   Ductal carcinoma in situ of right breast  Staging form: Breast, AJCC 8th Edition  - Clinical: Stage 0 (cTis (DCIS), cN0, cM0, ER: Positive, IN: Positive) - Signed by Ryan Mejia MD on 2018  Nuclear grade: G3  Laterality: Right          Interval History:  Jt Quintero 1940 is a [de-identified] y o  female Jt Quintero [de-identified]year old female with DCIS in the central portion of the right breast, status post breast conservation surgery  She is stage 0 (TIS Nx)  Her tumor is intermediate to high grade with negative margins   ER/IN positive   She completed a course of radiation therapy to the right breast on 18  The pt was last seen in radiation on 21  She returns today follow up       22 Surgical Oncology Note:   PETE, f/u 6 months   C/O rt breast pain       22 Mammo:   IMPRESSION:    Stable posttreatment changes in the right breast  No mammographic evidence of malignancy in either breast     Routine follow-up mammogram in 1 year is recommended     BI-RADS Category 2:    Benign      Up Comin22 Surgical Oncology            Historical Information       Past Medical History:   Diagnosis Date   • Allergic rhinitis    • Arthritis    • Atrial fibrillation ([address] )    • Breast cancer ([address] )    • Bug bite 2019   • Cataracts, bilateral    • Chronic headaches     pulsatile daily headaches   • Chronic post-traumatic headache, not intractable 2020   • Colitis    • Coronary artery disease    • Diabetes mellitus ([address] )    • Disease of thyroid gland    • DJD (degenerative joint disease), cervical    • Encounter for well adult exam with abnormal findings 10/14/2019   • Facial neuralgia     left frontal facial post traumatic neuralgia   • Fibromyalgia    • Glaucoma    • History of external beam radiation therapy     18 to 2018 Right breast   • Hypertension • Hypokalemia    • Hypovitaminosis D    • Lupus ([facility] [address] ) 7/19/2018   • Obesity    • Osteoarthritis    • Pre-op examination 2/25/2019   • Pre-operative clearance 8/21/2018   • Prediabetes    • Rib pain on right side 2/25/2019   • SDH (subdural hematoma) 1/31/2017   • Sleep apnea     no cpap   • Stage 3a chronic kidney disease ([facility] [address] ) 5/10/2022   • Syncope and collapse 12/1/2018   • Vertigo      Past Surgical History:   Procedure Laterality Date   • BREAST BIOPSY     • BREAST LUMPECTOMY     • CHOLECYSTECTOMY     • COLONOSCOPY  2013   • HYSTERECTOMY     • MAMMO NEEDLE LOCALIZATION RIGHT (ALL INC) Right 6/21/2018   • MAMMO STEREOTACTIC BREAST BIOPSY RIGHT (ALL INC) Right 5/9/2018   • THYROIDECTOMY N/A 11/21/2019    Procedure: THYROIDECTOMY, total;  Surgeon: Sania Jiang MD;  [facility];  Service: Surgical Oncology   • TONSILLECTOMY     • US GUIDED BREAST BIOPSY RIGHT COMPLETE Right 5/9/2018   • US GUIDED THYROID BIOPSY  6/12/2019   • [address] THYROID BIOPSY  9/13/2019       Social History   Social History     Substance and Sexual Activity   Alcohol Use Yes     Social History     Substance and Sexual Activity   Drug Use No     Social History     Tobacco Use   Smoking Status Never   Smokeless Tobacco Never   Tobacco Comments    no smoke exposure         Meds/Allergies     Current Outpatient Medications:   •  allopurinol (ZYLOPRIM) 100 mg tablet, Take 1 tablet (100 mg total) by mouth daily, Disp: 90 tablet, Rfl: 0  •  apixaban (Eliquis) 5 mg, Take 1 tablet (5 mg total) by mouth 2 (two) times a day Patient must schedule appointment for farther refills  , Disp: 180 tablet, Rfl: 3  •  atorvastatin (LIPITOR) 10 mg tablet, Take 1 tablet (10 mg total) by mouth daily, Disp: 100 tablet, Rfl: 0  •  cholecalciferol (VITAMIN D3) 1,000 units tablet, Take 1 tablet (1,000 Units total) by mouth daily, Disp: 30 tablet, Rfl: 0  •  Diclofenac Sodium (VOLTAREN) 1 %, Apply a small amount on the hand / wrist p r n  pain    No more than twice per day , Disp: 100 g, Rfl: 0  •  gabapentin (NEURONTIN) 300 mg capsule, Take 1 capsule (300 mg total) by mouth 2 (two) times a day, Disp: 180 capsule, Rfl: 0  •  guaiFENesin (MUCINEX) 600 mg 12 hr tablet, Take 2 tablets (1,200 mg total) by mouth every 12 (twelve) hours, Disp: 20 tablet, Rfl: 0  •  hydrocortisone 2 5 % cream, Apply topically 2 (two) times a day, Disp: 30 g, Rfl: 0  •  irbesartan-hydrochlorothiazide (AVALIDE) 300-12 5 MG per tablet, Take 1 tablet by mouth daily, Disp: 100 tablet, Rfl: 0  •  levothyroxine 75 mcg tablet, Take 1 tablet (75 mcg total) by mouth daily, Disp: 90 tablet, Rfl: 0  •  meclizine (ANTIVERT) 25 mg tablet, Take 1 tablet (25 mg total) by mouth daily at bedtime, Disp: 30 tablet, Rfl: 1  •  metFORMIN (GLUCOPHAGE) 500 mg tablet, Take 1 tablet (500 mg total) by mouth 2 (two) times a day with meals, Disp: 180 tablet, Rfl: 0  •  metoprolol tartrate (LOPRESSOR) 25 mg tablet, Take 1 tablet (25 mg total) by mouth every 12 (twelve) hours, Disp: 180 tablet, Rfl: 0  •  NIFEdipine (PROCARDIA XL) 30 mg 24 hr tablet, Take 1 tablet (30 mg total) by mouth daily, Disp: 30 tablet, Rfl: 1  •  potassium chloride (K-DUR,KLOR-CON) 20 mEq tablet, Take 20 mEq by mouth 2 (two) times a day, Disp: , Rfl:   •  TiZANidine (ZANAFLEX) 2 MG capsule, Take 1 capsule (2 mg total) by mouth 2 (two) times a day, Disp: 20 capsule, Rfl: 0  •  vitamin B-12 (VITAMIN B-12) 1,000 mcg tablet, Take 1 tablet (1,000 mcg total) by mouth daily, Disp: 30 tablet, Rfl: 3  Allergies   Allergen Reactions   • No Known Allergies          Review of Systems   Constitutional: Negative  Negative for appetite change, fatigue and unexpected weight change  HENT: Negative  Negative for dental problem  Eyes: Negative  Wears glasses   Respiratory: Negative for cough and shortness of breath  Had Covid in May, recommended getting boosters, got Flu shot  Cardiovascular: Negative  Gastrointestinal: Negative  Negative for constipation and diarrhea  Endocrine: Negative  Genitourinary: Negative  Negative for difficulty urinating  Musculoskeletal: Positive for arthralgias (right hand, wears wrist splint as needed), back pain (hurts all the time, worsens when getting up, unchanged) and gait problem (sometimes loses balance)  Left wrist short brace d/t arthritis and carpal tunnel     Skin: Negative  Allergic/Immunologic: Negative  Neurological: Positive for numbness (hands, intermittent) and headaches  Negative for dizziness, weakness and light-headedness  Hx carpal tunnel rt  wrist with intermittent numbness and tingling, wears wrist splint as needed   Hematological: Negative  Psychiatric/Behavioral: Positive for sleep disturbance (bad sleep habits      OBJECTIVE:   /83   Pulse 78   Temp 98 2 °F (36 8 °C)   Resp 16   Wt 78 2 kg (172 lb 6 4 oz)   SpO2 98%   BMI 32 57 kg/m²   Pain Assessment:  0  ECOG/Zubrod/WHO: 1 - Symptomatic but completely ambulatory    Physical Exam   There is no supraclavicular axillary adenopathy palpable  The skin in the radiated field is in good condition  No breast or arm edema  No suspicious lesions palpable in either breast her breathing is unlabored  Abdomen soft nontender  Ambulating independently  Conversing appropriately  RESULTS    Lab Results:   Recent Results (from the past 672 hour(s))    Diabetes Eye Exam    Collection Time: 11/01/22 12:00 AM   Result Value Ref Range    Right Eye Diabetic Retinopathy None     Left Eye Diabetic Retinopathy None        Imaging Studies:No results found  Assessment/Plan:  No orders of the defined types were placed in this encounter  Teddy Moore is a [de-identified]y o  year old female who is 4-1/2 years post adjuvant radiation therapy for DCIS of the right breast   She has no clinical evidence of recurrence  Mammogram from September returned stable    I have not made any further follow-up appointments for her but would be happy to see her in the future should the need arise  Boris Hess MD  11/21/2022,10:06 AM    Portions of the record may have been created with voice recognition software   Occasional wrong word or "sound a like" substitutions may have occurred due to the inherent limitations of voice recognition software   Read the chart carefully and recognize, using context, where substitutions have occurred

## 2022-11-29 ENCOUNTER — PATIENT OUTREACH (OUTPATIENT)
Dept: FAMILY MEDICINE CLINIC | Facility: CLINIC | Age: 82
End: 2022-11-29

## 2022-12-09 ENCOUNTER — PATIENT OUTREACH (OUTPATIENT)
Dept: FAMILY MEDICINE CLINIC | Facility: CLINIC | Age: 82
End: 2022-12-09

## 2022-12-09 ENCOUNTER — OFFICE VISIT (OUTPATIENT)
Dept: FAMILY MEDICINE CLINIC | Facility: CLINIC | Age: 82
End: 2022-12-09

## 2022-12-09 VITALS
RESPIRATION RATE: 16 BRPM | HEIGHT: 61 IN | DIASTOLIC BLOOD PRESSURE: 78 MMHG | HEART RATE: 53 BPM | BODY MASS INDEX: 32.66 KG/M2 | SYSTOLIC BLOOD PRESSURE: 126 MMHG | OXYGEN SATURATION: 97 % | TEMPERATURE: 98.5 F | WEIGHT: 173 LBS

## 2022-12-09 DIAGNOSIS — E89.0 POSTOPERATIVE HYPOTHYROIDISM: ICD-10-CM

## 2022-12-09 DIAGNOSIS — I10 ESSENTIAL HYPERTENSION: Primary | ICD-10-CM

## 2022-12-09 DIAGNOSIS — E11.9 TYPE 2 DIABETES MELLITUS WITHOUT COMPLICATION, WITHOUT LONG-TERM CURRENT USE OF INSULIN (HCC): ICD-10-CM

## 2022-12-09 DIAGNOSIS — E78.2 MIXED HYPERLIPIDEMIA: ICD-10-CM

## 2022-12-09 DIAGNOSIS — G44.89 OTHER HEADACHE SYNDROME: ICD-10-CM

## 2022-12-09 DIAGNOSIS — I48.0 PAROXYSMAL ATRIAL FIBRILLATION (HCC): ICD-10-CM

## 2022-12-09 LAB — SL AMB POCT HEMOGLOBIN AIC: 6.6 (ref ?–6.5)

## 2022-12-09 NOTE — ASSESSMENT & PLAN NOTE
Lab Results   Component Value Date    HGBA1C 6 6 (H) 05/05/2022   Chronic asymptomatic fair control continue current management patient on metformin 500 mg twice a day low-carb diet important lose weight discussed with patient

## 2022-12-09 NOTE — PROGRESS NOTES
IDALIA BABIN attempted to reach patient for SDOH referral from PCP  Unable to reach patient by phone  Unable to reach letter sent  IDALIA BABIN to remain available as needed

## 2022-12-09 NOTE — LETTER
[address] [place]  [address] [phone]    Re: Care Coordination   12/9/2022       Dear Nain Real,    I tried to reach you by phone and was unfortunately unable to reach you  It is important that you contact me as soon as you are able        Sincerely,         Aminata Carter, MSW, LCSW   Care Manager  [phone]

## 2022-12-11 PROBLEM — G44.89 OTHER HEADACHE SYNDROME: Status: ACTIVE | Noted: 2022-10-14

## 2022-12-11 NOTE — ASSESSMENT & PLAN NOTE
Chronic asymptomatic fair control continue current management including Avalide 300-12 5 mg once a day and nifedipine 30 mg a day and metoprolol tartrate 25 mg twice a day low-salt diet important lose weight discussed with the patient

## 2022-12-11 NOTE — PROGRESS NOTES
Name: Jethro Tinoco      : 1940      MRN: [id number]  Encounter Provider: Isabel Guy MD  Encounter Date: 2022   Encounter department: [place] [id number]  Essential hypertension  Assessment & Plan:  Chronic asymptomatic fair control continue current management including Avalide 300-12 5 mg once a day and nifedipine 30 mg a day and metoprolol tartrate 25 mg twice a day low-salt diet important lose weight discussed with the patient    Orders:  -     CBC and differential; Future  -     Comprehensive metabolic panel; Future  -     Lipid Panel with Direct LDL reflex; Future  -     TSH, 3rd generation with Free T4 reflex; Future  -     Microalbumin / creatinine urine ratio; Future    2  Type 2 diabetes mellitus without complication, without long-term current use of insulin (HCC)  Assessment & Plan:    Lab Results   Component Value Date    HGBA1C 6 6 (H) 2022   Chronic asymptomatic fair control continue current management patient on metformin 500 mg twice a day low-carb diet important lose weight discussed with patient    Orders:  -     POCT hemoglobin A1c  -     CBC and differential; Future  -     Comprehensive metabolic panel; Future  -     Lipid Panel with Direct LDL reflex; Future  -     TSH, 3rd generation with Free T4 reflex; Future  -     Microalbumin / creatinine urine ratio; Future    3  Other headache syndrome  Assessment & Plan:  Okay chronic symptomatic recommend Tylenol for the pain discussed well hydration and sleeping hygiene      4  Paroxysmal atrial fibrillation (HCC)  -     CBC and differential; Future  -     Comprehensive metabolic panel; Future  -     Lipid Panel with Direct LDL reflex; Future  -     TSH, 3rd generation with Free T4 reflex; Future  -     Microalbumin / creatinine urine ratio; Future    5  Postoperative hypothyroidism  -     CBC and differential; Future  -     Comprehensive metabolic panel;  Future  - Lipid Panel with Direct LDL reflex; Future  -     TSH, 3rd generation with Free T4 reflex; Future  -     Microalbumin / creatinine urine ratio; Future    6  Mixed hyperlipidemia  -     CBC and differential; Future  -     Comprehensive metabolic panel; Future  -     Lipid Panel with Direct LDL reflex; Future  -     TSH, 3rd generation with Free T4 reflex; Future  -     Microalbumin / creatinine urine ratio; Future           Subjective      Patient here for follow-up with a chronic condition concerned about the headache she described her headache as dull no nausea no vomiting no blurred vision no double vision no loss control of the urine or stool and no head trauma  Patient compliant with the medication potential without effect no new concerns      Review of Systems   Constitutional: Negative for chills and fever  HENT: Negative for ear pain and sore throat  Eyes: Negative for pain and visual disturbance  Respiratory: Negative for cough and shortness of breath  Cardiovascular: Negative for chest pain and palpitations  Gastrointestinal: Negative for abdominal pain and vomiting  Genitourinary: Negative for dysuria and hematuria  Skin: Negative for color change and rash  Neurological: Positive for headaches  Negative for seizures and syncope  All other systems reviewed and are negative  Current Outpatient Medications on File Prior to Visit   Medication Sig   • allopurinol (ZYLOPRIM) 100 mg tablet Take 1 tablet (100 mg total) by mouth daily   • apixaban (Eliquis) 5 mg Take 1 tablet (5 mg total) by mouth 2 (two) times a day Patient must schedule appointment for farther refills  • atorvastatin (LIPITOR) 10 mg tablet Take 1 tablet (10 mg total) by mouth daily   • cholecalciferol (VITAMIN D3) 1,000 units tablet Take 1 tablet (1,000 Units total) by mouth daily   • Diclofenac Sodium (VOLTAREN) 1 % Apply a small amount on the hand / wrist p r n  pain  No more than twice per day     • gabapentin (NEURONTIN) 300 mg capsule Take 1 capsule (300 mg total) by mouth 2 (two) times a day   • guaiFENesin (MUCINEX) 600 mg 12 hr tablet Take 2 tablets (1,200 mg total) by mouth every 12 (twelve) hours   • hydrocortisone 2 5 % cream Apply topically 2 (two) times a day   • irbesartan-hydrochlorothiazide (AVALIDE) 300-12 5 MG per tablet Take 1 tablet by mouth daily   • levothyroxine 75 mcg tablet Take 1 tablet (75 mcg total) by mouth daily   • meclizine (ANTIVERT) 25 mg tablet Take 1 tablet (25 mg total) by mouth daily at bedtime   • metFORMIN (GLUCOPHAGE) 500 mg tablet Take 1 tablet (500 mg total) by mouth 2 (two) times a day with meals   • metoprolol tartrate (LOPRESSOR) 25 mg tablet Take 1 tablet (25 mg total) by mouth every 12 (twelve) hours   • NIFEdipine (PROCARDIA XL) 30 mg 24 hr tablet Take 1 tablet (30 mg total) by mouth daily   • potassium chloride (K-DUR,KLOR-CON) 20 mEq tablet Take 20 mEq by mouth 2 (two) times a day   • TiZANidine (ZANAFLEX) 2 MG capsule Take 1 capsule (2 mg total) by mouth 2 (two) times a day   • vitamin B-12 (VITAMIN B-12) 1,000 mcg tablet Take 1 tablet (1,000 mcg total) by mouth daily       Objective     /78 (BP Location: Left arm, Patient Position: Sitting, Cuff Size: Large)   Pulse (!) 53   Temp 98 5 °F (36 9 °C) (Tympanic)   Resp 16   Ht 5' 1" (1 549 m)   Wt 78 5 kg (173 lb)   SpO2 97%   BMI 32 69 kg/m²     Physical Exam  Vitals and nursing note reviewed  Constitutional:       General: She is not in acute distress  Appearance: She is well-developed  She is not diaphoretic  HENT:      Head: Normocephalic  Right Ear: External ear normal       Left Ear: External ear normal    Eyes:      General:         Right eye: No discharge  Left eye: No discharge  Conjunctiva/sclera: Conjunctivae normal    Neck:      Vascular: No JVD  Cardiovascular:      Rate and Rhythm: Normal rate and regular rhythm  Heart sounds: Normal heart sounds  No murmur heard  No gallop  Pulmonary:      Effort: Pulmonary effort is normal  No respiratory distress  Breath sounds: Normal breath sounds  No stridor  No wheezing or rales  Chest:      Chest wall: No tenderness  Abdominal:      General: There is no distension  Palpations: Abdomen is soft  There is no mass  Tenderness: There is no abdominal tenderness  There is no rebound  Musculoskeletal:         General: No tenderness  Cervical back: Normal range of motion and neck supple  Lymphadenopathy:      Cervical: No cervical adenopathy  Skin:     General: Skin is warm  Findings: No erythema or rash  Neurological:      Mental Status: She is alert and oriented to person, place, and time        Coordination: Coordination normal       Gait: Gait normal       Deep Tendon Reflexes: Reflexes normal        Codey Carbone MD

## 2022-12-11 NOTE — ASSESSMENT & PLAN NOTE
Maykel chronic symptomatic recommend Tylenol for the pain discussed well hydration and sleeping hygiene

## 2022-12-15 DIAGNOSIS — U07.1 COVID-19 VIRUS INFECTION: ICD-10-CM

## 2022-12-15 RX ORDER — MELATONIN
1000 DAILY
Qty: 90 TABLET | Refills: 0 | Status: SHIPPED | OUTPATIENT
Start: 2022-12-15

## 2023-01-12 DIAGNOSIS — M79.642 LEFT HAND PAIN: ICD-10-CM

## 2023-01-12 DIAGNOSIS — I10 ESSENTIAL HYPERTENSION: ICD-10-CM

## 2023-01-12 DIAGNOSIS — M79.641 RIGHT HAND PAIN: Primary | ICD-10-CM

## 2023-01-12 RX ORDER — NIFEDIPINE 30 MG/1
30 TABLET, EXTENDED RELEASE ORAL DAILY
Qty: 30 TABLET | Refills: 0 | Status: SHIPPED | OUTPATIENT
Start: 2023-01-12

## 2023-01-19 ENCOUNTER — OFFICE VISIT (OUTPATIENT)
Dept: OBGYN CLINIC | Facility: CLINIC | Age: 83
End: 2023-01-19

## 2023-01-19 ENCOUNTER — LAB (OUTPATIENT)
Dept: LAB | Facility: HOSPITAL | Age: 83
End: 2023-01-19

## 2023-01-19 ENCOUNTER — TELEPHONE (OUTPATIENT)
Dept: FAMILY MEDICINE CLINIC | Facility: CLINIC | Age: 83
End: 2023-01-19

## 2023-01-19 VITALS
SYSTOLIC BLOOD PRESSURE: 147 MMHG | HEIGHT: 61 IN | WEIGHT: 173 LBS | DIASTOLIC BLOOD PRESSURE: 79 MMHG | HEART RATE: 58 BPM | BODY MASS INDEX: 32.66 KG/M2

## 2023-01-19 DIAGNOSIS — I48.0 PAROXYSMAL ATRIAL FIBRILLATION (HCC): ICD-10-CM

## 2023-01-19 DIAGNOSIS — M79.2 NEURALGIA: ICD-10-CM

## 2023-01-19 DIAGNOSIS — E11.9 TYPE 2 DIABETES MELLITUS WITHOUT COMPLICATION, WITHOUT LONG-TERM CURRENT USE OF INSULIN (HCC): ICD-10-CM

## 2023-01-19 DIAGNOSIS — E89.0 POSTOPERATIVE HYPOTHYROIDISM: ICD-10-CM

## 2023-01-19 DIAGNOSIS — M65.4 RADIAL STYLOID TENOSYNOVITIS (DE QUERVAIN): Primary | ICD-10-CM

## 2023-01-19 DIAGNOSIS — E78.2 MIXED HYPERLIPIDEMIA: ICD-10-CM

## 2023-01-19 DIAGNOSIS — R51.9 CHRONIC DAILY HEADACHE: Primary | ICD-10-CM

## 2023-01-19 DIAGNOSIS — I10 ESSENTIAL HYPERTENSION: ICD-10-CM

## 2023-01-19 LAB
ALBUMIN SERPL BCP-MCNC: 4.4 G/DL (ref 3.5–5)
ALP SERPL-CCNC: 90 U/L (ref 43–122)
ALT SERPL W P-5'-P-CCNC: 11 U/L
ANION GAP SERPL CALCULATED.3IONS-SCNC: 10 MMOL/L (ref 5–14)
AST SERPL W P-5'-P-CCNC: 17 U/L (ref 14–36)
BASOPHILS # BLD AUTO: 0.01 THOUSANDS/ÂΜL (ref 0–0.1)
BASOPHILS NFR BLD AUTO: 0 % (ref 0–1)
BILIRUB SERPL-MCNC: 2.37 MG/DL (ref 0.2–1)
BUN SERPL-MCNC: 12 MG/DL (ref 5–25)
CALCIUM SERPL-MCNC: 9.3 MG/DL (ref 8.4–10.2)
CHLORIDE SERPL-SCNC: 99 MMOL/L (ref 96–108)
CHOLEST SERPL-MCNC: 121 MG/DL
CO2 SERPL-SCNC: 32 MMOL/L (ref 21–32)
CREAT SERPL-MCNC: 0.88 MG/DL (ref 0.6–1.2)
CREAT UR-MCNC: 74.6 MG/DL
EOSINOPHIL # BLD AUTO: 0.02 THOUSAND/ÂΜL (ref 0–0.61)
EOSINOPHIL NFR BLD AUTO: 0 % (ref 0–6)
ERYTHROCYTE [DISTWIDTH] IN BLOOD BY AUTOMATED COUNT: 13.2 % (ref 11.6–15.1)
GFR SERPL CREATININE-BSD FRML MDRD: 61 ML/MIN/1.73SQ M
GLUCOSE P FAST SERPL-MCNC: 104 MG/DL (ref 70–99)
HCT VFR BLD AUTO: 44.5 % (ref 34.8–46.1)
HDLC SERPL-MCNC: 56 MG/DL
HGB BLD-MCNC: 14.4 G/DL (ref 11.5–15.4)
IMM GRANULOCYTES # BLD AUTO: 0.02 THOUSAND/UL (ref 0–0.2)
IMM GRANULOCYTES NFR BLD AUTO: 0 % (ref 0–2)
LDLC SERPL CALC-MCNC: 45 MG/DL
LYMPHOCYTES # BLD AUTO: 2.02 THOUSANDS/ÂΜL (ref 0.6–4.47)
LYMPHOCYTES NFR BLD AUTO: 37 % (ref 14–44)
MCH RBC QN AUTO: 31.2 PG (ref 26.8–34.3)
MCHC RBC AUTO-ENTMCNC: 32.4 G/DL (ref 31.4–37.4)
MCV RBC AUTO: 97 FL (ref 82–98)
MICROALBUMIN UR-MCNC: 7 MG/L (ref 0–20)
MICROALBUMIN/CREAT 24H UR: 9 MG/G CREATININE (ref 0–30)
MONOCYTES # BLD AUTO: 0.45 THOUSAND/ÂΜL (ref 0.17–1.22)
MONOCYTES NFR BLD AUTO: 8 % (ref 4–12)
NEUTROPHILS # BLD AUTO: 2.96 THOUSANDS/ÂΜL (ref 1.85–7.62)
NEUTS SEG NFR BLD AUTO: 55 % (ref 43–75)
NRBC BLD AUTO-RTO: 0 /100 WBCS
PLATELET # BLD AUTO: 204 THOUSANDS/UL (ref 149–390)
PMV BLD AUTO: 10.6 FL (ref 8.9–12.7)
POTASSIUM SERPL-SCNC: 3.8 MMOL/L (ref 3.5–5.3)
PROT SERPL-MCNC: 8.1 G/DL (ref 6.4–8.4)
RBC # BLD AUTO: 4.61 MILLION/UL (ref 3.81–5.12)
SODIUM SERPL-SCNC: 141 MMOL/L (ref 135–147)
T4 FREE SERPL-MCNC: 1.22 NG/DL (ref 0.76–1.46)
TRIGL SERPL-MCNC: 99 MG/DL
TSH SERPL DL<=0.05 MIU/L-ACNC: 8.61 UIU/ML (ref 0.45–4.5)
WBC # BLD AUTO: 5.48 THOUSAND/UL (ref 4.31–10.16)

## 2023-01-19 NOTE — PROGRESS NOTES
Assessment:    Right DeQuervain's tenosynovitis  Right 2nd dorsal compartment tendonitis  Right FCR tendonitis       Plan:    Treatment options discussed with the patient  Will start conservatively with nighttime thumb spica bracing  Follow-up in the office 6 weeks for re-evaluation  If no significant improvement at that time, consider steroid injection to the first dorsal extensor compartment  Subjective:     HPI    Patient ID:  Brittny Brown is a 81 y o  female presenting for evaluation of the right upper extremity  According to the patient, she has a several year history of primarily right radial and volar sided wrist pain that radiates to the forearm and sometimes all the way up to the whole arm that has been present and recently has gotten progressively worse over time  She denies any injury or trauma to the area that caused this  She has no associated numbness and tingling  The pain is sharp and typically worse with activity  It is associated with weakness with  strength  She wears a wrist brace on the right side which does help  She has not tried any formal medications or therapy for this  She had an EMG study about 1 year ago that was negative for any pathology  She denies a history of surgery to the right wrist       The following portions of the patient's history were reviewed and updated as appropriate: allergies, current medications, past family history, past medical history, past social history, past surgical history, and problem list     Review of Systems     Objective:    Imaging:  EMG RUE 2/23/2022 Normal study  Impression:   Normal study  These electrodiagnostic findings do not provide any evidence to support a focal neuropathy versus radiculopathy in right upper extremity       Physical Exam     Vitals:    01/19/23 1026   BP: 147/79   Pulse: 58       General appearance:  NAD   Cardiac:  Regular rate  Lungs:  Unlabored breathing  Abdomen:  Non-distended    Orthopedic Examination:  Right wrist    Inspection: No open wounds or erythema  No significant swelling or ecchymosis  No visual deformities  Palpation:  + TTP 1st dorsal extensor compartment, to a lesser degree 2nd compartment TTP,  and FCR TTP  NTTP CMC joint      Range-of-motion: Normal wrist flexion extension, full composite fist formation    Strength: 5/5 wrist flexion extension,  strength, thumb opposition, APB    Sensation:  Intact m/r/u nerve distribution    Special Tests:  + Finkelstein's, negative grind  Negative Tinel's and Wu's  Palpable radial pulse

## 2023-01-20 DIAGNOSIS — E04.1 THYROID NODULE: Primary | ICD-10-CM

## 2023-01-20 RX ORDER — LEVOTHYROXINE SODIUM 88 UG/1
88 TABLET ORAL DAILY
Qty: 30 TABLET | Refills: 1 | Status: SHIPPED | OUTPATIENT
Start: 2023-01-20

## 2023-01-20 NOTE — TELEPHONE ENCOUNTER
----- Message from Amber Brothers MD sent at 1/20/2023 11:21 AM EST -----  Is patient taking her Levothyroxine daily

## 2023-01-26 ENCOUNTER — OFFICE VISIT (OUTPATIENT)
Dept: NEUROLOGY | Facility: CLINIC | Age: 83
End: 2023-01-26

## 2023-01-26 ENCOUNTER — TELEPHONE (OUTPATIENT)
Dept: NEUROLOGY | Facility: CLINIC | Age: 83
End: 2023-01-26

## 2023-01-26 VITALS
WEIGHT: 173 LBS | DIASTOLIC BLOOD PRESSURE: 63 MMHG | SYSTOLIC BLOOD PRESSURE: 136 MMHG | HEART RATE: 59 BPM | BODY MASS INDEX: 32.66 KG/M2 | HEIGHT: 61 IN

## 2023-01-26 DIAGNOSIS — G43.009 MIGRAINE WITHOUT AURA AND WITHOUT STATUS MIGRAINOSUS, NOT INTRACTABLE: Primary | ICD-10-CM

## 2023-01-26 DIAGNOSIS — G56.02 CARPAL TUNNEL SYNDROME OF LEFT WRIST: ICD-10-CM

## 2023-01-26 DIAGNOSIS — G56.01 CARPAL TUNNEL SYNDROME OF RIGHT WRIST: ICD-10-CM

## 2023-01-26 DIAGNOSIS — G44.329 CHRONIC POST-TRAUMATIC HEADACHE, NOT INTRACTABLE: ICD-10-CM

## 2023-01-26 DIAGNOSIS — M79.2 NEURALGIA: ICD-10-CM

## 2023-01-26 DIAGNOSIS — R51.9 CHRONIC DAILY HEADACHE: ICD-10-CM

## 2023-01-26 DIAGNOSIS — G50.0 SUPRAORBITAL NEURALGIA: ICD-10-CM

## 2023-01-26 RX ORDER — GALCANEZUMAB 120 MG/ML
INJECTION, SOLUTION SUBCUTANEOUS
Qty: 1 ML | Refills: 11 | Status: SHIPPED | OUTPATIENT
Start: 2023-01-26 | End: 2023-01-27 | Stop reason: CLARIF

## 2023-01-26 RX ORDER — GALCANEZUMAB 120 MG/ML
INJECTION, SOLUTION SUBCUTANEOUS
Qty: 2 ML | Refills: 0 | Status: SHIPPED | OUTPATIENT
Start: 2023-01-26 | End: 2023-01-27 | Stop reason: CLARIF
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
## 2023-01-26 NOTE — TELEPHONE ENCOUNTER
Do you want to order aimovig (preferred) or continue with emgality  Either way, I will submit PA but emgality may be denied if aimovig not tried  Thank you

## 2023-01-26 NOTE — TELEPHONE ENCOUNTER
Hi, this is Alaina Coello at  [employer] on patient [address]  Date of birth 1940  We received the prescription for emgality  And when we went to bill that [employer] is not on the formulary for her drug plan  It looks like their preferred product is Aimovig  This will require PA  If you could please send us a new prescription  If not, and you'd like to get prior authorization for [facility], please give me a call back and I can give you the patients insurance information  Our phone number here, [phone]   Thank you, jeri

## 2023-01-27 NOTE — TELEPHONE ENCOUNTER
Aimovig is fine  Please clarify with the patient and let her know this is 1 injection/month, same as Emgality  It is manufactured by different company

## 2023-01-27 NOTE — TELEPHONE ENCOUNTER
Received voicemail from [employer] [facility] is needed for Aimovig  Insurance phone # [phone]  ID # [id number]  Would like call back when approved     Pharmacy # [phone]

## 2023-01-27 NOTE — PATIENT INSTRUCTIONS
Headache management instructions  - When patient has a moderate to severe headache, they should seek rest, initiate relaxation and apply cold compresses to the head  - Maintain regular sleep schedule  Adults need at least 7-8 hours of uninterrupted a night  - Limit over the counter medications such as Tylenol, Ibuprofen, Aleve, Excedrin  (No more than 2- 3 times a week or max 10 a month)  - Maintain headache diary  Free ALFREDITO for a smart phone, which can be used is "Migraine pati"  - Limit caffeine to 1-2 cups 8 to 16 oz a day or less  - Avoid dietary trigger  (aged cheese, peanuts, MSG, aspartame and nitrates)  - Patient is to have regular frequent meals to prevent headache onset  - Please drink at least 64 ounces of water a day to help remain hydrated

## 2023-01-30 ENCOUNTER — OFFICE VISIT (OUTPATIENT)
Dept: URGENT CARE | Age: 83
End: 2023-01-30

## 2023-01-30 VITALS
TEMPERATURE: 96.3 F | RESPIRATION RATE: 18 BRPM | SYSTOLIC BLOOD PRESSURE: 128 MMHG | OXYGEN SATURATION: 98 % | DIASTOLIC BLOOD PRESSURE: 82 MMHG | HEART RATE: 64 BPM

## 2023-01-30 DIAGNOSIS — B96.89 BACTERIAL CONJUNCTIVITIS OF BOTH EYES: Primary | ICD-10-CM

## 2023-01-30 DIAGNOSIS — H10.9 BACTERIAL CONJUNCTIVITIS OF BOTH EYES: Primary | ICD-10-CM

## 2023-01-30 RX ORDER — OFLOXACIN 3 MG/ML
1 SOLUTION/ DROPS OPHTHALMIC 4 TIMES DAILY
Qty: 5 ML | Refills: 0 | Status: SHIPPED | OUTPATIENT
Start: 2023-01-30 | End: 2023-02-06

## 2023-01-30 NOTE — PROGRESS NOTES
[facility]        NAME: Celia Fang is a 81 y o  female  : 1940    MRN: [id number]  DATE: 2023  TIME: 10:33 AM      Assessment and Plan     Bacterial conjunctivitis of both eyes [H10 9, B96 89]  1  Bacterial conjunctivitis of both eyes  ofloxacin (OCUFLOX) 0 3 % ophthalmic solution            Patient Instructions     Use antibiotic eye drops as directed to both eyes  Follow-up with your eye doctor  PCP follow-up in 3-5 days  Proceed to the ER if symptoms worsen  Chief Complaint     Chief Complaint   Patient presents with   • Eye Problem     Eyes swollen and itchy since Saturday  Eye crusting in AM         History of Present Illness     Patient is an 71-year-old female who presents with family at bedside  Reports drainage and itchy sensation to right eyelid started 2 days ago  States it now spread to her left eye  Denies use of contacts  Denies fever or chills  Denies nausea, vomiting, or diarrhea  Review of Systems     Review of Systems   Constitutional: Negative for chills and fever  Eyes: Positive for discharge, redness and itching  Gastrointestinal: Negative for diarrhea, nausea and vomiting  All other systems reviewed and are negative  Current Medications       Current Outpatient Medications:   •  allopurinol (ZYLOPRIM) 100 mg tablet, Take 1 tablet (100 mg total) by mouth daily, Disp: 90 tablet, Rfl: 0  •  apixaban (Eliquis) 5 mg, Take 1 tablet (5 mg total) by mouth 2 (two) times a day Patient must schedule appointment for farther refills  , Disp: 180 tablet, Rfl: 3  •  atorvastatin (LIPITOR) 10 mg tablet, Take 1 tablet (10 mg total) by mouth daily, Disp: 100 tablet, Rfl: 0  •  cholecalciferol (VITAMIN D3) 1,000 units tablet, Take 1 tablet (1,000 Units total) by mouth daily, Disp: 90 tablet, Rfl: 0  •  Diclofenac Sodium (VOLTAREN) 1 %, Apply a small amount on the hand / wrist p r n  pain    No more than twice per day , Disp: 100 g, Rfl: 0  • Erenumab-aooe 140 MG/ML SOAJ, Inject 140 mg under the skin every 30 (thirty) days, Disp: 1 mL, Rfl: 11  •  gabapentin (NEURONTIN) 300 mg capsule, Take 1 capsule (300 mg total) by mouth 2 (two) times a day, Disp: 180 capsule, Rfl: 0  •  guaiFENesin (MUCINEX) 600 mg 12 hr tablet, Take 2 tablets (1,200 mg total) by mouth every 12 (twelve) hours, Disp: 20 tablet, Rfl: 0  •  irbesartan-hydrochlorothiazide (AVALIDE) 300-12 5 MG per tablet, Take 1 tablet by mouth daily, Disp: 100 tablet, Rfl: 0  •  levothyroxine 88 mcg tablet, Take 1 tablet (88 mcg total) by mouth daily, Disp: 30 tablet, Rfl: 1  •  meclizine (ANTIVERT) 25 mg tablet, Take 1 tablet (25 mg total) by mouth daily at bedtime, Disp: 30 tablet, Rfl: 1  •  metFORMIN (GLUCOPHAGE) 500 mg tablet, Take 1 tablet (500 mg total) by mouth 2 (two) times a day with meals, Disp: 180 tablet, Rfl: 0  •  metoprolol tartrate (LOPRESSOR) 25 mg tablet, Take 1 tablet (25 mg total) by mouth every 12 (twelve) hours, Disp: 180 tablet, Rfl: 0  •  NIFEdipine (PROCARDIA XL) 30 mg 24 hr tablet, Take 1 tablet (30 mg total) by mouth daily, Disp: 30 tablet, Rfl: 0  •  ofloxacin (OCUFLOX) 0 3 % ophthalmic solution, Administer 1 drop to both eyes 4 (four) times a day for 7 days, Disp: 5 mL, Rfl: 0  •  potassium chloride (K-DUR,KLOR-CON) 20 mEq tablet, Take 20 mEq by mouth 2 (two) times a day, Disp: , Rfl:   •  TiZANidine (ZANAFLEX) 2 MG capsule, Take 1 capsule (2 mg total) by mouth 2 (two) times a day, Disp: 20 capsule, Rfl: 0  •  vitamin B-12 (VITAMIN B-12) 1,000 mcg tablet, Take 1 tablet (1,000 mcg total) by mouth daily, Disp: 30 tablet, Rfl: 3  •  hydrocortisone 2 5 % cream, Apply topically 2 (two) times a day (Patient not taking: Reported on 1/30/2023), Disp: 30 g, Rfl: 0    Current Allergies     Allergies as of 01/30/2023 - Reviewed 01/27/2023   Allergen Reaction Noted   • No known allergies  02/19/2018              The following portions of the patient's history were reviewed and updated as appropriate: allergies, current medications, past family history, past medical history, past social history, past surgical history and problem list      Past Medical History:   Diagnosis Date   • Allergic rhinitis    • Arthritis    • Atrial fibrillation ([facility] )    • Breast cancer ([place] [address] )    • Bug bite 5/21/2019   • Cataracts, bilateral    • Chronic headaches     pulsatile daily headaches   • Chronic post-traumatic headache, not intractable 9/8/2020   • Colitis    • Coronary artery disease    • Diabetes mellitus ([place] [address] )    • Disease of thyroid gland    • DJD (degenerative joint disease), cervical    • Encounter for well adult exam with abnormal findings 10/14/2019   • Facial neuralgia     left frontal facial post traumatic neuralgia   • Fibromyalgia    • Glaucoma    • History of external beam radiation therapy     8/16/18 to 9/14/2018 Right breast   • Hypertension    • Hypokalemia    • Hypovitaminosis D    • Lupus ([place] [address] ) 7/19/2018   • Obesity    • Osteoarthritis    • Pre-op examination 2/25/2019   • Pre-operative clearance 8/21/2018   • Prediabetes    • Rib pain on right side 2/25/2019   • SDH (subdural hematoma) 1/31/2017   • Sleep apnea     no cpap   • Stage 3a chronic kidney disease ([place] [address] ) 5/10/2022   • Syncope and collapse 12/1/2018   • Vertigo        Past Surgical History:   Procedure Laterality Date   • BREAST BIOPSY     • BREAST LUMPECTOMY     • CHOLECYSTECTOMY     • COLONOSCOPY  2013   • HYSTERECTOMY     • MAMMO NEEDLE LOCALIZATION RIGHT (ALL INC) Right 6/21/2018   • MAMMO STEREOTACTIC BREAST BIOPSY RIGHT (ALL INC) Right 5/9/2018   • THYROIDECTOMY N/A 11/21/2019    Procedure: THYROIDECTOMY, total;  Surgeon: Rico Reddy MD;  Location: [facility];  Service: Surgical Oncology   • TONSILLECTOMY     • US GUIDED BREAST BIOPSY RIGHT COMPLETE Right 5/9/2018   • US GUIDED THYROID BIOPSY  6/12/2019   • US GUIDED THYROID BIOPSY  9/13/2019       Family History   Problem Relation Age of Onset   • Heart attack Sister    • Hypertension Family    • Diabetes Family    • Stroke Family    • Migraines Family    • Breast cancer Daughter 28         Medications have been verified  Objective     /82   Pulse 64   Temp (!) 96 3 °F (35 7 °C)   Resp 18   SpO2 98%   No LMP recorded  Patient has had a hysterectomy  Physical Exam     Physical Exam  Vitals and nursing note reviewed  Constitutional:       General: She is awake  She is not in acute distress  Appearance: Normal appearance  She is not ill-appearing, toxic-appearing or diaphoretic  Eyes:      General:         Right eye: Discharge present  Left eye: Discharge present  Conjunctiva/sclera:      Right eye: Right conjunctiva is injected  Left eye: Left conjunctiva is injected  Pupils: Pupils are equal, round, and reactive to light  Comments: No surrounding orbital erythema  Cardiovascular:      Rate and Rhythm: Normal rate  Pulses: Normal pulses  Heart sounds: Normal heart sounds, S1 normal and S2 normal    Pulmonary:      Effort: Pulmonary effort is normal       Breath sounds: Normal breath sounds and air entry  Skin:     General: Skin is warm  Capillary Refill: Capillary refill takes less than 2 seconds  Neurological:      Mental Status: She is alert  Psychiatric:         Mood and Affect: Mood normal          Behavior: Behavior normal          Thought Content:  Thought content normal          Judgment: Judgment normal

## 2023-01-30 NOTE — PATIENT INSTRUCTIONS
Use antibiotic eye drops as directed to both eyes  Follow-up with your eye doctor  PCP follow-up in 3-5 days  Proceed to the ER if symptoms worsen

## 2023-02-02 NOTE — TELEPHONE ENCOUNTER
I spoke to patient and reviewed instructions to [employer]; she is receptive to trying same  I attempted to do PA through [facility] however recd error message  I called insurance and submitted verbal PA, turnaround time 72 hours    Recd approval, case approved, case # [id number], 1/1/2023 - 5/3/2023    Patient's daughter aware

## 2023-02-10 DIAGNOSIS — I10 ESSENTIAL HYPERTENSION: ICD-10-CM

## 2023-02-10 DIAGNOSIS — E11.9 TYPE 2 DIABETES MELLITUS WITHOUT COMPLICATION, WITHOUT LONG-TERM CURRENT USE OF INSULIN (HCC): ICD-10-CM

## 2023-02-13 ENCOUNTER — RA CDI HCC (OUTPATIENT)
Dept: OTHER | Facility: HOSPITAL | Age: 83
End: 2023-02-13

## 2023-02-13 NOTE — PROGRESS NOTES
[address]  coding opportunities       E11 22  Chart Reviewed number of suggestions sent to Provider: 1     Patients Insurance     Medicare Insurance: [address] [place] [address]

## 2023-02-17 ENCOUNTER — OFFICE VISIT (OUTPATIENT)
Dept: FAMILY MEDICINE CLINIC | Facility: CLINIC | Age: 83
End: 2023-02-17

## 2023-02-17 VITALS
WEIGHT: 170 LBS | OXYGEN SATURATION: 96 % | TEMPERATURE: 98.2 F | SYSTOLIC BLOOD PRESSURE: 130 MMHG | BODY MASS INDEX: 31.28 KG/M2 | HEIGHT: 62 IN | DIASTOLIC BLOOD PRESSURE: 80 MMHG | HEART RATE: 65 BPM

## 2023-02-17 DIAGNOSIS — C50.111 MALIGNANT NEOPLASM OF CENTRAL PORTION OF RIGHT BREAST IN FEMALE, ESTROGEN RECEPTOR POSITIVE (HCC): ICD-10-CM

## 2023-02-17 DIAGNOSIS — E53.8 LOW VITAMIN B12 LEVEL: ICD-10-CM

## 2023-02-17 DIAGNOSIS — R42 VERTIGO: ICD-10-CM

## 2023-02-17 DIAGNOSIS — E11.40 TYPE 2 DIABETES, CONTROLLED, WITH NEUROPATHY (HCC): ICD-10-CM

## 2023-02-17 DIAGNOSIS — Z17.0 MALIGNANT NEOPLASM OF CENTRAL PORTION OF RIGHT BREAST IN FEMALE, ESTROGEN RECEPTOR POSITIVE (HCC): ICD-10-CM

## 2023-02-17 DIAGNOSIS — E89.0 POSTOPERATIVE HYPOTHYROIDISM: Primary | ICD-10-CM

## 2023-02-17 DIAGNOSIS — I48.0 PAROXYSMAL ATRIAL FIBRILLATION (HCC): ICD-10-CM

## 2023-02-17 DIAGNOSIS — M21.612 BILATERAL BUNIONS: ICD-10-CM

## 2023-02-17 DIAGNOSIS — E79.0 HYPERURICEMIA: ICD-10-CM

## 2023-02-17 DIAGNOSIS — M21.611 BILATERAL BUNIONS: ICD-10-CM

## 2023-02-17 DIAGNOSIS — E78.00 PURE HYPERCHOLESTEROLEMIA: ICD-10-CM

## 2023-02-17 DIAGNOSIS — N18.31 CHRONIC KIDNEY DISEASE, STAGE 3A (HCC): ICD-10-CM

## 2023-02-17 DIAGNOSIS — M47.27 OSTEOARTHRITIS OF SPINE WITH RADICULOPATHY, LUMBOSACRAL REGION: ICD-10-CM

## 2023-02-17 DIAGNOSIS — I10 ESSENTIAL HYPERTENSION: ICD-10-CM

## 2023-02-17 DIAGNOSIS — M47.22 OSTEOARTHRITIS OF SPINE WITH RADICULOPATHY, CERVICAL REGION: ICD-10-CM

## 2023-02-17 RX ORDER — LANOLIN ALCOHOL/MO/W.PET/CERES
1000 CREAM (GRAM) TOPICAL DAILY
Qty: 30 TABLET | Refills: 3 | Status: SHIPPED | OUTPATIENT
Start: 2023-02-17 | End: 2023-02-17 | Stop reason: SDUPTHER

## 2023-02-17 RX ORDER — LANOLIN ALCOHOL/MO/W.PET/CERES
1000 CREAM (GRAM) TOPICAL DAILY
Qty: 90 TABLET | Refills: 0 | Status: SHIPPED | OUTPATIENT
Start: 2023-02-17

## 2023-02-17 RX ORDER — ALLOPURINOL 100 MG/1
100 TABLET ORAL DAILY
Qty: 90 TABLET | Refills: 0 | Status: SHIPPED | OUTPATIENT
Start: 2023-02-17

## 2023-02-17 RX ORDER — MECLIZINE HYDROCHLORIDE 25 MG/1
25 TABLET ORAL
Qty: 30 TABLET | Refills: 1 | Status: SHIPPED | OUTPATIENT
Start: 2023-02-17

## 2023-02-17 RX ORDER — ATORVASTATIN CALCIUM 10 MG/1
10 TABLET, FILM COATED ORAL DAILY
Qty: 100 TABLET | Refills: 0 | Status: SHIPPED | OUTPATIENT
Start: 2023-02-17

## 2023-02-17 RX ORDER — GABAPENTIN 300 MG/1
300 CAPSULE ORAL 2 TIMES DAILY
Qty: 180 CAPSULE | Refills: 0 | Status: SHIPPED | OUTPATIENT
Start: 2023-02-17

## 2023-02-17 RX ORDER — IRBESARTAN AND HYDROCHLOROTHIAZIDE 300; 12.5 MG/1; MG/1
1 TABLET, FILM COATED ORAL DAILY
Qty: 100 TABLET | Refills: 0 | Status: SHIPPED | OUTPATIENT
Start: 2023-02-17

## 2023-02-17 NOTE — ASSESSMENT & PLAN NOTE
Chronic asymptomatic uncontrol  increase levothyroxine from 75 mcg to 88 mcg proper use and possible side effect discussed with patientcontrol

## 2023-02-17 NOTE — PROGRESS NOTES
Name: Mya De La Cruz      : 1940      MRN: [id number]  Encounter Provider: Amber Brothers MD  Encounter Date: 2023   Encounter department: [facility]     1  Postoperative hypothyroidism  Assessment & Plan:  Chronic asymptomatic uncontrol  increase levothyroxine from 75 mcg to 88 mcg proper use and possible side effect discussed with patientcontrol      2  Paroxysmal atrial fibrillation (HCC)  Assessment & Plan:  Chronic rate is control ,continue metoprolol 30 5 mg once a day patient on Eliquis patient follow-up with the cardiology periodically      3  Type 2 diabetes, controlled, with neuropathy ([facility] )  Assessment & Plan:    Lab Results   Component Value Date    HGBA1C 6 6 (A) 2022     Chronic asymptomatic fair control continue current management including metformin 500 mg twice a day low-carb diet discussed with patient patient continues a statin      4  Malignant neoplasm of central portion of right breast in female, estrogen receptor positive ([facility] [address] )  Assessment & Plan:  Status post surgery and radiation patient follow-up with oncology periodically      5  Chronic kidney disease, stage 3a [facility])  Assessment & Plan:  Lab Results   Component Value Date    EGFR 61 2023    EGFR 59 2022    EGFR 62 2022    CREATININE 0 88 2023    CREATININE 0 91 2022    CREATININE 0 88 2022     Chronic improvement the GFR compared with before discussed with patient avoid midstate anti-inflammatory drugs keep well hydration      6  Bilateral bunions  Assessment & Plan:  Finding on physical exam bunion B/L big toe proper shoes wear discuss with patient      7  Hyperuricemia  -     allopurinol (ZYLOPRIM) 100 mg tablet; Take 1 tablet (100 mg total) by mouth daily  -     Bilirubin, total; Future    8  Pure hypercholesterolemia  -     atorvastatin (LIPITOR) 10 mg tablet;  Take 1 tablet (10 mg total) by mouth daily  -     Basic metabolic panel; Future  -     CBC and differential; Future  -     Lipid Panel with Direct LDL reflex; Future  -     TSH, 3rd generation with Free T4 reflex; Future  -     Hemoglobin A1C; Future    9  Osteoarthritis of spine with radiculopathy, lumbosacral region  -     gabapentin (NEURONTIN) 300 mg capsule; Take 1 capsule (300 mg total) by mouth 2 (two) times a day    10  Essential hypertension  -     irbesartan-hydrochlorothiazide (AVALIDE) 300-12 5 MG per tablet; Take 1 tablet by mouth daily  -     Basic metabolic panel; Future  -     CBC and differential; Future  -     Lipid Panel with Direct LDL reflex; Future  -     TSH, 3rd generation with Free T4 reflex; Future  -     Hemoglobin A1C; Future    11  Osteoarthritis of spine with radiculopathy, cervical region  -     gabapentin (NEURONTIN) 300 mg capsule; Take 1 capsule (300 mg total) by mouth 2 (two) times a day    12  Vertigo  -     meclizine (ANTIVERT) 25 mg tablet; Take 1 tablet (25 mg total) by mouth daily at bedtime    13  Low vitamin B12 level        Depression Screening and Follow-up Plan: Patient was screened for depression during today's encounter  They screened negative with a PHQ-2 score of 0  Subjective      Patient here follow-up with a chronic condition patient compliant with the medication tolerated well without side effect no new concern recent blood work reviewed with the patient    Review of Systems   Constitutional: Negative for chills and fever  HENT: Negative for congestion, ear pain and sore throat  Eyes: Negative for pain and visual disturbance  Respiratory: Negative for cough and shortness of breath  Cardiovascular: Negative for chest pain and palpitations  Gastrointestinal: Negative for abdominal pain, constipation, diarrhea, nausea and vomiting  Genitourinary: Negative for dysuria and hematuria  Skin: Negative for color change and rash  Neurological: Negative for seizures and syncope     All other systems reviewed and are negative  Current Outpatient Medications on File Prior to Visit   Medication Sig   • apixaban (Eliquis) 5 mg Take 1 tablet (5 mg total) by mouth 2 (two) times a day Patient must schedule appointment for farther refills  • cholecalciferol (VITAMIN D3) 1,000 units tablet Take 1 tablet (1,000 Units total) by mouth daily   • Diclofenac Sodium (VOLTAREN) 1 % Apply a small amount on the hand / wrist p r n  pain  No more than twice per day     • Erenumab-aooe 140 MG/ML SOAJ Inject 140 mg under the skin every 30 (thirty) days   • levothyroxine 88 mcg tablet Take 1 tablet (88 mcg total) by mouth daily   • metFORMIN (GLUCOPHAGE) 500 mg tablet Take 1 tablet (500 mg total) by mouth 2 (two) times a day with meals   • metoprolol tartrate (LOPRESSOR) 25 mg tablet Take 1 tablet (25 mg total) by mouth every 12 (twelve) hours   • NIFEdipine (PROCARDIA XL) 30 mg 24 hr tablet Take 1 tablet (30 mg total) by mouth daily   • potassium chloride (K-DUR,KLOR-CON) 20 mEq tablet Take 20 mEq by mouth 2 (two) times a day   • TiZANidine (ZANAFLEX) 2 MG capsule Take 1 capsule (2 mg total) by mouth 2 (two) times a day   • [DISCONTINUED] allopurinol (ZYLOPRIM) 100 mg tablet Take 1 tablet (100 mg total) by mouth daily   • [DISCONTINUED] atorvastatin (LIPITOR) 10 mg tablet Take 1 tablet (10 mg total) by mouth daily   • [DISCONTINUED] gabapentin (NEURONTIN) 300 mg capsule Take 1 capsule (300 mg total) by mouth 2 (two) times a day   • [DISCONTINUED] guaiFENesin (MUCINEX) 600 mg 12 hr tablet Take 2 tablets (1,200 mg total) by mouth every 12 (twelve) hours   • [DISCONTINUED] hydrocortisone 2 5 % cream Apply topically 2 (two) times a day (Patient not taking: Reported on 1/30/2023)   • [DISCONTINUED] irbesartan-hydrochlorothiazide (AVALIDE) 300-12 5 MG per tablet Take 1 tablet by mouth daily   • [DISCONTINUED] meclizine (ANTIVERT) 25 mg tablet Take 1 tablet (25 mg total) by mouth daily at bedtime   • [DISCONTINUED] vitamin B-12 (VITAMIN B-12) 1,000 mcg tablet Take 1 tablet (1,000 mcg total) by mouth daily       Objective     /80   Pulse 65   Temp 98 2 °F (36 8 °C) (Tympanic)   Ht 5' 1 5" (1 562 m)   Wt 77 1 kg (170 lb)   SpO2 96%   Breastfeeding No   BMI 31 60 kg/m²     Physical Exam  Vitals and nursing note reviewed  Constitutional:       General: She is not in acute distress  Appearance: She is well-developed  She is not diaphoretic  HENT:      Head: Normocephalic  Right Ear: External ear normal       Left Ear: External ear normal       Nose: No congestion  Mouth/Throat:      Pharynx: No oropharyngeal exudate  Eyes:      General:         Right eye: No discharge  Left eye: No discharge  Conjunctiva/sclera: Conjunctivae normal    Neck:      Vascular: No JVD  Cardiovascular:      Rate and Rhythm: Normal rate  Rhythm irregular  Pulses: no weak pulses          Dorsalis pedis pulses are 2+ on the right side and 2+ on the left side  Heart sounds: Murmur heard  No gallop  Pulmonary:      Effort: Pulmonary effort is normal  No respiratory distress  Breath sounds: Normal breath sounds  No stridor  No wheezing or rales  Chest:      Chest wall: No tenderness  Abdominal:      General: There is no distension  Palpations: Abdomen is soft  There is no mass  Tenderness: There is no abdominal tenderness  There is no rebound  Musculoskeletal:         General: No tenderness  Cervical back: Normal range of motion and neck supple  Feet:    Feet:      Right foot:      Skin integrity: No warmth  Left foot:      Skin integrity: No warmth  Lymphadenopathy:      Cervical: No cervical adenopathy  Skin:     General: Skin is warm  Findings: No erythema or rash  Neurological:      Mental Status: She is alert and oriented to person, place, and time  Patient's shoes and socks removed  Right Foot/Ankle   Right Foot Inspection  Skin Exam: skin intact  No warmth and no pre-ulcer  Toe Exam: No swelling and erythema    Sensory   Monofilament testing: intact    Vascular  Capillary refills: < 3 seconds  The right DP pulse is 2+  Left Foot/Ankle  Left Foot Inspection  Skin Exam: skin intact  No warmth and no pre-ulcer  Toe Exam: No swelling and no erythema  Sensory   Monofilament testing: intact    Vascular  Capillary refills: < 3 seconds  The left DP pulse is 2+       Assign Risk Category  No deformity present  No loss of protective sensation  No weak pulses  Risk: 0        Vilma Stokes MD

## 2023-02-17 NOTE — ASSESSMENT & PLAN NOTE
Chronic rate is control ,continue metoprolol 30 5 mg once a day patient on Eliquis patient follow-up with the cardiology periodically

## 2023-02-17 NOTE — ASSESSMENT & PLAN NOTE
Lab Results   Component Value Date    HGBA1C 6 6 (A) 12/09/2022     Chronic asymptomatic fair control continue current management including metformin 500 mg twice a day low-carb diet discussed with patient patient continues a statin

## 2023-02-17 NOTE — ASSESSMENT & PLAN NOTE
Lab Results   Component Value Date    EGFR 61 01/19/2023    EGFR 59 05/05/2022    EGFR 62 01/26/2022    CREATININE 0 88 01/19/2023    CREATININE 0 91 05/05/2022    CREATININE 0 88 01/26/2022     Chronic improvement the GFR compared with before discussed with patient avoid midstate anti-inflammatory drugs keep well hydration

## 2023-03-02 ENCOUNTER — TELEPHONE (OUTPATIENT)
Dept: FAMILY MEDICINE CLINIC | Facility: CLINIC | Age: 83
End: 2023-03-02

## 2023-03-02 NOTE — TELEPHONE ENCOUNTER
Patient c/o headache so painful she can not move vomiting and nose bleed  b/p [id number] she is taking all medications advised to go to ER to be evaluated

## 2023-03-14 ENCOUNTER — OFFICE VISIT (OUTPATIENT)
Dept: FAMILY MEDICINE CLINIC | Facility: CLINIC | Age: 83
End: 2023-03-14

## 2023-03-14 VITALS
HEIGHT: 62 IN | BODY MASS INDEX: 30.2 KG/M2 | DIASTOLIC BLOOD PRESSURE: 60 MMHG | WEIGHT: 164.1 LBS | SYSTOLIC BLOOD PRESSURE: 130 MMHG | OXYGEN SATURATION: 94 % | HEART RATE: 71 BPM | TEMPERATURE: 97.5 F

## 2023-03-14 DIAGNOSIS — R06.02 SOB (SHORTNESS OF BREATH): ICD-10-CM

## 2023-03-14 DIAGNOSIS — J20.8 ACUTE BRONCHITIS DUE TO OTHER SPECIFIED ORGANISMS: Primary | ICD-10-CM

## 2023-03-14 RX ORDER — ALBUTEROL SULFATE 90 UG/1
2 AEROSOL, METERED RESPIRATORY (INHALATION) EVERY 6 HOURS PRN
Qty: 18 G | Refills: 0 | Status: SHIPPED | OUTPATIENT
Start: 2023-03-14

## 2023-03-14 RX ORDER — AZITHROMYCIN 250 MG/1
TABLET, FILM COATED ORAL
Qty: 6 TABLET | Refills: 0 | Status: SHIPPED | OUTPATIENT
Start: 2023-03-14 | End: 2023-03-18

## 2023-03-14 NOTE — PROGRESS NOTES
Name: Donal Ballesteros      : 1940      MRN: [id number]  Encounter Provider: Milton Baltazar MD  Encounter Date: 3/14/2023   Encounter department: [place] [address]  Acute bronchitis due to other specified organisms  Assessment & Plan:  Acute symptomatic with a cough productive fatigue shortness of breath patient with history of contact to her daughter who diagnosed with bronchitis  Recommend Zithromax liquid as directed on the pack proper use and possible side effects constipation we did discuss well before COVID and send out    Orders:  -     azithromycin (ZITHROMAX) 250 mg tablet; Take 2 tablets today then 1 tablet daily x 4 days  -     albuterol (Ventolin HFA) 90 mcg/act inhaler; Inhale 2 puffs every 6 (six) hours as needed for wheezing  -     COVID Only - Office Collect    2  SOB (shortness of breath)  Assessment & Plan:  Symptomatic possible secondary to bronchitis EKG in the office no change from before recommend to treat the infection in case symptoms get worse to go to the emergency room    Orders:  -     [address]  -     POCT ECG           Subjective      Patient here with her daughter and concerned about cough with active sputum that has been going on for the last 3 to 4 days associated with tightness in the chest fatigue no fever no chills patient contact with her daughter who diagnosed with bronchitis     Review of Systems   Constitutional: Positive for fatigue  Negative for chills and fever  HENT: Negative for ear pain and sore throat  Eyes: Negative for pain and visual disturbance  Respiratory: Positive for cough and shortness of breath  Cardiovascular: Negative for chest pain and palpitations  Gastrointestinal: Negative for abdominal pain and vomiting  Genitourinary: Negative for dysuria and hematuria  Musculoskeletal: Negative for arthralgias and back pain  Skin: Negative for color change and rash  Neurological: Negative for seizures and syncope  All other systems reviewed and are negative  Current Outpatient Medications on File Prior to Visit   Medication Sig   • allopurinol (ZYLOPRIM) 100 mg tablet Take 1 tablet (100 mg total) by mouth daily   • apixaban (Eliquis) 5 mg Take 1 tablet (5 mg total) by mouth 2 (two) times a day Patient must schedule appointment for farther refills  • atorvastatin (LIPITOR) 10 mg tablet Take 1 tablet (10 mg total) by mouth daily   • cholecalciferol (VITAMIN D3) 1,000 units tablet Take 1 tablet (1,000 Units total) by mouth daily   • Diclofenac Sodium (VOLTAREN) 1 % Apply a small amount on the hand / wrist p r n  pain  No more than twice per day     • Erenumab-aooe 140 MG/ML SOAJ Inject 140 mg under the skin every 30 (thirty) days   • gabapentin (NEURONTIN) 300 mg capsule Take 1 capsule (300 mg total) by mouth 2 (two) times a day   • levothyroxine 88 mcg tablet Take 1 tablet (88 mcg total) by mouth daily   • meclizine (ANTIVERT) 25 mg tablet Take 1 tablet (25 mg total) by mouth daily at bedtime   • metFORMIN (GLUCOPHAGE) 500 mg tablet Take 1 tablet (500 mg total) by mouth 2 (two) times a day with meals   • metoprolol tartrate (LOPRESSOR) 25 mg tablet Take 1 tablet (25 mg total) by mouth every 12 (twelve) hours   • NIFEdipine (PROCARDIA XL) 30 mg 24 hr tablet Take 1 tablet (30 mg total) by mouth daily   • potassium chloride (K-DUR,KLOR-CON) 20 mEq tablet Take 20 mEq by mouth 2 (two) times a day   • vitamin B-12 (VITAMIN B-12) 1,000 mcg tablet Take 1 tablet (1,000 mcg total) by mouth daily   • irbesartan-hydrochlorothiazide (AVALIDE) 300-12 5 MG per tablet Take 1 tablet by mouth daily   • TiZANidine (ZANAFLEX) 2 MG capsule Take 1 capsule (2 mg total) by mouth 2 (two) times a day       Objective     /60 (BP Location: Left arm, Patient Position: Sitting)   Pulse 71   Temp 97 5 °F (36 4 °C) (Tympanic)   Ht 5' 1 5" (1 562 m)   Wt 74 4 kg (164 lb 1 6 oz)   SpO2 94% BMI 30 50 kg/m²     Physical Exam  Vitals and nursing note reviewed  Constitutional:       General: She is not in acute distress  Appearance: She is well-developed  She is not diaphoretic  HENT:      Head: Normocephalic  Right Ear: External ear normal       Left Ear: External ear normal    Eyes:      General:         Right eye: No discharge  Left eye: No discharge  Conjunctiva/sclera: Conjunctivae normal    Neck:      Vascular: No JVD  Cardiovascular:      Rate and Rhythm: Normal rate and regular rhythm  Heart sounds: Normal heart sounds  No murmur heard  No gallop  Pulmonary:      Effort: Pulmonary effort is normal  No respiratory distress  Breath sounds: No stridor  Rales present  Chest:      Chest wall: No tenderness  Abdominal:      General: There is no distension  Palpations: Abdomen is soft  There is no mass  Tenderness: There is no abdominal tenderness  There is no rebound  Musculoskeletal:         General: No tenderness  Cervical back: Normal range of motion and neck supple  Lymphadenopathy:      Cervical: No cervical adenopathy  Skin:     General: Skin is warm  Findings: No erythema or rash  Neurological:      Mental Status: She is alert and oriented to person, place, and time         Aleksandar Newell MD

## 2023-03-15 LAB — SARS-COV-2 RNA RESP QL NAA+PROBE: NEGATIVE

## 2023-03-16 PROBLEM — J20.8 ACUTE BRONCHITIS DUE TO OTHER SPECIFIED ORGANISMS: Status: ACTIVE | Noted: 2023-03-16

## 2023-03-16 NOTE — ASSESSMENT & PLAN NOTE
Acute symptomatic with a cough productive fatigue shortness of breath patient with history of contact to her daughter who diagnosed with bronchitis  Recommend Zithromax liquid as directed on the pack proper use and possible side effects constipation we did discuss well before COVID and send out

## 2023-03-16 NOTE — ASSESSMENT & PLAN NOTE
Symptomatic possible secondary to bronchitis EKG in the office no change from before recommend to treat the infection in case symptoms get worse to go to the emergency room

## 2023-03-23 DIAGNOSIS — U07.1 COVID-19 VIRUS INFECTION: ICD-10-CM

## 2023-03-23 RX ORDER — MELATONIN
1000 DAILY
Qty: 90 TABLET | Refills: 0 | Status: SHIPPED | OUTPATIENT
Start: 2023-03-23

## 2023-04-03 ENCOUNTER — OFFICE VISIT (OUTPATIENT)
Dept: FAMILY MEDICINE CLINIC | Facility: CLINIC | Age: 83
End: 2023-04-03

## 2023-04-03 VITALS
OXYGEN SATURATION: 97 % | HEART RATE: 65 BPM | TEMPERATURE: 98.4 F | HEIGHT: 62 IN | WEIGHT: 171 LBS | BODY MASS INDEX: 31.47 KG/M2 | DIASTOLIC BLOOD PRESSURE: 70 MMHG | SYSTOLIC BLOOD PRESSURE: 110 MMHG

## 2023-04-03 DIAGNOSIS — G44.89 OTHER HEADACHE SYNDROME: Primary | ICD-10-CM

## 2023-04-03 DIAGNOSIS — R04.0 BLEEDING NOSE: ICD-10-CM

## 2023-04-03 DIAGNOSIS — M47.22 OSTEOARTHRITIS OF SPINE WITH RADICULOPATHY, CERVICAL REGION: ICD-10-CM

## 2023-04-03 NOTE — PROGRESS NOTES
Name: Harley Galeazzi      : 1940      MRN: [id number]  Encounter Provider: Moo Dexter MD  Encounter Date: 4/3/2023   Encounter department: [facility]     1  Other headache syndrome  Assessment & Plan:  Symptomatic it can be multifactorial patient has history of migraine headache for work follow-up with neurology also she had cervical spine with radiculopathy which can cause headache  Patient had B12 and can go headache discussed with patient about well recommend well hydration Tylenol for the pain continue follow-up with neurology but also patient had history of HIT had multiple risk factor for vascular disease she had history of breast cancer recommend to do MRI of the brain    Orders:  -     MRI brain wo contrast; Future; Expected date: 2023    2  Osteoarthritis of spine with radiculopathy, cervical region  Assessment & Plan:  Chronic symptomatic with neck pain and headache recommend to continue with the gabapentin Tylenol for the pain proper   Exercise reviewed with the patient and her daughter       3  Bleeding nose  Assessment & Plan:  New diagnosis symptomatic patient on the blood thinner for A-fib recommend the patient be evaluated by ENT for nosebleed    Orders:  -     Ambulatory Referral to Otolaryngology; Future           Subjective      Patient here with her daughter concerned about headache she described as a dull and well over her head radiate to her well neck no recent fall or trauma no numbness no muscle weakness associated with this fatigue no difficulty swallowing no drop in the foot no loss control of the urine or stool patient here for migraine headache for which she follow-up with neurology but also patient has a history of cervical radiculopathy history of breast cancer no recent MRI of the brain    Patient concerned about nosebleed she had it on multiple occasion and last 1 she noticed blood coming profusely and it took a long time to stop and no history of trauma    Review of Systems   Constitutional: Negative for chills and fever  HENT: Positive for nosebleeds  Negative for ear pain and sore throat  Eyes: Negative for pain and visual disturbance  Respiratory: Negative for cough and shortness of breath  Cardiovascular: Negative for chest pain and palpitations  Gastrointestinal: Negative for abdominal pain, constipation, diarrhea and vomiting  Genitourinary: Negative for dysuria and hematuria  Musculoskeletal: Positive for neck pain  Skin: Negative for color change and rash  Neurological: Positive for headaches  Negative for dizziness, tremors, seizures, syncope, facial asymmetry, speech difficulty and numbness  All other systems reviewed and are negative  Current Outpatient Medications on File Prior to Visit   Medication Sig   • albuterol (Ventolin HFA) 90 mcg/act inhaler Inhale 2 puffs every 6 (six) hours as needed for wheezing   • allopurinol (ZYLOPRIM) 100 mg tablet Take 1 tablet (100 mg total) by mouth daily   • apixaban (Eliquis) 5 mg Take 1 tablet (5 mg total) by mouth 2 (two) times a day Patient must schedule appointment for farther refills  • cholecalciferol (VITAMIN D3) 1,000 units tablet Take 1 tablet (1,000 Units total) by mouth daily   • Diclofenac Sodium (VOLTAREN) 1 % Apply a small amount on the hand / wrist p r n  pain  No more than twice per day     • Erenumab-aooe 140 MG/ML SOAJ Inject 140 mg under the skin every 30 (thirty) days   • gabapentin (NEURONTIN) 300 mg capsule Take 1 capsule (300 mg total) by mouth 2 (two) times a day   • irbesartan-hydrochlorothiazide (AVALIDE) 300-12 5 MG per tablet Take 1 tablet by mouth daily   • levothyroxine 88 mcg tablet Take 1 tablet (88 mcg total) by mouth daily   • meclizine (ANTIVERT) 25 mg tablet Take 1 tablet (25 mg total) by mouth daily at bedtime   • metFORMIN (GLUCOPHAGE) 500 mg tablet Take 1 tablet (500 mg total) by mouth 2 (two) times "a day with meals   • metoprolol tartrate (LOPRESSOR) 25 mg tablet Take 1 tablet (25 mg total) by mouth every 12 (twelve) hours   • NIFEdipine (PROCARDIA XL) 30 mg 24 hr tablet Take 1 tablet (30 mg total) by mouth daily   • potassium chloride (K-DUR,KLOR-CON) 20 mEq tablet Take 20 mEq by mouth 2 (two) times a day   • vitamin B-12 (VITAMIN B-12) 1,000 mcg tablet Take 1 tablet (1,000 mcg total) by mouth daily       Objective     /70 (BP Location: Left arm, Patient Position: Sitting)   Pulse 65   Temp 98 4 °F (36 9 °C) (Tympanic)   Ht 5' 1 5\" (1 562 m)   Wt 77 6 kg (171 lb)   SpO2 97%   Breastfeeding No   BMI 31 79 kg/m²     Physical Exam  Vitals and nursing note reviewed  Constitutional:       General: She is not in acute distress  Appearance: She is well-developed  She is not diaphoretic  HENT:      Head: Normocephalic  Right Ear: External ear normal       Left Ear: External ear normal       Nose: No congestion  Right Nostril: No foreign body, epistaxis or septal hematoma  Left Nostril: No epistaxis or septal hematoma  Mouth/Throat:      Pharynx: Oropharynx is clear  Eyes:      General:         Right eye: No discharge  Left eye: No discharge  Conjunctiva/sclera: Conjunctivae normal    Neck:      Vascular: No JVD  Cardiovascular:      Rate and Rhythm: Normal rate  Heart sounds: Murmur heard  No gallop  Pulmonary:      Effort: Pulmonary effort is normal  No respiratory distress  Breath sounds: Normal breath sounds  No stridor  No wheezing or rales  Chest:      Chest wall: No tenderness  Abdominal:      General: There is no distension  Palpations: Abdomen is soft  There is no mass  Tenderness: There is no abdominal tenderness  There is no rebound  Musculoskeletal:      Cervical back: Normal range of motion and neck supple  Tenderness present  No signs of trauma or bony tenderness     Lymphadenopathy:      Cervical: No cervical " adenopathy  Skin:     General: Skin is warm  Findings: No erythema or rash  Neurological:      Mental Status: She is alert and oriented to person, place, and time         Constance Cabrera MD

## 2023-04-05 DIAGNOSIS — E78.00 PURE HYPERCHOLESTEROLEMIA: ICD-10-CM

## 2023-04-05 RX ORDER — ATORVASTATIN CALCIUM 10 MG/1
10 TABLET, FILM COATED ORAL DAILY
Qty: 100 TABLET | Refills: 0 | Status: SHIPPED | OUTPATIENT
Start: 2023-04-05

## 2023-04-06 NOTE — ASSESSMENT & PLAN NOTE
New diagnosis symptomatic patient on the blood thinner for A-fib recommend the patient be evaluated by ENT for nosebleed

## 2023-04-06 NOTE — ASSESSMENT & PLAN NOTE
Chronic symptomatic with neck pain and headache recommend to continue with the gabapentin Tylenol for the pain proper   Exercise reviewed with the patient and her daughter

## 2023-04-06 NOTE — ASSESSMENT & PLAN NOTE
Symptomatic it can be multifactorial patient has history of migraine headache for work follow-up with neurology also she had cervical spine with radiculopathy which can cause headache    Patient had B12 and can go headache discussed with patient about well recommend well hydration Tylenol for the pain continue follow-up with neurology but also patient had history of HIT had multiple risk factor for vascular disease she had history of breast cancer recommend to do MRI of the brain

## 2023-05-01 DIAGNOSIS — E11.9 TYPE 2 DIABETES MELLITUS WITHOUT COMPLICATION, WITHOUT LONG-TERM CURRENT USE OF INSULIN (HCC): ICD-10-CM

## 2023-05-15 PROBLEM — J20.8 ACUTE BRONCHITIS DUE TO OTHER SPECIFIED ORGANISMS: Status: RESOLVED | Noted: 2023-03-16 | Resolved: 2023-05-15

## 2023-05-16 DIAGNOSIS — I10 ESSENTIAL HYPERTENSION: ICD-10-CM

## 2023-05-17 ENCOUNTER — OFFICE VISIT (OUTPATIENT)
Dept: FAMILY MEDICINE CLINIC | Facility: CLINIC | Age: 83
End: 2023-05-17

## 2023-05-17 VITALS
HEART RATE: 56 BPM | HEIGHT: 63 IN | BODY MASS INDEX: 30.33 KG/M2 | DIASTOLIC BLOOD PRESSURE: 74 MMHG | TEMPERATURE: 97.1 F | SYSTOLIC BLOOD PRESSURE: 128 MMHG | OXYGEN SATURATION: 97 % | WEIGHT: 171.2 LBS

## 2023-05-17 DIAGNOSIS — I10 ESSENTIAL HYPERTENSION: ICD-10-CM

## 2023-05-17 DIAGNOSIS — E78.2 MIXED HYPERLIPIDEMIA: ICD-10-CM

## 2023-05-17 DIAGNOSIS — E11.9 TYPE 2 DIABETES MELLITUS WITHOUT COMPLICATION, WITHOUT LONG-TERM CURRENT USE OF INSULIN (HCC): ICD-10-CM

## 2023-05-17 DIAGNOSIS — M47.22 OSTEOARTHRITIS OF SPINE WITH RADICULOPATHY, CERVICAL REGION: ICD-10-CM

## 2023-05-17 DIAGNOSIS — R60.0 LOWER EXTREMITY EDEMA: Primary | ICD-10-CM

## 2023-05-17 RX ORDER — SPIRONOLACTONE 25 MG/1
25 TABLET ORAL DAILY
Qty: 30 TABLET | Refills: 1 | Status: SHIPPED | OUTPATIENT
Start: 2023-05-17

## 2023-05-17 NOTE — PROGRESS NOTES
Name: Vadim Tapia      : 1940      MRN: [id number]  Encounter Provider: Jame Whalen MD  Encounter Date: 2023   Encounter department: [facility]     1  Lower extremity edema  Assessment & Plan:  Symptomatic care possible side effectsreviewed with the pain recommend the patient stop nifedipine recommend low-salt diet elevate the leg above the level of the heart continue to monitor if no improvement to call the offic    Orders:  -     Basic metabolic panel; Future; Expected date: 2023  -     CBC and differential; Future; Expected date: 2023  -     Potassium; Future; Expected date: 2023    2  Essential hypertension  Assessment & Plan:  Chronic care for control but secondary to lower extremity edema recommend stopping nifedipine  Patient will continue Avalide 300-12 5 mg once a day will add spironolactone   25 mg once a day patient continue metoprolol titrate 25 mg twice a day low-salt diet and possible side effects reviewed recommend to monitor blood pressure    Patient currently on potassium supplement secondary to starting spironolactone and patient already on ARB recommend to stop potassium supplement recheck potassium level in 1 week    Orders:  -     spironolactone (ALDACTONE) 25 mg tablet; Take 1 tablet (25 mg total) by mouth daily  -     Basic metabolic panel; Future; Expected date: 2023  -     CBC and differential; Future; Expected date: 2023  -     Potassium; Future; Expected date: 2023    3  Osteoarthritis of spine with radiculopathy, cervical region  Assessment & Plan:  Chronic symptomatic recommend to increase the gabapentin to take 300 mg at nighttime 200 in the morning 100 at noon proper use and possible side effects reviewed    Orders:  -     Basic metabolic panel; Future; Expected date: 2023  -     CBC and differential; Future; Expected date: 2023  -     Potassium;  Future; Expected date: 06/17/2023    4  Type 2 diabetes mellitus without complication, without long-term current use of insulin ([facility])  Assessment & Plan:    Lab Results   Component Value Date    HGBA1C 6 3 (H) 04/10/2023     Chronic symptomatic well continue current management low-carb diet important lose weight for the patient    Orders:  -     Hemoglobin A1C; Future; Expected date: 07/17/2023  -     Potassium; Future; Expected date: 06/17/2023    5  Mixed hyperlipidemia  -     Lipid Panel with Direct LDL reflex; Future; Expected date: 07/17/2023  -     Potassium; Future; Expected date: 06/17/2023           Subjective      Patient here follow-up with a chronic condition and concerned about swelling bilateral lower extremity she had a swelling on her abdomen getting worse recently no redness no limited range of the motion no fall or trauma reviewed the medication but since she started on the nifedipine the swelling gets worse   Patient also concerned about her neck pain patient known to have history of cervical osteoarthritis she is currently on limited tolerated well but feels is not helping with the current regimen   recent blood work reviewed with the patient      Review of Systems   Constitutional: Negative for chills and fever  HENT: Negative for ear pain and sore throat  Eyes: Negative for pain and visual disturbance  Respiratory: Negative for cough and shortness of breath  Cardiovascular: Positive for leg swelling  Negative for chest pain and palpitations  Gastrointestinal: Negative for abdominal pain, constipation, diarrhea and vomiting  Genitourinary: Negative for dysuria and hematuria  Musculoskeletal: Positive for neck pain  Skin: Negative for color change and rash  Neurological: Negative for seizures and syncope  All other systems reviewed and are negative        Current Outpatient Medications on File Prior to Visit   Medication Sig   • albuterol (Ventolin HFA) 90 mcg/act inhaler Inhale 2 "puffs every 6 (six) hours as needed for wheezing   • allopurinol (ZYLOPRIM) 100 mg tablet Take 1 tablet (100 mg total) by mouth daily   • apixaban (Eliquis) 5 mg Take 1 tablet (5 mg total) by mouth 2 (two) times a day Patient must schedule appointment for farther refills  • atorvastatin (LIPITOR) 10 mg tablet Take 1 tablet (10 mg total) by mouth daily   • cholecalciferol (VITAMIN D3) 1,000 units tablet Take 1 tablet (1,000 Units total) by mouth daily   • Diclofenac Sodium (VOLTAREN) 1 % Apply a small amount on the hand / wrist p r n  pain  No more than twice per day  • Erenumab-aooe 140 MG/ML SOAJ Inject 140 mg under the skin every 30 (thirty) days   • gabapentin (NEURONTIN) 300 mg capsule Take 1 capsule (300 mg total) by mouth 2 (two) times a day   • irbesartan-hydrochlorothiazide (AVALIDE) 300-12 5 MG per tablet Take 1 tablet by mouth daily   • levothyroxine 88 mcg tablet Take 1 tablet (88 mcg total) by mouth daily   • meclizine (ANTIVERT) 25 mg tablet Take 1 tablet (25 mg total) by mouth daily at bedtime   • metFORMIN (GLUCOPHAGE) 500 mg tablet Take 1 tablet (500 mg total) by mouth 2 (two) times a day with meals   • metoprolol tartrate (LOPRESSOR) 25 mg tablet Take 1 tablet (25 mg total) by mouth every 12 (twelve) hours   • vitamin B-12 (VITAMIN B-12) 1,000 mcg tablet Take 1 tablet (1,000 mcg total) by mouth daily       Objective     /74 (BP Location: Left arm, Patient Position: Sitting, Cuff Size: Standard)   Pulse 56   Temp (!) 97 1 °F (36 2 °C) (Tympanic)   Ht 5' 2 99\" (1 6 m)   Wt 77 7 kg (171 lb 3 2 oz)   SpO2 97%   BMI 30 33 kg/m²     Physical Exam  Constitutional:       General: She is not in acute distress  Appearance: She is well-developed  She is not diaphoretic  HENT:      Head: Normocephalic  Right Ear: External ear normal       Left Ear: External ear normal       Nose: No congestion  Mouth/Throat:      Pharynx: No oropharyngeal exudate     Eyes:      General:         " Right eye: No discharge  Left eye: No discharge  Conjunctiva/sclera: Conjunctivae normal    Neck:      Vascular: No JVD  Cardiovascular:      Rate and Rhythm: Normal rate and regular rhythm  Heart sounds: Normal heart sounds  No murmur heard  No gallop  Pulmonary:      Effort: Pulmonary effort is normal  No respiratory distress  Breath sounds: Normal breath sounds  No stridor  No wheezing or rales  Chest:      Chest wall: No tenderness  Abdominal:      General: There is no distension  Palpations: Abdomen is soft  There is no mass  Tenderness: There is no abdominal tenderness  There is no rebound  Musculoskeletal:      Cervical back: Normal range of motion and neck supple  Tenderness present  No signs of trauma  Right lower le+ Edema present  Left lower le+ Edema present  Lymphadenopathy:      Cervical: No cervical adenopathy  Skin:     General: Skin is warm  Findings: No erythema or rash  Neurological:      Mental Status: She is alert and oriented to person, place, and time         Josh Garvin MD

## 2023-05-21 PROBLEM — R60.0 LOWER EXTREMITY EDEMA: Status: ACTIVE | Noted: 2023-05-17

## 2023-05-21 PROBLEM — R60.0 LOWER EXTREMITY EDEMA: Status: ACTIVE | Noted: 2023-05-21

## 2023-05-21 PROBLEM — R60.0 LOWER EXTREMITY EDEMA: Status: ACTIVE | Noted: 2023-05-19

## 2023-05-21 NOTE — ASSESSMENT & PLAN NOTE
Chronic care for control but secondary to lower extremity edema recommend stopping nifedipine  Patient will continue Avalide 300-12 5 mg once a day will add spironolactone   25 mg once a day patient continue metoprolol titrate 25 mg twice a day low-salt diet and possible side effects reviewed recommend to monitor blood pressure    Patient currently on potassium supplement secondary to starting spironolactone and patient already on ARB recommend to stop potassium supplement recheck potassium level in 1 week

## 2023-05-21 NOTE — ASSESSMENT & PLAN NOTE
Lab Results   Component Value Date    HGBA1C 6 3 (H) 04/10/2023     Chronic symptomatic well continue current management low-carb diet important lose weight for the patient

## 2023-05-21 NOTE — ASSESSMENT & PLAN NOTE
Symptomatic care possible side effectsreviewed with the pain recommend the patient stop nifedipine recommend low-salt diet elevate the leg above the level of the heart continue to monitor if no improvement to call the offic

## 2023-05-21 NOTE — ASSESSMENT & PLAN NOTE
Chronic symptomatic recommend to increase the gabapentin to take 300 mg at nighttime 200 in the morning 100 at noon proper use and possible side effects reviewed

## 2023-05-31 DIAGNOSIS — E53.8 LOW VITAMIN B12 LEVEL: ICD-10-CM

## 2023-05-31 RX ORDER — LANOLIN ALCOHOL/MO/W.PET/CERES
1000 CREAM (GRAM) TOPICAL DAILY
Qty: 90 TABLET | Refills: 0 | Status: SHIPPED | OUTPATIENT
Start: 2023-05-31

## 2023-06-21 DIAGNOSIS — E79.0 HYPERURICEMIA: ICD-10-CM

## 2023-06-21 RX ORDER — ALLOPURINOL 100 MG/1
100 TABLET ORAL DAILY
Qty: 90 TABLET | Refills: 0 | Status: SHIPPED | OUTPATIENT
Start: 2023-06-21

## 2023-07-17 ENCOUNTER — TELEPHONE (OUTPATIENT)
Dept: FAMILY MEDICINE CLINIC | Facility: CLINIC | Age: 83
End: 2023-07-17

## 2023-07-17 ENCOUNTER — APPOINTMENT (OUTPATIENT)
Dept: LAB | Facility: HOSPITAL | Age: 83
End: 2023-07-17
Payer: COMMERCIAL

## 2023-07-17 NOTE — TELEPHONE ENCOUNTER
----- Message from Mohamud Landers MD sent at 7/17/2023 12:41 PM EDT -----  Low potassium ,to be recheck

## 2023-07-25 ENCOUNTER — TELEPHONE (OUTPATIENT)
Dept: FAMILY MEDICINE CLINIC | Facility: CLINIC | Age: 83
End: 2023-07-25

## 2023-07-25 ENCOUNTER — OFFICE VISIT (OUTPATIENT)
Dept: FAMILY MEDICINE CLINIC | Facility: CLINIC | Age: 83
End: 2023-07-25
Payer: COMMERCIAL

## 2023-07-25 ENCOUNTER — LAB (OUTPATIENT)
Dept: LAB | Facility: HOSPITAL | Age: 83
End: 2023-07-25
Payer: COMMERCIAL

## 2023-07-25 VITALS
WEIGHT: 168 LBS | SYSTOLIC BLOOD PRESSURE: 130 MMHG | BODY MASS INDEX: 30.91 KG/M2 | DIASTOLIC BLOOD PRESSURE: 70 MMHG | HEIGHT: 62 IN | HEART RATE: 57 BPM | OXYGEN SATURATION: 98 % | TEMPERATURE: 97.8 F

## 2023-07-25 DIAGNOSIS — E66.09 CLASS 1 OBESITY DUE TO EXCESS CALORIES WITH SERIOUS COMORBIDITY AND BODY MASS INDEX (BMI) OF 31.0 TO 31.9 IN ADULT: ICD-10-CM

## 2023-07-25 DIAGNOSIS — E87.6 HYPOKALEMIA: ICD-10-CM

## 2023-07-25 DIAGNOSIS — E11.9 TYPE 2 DIABETES MELLITUS WITHOUT COMPLICATION, WITHOUT LONG-TERM CURRENT USE OF INSULIN (HCC): ICD-10-CM

## 2023-07-25 DIAGNOSIS — E53.8 LOW VITAMIN B12 LEVEL: ICD-10-CM

## 2023-07-25 DIAGNOSIS — E78.2 MIXED HYPERLIPIDEMIA: ICD-10-CM

## 2023-07-25 DIAGNOSIS — E87.6 HYPOKALEMIA: Primary | ICD-10-CM

## 2023-07-25 DIAGNOSIS — Z78.0 POST-MENOPAUSAL: ICD-10-CM

## 2023-07-25 DIAGNOSIS — E04.1 THYROID NODULE: ICD-10-CM

## 2023-07-25 DIAGNOSIS — I10 ESSENTIAL HYPERTENSION: ICD-10-CM

## 2023-07-25 LAB — POTASSIUM SERPL-SCNC: 3.7 MMOL/L (ref 3.5–5.3)

## 2023-07-25 PROCEDURE — 84132 ASSAY OF SERUM POTASSIUM: CPT

## 2023-07-25 PROCEDURE — 99214 OFFICE O/P EST MOD 30 MIN: CPT | Performed by: FAMILY MEDICINE

## 2023-07-25 PROCEDURE — 36415 COLL VENOUS BLD VENIPUNCTURE: CPT

## 2023-07-25 RX ORDER — IRBESARTAN AND HYDROCHLOROTHIAZIDE 300; 12.5 MG/1; MG/1
1 TABLET, FILM COATED ORAL DAILY
Qty: 90 TABLET | Refills: 0 | Status: SHIPPED | OUTPATIENT
Start: 2023-07-25

## 2023-07-25 RX ORDER — ATORVASTATIN CALCIUM 10 MG/1
10 TABLET, FILM COATED ORAL DAILY
Qty: 90 TABLET | Refills: 0 | Status: SHIPPED | OUTPATIENT
Start: 2023-07-25

## 2023-07-25 RX ORDER — LEVOTHYROXINE SODIUM 88 UG/1
88 TABLET ORAL DAILY
Qty: 90 TABLET | Refills: 0 | Status: SHIPPED | OUTPATIENT
Start: 2023-07-25

## 2023-07-25 RX ORDER — LANOLIN ALCOHOL/MO/W.PET/CERES
1000 CREAM (GRAM) TOPICAL DAILY
Qty: 90 TABLET | Refills: 0 | Status: SHIPPED | OUTPATIENT
Start: 2023-07-25

## 2023-07-25 NOTE — PROGRESS NOTES
Name: Matt Rubio      : 1940      MRN: [id number]  Encounter Provider: Sukhdev Puente MD  Encounter Date: 2023   Encounter department: [address] [place] [address]     1. Hypokalemia  Assessment & Plan:  Recent blood work is 2023 potassium level 3.3 patient already on 2 medication retain potassium I will hold on any replacement until we recheck her blood work to rule out lab error    Orders:  -     Potassium; Future    2. Type 2 diabetes mellitus without complication, without long-term current use of insulin ([facility])  Assessment & Plan:    Lab Results   Component Value Date    HGBA1C 6.5 (H) 2023   Chronic asymptomatic hemoglobin A1c 6.5 we will continue current management for diabetes patient already on ARB and statin      3. Low vitamin B12 level  Assessment & Plan:  Chronic asymptomatic patient on B12 supplement we will refill the medication today    Orders:  -     vitamin B-12 (VITAMIN B-12) 1,000 mcg tablet; Take 1 tablet (1,000 mcg total) by mouth daily    4. Mixed hyperlipidemia  Assessment & Plan:  Chronic asymptomatic LDL therapeutic in diabetic patient continue current dose of atorvastatin 10 mg once a day    Orders:  -     atorvastatin (LIPITOR) 10 mg tablet; Take 1 tablet (10 mg total) by mouth daily    5. Essential hypertension  -     irbesartan-hydrochlorothiazide (AVALIDE) 300-12.5 MG per tablet; Take 1 tablet by mouth daily    6. Thyroid nodule  -     levothyroxine 88 mcg tablet; Take 1 tablet (88 mcg total) by mouth daily    7. Class 1 obesity due to excess calories with serious comorbidity and body mass index (BMI) of 31.0 to 31.9 in adult  Assessment & Plan:  BMI today 31.23 discussed with patient portion control low-carb low-fat diet and increase physical activity      8.  Post-menopausal  Assessment & Plan:  Patient postmenopausal obesity history of vitamin D deficiency and diabetic multiple risk factor for osteoporosis recommend screening by DEXA scan she agreed we will order today    Orders:  -     DXA bone density spine hip and pelvis; Future; Expected date: 07/25/2023      BMI Counseling: Body mass index is 31.23 kg/m². The BMI is above normal. Nutrition recommendations include decreasing portion sizes, decreasing fast food intake and limiting drinks that contain sugar. Exercise recommendations include exercising 3-5 times per week. No pharmacotherapy was ordered. Rationale for BMI follow-up plan is due to patient being overweight or obese. Depression Screening and Follow-up Plan: Patient was screened for depression during today's encounter. They screened negative with a PHQ-2 score of 0. Subjective      Patient here with her daughter for follow-up of the chronic condition patient compliant with the medication tolerated well without side effect no new concerns recent blood work reviewed with the patient    Review of Systems   Constitutional: Negative for chills and fever. HENT: Negative for ear pain and sore throat. Eyes: Negative for pain and visual disturbance. Respiratory: Negative for cough and shortness of breath. Cardiovascular: Negative for chest pain and palpitations. Gastrointestinal: Negative for abdominal pain, constipation, diarrhea and vomiting. Genitourinary: Negative for dysuria and hematuria. Musculoskeletal: Negative for arthralgias and back pain. Skin: Negative for color change and rash. Neurological: Negative for seizures and syncope. All other systems reviewed and are negative. Current Outpatient Medications on File Prior to Visit   Medication Sig   • albuterol (Ventolin HFA) 90 mcg/act inhaler Inhale 2 puffs every 6 (six) hours as needed for wheezing   • allopurinol (ZYLOPRIM) 100 mg tablet Take 1 tablet (100 mg total) by mouth daily   • apixaban (Eliquis) 5 mg Take 1 tablet (5 mg total) by mouth 2 (two) times a day Patient must schedule appointment for farther refills. • cholecalciferol (VITAMIN D3) 1,000 units tablet Take 1 tablet (1,000 Units total) by mouth daily   • Diclofenac Sodium (VOLTAREN) 1 % Apply a small amount on the hand / wrist p.r.n. pain. No more than twice per day. • Erenumab-aooe 140 MG/ML SOAJ Inject 140 mg under the skin every 30 (thirty) days   • gabapentin (NEURONTIN) 300 mg capsule Take 1 capsule (300 mg total) by mouth 2 (two) times a day   • meclizine (ANTIVERT) 25 mg tablet Take 1 tablet (25 mg total) by mouth daily at bedtime   • metFORMIN (GLUCOPHAGE) 500 mg tablet Take 1 tablet (500 mg total) by mouth 2 (two) times a day with meals   • metoprolol tartrate (LOPRESSOR) 25 mg tablet Take 1 tablet (25 mg total) by mouth every 12 (twelve) hours   • spironolactone (ALDACTONE) 25 mg tablet Take 1 tablet (25 mg total) by mouth daily   • [DISCONTINUED] atorvastatin (LIPITOR) 10 mg tablet Take 1 tablet (10 mg total) by mouth daily   • [DISCONTINUED] irbesartan-hydrochlorothiazide (AVALIDE) 300-12.5 MG per tablet Take 1 tablet by mouth daily   • [DISCONTINUED] levothyroxine 88 mcg tablet Take 1 tablet (88 mcg total) by mouth daily   • [DISCONTINUED] vitamin B-12 (VITAMIN B-12) 1,000 mcg tablet Take 1 tablet (1,000 mcg total) by mouth daily       Objective     /70 (BP Location: Left arm, Patient Position: Sitting)   Pulse 57   Temp 97.8 °F (36.6 °C) (Tympanic)   Ht 5' 1.5" (1.562 m)   Wt 76.2 kg (168 lb)   SpO2 98%   Breastfeeding No   BMI 31.23 kg/m²     Physical Exam  Vitals and nursing note reviewed. Constitutional:       General: She is not in acute distress. Appearance: She is well-developed. She is not diaphoretic. HENT:      Head: Normocephalic. Right Ear: External ear normal.      Left Ear: External ear normal.   Eyes:      General:         Right eye: No discharge. Left eye: No discharge. Conjunctiva/sclera: Conjunctivae normal.   Neck:      Vascular: No JVD.    Cardiovascular:      Rate and Rhythm: Normal rate and regular rhythm. Heart sounds: Murmur heard. No gallop. Pulmonary:      Effort: Pulmonary effort is normal. No respiratory distress. Breath sounds: Normal breath sounds. No stridor. No wheezing or rales. Chest:      Chest wall: No tenderness. Abdominal:      General: There is no distension. Palpations: Abdomen is soft. There is no mass. Tenderness: There is no abdominal tenderness. There is no rebound. Musculoskeletal:         General: No tenderness. Cervical back: Normal range of motion and neck supple. Lymphadenopathy:      Cervical: No cervical adenopathy. Skin:     General: Skin is warm. Findings: No erythema or rash. Neurological:      Mental Status: She is alert and oriented to person, place, and time.        Dakota Szymanski MD

## 2023-07-25 NOTE — ASSESSMENT & PLAN NOTE
Lab Results   Component Value Date    HGBA1C 6.5 (H) 07/17/2023   Chronic asymptomatic hemoglobin A1c 6.5 we will continue current management for diabetes patient already on ARB and statin

## 2023-07-25 NOTE — ASSESSMENT & PLAN NOTE
Recent blood work is July 17, 2023 potassium level 3.3 patient already on 2 medication retain potassium I will hold on any replacement until we recheck her blood work to rule out lab error

## 2023-07-25 NOTE — ASSESSMENT & PLAN NOTE
BMI today 31.23 discussed with patient portion control low-carb low-fat diet and increase physical activity

## 2023-07-25 NOTE — ASSESSMENT & PLAN NOTE
Patient postmenopausal obesity history of vitamin D deficiency and diabetic multiple risk factor for osteoporosis recommend screening by DEXA scan she agreed we will order today

## 2023-07-25 NOTE — ASSESSMENT & PLAN NOTE
Chronic asymptomatic LDL therapeutic in diabetic patient continue current dose of atorvastatin 10 mg once a day

## 2023-08-15 DIAGNOSIS — I10 ESSENTIAL HYPERTENSION: ICD-10-CM

## 2023-09-22 DIAGNOSIS — E79.0 HYPERURICEMIA: ICD-10-CM

## 2023-09-22 RX ORDER — ALLOPURINOL 100 MG/1
100 TABLET ORAL DAILY
Qty: 90 TABLET | Refills: 0 | Status: SHIPPED | OUTPATIENT
Start: 2023-09-22

## 2023-10-09 DIAGNOSIS — U07.1 COVID-19 VIRUS INFECTION: ICD-10-CM

## 2023-10-09 RX ORDER — MELATONIN
1000 DAILY
Qty: 90 TABLET | Refills: 0 | Status: SHIPPED | OUTPATIENT
Start: 2023-10-09

## 2023-10-12 DIAGNOSIS — I10 ESSENTIAL HYPERTENSION: ICD-10-CM

## 2023-10-12 RX ORDER — SPIRONOLACTONE 25 MG/1
25 TABLET ORAL DAILY
Qty: 30 TABLET | Refills: 2 | Status: SHIPPED | OUTPATIENT
Start: 2023-10-12

## 2023-10-20 DIAGNOSIS — I10 ESSENTIAL HYPERTENSION: ICD-10-CM

## 2023-10-20 DIAGNOSIS — E78.2 MIXED HYPERLIPIDEMIA: ICD-10-CM

## 2023-10-20 RX ORDER — ATORVASTATIN CALCIUM 10 MG/1
10 TABLET, FILM COATED ORAL DAILY
Qty: 90 TABLET | Refills: 0 | Status: SHIPPED | OUTPATIENT
Start: 2023-10-20

## 2023-10-20 RX ORDER — IRBESARTAN AND HYDROCHLOROTHIAZIDE 300; 12.5 MG/1; MG/1
1 TABLET, FILM COATED ORAL DAILY
Qty: 90 TABLET | Refills: 0 | Status: SHIPPED | OUTPATIENT
Start: 2023-10-20

## 2023-10-23 DIAGNOSIS — E04.1 THYROID NODULE: ICD-10-CM

## 2023-10-23 RX ORDER — LEVOTHYROXINE SODIUM 88 UG/1
88 TABLET ORAL DAILY
Qty: 30 TABLET | Refills: 1 | Status: SHIPPED | OUTPATIENT
Start: 2023-10-23

## 2023-10-27 ENCOUNTER — HOSPITAL ENCOUNTER (OUTPATIENT)
Dept: BONE DENSITY | Facility: CLINIC | Age: 83
Discharge: HOME | End: 2023-10-27
Payer: COMMERCIAL

## 2023-10-27 DIAGNOSIS — Z78.0 POST-MENOPAUSAL: ICD-10-CM

## 2023-10-27 PROCEDURE — 77080 DXA BONE DENSITY AXIAL: CPT

## 2023-10-30 ENCOUNTER — HOSPITAL ENCOUNTER (OUTPATIENT)
Dept: BONE DENSITY | Facility: CLINIC | Age: 83
Discharge: HOME | End: 2023-10-30

## 2023-10-30 DIAGNOSIS — Z13.820 SCREENING FOR OSTEOPOROSIS: ICD-10-CM

## 2023-10-30 PROBLEM — E11.40 TYPE 2 DIABETES, CONTROLLED, WITH NEUROPATHY (HCC): Status: RESOLVED | Noted: 2022-02-08 | Resolved: 2023-10-30

## 2023-11-03 ENCOUNTER — OFFICE VISIT (OUTPATIENT)
Dept: CARDIOLOGY CLINIC | Facility: CLINIC | Age: 83
End: 2023-11-03
Payer: COMMERCIAL

## 2023-11-03 VITALS
SYSTOLIC BLOOD PRESSURE: 134 MMHG | HEIGHT: 61 IN | WEIGHT: 168.8 LBS | BODY MASS INDEX: 31.87 KG/M2 | DIASTOLIC BLOOD PRESSURE: 80 MMHG | HEART RATE: 51 BPM

## 2023-11-03 DIAGNOSIS — R60.0 LOWER EXTREMITY EDEMA: ICD-10-CM

## 2023-11-03 DIAGNOSIS — I25.10 CHRONIC CORONARY ARTERY DISEASE: ICD-10-CM

## 2023-11-03 DIAGNOSIS — N18.31 CHRONIC KIDNEY DISEASE, STAGE 3A (HCC): ICD-10-CM

## 2023-11-03 DIAGNOSIS — R06.02 SOB (SHORTNESS OF BREATH): ICD-10-CM

## 2023-11-03 DIAGNOSIS — E11.42 TYPE 2 DIABETES MELLITUS WITH DIABETIC POLYNEUROPATHY, WITHOUT LONG-TERM CURRENT USE OF INSULIN (HCC): ICD-10-CM

## 2023-11-03 DIAGNOSIS — E78.2 MIXED HYPERLIPIDEMIA: ICD-10-CM

## 2023-11-03 DIAGNOSIS — Z79.01 LONG TERM CURRENT USE OF ANTICOAGULANT THERAPY: ICD-10-CM

## 2023-11-03 DIAGNOSIS — I10 ESSENTIAL HYPERTENSION: ICD-10-CM

## 2023-11-03 DIAGNOSIS — I48.0 PAROXYSMAL ATRIAL FIBRILLATION (HCC): Primary | ICD-10-CM

## 2023-11-03 DIAGNOSIS — E66.09 CLASS 1 OBESITY DUE TO EXCESS CALORIES WITH SERIOUS COMORBIDITY AND BODY MASS INDEX (BMI) OF 31.0 TO 31.9 IN ADULT: ICD-10-CM

## 2023-11-03 LAB
LEFT EYE DIABETIC RETINOPATHY: NORMAL
RIGHT EYE DIABETIC RETINOPATHY: NORMAL
SEVERITY (EYE EXAM): NORMAL

## 2023-11-03 PROCEDURE — 93000 ELECTROCARDIOGRAM COMPLETE: CPT | Performed by: INTERNAL MEDICINE

## 2023-11-03 PROCEDURE — 99214 OFFICE O/P EST MOD 30 MIN: CPT | Performed by: INTERNAL MEDICINE
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
## 2023-11-06 DIAGNOSIS — E11.9 TYPE 2 DIABETES MELLITUS WITHOUT COMPLICATION, WITHOUT LONG-TERM CURRENT USE OF INSULIN (HCC): ICD-10-CM

## 2023-11-13 ENCOUNTER — OFFICE VISIT (OUTPATIENT)
Dept: FAMILY MEDICINE CLINIC | Facility: CLINIC | Age: 83
End: 2023-11-13
Payer: COMMERCIAL

## 2023-11-13 VITALS
WEIGHT: 168 LBS | TEMPERATURE: 96.5 F | BODY MASS INDEX: 30.91 KG/M2 | DIASTOLIC BLOOD PRESSURE: 80 MMHG | HEART RATE: 76 BPM | OXYGEN SATURATION: 99 % | SYSTOLIC BLOOD PRESSURE: 134 MMHG | HEIGHT: 62 IN

## 2023-11-13 DIAGNOSIS — G89.29 CHRONIC PAIN OF RIGHT KNEE: ICD-10-CM

## 2023-11-13 DIAGNOSIS — Z23 NEED FOR INFLUENZA VACCINATION: ICD-10-CM

## 2023-11-13 DIAGNOSIS — M79.2 NEURALGIA: ICD-10-CM

## 2023-11-13 DIAGNOSIS — R04.0 BLEEDING NOSE: ICD-10-CM

## 2023-11-13 DIAGNOSIS — Z00.00 MEDICARE ANNUAL WELLNESS VISIT, SUBSEQUENT: Primary | ICD-10-CM

## 2023-11-13 DIAGNOSIS — Z12.31 SCREENING MAMMOGRAM FOR BREAST CANCER: ICD-10-CM

## 2023-11-13 DIAGNOSIS — E11.9 TYPE 2 DIABETES MELLITUS WITHOUT COMPLICATION, WITHOUT LONG-TERM CURRENT USE OF INSULIN (HCC): ICD-10-CM

## 2023-11-13 DIAGNOSIS — E78.2 MIXED HYPERLIPIDEMIA: ICD-10-CM

## 2023-11-13 DIAGNOSIS — I10 ESSENTIAL HYPERTENSION: ICD-10-CM

## 2023-11-13 DIAGNOSIS — E66.09 CLASS 1 OBESITY DUE TO EXCESS CALORIES WITH SERIOUS COMORBIDITY AND BODY MASS INDEX (BMI) OF 30.0 TO 30.9 IN ADULT: ICD-10-CM

## 2023-11-13 DIAGNOSIS — M25.561 CHRONIC PAIN OF RIGHT KNEE: ICD-10-CM

## 2023-11-13 DIAGNOSIS — Z23 ENCOUNTER FOR IMMUNIZATION: ICD-10-CM

## 2023-11-13 PROCEDURE — G0008 ADMIN INFLUENZA VIRUS VAC: HCPCS

## 2023-11-13 PROCEDURE — G0444 DEPRESSION SCREEN ANNUAL: HCPCS | Performed by: FAMILY MEDICINE

## 2023-11-13 PROCEDURE — 90662 IIV NO PRSV INCREASED AG IM: CPT

## 2023-11-13 PROCEDURE — G0439 PPPS, SUBSEQ VISIT: HCPCS | Performed by: FAMILY MEDICINE

## 2023-11-13 PROCEDURE — 99214 OFFICE O/P EST MOD 30 MIN: CPT | Performed by: FAMILY MEDICINE

## 2023-11-13 NOTE — PATIENT INSTRUCTIONS
Medicare Preventive Visit Patient Instructions  Thank you for completing your Welcome to Medicare Visit or Medicare Annual Wellness Visit today. Your next wellness visit will be due in one year (11/13/2024). The screening/preventive services that you may require over the next 5-10 years are detailed below. Some tests may not apply to you based off risk factors and/or age. Screening tests ordered at today's visit but not completed yet may show as past due. Also, please note that scanned in results may not display below. Preventive Screenings:  Service Recommendations Previous Testing/Comments   Colorectal Cancer Screening  * Colonoscopy    * Fecal Occult Blood Test (FOBT)/Fecal Immunochemical Test (FIT)  * Fecal DNA/Cologuard Test  * Flexible Sigmoidoscopy Age: 43-73 years old   Colonoscopy: every 10 years (may be performed more frequently if at higher risk)  OR  FOBT/FIT: every 1 year  OR  Cologuard: every 3 years  OR  Sigmoidoscopy: every 5 years  Screening may be recommended earlier than age 39 if at higher risk for colorectal cancer. Also, an individualized decision between you and your healthcare provider will decide whether screening between the ages of 77-80 would be appropriate. Colonoscopy: 01/29/2018  FOBT/FIT: Not on file  Cologuard: Not on file  Sigmoidoscopy: Not on file          Breast Cancer Screening Age: 36 years old  Frequency: every 1-2 years  Not required if history of left and right mastectomy Mammogram: 09/20/2022    History Breast Cancer   Cervical Cancer Screening Between the ages of 21-29, pap smear recommended once every 3 years. Between the ages of 32-69, can perform pap smear with HPV co-testing every 5 years.    Recommendations may differ for women with a history of total hysterectomy, cervical cancer, or abnormal pap smears in past. Pap Smear: Not on file    Screening Not Indicated   Hepatitis C Screening Once for adults born between 1945 and 1965  More frequently in patients at high risk for Hepatitis C Hep C Antibody: 11/12/2018    Screening Current   Diabetes Screening 1-2 times per year if you're at risk for diabetes or have pre-diabetes Fasting glucose: 118 mg/dL (7/17/2023)  A1C: 6.5 % (7/17/2023)  Screening Not Indicated  History Diabetes   Cholesterol Screening Once every 5 years if you don't have a lipid disorder. May order more often based on risk factors. Lipid panel: 07/17/2023    Screening Not Indicated  History Lipid Disorder     Other Preventive Screenings Covered by Medicare:  Abdominal Aortic Aneurysm (AAA) Screening: covered once if your at risk. You're considered to be at risk if you have a family history of AAA. Lung Cancer Screening: covers low dose CT scan once per year if you meet all of the following conditions: (1) Age 48-67; (2) No signs or symptoms of lung cancer; (3) Current smoker or have quit smoking within the last 15 years; (4) You have a tobacco smoking history of at least 20 pack years (packs per day multiplied by number of years you smoked); (5) You get a written order from a healthcare provider. Glaucoma Screening: covered annually if you're considered high risk: (1) You have diabetes OR (2) Family history of glaucoma OR (3)  aged 48 and older OR (3)  American aged 72 and older  Osteoporosis Screening: covered every 2 years if you meet one of the following conditions: (1) You're estrogen deficient and at risk for osteoporosis based off medical history and other findings; (2) Have a vertebral abnormality; (3) On glucocorticoid therapy for more than 3 months; (4) Have primary hyperparathyroidism; (5) On osteoporosis medications and need to assess response to drug therapy. Last bone density test (DXA Scan): 10/30/2023. HIV Screening: covered annually if you're between the age of 14-79. Also covered annually if you are younger than 13 and older than 72 with risk factors for HIV infection.  For pregnant patients, it is covered up to 3 times per pregnancy. Immunizations:  Immunization Recommendations   Influenza Vaccine Annual influenza vaccination during flu season is recommended for all persons aged >= 6 months who do not have contraindications   Pneumococcal Vaccine   * Pneumococcal conjugate vaccine = PCV13 (Prevnar 13), PCV15 (Vaxneuvance), PCV20 (Prevnar 20)  * Pneumococcal polysaccharide vaccine = PPSV23 (Pneumovax) Adults 99-95 yo with certain risk factors or if 69+ yo  If never received any pneumonia vaccine: recommend Prevnar 20 (PCV20)  Give PCV20 if previously received 1 dose of PCV13 or PPSV23   Hepatitis B Vaccine 3 dose series if at intermediate or high risk (ex: diabetes, end stage renal disease, liver disease)   Respiratory syncytial virus (RSV) Vaccine - COVERED BY MEDICARE PART D  * RSVPreF3 (Arexvy) CDC recommends that adults 61years of age and older may receive a single dose of RSV vaccine using shared clinical decision-making (SCDM)   Tetanus (Td) Vaccine - COST NOT COVERED BY MEDICARE PART B Following completion of primary series, a booster dose should be given every 10 years to maintain immunity against tetanus. Td may also be given as tetanus wound prophylaxis. Tdap Vaccine - COST NOT COVERED BY MEDICARE PART B Recommended at least once for all adults. For pregnant patients, recommended with each pregnancy. Shingles Vaccine (Shingrix) - COST NOT COVERED BY MEDICARE PART B  2 shot series recommended in those 19 years and older who have or will have weakened immune systems or those 50 years and older     Health Maintenance Due:      Topic Date Due   • Breast Cancer Screening: Mammogram  09/20/2023   • DXA SCAN  10/27/2025   • Hepatitis C Screening  Completed     Immunizations Due:      Topic Date Due   • COVID-19 Vaccine (3 - Moderna series) 04/20/2021   • Influenza Vaccine (1) 09/01/2023     Advance Directives   What are advance directives?   Advance directives are legal documents that state your wishes and plans for medical care. These plans are made ahead of time in case you lose your ability to make decisions for yourself. Advance directives can apply to any medical decision, such as the treatments you want, and if you want to donate organs. What are the types of advance directives? There are many types of advance directives, and each state has rules about how to use them. You may choose a combination of any of the following:  Living will: This is a written record of the treatment you want. You can also choose which treatments you do not want, which to limit, and which to stop at a certain time. This includes surgery, medicine, IV fluid, and tube feedings. Durable power of  for healthcare [facility]): This is a written record that states who you want to make healthcare choices for you when you are unable to make them for yourself. This person, called a proxy, is usually a family member or a friend. You may choose more than 1 proxy. Do not resuscitate (DNR) order:  A DNR order is used in case your heart stops beating or you stop breathing. It is a request not to have certain forms of treatment, such as CPR. A DNR order may be included in other types of advance directives. Medical directive: This covers the care that you want if you are in a coma, near death, or unable to make decisions for yourself. You can list the treatments you want for each condition. Treatment may include pain medicine, surgery, blood transfusions, dialysis, IV or tube feedings, and a ventilator (breathing machine). Values history: This document has questions about your views, beliefs, and how you feel and think about life. This information can help others choose the care that you would choose. Why are advance directives important? An advance directive helps you control your care. Although spoken wishes may be used, it is better to have your wishes written down. Spoken wishes can be misunderstood, or not followed.  Treatments may be given even if you do not want them. An advance directive may make it easier for your family to make difficult choices about your care. Weight Management   Why it is important to manage your weight:  Being overweight increases your risk of health conditions such as heart disease, high blood pressure, type 2 diabetes, and certain types of cancer. It can also increase your risk for osteoarthritis, sleep apnea, and other respiratory problems. Aim for a slow, steady weight loss. Even a small amount of weight loss can lower your risk of health problems. How to lose weight safely:  A safe and healthy way to lose weight is to eat fewer calories and get regular exercise. You can lose up about 1 pound a week by decreasing the number of calories you eat by 500 calories each day. Healthy meal plan for weight management:  A healthy meal plan includes a variety of foods, contains fewer calories, and helps you stay healthy. A healthy meal plan includes the following:  Eat whole-grain foods more often. A healthy meal plan should contain fiber. Fiber is the part of grains, fruits, and vegetables that is not broken down by your body. Whole-grain foods are healthy and provide extra fiber in your diet. Some examples of whole-grain foods are whole-wheat breads and pastas, oatmeal, brown rice, and bulgur. Eat a variety of vegetables every day. Include dark, leafy greens such as spinach, kale, nahum greens, and mustard greens. Eat yellow and orange vegetables such as carrots, sweet potatoes, and winter squash. Eat a variety of fruits every day. Choose fresh or canned fruit (canned in its own juice or light syrup) instead of juice. Fruit juice has very little or no fiber. Eat low-fat dairy foods. Drink fat-free (skim) milk or 1% milk. Eat fat-free yogurt and low-fat cottage cheese. Try low-fat cheeses such as mozzarella and other reduced-fat cheeses. Choose meat and other protein foods that are low in fat.   Choose beans or other legumes such as split peas or lentils. Choose fish, skinless poultry (chicken or turkey), or lean cuts of red meat (beef or pork). Before you cook meat or poultry, cut off any visible fat. Use less fat and oil. Try baking foods instead of frying them. Add less fat, such as margarine, sour cream, regular salad dressing and mayonnaise to foods. Eat fewer high-fat foods. Some examples of high-fat foods include french fries, doughnuts, ice cream, and cakes. Eat fewer sweets. Limit foods and drinks that are high in sugar. This includes candy, cookies, regular soda, and sweetened drinks. Exercise:  Exercise at least 30 minutes per day on most days of the week. Some examples of exercise include walking, biking, dancing, and swimming. You can also fit in more physical activity by taking the stairs instead of the elevator or parking farther away from stores. Ask your healthcare provider about the best exercise plan for you. © Copyright [employer] 2018 Information is for End User's use only and may not be sold, redistributed or otherwise used for commercial purposes.  All illustrations and images included in CareNotes® are the copyrighted property of [employer] or [address] [place] [address]

## 2023-11-13 NOTE — ASSESSMENT & PLAN NOTE
Symptomatic has been going on for more than 3 months on and off no history of trauma positive limited range of motion with physical exam patient on anticoagulation recommend to avoid NSAID Tylenol for the pain apply ice 20 minutes 3 times a day plan for x-ray of the right knee discussed important lose weight

## 2023-11-13 NOTE — ASSESSMENT & PLAN NOTE
Improving the patient BMI down from 21-12.7 discussed with patient portion control low-carb low-fat diet increase physical activity

## 2023-11-13 NOTE — PROGRESS NOTES
Assessment and Plan:     Problem List Items Addressed This Visit     Essential hypertension     At the time of the arrival blood pressure was 150/90 patient was rushing to come to the office she was worried she cannot relate I rechecked her blood pressure at the end of the visit 134/80 patient was recently at the cardiology almost 10 days ago and the blood pressure also 134/80 patient compliant with the medication recommend to continue current management low-salt diet important lose weight reviewed         Relevant Orders    Comprehensive metabolic panel    Class 1 obesity due to excess calories with serious comorbidity and body mass index (BMI) of 30.0 to 30.9 in adult     Improving the patient BMI down from 21-12.7 discussed with patient portion control low-carb low-fat diet increase physical activity         Relevant Orders    CBC and differential    Comprehensive metabolic panel    TSH, 3rd generation with Free T4 reflex    Mixed hyperlipidemia    Relevant Orders    Lipid panel    Neuralgia    Medicare annual wellness visit, subsequent - Primary     Advice and education were given regarding nutrition, aerobic exercises, weight-bearing exercises, cardiovascular risk reduction, fall risk reduction, and age-appropriate supplements. The patient was counseled regarding instructions for management, risk factor reductions, prognosis, risks and benefits of treatment options, patient and family education, and importance of compliance with treatment.          Bleeding nose     Patient continues to have a nosebleed recommend using Estefani Kil fire in the room use of Vaseline and I will refer her to ENT previously         Chronic pain of right knee     Symptomatic has been going on for more than 3 months on and off no history of trauma positive limited range of motion with physical exam patient on anticoagulation recommend to avoid NSAID Tylenol for the pain apply ice 20 minutes 3 times a day plan for x-ray of the right knee discussed important lose weight         Relevant Orders    XR knee 3 vw right non injury   Other Visit Diagnoses     Encounter for immunization        Benefit versus side effect of flu shot discussed with the patient she tolerated well    Relevant Orders    FLUZONE HIGH-DOSE: influenza vaccine, high-dose, preservative-free 0.7 mL (Completed)    Need for influenza vaccination        Relevant Orders    FLUZONE HIGH-DOSE: influenza vaccine, high-dose, preservative-free 0.7 mL (Completed)    Type 2 diabetes mellitus without complication, without long-term current use of insulin ([address])        Relevant Orders    Hemoglobin A1C    Albumin / creatinine urine ratio    Screening mammogram for breast cancer        Relevant Orders    Mammo screening bilateral w 3d & cad        BMI Counseling: Body mass index is 30.73 kg/m². The BMI is above normal. Nutrition recommendations include decreasing portion sizes, decreasing fast food intake and limiting drinks that contain sugar. Exercise recommendations include exercising 3-5 times per week. No pharmacotherapy was ordered. Rationale for BMI follow-up plan is due to patient being overweight or obese. Depression Screening and Follow-up Plan: Patient was screened for depression during today's encounter. They screened negative with a PHQ-2 score of 0. Preventive health issues were discussed with patient, and age appropriate screening tests were ordered as noted in patient's After Visit Summary. Personalized health advice and appropriate referrals for health education or preventive services given if needed, as noted in patient's After Visit Summary. History of Present Illness:     Patient presents for a Medicare Wellness Visit    Patient here for Medicare exam and follow-up. Patient had also nosebleed.   She described it as spotting and sometimes to happen in the morning denies any head trauma patient notices since she started turning the heat and dryness in the area she is still on blood thinner for A-fib that she well notices just in the morning some morning well every day  Patient concerned about the right knee pain she described it as achy rated as 6/10 across in the knee well radiation no recent fall or trauma worse when she go up and down the steps       Patient Care Team:  River Jimenez MD as PCP - Katherin Thorpe MD (Radiation Oncology)  Yris Ramires MD (Cardiology)  Viviana Martinez MD (General Surgery)  Arthur Kent PA-C (Neurology)     Review of Systems:     Review of Systems   Constitutional:  Negative for chills and fever. HENT:  Positive for nosebleeds. Negative for ear pain and sore throat. Eyes:  Negative for pain and visual disturbance. Respiratory:  Negative for cough and shortness of breath. Cardiovascular:  Negative for chest pain and palpitations. Gastrointestinal:  Negative for abdominal pain and vomiting. Genitourinary:  Negative for dysuria and hematuria. Musculoskeletal:  Negative for arthralgias and back pain. Skin:  Negative for color change and rash. Neurological:  Negative for tremors, seizures, syncope, weakness and numbness. Psychiatric/Behavioral:  Negative for behavioral problems. All other systems reviewed and are negative.        Problem List:     Patient Active Problem List   Diagnosis   • Paroxysmal atrial fibrillation (HCC)   • Chronic coronary artery disease   • Cervical spinal stenosis   • Closed fracture of maxilla with routine healing   • Osteoarthritis of spine with radiculopathy, cervical region   • Degeneration of intervertebral disc   • Dizziness and giddiness   • Headache   • Hypokalemia   • Fracture of multiple ribs   • Ductal carcinoma in situ of right breast   • Long term current use of anticoagulant therapy   • Latent tuberculosis   • Low back pain   • Noncompliance with treatment   • Primary osteoarthritis of right hand   • Osteoarthritis of knee   • Type 2 diabetes mellitus with diabetic polyneuropathy, without long-term current use of insulin (HCC)   • Thyroid nodule   • Vertigo   • Vitamin D deficiency   • Essential hypertension   • Class 1 obesity due to excess calories with serious comorbidity and body mass index (BMI) of 30.0 to 30.9 in adult   • Mixed hyperlipidemia   • Neuralgia   • Bunion of unspecified foot   • Malignant neoplasm of central portion of right breast in female, estrogen receptor positive    • Pain of left lower extremity   • 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   • Postoperative hypothyroidism   • Paresthesias in right hand   • Chronic daily headache   • Carpal tunnel syndrome of left wrist   • Supraorbital neuralgia   • Medicare annual wellness visit, subsequent   • Osteoarthritis of both wrists   • Osteopenia   • Polyarthralgia   • Hyperuricemia   • Carpal tunnel syndrome of right wrist   • 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   • Allergic reaction   • 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 ([address])   • Chronic kidney disease, stage 3a ([address])   • COVID-19 virus infection   • SOB (shortness of breath)   • Low vitamin B12 level   • Neck pain   • Other headache syndrome   • Right wrist pain   • Financial difficulties   • Osteoarthritis of spine with radiculopathy, lumbosacral region   • Migraine without aura and without status migrainosus, not intractable   • Bilateral bunions   • Bleeding nose   • Lower extremity edema   • Post-menopausal   • Chronic pain of right knee      Past Medical and Surgical History:     Past Medical History:   Diagnosis Date   • Allergic rhinitis    • Arthritis    • Atrial fibrillation (HCC)    • Breast cancer ([address])    • Bug bite 5/21/2019   • Cataracts, bilateral    • Chronic headaches     pulsatile daily headaches   • Chronic post-traumatic headache, not intractable 9/8/2020   • Colitis    • Coronary artery disease    • Diabetes mellitus ([address])    • Disease of thyroid gland    • DJD (degenerative joint disease), cervical    • Encounter for well adult exam with abnormal findings 10/14/2019   • Facial neuralgia     left frontal facial post traumatic neuralgia   • Fibromyalgia    • Glaucoma    • History of external beam radiation therapy     8/16/18 to 9/14/2018 Right breast   • Hypertension    • Hypokalemia    • Hypovitaminosis D    • Lupus ([address]) 7/19/2018   • Obesity    • Osteoarthritis    • Pre-op examination 2/25/2019   • Pre-operative clearance 8/21/2018   • Prediabetes    • Rib pain on right side 2/25/2019   • SDH (subdural hematoma) ([address]) 1/31/2017   • Sleep apnea     no cpap   • Stage 3a chronic kidney disease ([address]) 5/10/2022   • Syncope and collapse 12/1/2018   • Vertigo      Past Surgical History:   Procedure Laterality Date   • BREAST BIOPSY     • BREAST LUMPECTOMY     • CHOLECYSTECTOMY     • COLONOSCOPY  2013   • HYSTERECTOMY     • MAMMO NEEDLE LOCALIZATION RIGHT (ALL INC) Right 6/21/2018   • MAMMO STEREOTACTIC BREAST BIOPSY RIGHT (ALL INC) Right 5/9/2018   • THYROIDECTOMY N/A 11/21/2019    Procedure: THYROIDECTOMY, total;  Surgeon: Ascencion Hastings MD;  Location: [facility];  Service: Surgical Oncology   • TONSILLECTOMY     • US GUIDED BREAST BIOPSY RIGHT COMPLETE Right 5/9/2018   • US GUIDED THYROID BIOPSY  6/12/2019   • US GUIDED THYROID BIOPSY  9/13/2019      Family History:     Family History   Problem Relation Age of Onset   • Heart attack Sister    • Hypertension Family    • Diabetes Family    • Stroke Family    • Migraines Family    • Breast cancer Daughter 28      Social History:     Social History     Socioeconomic History   • Marital status: Single     Spouse name: None   • Number of children: None   • Years of education: None   • Highest education level: None   Occupational History   • None   Tobacco Use   • Smoking status: Never     Passive exposure: Never   • Smokeless tobacco: Never   • Tobacco comments:     no smoke exposure   Vaping Use   • Vaping Use: Never used   Substance and Sexual Activity   • Alcohol use: Yes   • Drug use: No   • Sexual activity: Not Currently     Partners: Male   Other Topics Concern   • None   Social History Narrative   • None     Social Determinants of Health     Financial Resource Strain: Low Risk  (11/13/2023)    Overall Financial Resource Strain (CARDIA)    • Difficulty of Paying Living Expenses: Not hard at all   Food Insecurity: Food Insecurity Present (11/9/2022)    Hunger Vital Sign    • Worried About Running Out of Food in the Last Year: Often true    • Ran Out of Food in the Last Year: Sometimes true   Transportation Needs: No Transportation Needs (11/13/2023)    PRAPARE - Transportation    • Lack of Transportation (Medical): No    • Lack of Transportation (Non-Medical): No   Physical Activity: Inactive (11/9/2022)    Exercise Vital Sign    • Days of Exercise per Week: 0 days    • Minutes of Exercise per Session: 0 min   Stress: Stress Concern Present (11/9/2022)    [facility]    • Feeling of Stress : Very much   Social Connections: Moderately Integrated (11/9/2022)    Social Connection and Isolation Panel [NHANES]    • Frequency of Communication with Friends and Family: More than three times a week    • Frequency of Social Gatherings with Friends and Family: More than three times a week    • Attends Jainism Services: More than 4 times per year    • Active Member of Clubs or Organizations:  Yes    • Attends Club or Organization Meetings: Never    • Marital Status: Never    Intimate Partner Violence: Not At Risk (11/9/2022)    Humiliation, Afraid, Rape, and Kick questionnaire    • Fear of Current or Ex-Partner: No    • Emotionally Abused: No    • Physically Abused: No    • Sexually Abused: No   Housing Stability: Unknown (11/9/2022)    Housing Stability Vital Sign    • Unable to Pay for Housing in the Last Year: No    • Number of Places Lived in the Last Year: Not on file    • Unstable Housing in the Last Year: No      Medications and Allergies:     Current Outpatient Medications   Medication Sig Dispense Refill   • albuterol (Ventolin HFA) 90 mcg/act inhaler Inhale 2 puffs every 6 (six) hours as needed for wheezing 18 g 0   • allopurinol (ZYLOPRIM) 100 mg tablet Take 1 tablet (100 mg total) by mouth daily 90 tablet 0   • apixaban (Eliquis) 5 mg Take 1 tablet (5 mg total) by mouth 2 (two) times a day Patient must schedule appointment for farther refills. 180 tablet 3   • atorvastatin (LIPITOR) 10 mg tablet Take 1 tablet (10 mg total) by mouth daily 90 tablet 0   • cholecalciferol (VITAMIN D3) 1,000 units tablet Take 1 tablet (1,000 Units total) by mouth daily 90 tablet 0   • Diclofenac Sodium (VOLTAREN) 1 % Apply a small amount on the hand / wrist p.r.n. pain. No more than twice per day. 100 g 0   • gabapentin (NEURONTIN) 300 mg capsule Take 1 capsule (300 mg total) by mouth 2 (two) times a day 180 capsule 0   • irbesartan-hydrochlorothiazide (AVALIDE) 300-12.5 MG per tablet Take 1 tablet by mouth daily 90 tablet 0   • levothyroxine 88 mcg tablet Take 1 tablet (88 mcg total) by mouth daily 30 tablet 1   • metFORMIN (GLUCOPHAGE) 500 mg tablet Take 1 tablet (500 mg total) by mouth 2 (two) times a day with meals 180 tablet 0   • metoprolol tartrate (LOPRESSOR) 25 mg tablet Take 1 tablet (25 mg total) by mouth every 12 (twelve) hours 180 tablet 0   • spironolactone (ALDACTONE) 25 mg tablet Take 1 tablet (25 mg total) by mouth daily 30 tablet 2   • vitamin B-12 (VITAMIN B-12) 1,000 mcg tablet Take 1 tablet (1,000 mcg total) by mouth daily 90 tablet 0     No current facility-administered medications for this visit.      Allergies   Allergen Reactions   • Procardia [Nifedipine] Edema      Immunizations:     Immunization History   Administered Date(s) Administered   • COVID-19 MODERNA VACC 0.5 ML IM 01/28/2021, 02/23/2021   • INFLUENZA 11/07/2015, 01/27/2017, 10/09/2017, 10/14/2022   • Influenza Split 10/25/2013   • Influenza Split High Dose Preservative Free IM 01/27/2017, 10/09/2017   • Influenza, high dose seasonal 0.7 mL 10/11/2018, 10/14/2019, 09/16/2020, 09/27/2021, 10/14/2022, 11/13/2023   • Influenza, seasonal, injectable 12/01/2014   • Pneumococcal Conjugate 13-Valent 01/27/2017   • Pneumococcal Polysaccharide PPV23 08/14/2017   • Tdap 02/25/2019   • Zoster 09/12/2017, 09/13/2017      Health Maintenance:         Topic Date Due   • Breast Cancer Screening: Mammogram  09/20/2023   • DXA SCAN  10/27/2025   • Hepatitis C Screening  Completed         Topic Date Due   • COVID-19 Vaccine (3 - Colin Josh series) 04/20/2021      Medicare Screening Tests and Risk Assessments:     Jasbir Asencio is here for her Subsequent Wellness visit. Last Medicare Wellness visit information reviewed, patient interviewed and updates made to the record today. Health Risk Assessment:   Patient rates overall health as good. Patient feels that their physical health rating is same. Patient is very satisfied with their life. Eyesight was rated as same. Hearing was rated as slightly worse. Patient feels that their emotional and mental health rating is same. Patients states they are never, rarely angry. Patient states they are always unusually tired/fatigued. Pain experienced in the last 7 days has been some. Patient's pain rating has been 5/10. Patient states that she has experienced no weight loss or gain in last 6 months. Right knee pain    Depression Screening:   PHQ-2 Score: 0      Fall Risk Screening: In the past year, patient has experienced: no history of falling in past year      Urinary Incontinence Screening:   Patient has not leaked urine accidently in the last six months. Home Safety:  Patient does not have trouble with stairs inside or outside of their home. Patient has working smoke alarms and has working carbon monoxide detector. Home safety hazards include: none.      Nutrition: Current diet is Regular. Medications:   Patient is currently taking over-the-counter supplements. OTC medications include: see medication list. Patient is able to manage medications. Activities of Daily Living (ADLs)/Instrumental Activities of Daily Living (IADLs):   Walk and transfer into and out of bed and chair?: Yes  Dress and groom yourself?: Yes    Bathe or shower yourself?: Yes    Feed yourself?  Yes  Do your laundry/housekeeping?: Yes  Manage your money, pay your bills and track your expenses?: No  Make your own meals?: Yes    Do your own shopping?: Yes    Durable Medical Equipment Suppliers  none    Previous Hospitalizations:   Any hospitalizations or ED visits within the last 12 months?: No      Advance Care Planning:   Living will: No    Advanced directive: No    Advanced directive counseling given: No    ACP document given: Yes    Patient declined ACP directive: No    End of Life Decisions reviewed with patient: Yes    Provider agrees with end of life decisions: Yes      Cognitive Screening:   Provider or family/friend/caregiver concerned regarding cognition?: No    PREVENTIVE SCREENINGS      Cardiovascular Screening:    General: Screening Not Indicated and History Lipid Disorder      Diabetes Screening:     General: Screening Not Indicated and History Diabetes      Colorectal Cancer Screening:     General: Screening Current      Breast Cancer Screening:     General: History Breast Cancer      Cervical Cancer Screening:    General: Screening Not Indicated      Osteoporosis Screening:    General: Screening Current      Abdominal Aortic Aneurysm (AAA) Screening:        General: Screening Not Indicated      Lung Cancer Screening:     General: Screening Not Indicated      Hepatitis C Screening:    General: Screening Current    Screening, Brief Intervention, and Referral to Treatment (SBIRT)    Screening  Typical number of drinks in a day: 0  Typical number of drinks in a week: 0  Interpretation: Low risk drinking behavior. AUDIT-C Screenin) How often did you have a drink containing alcohol in the past year? never  2) How many drinks did you have on a typical day when you were drinking in the past year? 0  3) How often did you have 6 or more drinks on one occasion in the past year? never    AUDIT-C Score: 0  Interpretation: Score 0-2 (female): Negative screen for alcohol misuse    Single Item Drug Screening:  How often have you used an illegal drug (including marijuana) or a prescription medication for non-medical reasons in the past year? never    Single Item Drug Screen Score: 0  Interpretation: Negative screen for possible drug use disorder    Brief Intervention  Alcohol & drug use screenings were reviewed. No concerns regarding substance use disorder identified. Annual Depression Screening  Time spent screening and evaluating the patient for depression during today's encounter was 5 minutes. No results found. Physical Exam:     /80   Pulse 76   Temp (!) 96.5 °F (35.8 °C) (Tympanic)   Ht 5' 2" (1.575 m)   Wt 76.2 kg (168 lb)   SpO2 99%   Breastfeeding No   BMI 30.73 kg/m²     Physical Exam  Vitals and nursing note reviewed. Constitutional:       General: She is not in acute distress. Appearance: She is well-developed. HENT:      Head: Normocephalic and atraumatic. Right Ear: Tympanic membrane normal.      Left Ear: Tympanic membrane normal.      Nose: No congestion or rhinorrhea. Mouth/Throat:      Pharynx: No oropharyngeal exudate or posterior oropharyngeal erythema. Eyes:      General:         Right eye: No discharge. Left eye: No discharge. Conjunctiva/sclera: Conjunctivae normal.   Cardiovascular:      Rate and Rhythm: Normal rate. Heart sounds: Murmur heard. Pulmonary:      Effort: Pulmonary effort is normal. No respiratory distress. Breath sounds: Normal breath sounds. Abdominal:      Palpations: Abdomen is soft. Tenderness: There is no abdominal tenderness. Musculoskeletal:         General: No swelling. Cervical back: Neck supple. Right knee: No swelling, effusion or erythema. Decreased range of motion. Tenderness present over the medial joint line and lateral joint line. Skin:     General: Skin is warm and dry. Capillary Refill: Capillary refill takes less than 2 seconds. Findings: No rash. Neurological:      Mental Status: She is alert and oriented to person, place, and time.    Psychiatric:         Mood and Affect: Mood normal.          Gwendolyn Greer MD

## 2023-11-13 NOTE — ASSESSMENT & PLAN NOTE
Patient continues to have a nosebleed recommend using Severa Nan fire in the room use of Vaseline and I will refer her to ENT previously

## 2023-11-28 ENCOUNTER — TELEPHONE (OUTPATIENT)
Dept: NEUROLOGY | Facility: CLINIC | Age: 83
End: 2023-11-28

## 2023-11-28 DIAGNOSIS — G43.009 MIGRAINE WITHOUT AURA AND WITHOUT STATUS MIGRAINOSUS, NOT INTRACTABLE: Primary | ICD-10-CM

## 2023-11-28 NOTE — TELEPHONE ENCOUNTER
Pts daughter called to say pt is having worsening headaches with blurred vision and other concerns. Please call pts munira Ag as she lives with Pt. To triage. Thank you. IV discontinued, cath removed intact

## 2023-11-28 NOTE — TELEPHONE ENCOUNTER
Called pt's dtr Yani Ibrahim and states that pt has been c/o worsening HA, possibly a migraines. When did migraine start? HA never stop. Been having daily HA but worsening. The week before Thanksgiving and after has been worse. Been having dizziness, blurred vision, vomited x 1 on 11/21/23 and nosebleed. States that PCP told her to take tylenol. Location/Description: left side of her head. Middle of her head down to forehead. Throbbing at times. Pain scale: 7    Precipitating factors: unknown    Alleviating factors: tylenol prn    What medications have you tried for this migraine headache? Current migraine medications are confirmed as:  None. States that pt refused to take Aimovig    11/21/23- bp 163/90 (L), 150/102 R  /86, HR 74. Finally went down-/73   Today, /78, HR 62.   States that she called and left a message to pt's PCP to report the fluctuating bp readings. Awaiting call from PCP. Pt missed her appt in May. Agreeable to schedule f/u appt to discuss tx options. But I don't see any open slot. Cb [phone], ok to leave a detailed message     Vivien December, can you check if you can accommodate this pt to 's schedule?
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
## 2023-11-30 RX ORDER — ATOGEPANT 10 MG/1
TABLET ORAL
Qty: 90 TABLET | Refills: 0 | Status: SHIPPED | OUTPATIENT
Start: 2023-11-30

## 2023-11-30 NOTE — TELEPHONE ENCOUNTER
Called and spoke to patients daughter Danielle German, offered an appointment today for her mother to come in but she couldn't make it in today. I told her we will keep her on the cancellation list and call her if any sooner appts open up.

## 2023-12-01 NOTE — TELEPHONE ENCOUNTER
11/30/23 at 2:37    Hi, this is [facility] at [facility] [employer] on patient Phillip Canales, Date of birth 1940. We received the prescription this morning for Qulipta 10 milligram that is not on the formulary for her drug plan. If you'd like to try and get a medical exception for that. She's got an [place] medicare Part D prescription drug plan. Their phone number is [phone]. And Oanh's id number on her insurance is [id number]. And then if you could call us back when you get the medication approved or if you'd like to change it. Our phone number here is [phone]. Thank you. Rhiannon.     Awaiting determination

## 2023-12-11 DIAGNOSIS — R79.89 LOW VITAMIN B12 LEVEL: ICD-10-CM

## 2023-12-11 DIAGNOSIS — I48.91 ATRIAL FIBRILLATION, UNSPECIFIED TYPE (HCC): ICD-10-CM

## 2023-12-11 RX ORDER — LANOLIN ALCOHOL/MO/W.PET/CERES
1000 CREAM (GRAM) TOPICAL DAILY
Qty: 90 TABLET | Refills: 0 | Status: SHIPPED | OUTPATIENT
Start: 2023-12-11

## 2023-12-15 DIAGNOSIS — M47.22 OSTEOARTHRITIS OF SPINE WITH RADICULOPATHY, CERVICAL REGION: ICD-10-CM

## 2023-12-15 DIAGNOSIS — M47.27 OSTEOARTHRITIS OF SPINE WITH RADICULOPATHY, LUMBOSACRAL REGION: ICD-10-CM

## 2023-12-15 DIAGNOSIS — I10 ESSENTIAL HYPERTENSION: ICD-10-CM

## 2023-12-15 RX ORDER — GABAPENTIN 300 MG/1
300 CAPSULE ORAL 2 TIMES DAILY
Qty: 180 CAPSULE | Refills: 0 | Status: SHIPPED | OUTPATIENT
Start: 2023-12-15

## 2023-12-27 DIAGNOSIS — E79.0 HYPERURICEMIA: ICD-10-CM

## 2023-12-27 RX ORDER — ALLOPURINOL 100 MG/1
100 TABLET ORAL DAILY
Qty: 90 TABLET | Refills: 0 | Status: SHIPPED | OUTPATIENT
Start: 2023-12-27

## 2023-12-28 NOTE — ASSESSMENT & PLAN NOTE
Chronic asymptomatic fair control patient taking OTC med of vitamin-D but that she does not recall with the dosage patient will call us with the dosage will recommend patient to increase the knee diet which was vitamin-D Self

## 2024-01-01 NOTE — ASSESSMENT & PLAN NOTE
At the time of the arrival blood pressure was 150/90 patient was rushing to come to the office she was worried she cannot relate I rechecked her blood pressure at the end of the visit 134/80 patient was recently at the cardiology almost 10 days ago and the blood pressure also 134/80 patient compliant with the medication recommend to continue current management low-salt diet important lose weight reviewed .

## 2024-01-04 ENCOUNTER — HOSPITAL ENCOUNTER (OUTPATIENT)
Dept: RADIOLOGY | Facility: HOSPITAL | Age: 84
End: 2024-01-04
Payer: COMMERCIAL

## 2024-01-04 ENCOUNTER — APPOINTMENT (OUTPATIENT)
Dept: LAB | Facility: HOSPITAL | Age: 84
End: 2024-01-04
Payer: COMMERCIAL

## 2024-01-04 DIAGNOSIS — I10 ESSENTIAL HYPERTENSION: ICD-10-CM

## 2024-01-04 DIAGNOSIS — G89.29 CHRONIC PAIN OF RIGHT KNEE: ICD-10-CM

## 2024-01-04 DIAGNOSIS — E78.2 MIXED HYPERLIPIDEMIA: ICD-10-CM

## 2024-01-04 DIAGNOSIS — E66.09 CLASS 1 OBESITY DUE TO EXCESS CALORIES WITH SERIOUS COMORBIDITY AND BODY MASS INDEX (BMI) OF 30.0 TO 30.9 IN ADULT: ICD-10-CM

## 2024-01-04 DIAGNOSIS — M25.561 CHRONIC PAIN OF RIGHT KNEE: ICD-10-CM

## 2024-01-04 LAB
ALBUMIN SERPL BCP-MCNC: 4.2 G/DL (ref 3.5–5)
ALP SERPL-CCNC: 68 U/L (ref 34–104)
ALT SERPL W P-5'-P-CCNC: 8 U/L (ref 7–52)
ANION GAP SERPL CALCULATED.3IONS-SCNC: 9 MMOL/L
AST SERPL W P-5'-P-CCNC: 11 U/L (ref 13–39)
BASOPHILS # BLD AUTO: 0.02 THOUSANDS/ÂΜL (ref 0–0.1)
BASOPHILS NFR BLD AUTO: 0 % (ref 0–1)
BILIRUB SERPL-MCNC: 1.83 MG/DL (ref 0.2–1)
BUN SERPL-MCNC: 17 MG/DL (ref 5–25)
CALCIUM SERPL-MCNC: 9.6 MG/DL (ref 8.4–10.2)
CHLORIDE SERPL-SCNC: 103 MMOL/L (ref 96–108)
CHOLEST SERPL-MCNC: 89 MG/DL
CO2 SERPL-SCNC: 30 MMOL/L (ref 21–32)
CREAT SERPL-MCNC: 0.92 MG/DL (ref 0.6–1.3)
EOSINOPHIL # BLD AUTO: 0.02 THOUSAND/ÂΜL (ref 0–0.61)
EOSINOPHIL NFR BLD AUTO: 0 % (ref 0–6)
ERYTHROCYTE [DISTWIDTH] IN BLOOD BY AUTOMATED COUNT: 13 % (ref 11.6–15.1)
GFR SERPL CREATININE-BSD FRML MDRD: 58 ML/MIN/1.73SQ M
GLUCOSE P FAST SERPL-MCNC: 112 MG/DL (ref 65–99)
HCT VFR BLD AUTO: 42.3 % (ref 34.8–46.1)
HDLC SERPL-MCNC: 47 MG/DL
HGB BLD-MCNC: 14 G/DL (ref 11.5–15.4)
IMM GRANULOCYTES # BLD AUTO: 0.02 THOUSAND/UL (ref 0–0.2)
IMM GRANULOCYTES NFR BLD AUTO: 0 % (ref 0–2)
LDLC SERPL CALC-MCNC: 28 MG/DL (ref 0–100)
LYMPHOCYTES # BLD AUTO: 1.61 THOUSANDS/ÂΜL (ref 0.6–4.47)
LYMPHOCYTES NFR BLD AUTO: 32 % (ref 14–44)
MCH RBC QN AUTO: 31.4 PG (ref 26.8–34.3)
MCHC RBC AUTO-ENTMCNC: 33.1 G/DL (ref 31.4–37.4)
MCV RBC AUTO: 95 FL (ref 82–98)
MONOCYTES # BLD AUTO: 0.54 THOUSAND/ÂΜL (ref 0.17–1.22)
MONOCYTES NFR BLD AUTO: 11 % (ref 4–12)
NEUTROPHILS # BLD AUTO: 2.85 THOUSANDS/ÂΜL (ref 1.85–7.62)
NEUTS SEG NFR BLD AUTO: 57 % (ref 43–75)
NONHDLC SERPL-MCNC: 42 MG/DL
NRBC BLD AUTO-RTO: 0 /100 WBCS
PLATELET # BLD AUTO: 222 THOUSANDS/UL (ref 149–390)
PMV BLD AUTO: 11.4 FL (ref 8.9–12.7)
POTASSIUM SERPL-SCNC: 3.9 MMOL/L (ref 3.5–5.3)
PROT SERPL-MCNC: 7.4 G/DL (ref 6.4–8.4)
RBC # BLD AUTO: 4.46 MILLION/UL (ref 3.81–5.12)
SODIUM SERPL-SCNC: 142 MMOL/L (ref 135–147)
T4 FREE SERPL-MCNC: 1.7 NG/DL (ref 0.61–1.12)
TRIGL SERPL-MCNC: 70 MG/DL
TSH SERPL DL<=0.05 MIU/L-ACNC: 0.17 UIU/ML (ref 0.45–4.5)
WBC # BLD AUTO: 5.06 THOUSAND/UL (ref 4.31–10.16)

## 2024-01-04 PROCEDURE — 80053 COMPREHEN METABOLIC PANEL: CPT

## 2024-01-04 PROCEDURE — 84443 ASSAY THYROID STIM HORMONE: CPT

## 2024-01-04 PROCEDURE — 85025 COMPLETE CBC W/AUTO DIFF WBC: CPT

## 2024-01-04 PROCEDURE — 84439 ASSAY OF FREE THYROXINE: CPT

## 2024-01-04 PROCEDURE — 73562 X-RAY EXAM OF KNEE 3: CPT

## 2024-01-04 PROCEDURE — 80061 LIPID PANEL: CPT

## 2024-01-04 PROCEDURE — 36415 COLL VENOUS BLD VENIPUNCTURE: CPT

## 2024-01-08 ENCOUNTER — TELEPHONE (OUTPATIENT)
Dept: FAMILY MEDICINE CLINIC | Facility: CLINIC | Age: 84
End: 2024-01-08

## 2024-01-08 NOTE — TELEPHONE ENCOUNTER
----- Message from Parul Roe MD sent at 1/6/2024  3:35 PM EST -----  Negative mammogram repeat in one year

## 2024-01-09 ENCOUNTER — OFFICE VISIT (OUTPATIENT)
Dept: FAMILY MEDICINE CLINIC | Facility: CLINIC | Age: 84
End: 2024-01-09
Payer: COMMERCIAL

## 2024-01-09 VITALS
BODY MASS INDEX: 30.18 KG/M2 | HEART RATE: 67 BPM | HEIGHT: 62 IN | SYSTOLIC BLOOD PRESSURE: 130 MMHG | WEIGHT: 164 LBS | DIASTOLIC BLOOD PRESSURE: 80 MMHG | OXYGEN SATURATION: 99 % | TEMPERATURE: 97.6 F

## 2024-01-09 DIAGNOSIS — I48.0 PAROXYSMAL ATRIAL FIBRILLATION (HCC): ICD-10-CM

## 2024-01-09 DIAGNOSIS — Z17.0 MALIGNANT NEOPLASM OF CENTRAL PORTION OF RIGHT BREAST IN FEMALE, ESTROGEN RECEPTOR POSITIVE: ICD-10-CM

## 2024-01-09 DIAGNOSIS — N18.31 CHRONIC KIDNEY DISEASE, STAGE 3A (HCC): ICD-10-CM

## 2024-01-09 DIAGNOSIS — M17.11 PRIMARY OSTEOARTHRITIS OF RIGHT KNEE: ICD-10-CM

## 2024-01-09 DIAGNOSIS — C50.111 MALIGNANT NEOPLASM OF CENTRAL PORTION OF RIGHT BREAST IN FEMALE, ESTROGEN RECEPTOR POSITIVE: ICD-10-CM

## 2024-01-09 DIAGNOSIS — E11.42 TYPE 2 DIABETES MELLITUS WITH DIABETIC POLYNEUROPATHY, WITHOUT LONG-TERM CURRENT USE OF INSULIN (HCC): Primary | ICD-10-CM

## 2024-01-09 DIAGNOSIS — E89.0 POSTOPERATIVE HYPOTHYROIDISM: ICD-10-CM

## 2024-01-09 PROBLEM — R04.0 BLEEDING NOSE: Status: RESOLVED | Noted: 2023-04-03 | Resolved: 2024-01-09

## 2024-01-09 LAB
CREAT UR-MCNC: 182.1 MG/DL
MICROALBUMIN UR-MCNC: 12.3 MG/L
MICROALBUMIN/CREAT 24H UR: 7 MG/G CREATININE (ref 0–30)
SL AMB POCT HEMOGLOBIN AIC: 6.4 (ref ?–6.5)

## 2024-01-09 PROCEDURE — 82570 ASSAY OF URINE CREATININE: CPT | Performed by: FAMILY MEDICINE

## 2024-01-09 PROCEDURE — 83036 HEMOGLOBIN GLYCOSYLATED A1C: CPT | Performed by: FAMILY MEDICINE

## 2024-01-09 PROCEDURE — 82043 UR ALBUMIN QUANTITATIVE: CPT | Performed by: FAMILY MEDICINE

## 2024-01-09 PROCEDURE — 99214 OFFICE O/P EST MOD 30 MIN: CPT | Performed by: FAMILY MEDICINE

## 2024-01-09 RX ORDER — LEVOTHYROXINE SODIUM 0.07 MG/1
75 TABLET ORAL
Qty: 90 TABLET | Refills: 3 | Status: SHIPPED | OUTPATIENT
Start: 2024-01-09

## 2024-01-09 NOTE — PROGRESS NOTES
Name: Oanh Martinez      : 1940      MRN: [id number]  Encounter Provider: Parul Roe MD  Encounter Date: 2024   Encounter department: [facility]    Assessment & Plan     1. Type 2 diabetes mellitus with diabetic polyneuropathy, without long-term current use of insulin (HCC)  Assessment & Plan:    Lab Results   Component Value Date    HGBA1C 6.4 2024   Chronic asymptomatic fair control continue current management Metformin 500 mglow-carb diet important lose weight discussed with patient    Orders:  -     POCT hemoglobin A1c  -     Albumin / creatinine urine ratio  -     Basic metabolic panel; Future; Expected date: 2024  -     CBC and differential; Future; Expected date: 2024  -     Albumin / creatinine urine ratio; Future; Expected date: 2024  -     Lipid Panel with Direct LDL reflex; Future; Expected date: 2024  -     TSH, 3rd generation with Free T4 reflex; Future; Expected date: 2024  -     Hemoglobin A1C; Future; Expected date: 2024    2. Paroxysmal atrial fibrillation (HCC)  Assessment & Plan:  Chronic rate is controlled patient on anticoagulation follow-up with cardiology periodically    Orders:  -     Basic metabolic panel; Future; Expected date: 2024  -     CBC and differential; Future; Expected date: 2024  -     Albumin / creatinine urine ratio; Future; Expected date: 2024  -     Lipid Panel with Direct LDL reflex; Future; Expected date: 2024  -     TSH, 3rd generation with Free T4 reflex; Future; Expected date: 2024  -     Hemoglobin A1C; Future; Expected date: 2024    3. Malignant neoplasm of central portion of right breast in female, estrogen receptor positive   Assessment & Plan:  Status postsurgery and radiation follow-up with oncology periodically    Orders:  -     Basic metabolic panel; Future; Expected date: 2024  -     CBC and differential; Future; Expected  date: 04/09/2024  -     Albumin / creatinine urine ratio; Future; Expected date: 04/09/2024  -     Lipid Panel with Direct LDL reflex; Future; Expected date: 04/09/2024  -     TSH, 3rd generation with Free T4 reflex; Future; Expected date: 04/09/2024  -     Hemoglobin A1C; Future; Expected date: 04/09/2024    4. Chronic kidney disease, stage 3a (HCC)  Assessment & Plan:  Lab Results   Component Value Date    EGFR 58 01/04/2024    EGFR 56 07/17/2023    EGFR 61 04/10/2023    CREATININE 0.92 01/04/2024    CREATININE 0.95 07/17/2023    CREATININE 0.88 04/10/2023   Chronic asymptomatic GFR stable continue well hydration avoid NSAIDs patient at the goal for the blood pressure    Orders:  -     Basic metabolic panel; Future; Expected date: 04/09/2024  -     CBC and differential; Future; Expected date: 04/09/2024  -     Albumin / creatinine urine ratio; Future; Expected date: 04/09/2024  -     Lipid Panel with Direct LDL reflex; Future; Expected date: 04/09/2024  -     TSH, 3rd generation with Free T4 reflex; Future; Expected date: 04/09/2024  -     Hemoglobin A1C; Future; Expected date: 04/09/2024    5. Postoperative hypothyroidism  Assessment & Plan:  Chronic and controlled plan to decrease levothyroxine to 75 mcg once a day proper use reviewed will recheck TSH in a 2-week    Orders:  -     levothyroxine (Euthyrox) 75 mcg tablet; Take 1 tablet (75 mcg total) by mouth daily in the early morning    6. Primary osteoarthritis of right knee  Assessment & Plan:  New diagnosis finding on recent x-ray symptomatic with pain patient history of chronic kidney disease recommend to avoid NSAIDs keep well hydration Tylenol for the pain             Subjective      Patient here follow-up with a chronic condition compliant with the medication tolerated well without side effect no new concerns recent blood work discussed with the patient also x-ray of the right knee reviewed with the patient      Review of Systems   Constitutional:   Negative for chills and fever.   HENT:  Negative for ear pain and sore throat.    Eyes:  Negative for pain and visual disturbance.   Respiratory:  Negative for cough and shortness of breath.    Cardiovascular:  Negative for chest pain and palpitations.   Gastrointestinal:  Negative for abdominal pain, constipation, diarrhea and vomiting.   Genitourinary:  Negative for dysuria and hematuria.   Musculoskeletal:  Positive for arthralgias. Negative for back pain.   Skin:  Negative for color change and rash.   Neurological:  Negative for seizures and syncope.   All other systems reviewed and are negative.      Current Outpatient Medications on File Prior to Visit   Medication Sig   • apixaban (Eliquis) 5 mg Take 1 tablet (5 mg total) by mouth 2 (two) times a day Patient must schedule appointment for farther refills.   • gabapentin (NEURONTIN) 300 mg capsule Take 1 capsule (300 mg total) by mouth 2 (two) times a day   • albuterol (Ventolin HFA) 90 mcg/act inhaler Inhale 2 puffs every 6 (six) hours as needed for wheezing   • allopurinol (ZYLOPRIM) 100 mg tablet Take 1 tablet (100 mg total) by mouth daily   • Atogepant (Qulipta) 10 MG TABS One tab daily.   • atorvastatin (LIPITOR) 10 mg tablet Take 1 tablet (10 mg total) by mouth daily   • cholecalciferol (VITAMIN D3) 1,000 units tablet Take 1 tablet (1,000 Units total) by mouth daily   • Diclofenac Sodium (VOLTAREN) 1 % Apply a small amount on the hand / wrist p.r.n. pain.  No more than twice per day.   • irbesartan-hydrochlorothiazide (AVALIDE) 300-12.5 MG per tablet Take 1 tablet by mouth daily   • metFORMIN (GLUCOPHAGE) 500 mg tablet Take 1 tablet (500 mg total) by mouth 2 (two) times a day with meals   • metoprolol tartrate (LOPRESSOR) 25 mg tablet Take 1 tablet (25 mg total) by mouth every 12 (twelve) hours   • spironolactone (ALDACTONE) 25 mg tablet Take 1 tablet (25 mg total) by mouth daily   • vitamin B-12 (VITAMIN B-12) 1,000 mcg tablet Take 1 tablet (1,000 mcg  "total) by mouth daily   • [DISCONTINUED] levothyroxine 88 mcg tablet Take 1 tablet (88 mcg total) by mouth daily       Objective     /80 (BP Location: Left arm, Patient Position: Sitting)   Pulse 67   Temp 97.6 °F (36.4 °C) (Tympanic)   Ht 5' 2\" (1.575 m)   Wt 74.4 kg (164 lb)   SpO2 99%   Breastfeeding No   BMI 30.00 kg/m²     Physical Exam  Vitals and nursing note reviewed.   Constitutional:       General: She is not in acute distress.     Appearance: She is not diaphoretic.   HENT:      Head: Normocephalic and atraumatic.      Right Ear: Tympanic membrane normal. There is no impacted cerumen.      Left Ear: Tympanic membrane normal. There is no impacted cerumen.      Nose: Nose normal. No congestion or rhinorrhea.      Mouth/Throat:      Pharynx: No posterior oropharyngeal erythema.   Eyes:      General: No scleral icterus.        Right eye: No discharge.         Left eye: No discharge.   Cardiovascular:      Rate and Rhythm: Normal rate.      Heart sounds: Murmur heard.   Pulmonary:      Effort: Pulmonary effort is normal. No respiratory distress.   Abdominal:      General: There is no distension.      Tenderness: There is no abdominal tenderness.   Musculoskeletal:         General: Normal range of motion.      Cervical back: Normal range of motion.      Right knee: No swelling or erythema. Tenderness present over the medial joint line and lateral joint line.      Right lower leg: No edema.      Left lower leg: No edema.   Skin:     Findings: No rash.   Neurological:      Mental Status: She is alert and oriented to person, place, and time.       Parul Roe MD    "

## 2024-01-10 NOTE — ASSESSMENT & PLAN NOTE
New diagnosis finding on recent x-ray symptomatic with pain patient history of chronic kidney disease recommend to avoid NSAIDs keep well hydration Tylenol for the pain

## 2024-01-10 NOTE — ASSESSMENT & PLAN NOTE
Lab Results   Component Value Date    HGBA1C 6.4 01/09/2024   Chronic asymptomatic fair control continue current management Metformin 500 mglow-carb diet important lose weight discussed with patient

## 2024-01-10 NOTE — ASSESSMENT & PLAN NOTE
Chronic and controlled plan to decrease levothyroxine to 75 mcg once a day proper use reviewed will recheck TSH in a 2-week

## 2024-01-16 DIAGNOSIS — I10 ESSENTIAL HYPERTENSION: ICD-10-CM

## 2024-01-16 RX ORDER — SPIRONOLACTONE 25 MG/1
25 TABLET ORAL DAILY
Qty: 30 TABLET | Refills: 5 | Status: SHIPPED | OUTPATIENT
Start: 2024-01-16

## 2024-01-23 DIAGNOSIS — U07.1 COVID-19 VIRUS INFECTION: ICD-10-CM

## 2024-01-23 RX ORDER — MELATONIN
1000 DAILY
Qty: 90 TABLET | Refills: 1 | Status: SHIPPED | OUTPATIENT
Start: 2024-01-23

## 2024-01-24 DIAGNOSIS — I10 ESSENTIAL HYPERTENSION: ICD-10-CM

## 2024-01-24 DIAGNOSIS — E78.2 MIXED HYPERLIPIDEMIA: ICD-10-CM

## 2024-01-24 RX ORDER — IRBESARTAN AND HYDROCHLOROTHIAZIDE 300; 12.5 MG/1; MG/1
1 TABLET, FILM COATED ORAL DAILY
Qty: 90 TABLET | Refills: 1 | Status: SHIPPED | OUTPATIENT
Start: 2024-01-24

## 2024-01-24 RX ORDER — ATORVASTATIN CALCIUM 10 MG/1
10 TABLET, FILM COATED ORAL DAILY
Qty: 90 TABLET | Refills: 1 | Status: SHIPPED | OUTPATIENT
Start: 2024-01-24

## 2024-02-06 DIAGNOSIS — E11.9 TYPE 2 DIABETES MELLITUS WITHOUT COMPLICATION, WITHOUT LONG-TERM CURRENT USE OF INSULIN (HCC): ICD-10-CM

## 2024-02-19 NOTE — ASSESSMENT & PLAN NOTE
A chronic asymptomatic fair control blood pressure and office now 138/80 patient has not been taking her medication properly we discussed the patient proper taking the medication low-salt diet important lose weight
Recurrent symptomatic start her on meclizine 25 mg once a day proper use of medication possible side effect discussed with the patient recommend low salt low caffeine diet
No

## 2024-02-21 PROBLEM — Z00.00 MEDICARE ANNUAL WELLNESS VISIT, SUBSEQUENT: Status: RESOLVED | Noted: 2020-10-21 | Resolved: 2024-02-21

## 2024-02-28 ENCOUNTER — OFFICE VISIT (OUTPATIENT)
Dept: FAMILY MEDICINE CLINIC | Facility: CLINIC | Age: 84
End: 2024-02-28
Payer: COMMERCIAL

## 2024-02-28 VITALS
HEART RATE: 61 BPM | HEIGHT: 62 IN | TEMPERATURE: 98.3 F | DIASTOLIC BLOOD PRESSURE: 70 MMHG | BODY MASS INDEX: 30.11 KG/M2 | WEIGHT: 163.6 LBS | OXYGEN SATURATION: 97 % | SYSTOLIC BLOOD PRESSURE: 132 MMHG

## 2024-02-28 DIAGNOSIS — M21.619 BUNION OF UNSPECIFIED FOOT: ICD-10-CM

## 2024-02-28 DIAGNOSIS — G89.29 CHRONIC PAIN OF RIGHT KNEE: Primary | ICD-10-CM

## 2024-02-28 DIAGNOSIS — M25.561 CHRONIC PAIN OF RIGHT KNEE: Primary | ICD-10-CM

## 2024-02-28 PROCEDURE — 99214 OFFICE O/P EST MOD 30 MIN: CPT | Performed by: FAMILY MEDICINE

## 2024-02-28 PROCEDURE — G2211 COMPLEX E/M VISIT ADD ON: HCPCS | Performed by: FAMILY MEDICINE

## 2024-02-28 NOTE — PROGRESS NOTES
Name: Oanh Martinez      : 1940      MRN: [id number]  Encounter Provider: Parul Roe MD  Encounter Date: 2024   Encounter department: [facility]    Assessment & Plan     1. Chronic pain of right knee  Assessment & Plan:  Acute on chronic symptomatic previous work up include Xray result review with patient she declined injection ,patient on Eliquis ,recommend to Avoid NSAID ,Tylenol for pain ,Voltaren gel     Orders:  -     Diclofenac Sodium (VOLTAREN) 1 %; Apply 4 g topically 4 (four) times a day  -     MRI knee right  wo contrast; Future; Expected date: 2024    2. Bunion of unspecified foot  Assessment & Plan:  Chronic discussed with patient proper shoes wear           Subjective      Patient here with her daughter concerned about right knee pain and swelling no recent fall or trauma pain radiates to to her thigh and vertebral extremity no lower extremity patient on Eliquis she has not been taking any ibuprofen or Aleve      Review of Systems   Constitutional:  Negative for chills and fever.   HENT:  Negative for ear pain and sore throat.    Eyes:  Negative for pain and visual disturbance.   Respiratory:  Negative for cough and shortness of breath.    Cardiovascular:  Negative for chest pain and palpitations.   Gastrointestinal:  Negative for abdominal pain, constipation, diarrhea and vomiting.   Genitourinary:  Negative for dysuria and hematuria.   Musculoskeletal:  Positive for arthralgias and joint swelling.   Skin:  Negative for color change and rash.   Neurological:  Negative for seizures and syncope.   All other systems reviewed and are negative.      Current Outpatient Medications on File Prior to Visit   Medication Sig   • albuterol (Ventolin HFA) 90 mcg/act inhaler Inhale 2 puffs every 6 (six) hours as needed for wheezing   • allopurinol (ZYLOPRIM) 100 mg tablet Take 1 tablet (100 mg total) by mouth daily   • apixaban (Eliquis) 5 mg Take 1  "tablet (5 mg total) by mouth 2 (two) times a day Patient must schedule appointment for farther refills.   • Atogepant (Qulipta) 10 MG TABS One tab daily.   • atorvastatin (LIPITOR) 10 mg tablet Take 1 tablet (10 mg total) by mouth daily   • cholecalciferol (VITAMIN D3) 1,000 units tablet Take 1 tablet (1,000 Units total) by mouth daily   • gabapentin (NEURONTIN) 300 mg capsule Take 1 capsule (300 mg total) by mouth 2 (two) times a day   • irbesartan-hydrochlorothiazide (AVALIDE) 300-12.5 MG per tablet Take 1 tablet by mouth daily   • levothyroxine (Euthyrox) 75 mcg tablet Take 1 tablet (75 mcg total) by mouth daily in the early morning   • metFORMIN (GLUCOPHAGE) 500 mg tablet Take 1 tablet (500 mg total) by mouth 2 (two) times a day with meals   • metoprolol tartrate (LOPRESSOR) 25 mg tablet Take 1 tablet (25 mg total) by mouth every 12 (twelve) hours   • spironolactone (ALDACTONE) 25 mg tablet Take 1 tablet (25 mg total) by mouth daily   • vitamin B-12 (VITAMIN B-12) 1,000 mcg tablet Take 1 tablet (1,000 mcg total) by mouth daily       Objective     /70 (BP Location: Left arm, Patient Position: Sitting)   Pulse 61   Temp 98.3 °F (36.8 °C) (Tympanic)   Ht 5' 2\" (1.575 m)   Wt 74.2 kg (163 lb 9.6 oz)   SpO2 97%   BMI 29.92 kg/m²     Physical Exam  Vitals and nursing note reviewed.   Constitutional:       General: She is not in acute distress.     Appearance: She is not diaphoretic.   HENT:      Head: Normocephalic and atraumatic.      Right Ear: Tympanic membrane normal. There is no impacted cerumen.      Left Ear: Tympanic membrane normal. There is no impacted cerumen.      Nose: Nose normal. No congestion or rhinorrhea.      Mouth/Throat:      Pharynx: No posterior oropharyngeal erythema.   Eyes:      General: No scleral icterus.        Right eye: No discharge.         Left eye: No discharge.   Cardiovascular:      Rate and Rhythm: Normal rate.      Heart sounds: Murmur heard.   Pulmonary:      Effort: " Pulmonary effort is normal. No respiratory distress.   Abdominal:      General: There is no distension.      Tenderness: There is no abdominal tenderness.   Musculoskeletal:      Cervical back: Normal range of motion.      Right knee: Swelling and crepitus present. Decreased range of motion. Tenderness present over the medial joint line and lateral joint line.      Right lower leg: No edema.      Left lower leg: No edema.   Skin:     Findings: No rash.   Neurological:      Mental Status: She is alert and oriented to person, place, and time.       Patient's shoes and socks removed.         Parul Roe MD

## 2024-02-29 NOTE — ASSESSMENT & PLAN NOTE
Acute on chronic symptomatic previous work up include Xray result review with patient she declined injection ,patient on Eliquis ,recommend to Avoid NSAID ,Tylenol for pain ,Voltaren gel

## 2024-03-07 ENCOUNTER — HOSPITAL ENCOUNTER (OUTPATIENT)
Dept: MRI IMAGING | Facility: HOSPITAL | Age: 84
End: 2024-03-07
Payer: COMMERCIAL

## 2024-03-07 DIAGNOSIS — M25.561 CHRONIC PAIN OF RIGHT KNEE: ICD-10-CM

## 2024-03-07 DIAGNOSIS — G89.29 CHRONIC PAIN OF RIGHT KNEE: ICD-10-CM

## 2024-03-07 PROCEDURE — 73721 MRI JNT OF LWR EXTRE W/O DYE: CPT

## 2024-03-13 ENCOUNTER — TELEPHONE (OUTPATIENT)
Dept: FAMILY MEDICINE CLINIC | Facility: CLINIC | Age: 84
End: 2024-03-13

## 2024-03-13 DIAGNOSIS — S83.271A COMPLEX TEAR OF LATERAL MENISCUS, CURRENT INJURY, RIGHT KNEE, INITIAL ENCOUNTER: Primary | ICD-10-CM

## 2024-03-13 DIAGNOSIS — S83.231A COMPLEX TEAR OF MEDIAL MENISCUS OF RIGHT KNEE AS CURRENT INJURY, INITIAL ENCOUNTER: ICD-10-CM

## 2024-03-13 NOTE — TELEPHONE ENCOUNTER
Called and spoke with patient daughter to advise results. Provided orthopedic phone number for patient to schedule.    Referral placed.

## 2024-03-13 NOTE — TELEPHONE ENCOUNTER
----- Message from Parul Roe MD sent at 3/12/2024  6:19 PM EDT -----  Complex tear in lateral and medial meniscus of right knee refer to Orthopedic

## 2024-03-15 ENCOUNTER — OFFICE VISIT (OUTPATIENT)
Dept: OBGYN CLINIC | Facility: MEDICAL CENTER | Age: 84
End: 2024-03-15
Payer: COMMERCIAL

## 2024-03-15 ENCOUNTER — APPOINTMENT (OUTPATIENT)
Dept: RADIOLOGY | Facility: MEDICAL CENTER | Age: 84
End: 2024-03-15
Payer: COMMERCIAL

## 2024-03-15 VITALS
SYSTOLIC BLOOD PRESSURE: 136 MMHG | DIASTOLIC BLOOD PRESSURE: 78 MMHG | HEART RATE: 66 BPM | BODY MASS INDEX: 30.44 KG/M2 | HEIGHT: 62 IN | WEIGHT: 165.4 LBS

## 2024-03-15 DIAGNOSIS — M25.561 RIGHT KNEE PAIN, UNSPECIFIED CHRONICITY: ICD-10-CM

## 2024-03-15 DIAGNOSIS — S83.231A COMPLEX TEAR OF MEDIAL MENISCUS OF RIGHT KNEE AS CURRENT INJURY, INITIAL ENCOUNTER: ICD-10-CM

## 2024-03-15 DIAGNOSIS — S83.271A COMPLEX TEAR OF LATERAL MENISCUS, CURRENT INJURY, RIGHT KNEE, INITIAL ENCOUNTER: ICD-10-CM

## 2024-03-15 DIAGNOSIS — M17.11 PRIMARY OSTEOARTHRITIS OF RIGHT KNEE: Primary | ICD-10-CM

## 2024-03-15 PROCEDURE — 20610 DRAIN/INJ JOINT/BURSA W/O US: CPT | Performed by: ORTHOPAEDIC SURGERY

## 2024-03-15 PROCEDURE — 73560 X-RAY EXAM OF KNEE 1 OR 2: CPT

## 2024-03-15 PROCEDURE — 99214 OFFICE O/P EST MOD 30 MIN: CPT | Performed by: ORTHOPAEDIC SURGERY

## 2024-03-15 RX ORDER — TRIAMCINOLONE ACETONIDE 40 MG/ML
40 INJECTION, SUSPENSION INTRA-ARTICULAR; INTRAMUSCULAR
Status: COMPLETED | OUTPATIENT
Start: 2024-03-15 | End: 2024-03-15

## 2024-03-15 RX ORDER — LIDOCAINE HYDROCHLORIDE 10 MG/ML
3 INJECTION, SOLUTION INFILTRATION; PERINEURAL
Status: COMPLETED | OUTPATIENT
Start: 2024-03-15 | End: 2024-03-15

## 2024-03-15 RX ADMIN — TRIAMCINOLONE ACETONIDE 40 MG: 40 INJECTION, SUSPENSION INTRA-ARTICULAR; INTRAMUSCULAR at 10:30

## 2024-03-15 RX ADMIN — LIDOCAINE HYDROCHLORIDE 3 ML: 10 INJECTION, SOLUTION INFILTRATION; PERINEURAL at 10:30

## 2024-03-23 DIAGNOSIS — R79.89 LOW VITAMIN B12 LEVEL: ICD-10-CM

## 2024-03-23 DIAGNOSIS — I10 ESSENTIAL HYPERTENSION: ICD-10-CM

## 2024-03-25 RX ORDER — LANOLIN ALCOHOL/MO/W.PET/CERES
1000 CREAM (GRAM) TOPICAL DAILY
Qty: 90 TABLET | Refills: 0 | Status: SHIPPED | OUTPATIENT
Start: 2024-03-25

## 2024-04-02 DIAGNOSIS — E79.0 HYPERURICEMIA: ICD-10-CM

## 2024-04-02 RX ORDER — ALLOPURINOL 100 MG/1
100 TABLET ORAL DAILY
Qty: 90 TABLET | Refills: 1 | Status: SHIPPED | OUTPATIENT
Start: 2024-04-02

## 2024-04-11 ENCOUNTER — APPOINTMENT (OUTPATIENT)
Dept: LAB | Facility: CLINIC | Age: 84
End: 2024-04-11
Payer: COMMERCIAL

## 2024-04-11 DIAGNOSIS — I48.0 PAROXYSMAL ATRIAL FIBRILLATION (HCC): ICD-10-CM

## 2024-04-11 DIAGNOSIS — N18.31 CHRONIC KIDNEY DISEASE, STAGE 3A (HCC): ICD-10-CM

## 2024-04-11 DIAGNOSIS — E11.9 TYPE 2 DIABETES MELLITUS WITHOUT COMPLICATION, WITHOUT LONG-TERM CURRENT USE OF INSULIN (HCC): ICD-10-CM

## 2024-04-11 DIAGNOSIS — E11.42 TYPE 2 DIABETES MELLITUS WITH DIABETIC POLYNEUROPATHY, WITHOUT LONG-TERM CURRENT USE OF INSULIN (HCC): ICD-10-CM

## 2024-04-11 DIAGNOSIS — C50.111 MALIGNANT NEOPLASM OF CENTRAL PORTION OF RIGHT BREAST IN FEMALE, ESTROGEN RECEPTOR POSITIVE (HCC): ICD-10-CM

## 2024-04-11 DIAGNOSIS — Z17.0 MALIGNANT NEOPLASM OF CENTRAL PORTION OF RIGHT BREAST IN FEMALE, ESTROGEN RECEPTOR POSITIVE (HCC): ICD-10-CM

## 2024-04-11 LAB
ANION GAP SERPL CALCULATED.3IONS-SCNC: 11 MMOL/L (ref 4–13)
BASOPHILS # BLD AUTO: 0.02 THOUSANDS/ÂΜL (ref 0–0.1)
BASOPHILS NFR BLD AUTO: 0 % (ref 0–1)
BUN SERPL-MCNC: 20 MG/DL (ref 5–25)
CALCIUM SERPL-MCNC: 8.8 MG/DL (ref 8.4–10.2)
CHLORIDE SERPL-SCNC: 102 MMOL/L (ref 96–108)
CHOLEST SERPL-MCNC: 91 MG/DL
CO2 SERPL-SCNC: 28 MMOL/L (ref 21–32)
CREAT SERPL-MCNC: 0.98 MG/DL (ref 0.6–1.3)
CREAT UR-MCNC: 156.6 MG/DL
EOSINOPHIL # BLD AUTO: 0.04 THOUSAND/ÂΜL (ref 0–0.61)
EOSINOPHIL NFR BLD AUTO: 1 % (ref 0–6)
ERYTHROCYTE [DISTWIDTH] IN BLOOD BY AUTOMATED COUNT: 13.4 % (ref 11.6–15.1)
EST. AVERAGE GLUCOSE BLD GHB EST-MCNC: 154 MG/DL
GFR SERPL CREATININE-BSD FRML MDRD: 53 ML/MIN/1.73SQ M
GLUCOSE P FAST SERPL-MCNC: 130 MG/DL (ref 65–99)
HBA1C MFR BLD: 7 %
HCT VFR BLD AUTO: 43.2 % (ref 34.8–46.1)
HDLC SERPL-MCNC: 48 MG/DL
HGB BLD-MCNC: 14.2 G/DL (ref 11.5–15.4)
IMM GRANULOCYTES # BLD AUTO: 0.02 THOUSAND/UL (ref 0–0.2)
IMM GRANULOCYTES NFR BLD AUTO: 0 % (ref 0–2)
LDLC SERPL CALC-MCNC: 26 MG/DL (ref 0–100)
LYMPHOCYTES # BLD AUTO: 1.97 THOUSANDS/ÂΜL (ref 0.6–4.47)
LYMPHOCYTES NFR BLD AUTO: 42 % (ref 14–44)
MCH RBC QN AUTO: 32.3 PG (ref 26.8–34.3)
MCHC RBC AUTO-ENTMCNC: 32.9 G/DL (ref 31.4–37.4)
MCV RBC AUTO: 98 FL (ref 82–98)
MICROALBUMIN UR-MCNC: 10.4 MG/L
MICROALBUMIN/CREAT 24H UR: 7 MG/G CREATININE (ref 0–30)
MONOCYTES # BLD AUTO: 0.55 THOUSAND/ÂΜL (ref 0.17–1.22)
MONOCYTES NFR BLD AUTO: 12 % (ref 4–12)
NEUTROPHILS # BLD AUTO: 2.11 THOUSANDS/ÂΜL (ref 1.85–7.62)
NEUTS SEG NFR BLD AUTO: 45 % (ref 43–75)
NRBC BLD AUTO-RTO: 0 /100 WBCS
PLATELET # BLD AUTO: 217 THOUSANDS/UL (ref 149–390)
PMV BLD AUTO: 11.3 FL (ref 8.9–12.7)
POTASSIUM SERPL-SCNC: 3.9 MMOL/L (ref 3.5–5.3)
RBC # BLD AUTO: 4.4 MILLION/UL (ref 3.81–5.12)
SODIUM SERPL-SCNC: 141 MMOL/L (ref 135–147)
T4 FREE SERPL-MCNC: 0.99 NG/DL (ref 0.61–1.12)
TRIGL SERPL-MCNC: 87 MG/DL
TSH SERPL DL<=0.05 MIU/L-ACNC: 5.97 UIU/ML (ref 0.45–4.5)
WBC # BLD AUTO: 4.71 THOUSAND/UL (ref 4.31–10.16)

## 2024-04-11 PROCEDURE — 36415 COLL VENOUS BLD VENIPUNCTURE: CPT

## 2024-04-11 PROCEDURE — 84443 ASSAY THYROID STIM HORMONE: CPT

## 2024-04-11 PROCEDURE — 84439 ASSAY OF FREE THYROXINE: CPT

## 2024-04-11 PROCEDURE — 80061 LIPID PANEL: CPT

## 2024-04-11 PROCEDURE — 80048 BASIC METABOLIC PNL TOTAL CA: CPT

## 2024-04-11 PROCEDURE — 85025 COMPLETE CBC W/AUTO DIFF WBC: CPT

## 2024-04-11 PROCEDURE — 83036 HEMOGLOBIN GLYCOSYLATED A1C: CPT

## 2024-04-16 ENCOUNTER — OFFICE VISIT (OUTPATIENT)
Dept: FAMILY MEDICINE CLINIC | Facility: CLINIC | Age: 84
End: 2024-04-16
Payer: COMMERCIAL

## 2024-04-16 VITALS
DIASTOLIC BLOOD PRESSURE: 84 MMHG | SYSTOLIC BLOOD PRESSURE: 130 MMHG | OXYGEN SATURATION: 97 % | BODY MASS INDEX: 29.74 KG/M2 | HEART RATE: 56 BPM | HEIGHT: 62 IN | WEIGHT: 161.6 LBS | TEMPERATURE: 97.8 F

## 2024-04-16 DIAGNOSIS — E11.42 TYPE 2 DIABETES MELLITUS WITH DIABETIC POLYNEUROPATHY, WITHOUT LONG-TERM CURRENT USE OF INSULIN (HCC): ICD-10-CM

## 2024-04-16 DIAGNOSIS — I10 ESSENTIAL HYPERTENSION: Primary | ICD-10-CM

## 2024-04-16 DIAGNOSIS — M21.619 BUNION OF UNSPECIFIED FOOT: ICD-10-CM

## 2024-04-16 DIAGNOSIS — E55.9 VITAMIN D DEFICIENCY: ICD-10-CM

## 2024-04-16 DIAGNOSIS — E78.2 MIXED HYPERLIPIDEMIA: ICD-10-CM

## 2024-04-16 PROCEDURE — 99214 OFFICE O/P EST MOD 30 MIN: CPT | Performed by: FAMILY MEDICINE

## 2024-04-16 PROCEDURE — G2211 COMPLEX E/M VISIT ADD ON: HCPCS | Performed by: FAMILY MEDICINE

## 2024-04-16 NOTE — PROGRESS NOTES
Name: Oanh Martinez      : 1940      MRN: [id number]  Encounter Provider: Parul Roe MD  Encounter Date: 2024   Encounter department: [facility]    Assessment & Plan     1. Essential hypertension  Assessment & Plan:  Chronic asymptomatic fair control continue current management patient on Avalide 300/12.5 mg once a day and metoprolol 25 mg twice a day low-salt diet important lose weight discussed with the patient    Orders:  -     Basic metabolic panel; Future  -     Hemoglobin A1C; Future  -     Lipid panel; Future    2. Type 2 diabetes mellitus with diabetic polyneuropathy, without long-term current use of insulin (HCC)  Assessment & Plan:    Lab Results   Component Value Date    HGBA1C 7.0 (H) 2024   Chronic asymptomatic slight increase in HA1C compare to before recommend to continue current management ,to be more strict with low carb diet     Orders:  -     Basic metabolic panel; Future  -     Hemoglobin A1C; Future  -     Lipid panel; Future    3. Bunion of unspecified foot    4. Vitamin D deficiency    5. Mixed hyperlipidemia  Assessment & Plan:  Chronic asymptomatic fair control LDL therapeutic and diabetes patient continue atorvastatin 10 mg once a day low-fat diet discussed    Orders:  -     Basic metabolic panel; Future  -     Hemoglobin A1C; Future  -     Lipid panel; Future           Subjective      Patient here with her daughter follow-up with a chronic condition compliant with the medication tolerated well without side effect no new concerns recent blood work reviewed with the patient      Review of Systems   Constitutional:  Negative for chills and fever.   HENT:  Negative for ear pain and sore throat.    Eyes:  Negative for pain and visual disturbance.   Respiratory:  Negative for cough and shortness of breath.    Cardiovascular:  Negative for chest pain and palpitations.   Gastrointestinal:  Negative for abdominal pain, constipation,  "diarrhea and vomiting.   Genitourinary:  Negative for dysuria and hematuria.   Musculoskeletal:  Negative for arthralgias and back pain.   Skin:  Negative for color change and rash.   Neurological:  Negative for seizures and syncope.   All other systems reviewed and are negative.      Current Outpatient Medications on File Prior to Visit   Medication Sig    albuterol (Ventolin HFA) 90 mcg/act inhaler Inhale 2 puffs every 6 (six) hours as needed for wheezing    allopurinol (ZYLOPRIM) 100 mg tablet Take 1 tablet (100 mg total) by mouth daily    apixaban (Eliquis) 5 mg Take 1 tablet (5 mg total) by mouth 2 (two) times a day Patient must schedule appointment for farther refills.    Atogepant (Qulipta) 10 MG TABS One tab daily.    atorvastatin (LIPITOR) 10 mg tablet Take 1 tablet (10 mg total) by mouth daily    cholecalciferol (VITAMIN D3) 1,000 units tablet Take 1 tablet (1,000 Units total) by mouth daily    Diclofenac Sodium (VOLTAREN) 1 % Apply 4 g topically 4 (four) times a day    gabapentin (NEURONTIN) 300 mg capsule Take 1 capsule (300 mg total) by mouth 2 (two) times a day    irbesartan-hydrochlorothiazide (AVALIDE) 300-12.5 MG per tablet Take 1 tablet by mouth daily    levothyroxine (Euthyrox) 75 mcg tablet Take 1 tablet (75 mcg total) by mouth daily in the early morning    metFORMIN (GLUCOPHAGE) 500 mg tablet Take 1 tablet (500 mg total) by mouth 2 (two) times a day with meals    metoprolol tartrate (LOPRESSOR) 25 mg tablet Take 1 tablet (25 mg total) by mouth every 12 (twelve) hours    spironolactone (ALDACTONE) 25 mg tablet Take 1 tablet (25 mg total) by mouth daily    vitamin B-12 (VITAMIN B-12) 1,000 mcg tablet Take 1 tablet (1,000 mcg total) by mouth daily       Objective     /84 (BP Location: Left arm, Patient Position: Sitting)   Pulse 56   Temp 97.8 °F (36.6 °C) (Tympanic)   Ht 5' 2\" (1.575 m)   Wt 73.3 kg (161 lb 9.6 oz)   SpO2 97%   BMI 29.56 kg/m²     Physical Exam  Vitals and nursing note " reviewed.   Constitutional:       General: She is not in acute distress.     Appearance: She is not diaphoretic.   HENT:      Head: Normocephalic and atraumatic.      Right Ear: Tympanic membrane normal. There is no impacted cerumen.      Left Ear: Tympanic membrane normal. There is no impacted cerumen.      Nose: Nose normal. No congestion or rhinorrhea.      Mouth/Throat:      Pharynx: No posterior oropharyngeal erythema.   Eyes:      General: No scleral icterus.        Right eye: No discharge.         Left eye: No discharge.   Cardiovascular:      Rate and Rhythm: Normal rate and regular rhythm.      Pulses: no weak pulses.           Dorsalis pedis pulses are 2+ on the right side and 2+ on the left side.   Pulmonary:      Effort: Pulmonary effort is normal. No respiratory distress.   Abdominal:      General: There is no distension.      Tenderness: There is no abdominal tenderness.   Musculoskeletal:         General: Normal range of motion.      Cervical back: Normal range of motion.      Right lower leg: No edema.      Left lower leg: No edema.        Feet:    Feet:      Right foot:      Skin integrity: No warmth.      Left foot:      Skin integrity: No warmth.   Skin:     Findings: No rash.   Neurological:      Mental Status: She is alert and oriented to person, place, and time.       Patient's shoes and socks removed.    Right Foot/Ankle   Right Foot Inspection  Skin Exam: skin intact. No warmth and no pre-ulcer.     Toe Exam: No swelling and erythema    Sensory   Monofilament testing: intact    Vascular  Capillary refills: < 3 seconds  The right DP pulse is 2+.     Left Foot/Ankle  Left Foot Inspection  Skin Exam: skin intact. No warmth and no pre-ulcer.     Toe Exam: No swelling and no erythema.     Sensory   Monofilament testing: intact    Vascular  Capillary refills: < 3 seconds  The left DP pulse is 2+.     Assign Risk Category  No deformity present  No loss of protective sensation  No weak pulses  Risk:  0         Parul Roe MD

## 2024-04-18 NOTE — ASSESSMENT & PLAN NOTE
Chronic asymptomatic fair control LDL therapeutic and diabetes patient continue atorvastatin 10 mg once a day low-fat diet discussed

## 2024-04-18 NOTE — ASSESSMENT & PLAN NOTE
Chronic asymptomatic fair control continue current management patient on Avalide 300/12.5 mg once a day and metoprolol 25 mg twice a day low-salt diet important lose weight discussed with the patient

## 2024-04-18 NOTE — ASSESSMENT & PLAN NOTE
Lab Results   Component Value Date    HGBA1C 7.0 (H) 04/11/2024   Chronic asymptomatic slight increase in HA1C compare to before recommend to continue current management ,to be more strict with low carb diet

## 2024-06-13 ENCOUNTER — APPOINTMENT (OUTPATIENT)
Dept: LAB | Facility: CLINIC | Age: 84
End: 2024-06-13
Payer: COMMERCIAL

## 2024-06-13 DIAGNOSIS — E11.42 TYPE 2 DIABETES MELLITUS WITH DIABETIC POLYNEUROPATHY, WITHOUT LONG-TERM CURRENT USE OF INSULIN (HCC): ICD-10-CM

## 2024-06-13 DIAGNOSIS — I10 ESSENTIAL HYPERTENSION: ICD-10-CM

## 2024-06-13 DIAGNOSIS — E78.2 MIXED HYPERLIPIDEMIA: ICD-10-CM

## 2024-06-13 LAB
ANION GAP SERPL CALCULATED.3IONS-SCNC: 12 MMOL/L (ref 4–13)
BUN SERPL-MCNC: 18 MG/DL (ref 5–25)
CALCIUM SERPL-MCNC: 8.5 MG/DL (ref 8.4–10.2)
CHLORIDE SERPL-SCNC: 104 MMOL/L (ref 96–108)
CHOLEST SERPL-MCNC: 91 MG/DL
CO2 SERPL-SCNC: 25 MMOL/L (ref 21–32)
CREAT SERPL-MCNC: 0.87 MG/DL (ref 0.6–1.3)
EST. AVERAGE GLUCOSE BLD GHB EST-MCNC: 151 MG/DL
GFR SERPL CREATININE-BSD FRML MDRD: 62 ML/MIN/1.73SQ M
GLUCOSE P FAST SERPL-MCNC: 125 MG/DL (ref 65–99)
HBA1C MFR BLD: 6.9 %
HDLC SERPL-MCNC: 47 MG/DL
LDLC SERPL CALC-MCNC: 31 MG/DL (ref 0–100)
NONHDLC SERPL-MCNC: 44 MG/DL
POTASSIUM SERPL-SCNC: 3.6 MMOL/L (ref 3.5–5.3)
SODIUM SERPL-SCNC: 141 MMOL/L (ref 135–147)
TRIGL SERPL-MCNC: 66 MG/DL

## 2024-06-13 PROCEDURE — 80048 BASIC METABOLIC PNL TOTAL CA: CPT

## 2024-06-13 PROCEDURE — 36415 COLL VENOUS BLD VENIPUNCTURE: CPT

## 2024-06-13 PROCEDURE — 80061 LIPID PANEL: CPT

## 2024-06-13 PROCEDURE — 83036 HEMOGLOBIN GLYCOSYLATED A1C: CPT

## 2024-06-17 DIAGNOSIS — M47.27 OSTEOARTHRITIS OF SPINE WITH RADICULOPATHY, LUMBOSACRAL REGION: ICD-10-CM

## 2024-06-17 DIAGNOSIS — M47.22 OSTEOARTHRITIS OF SPINE WITH RADICULOPATHY, CERVICAL REGION: ICD-10-CM

## 2024-06-17 RX ORDER — GABAPENTIN 300 MG/1
300 CAPSULE ORAL 2 TIMES DAILY
Qty: 180 CAPSULE | Refills: 0 | Status: SHIPPED | OUTPATIENT
Start: 2024-06-17

## 2024-06-18 ENCOUNTER — OFFICE VISIT (OUTPATIENT)
Dept: OBGYN CLINIC | Facility: MEDICAL CENTER | Age: 84
End: 2024-06-18
Payer: COMMERCIAL

## 2024-06-18 VITALS
WEIGHT: 161.8 LBS | DIASTOLIC BLOOD PRESSURE: 71 MMHG | HEART RATE: 65 BPM | BODY MASS INDEX: 29.77 KG/M2 | HEIGHT: 62 IN | SYSTOLIC BLOOD PRESSURE: 132 MMHG

## 2024-06-18 DIAGNOSIS — M25.561 CHRONIC PAIN OF RIGHT KNEE: ICD-10-CM

## 2024-06-18 DIAGNOSIS — M17.11 PRIMARY OSTEOARTHRITIS OF RIGHT KNEE: Primary | ICD-10-CM

## 2024-06-18 DIAGNOSIS — G89.29 CHRONIC PAIN OF RIGHT KNEE: ICD-10-CM

## 2024-06-18 PROCEDURE — 99213 OFFICE O/P EST LOW 20 MIN: CPT | Performed by: ORTHOPAEDIC SURGERY

## 2024-06-18 PROCEDURE — 20610 DRAIN/INJ JOINT/BURSA W/O US: CPT | Performed by: PHYSICIAN ASSISTANT

## 2024-06-18 RX ORDER — ROPIVACAINE HYDROCHLORIDE 2 MG/ML
2 INJECTION, SOLUTION EPIDURAL; INFILTRATION; PERINEURAL
Status: COMPLETED | OUTPATIENT
Start: 2024-06-18 | End: 2024-06-18

## 2024-06-18 RX ORDER — TRIAMCINOLONE ACETONIDE 40 MG/ML
40 INJECTION, SUSPENSION INTRA-ARTICULAR; INTRAMUSCULAR
Status: COMPLETED | OUTPATIENT
Start: 2024-06-18 | End: 2024-06-18

## 2024-06-18 RX ADMIN — TRIAMCINOLONE ACETONIDE 40 MG: 40 INJECTION, SUSPENSION INTRA-ARTICULAR; INTRAMUSCULAR at 09:45

## 2024-06-18 RX ADMIN — ROPIVACAINE HYDROCHLORIDE 2 ML: 2 INJECTION, SOLUTION EPIDURAL; INFILTRATION; PERINEURAL at 09:45

## 2024-06-18 NOTE — PROGRESS NOTES
Assessment & Plan     1. Primary osteoarthritis of right knee    2. Chronic pain of right knee      Orders Placed This Encounter   Procedures    Large joint arthrocentesis       Patient has severe right knee osteoarthritis.   Injections: Patient received right knee steroid injection today. Tolerated the procedure well. Post injection instructions reviewed including information on glucose monitoring for diabetic patients. Patient aware that they may repeat steroid injection every 3 months if needed.   Medications: Tylenol up to 3000 mg per day  PT: Continue home exercises.   Bracing: Continue OTC knee brace.   Activity: Continue activity as tolerated.   Plan for next appt:  repeat right knee CSI      Return in about 3 months (around 9/18/2024) for right knee CSI.    I answered all of the patient's questions during the visit and provided education of the patient's condition during the visit.  The patient verbalized understanding of the information given and agrees with the plan.  This note was dictated using [employer] software.  It may contain errors including improperly dictated words.  Please contact physician directly for any questions.    History of Present Illness   Chief complaint:   Chief Complaint   Patient presents with    Right Knee - Follow-up, Pain       HPI: Oanh Martinez is a 83 y.o. female that c/o right knee pain.      Mechanism of Injury: none  Pain Description: right knee and pain swelling, described as achy and there is some sharp pain when the knee cracks   Palliating Factors: nothing  Provoking Factors: prolonged activity (watches great grandchild)   Associated Symptoms: cracking and stiffness   Medications: tylenol if needed but limits   Able to take NSAIDs? If not, why: no  Physical Therapy or Home Exercises: none  Injections: received right knee steroid injection at last visit on 3/15/24 and reports good pain relief for at least a few weeks   Bracing: tried short hinge knee brace but it fell  down the leg  Previous Surgery: none  Miscellaneous: NA     ROS:    See HPI for musculoskeletal review.   All other systems reviewed are negative     Historical Information   Past Medical History:   Diagnosis Date    Allergic reaction 09/27/2021    Allergic rhinitis     Arthritis     Atrial fibrillation (HCC)     Bleeding nose     Breast cancer (HCC)     Bug bite 05/21/2019    Cataracts, bilateral     Chronic headaches     pulsatile daily headaches    Chronic post-traumatic headache, not intractable 09/08/2020    Colitis     Coronary artery disease     Diabetes mellitus (HCC)     Disease of thyroid gland     Dizziness and giddiness     DJD (degenerative joint disease), cervical     Encounter for well adult exam with abnormal findings 10/14/2019    Facial neuralgia     left frontal facial post traumatic neuralgia    Fibromyalgia     Glaucoma     History of external beam radiation therapy     8/16/18 to 9/14/2018 Right breast    Hypertension     Hypokalemia     Hypovitaminosis D     Lupus (HCC) 07/19/2018    Obesity     Osteoarthritis     Pre-op examination 02/25/2019    Pre-operative clearance 08/21/2018    Prediabetes     Rib pain on right side 02/25/2019    SDH (subdural hematoma) (HCC) 01/31/2017    Sleep apnea     no cpap    Stage 3a chronic kidney disease (HCC) 05/10/2022    Syncope and collapse 12/01/2018    Vertigo      Past Surgical History:   Procedure Laterality Date    BREAST BIOPSY      BREAST LUMPECTOMY      CHOLECYSTECTOMY      COLONOSCOPY  2013    HYSTERECTOMY      MAMMO NEEDLE LOCALIZATION RIGHT (ALL INC) Right 6/21/2018    MAMMO STEREOTACTIC BREAST BIOPSY RIGHT (ALL INC) Right 5/9/2018    THYROIDECTOMY N/A 11/21/2019    Procedure: THYROIDECTOMY, total;  Surgeon: Joshua Glover MD;  Location: [facility];  Service: Surgical Oncology    TONSILLECTOMY      US GUIDED BREAST BIOPSY RIGHT COMPLETE Right 5/9/2018    US GUIDED THYROID BIOPSY  6/12/2019    US GUIDED THYROID BIOPSY  9/13/2019     Social  History   Social History     Substance and Sexual Activity   Alcohol Use Yes     Social History     Substance and Sexual Activity   Drug Use No     Social History     Tobacco Use   Smoking Status Never    Passive exposure: Never   Smokeless Tobacco Never   Tobacco Comments    no smoke exposure     Family History:   Family History   Problem Relation Age of Onset    Heart attack Sister     Hypertension Family     Diabetes Family     Stroke Family     Migraines Family     Breast cancer Daughter 35       Current Outpatient Medications on File Prior to Visit   Medication Sig Dispense Refill    albuterol (Ventolin HFA) 90 mcg/act inhaler Inhale 2 puffs every 6 (six) hours as needed for wheezing 18 g 0    allopurinol (ZYLOPRIM) 100 mg tablet Take 1 tablet (100 mg total) by mouth daily 90 tablet 1    apixaban (Eliquis) 5 mg Take 1 tablet (5 mg total) by mouth 2 (two) times a day Patient must schedule appointment for farther refills. 180 tablet 3    Atogepant (Qulipta) 10 MG TABS One tab daily. 90 tablet 0    atorvastatin (LIPITOR) 10 mg tablet Take 1 tablet (10 mg total) by mouth daily 90 tablet 1    cholecalciferol (VITAMIN D3) 1,000 units tablet Take 1 tablet (1,000 Units total) by mouth daily 90 tablet 1    Diclofenac Sodium (VOLTAREN) 1 % Apply 4 g topically 4 (four) times a day 100 g 0    gabapentin (NEURONTIN) 300 mg capsule Take 1 capsule (300 mg total) by mouth 2 (two) times a day 180 capsule 0    irbesartan-hydrochlorothiazide (AVALIDE) 300-12.5 MG per tablet Take 1 tablet by mouth daily 90 tablet 1    levothyroxine (Euthyrox) 75 mcg tablet Take 1 tablet (75 mcg total) by mouth daily in the early morning 90 tablet 3    metFORMIN (GLUCOPHAGE) 500 mg tablet Take 1 tablet (500 mg total) by mouth 2 (two) times a day with meals 180 tablet 1    metoprolol tartrate (LOPRESSOR) 25 mg tablet Take 1 tablet (25 mg total) by mouth every 12 (twelve) hours 180 tablet 0    spironolactone (ALDACTONE) 25 mg tablet Take 1 tablet (25  mg total) by mouth daily 30 tablet 5    vitamin B-12 (VITAMIN B-12) 1,000 mcg tablet Take 1 tablet (1,000 mcg total) by mouth daily 90 tablet 0     No current facility-administered medications on file prior to visit.     Allergies   Allergen Reactions    Procardia [Nifedipine] Edema       Current Outpatient Medications on File Prior to Visit   Medication Sig Dispense Refill    albuterol (Ventolin HFA) 90 mcg/act inhaler Inhale 2 puffs every 6 (six) hours as needed for wheezing 18 g 0    allopurinol (ZYLOPRIM) 100 mg tablet Take 1 tablet (100 mg total) by mouth daily 90 tablet 1    apixaban (Eliquis) 5 mg Take 1 tablet (5 mg total) by mouth 2 (two) times a day Patient must schedule appointment for farther refills. 180 tablet 3    Atogepant (Qulipta) 10 MG TABS One tab daily. 90 tablet 0    atorvastatin (LIPITOR) 10 mg tablet Take 1 tablet (10 mg total) by mouth daily 90 tablet 1    cholecalciferol (VITAMIN D3) 1,000 units tablet Take 1 tablet (1,000 Units total) by mouth daily 90 tablet 1    Diclofenac Sodium (VOLTAREN) 1 % Apply 4 g topically 4 (four) times a day 100 g 0    gabapentin (NEURONTIN) 300 mg capsule Take 1 capsule (300 mg total) by mouth 2 (two) times a day 180 capsule 0    irbesartan-hydrochlorothiazide (AVALIDE) 300-12.5 MG per tablet Take 1 tablet by mouth daily 90 tablet 1    levothyroxine (Euthyrox) 75 mcg tablet Take 1 tablet (75 mcg total) by mouth daily in the early morning 90 tablet 3    metFORMIN (GLUCOPHAGE) 500 mg tablet Take 1 tablet (500 mg total) by mouth 2 (two) times a day with meals 180 tablet 1    metoprolol tartrate (LOPRESSOR) 25 mg tablet Take 1 tablet (25 mg total) by mouth every 12 (twelve) hours 180 tablet 0    spironolactone (ALDACTONE) 25 mg tablet Take 1 tablet (25 mg total) by mouth daily 30 tablet 5    vitamin B-12 (VITAMIN B-12) 1,000 mcg tablet Take 1 tablet (1,000 mcg total) by mouth daily 90 tablet 0     No current facility-administered medications on file prior to  "visit.       Objective   Vitals: Blood pressure 132/71, pulse 65, height 5' 2\" (1.575 m), weight 73.4 kg (161 lb 12.8 oz), not currently breastfeeding.,Body mass index is 29.59 kg/m².    PE:  AAOx 3  WDWN  Hearing intact, no drainage from eyes  Regular rate  no audible wheezing  no abdominal distension  LE compartments soft, skin intact    rightknee:    Appearance:  Yes  swelling   No  ecchymosis  Yes  obvious joint deformity   No  effusion   Palpation/Tenderness:  Yes  TTP over medial joint line  Yes  TTP over lateral joint line   No  TTP over patella  No  TTP over patellar tendon  No  TTP over pes anserine bursa  Active Range of Motion:  AROM: 3- 115  Special Tests:  Valgus Stress Test: negative  Varus Stress Test: negative        Large joint arthrocentesis: R knee  Universal Protocol:  Consent: Verbal consent obtained.  Risks and benefits: risks, benefits and alternatives were discussed  Consent given by: patient  Site marked: the operative site was marked  Supporting Documentation  Indications: pain   Procedure Details  Location: knee - R knee  Preparation: Patient was prepped and draped in the usual sterile fashion  Needle size: 22 G  Ultrasound guidance: no  Approach: anterolateral  Medications administered: 40 mg triamcinolone acetonide 40 mg/mL; 2 mL ropivacaine 0.2 %    Patient tolerance: patient tolerated the procedure well with no immediate complications  Dressing:  Sterile dressing applied            Scribe Attestation      I,:  Glenis Jiang PA-C am acting as a scribe while in the presence of the attending physician.:       I,:  Elida Alvarez DO personally performed the services described in this documentation    as scribed in my presence.:                "

## 2024-06-25 DIAGNOSIS — I10 ESSENTIAL HYPERTENSION: ICD-10-CM

## 2024-06-25 DIAGNOSIS — R79.89 LOW VITAMIN B12 LEVEL: ICD-10-CM

## 2024-06-25 RX ORDER — LANOLIN ALCOHOL/MO/W.PET/CERES
1000 CREAM (GRAM) TOPICAL DAILY
Qty: 90 TABLET | Refills: 0 | Status: SHIPPED | OUTPATIENT
Start: 2024-06-25

## 2024-07-24 DIAGNOSIS — I10 ESSENTIAL HYPERTENSION: ICD-10-CM

## 2024-07-24 DIAGNOSIS — E78.2 MIXED HYPERLIPIDEMIA: ICD-10-CM

## 2024-07-24 RX ORDER — ATORVASTATIN CALCIUM 10 MG/1
10 TABLET, FILM COATED ORAL DAILY
Qty: 90 TABLET | Refills: 0 | Status: SHIPPED | OUTPATIENT
Start: 2024-07-24

## 2024-07-24 RX ORDER — IRBESARTAN AND HYDROCHLOROTHIAZIDE 300; 12.5 MG/1; MG/1
1 TABLET, FILM COATED ORAL DAILY
Qty: 90 TABLET | Refills: 0 | Status: SHIPPED | OUTPATIENT
Start: 2024-07-24

## 2024-07-26 NOTE — ASSESSMENT & PLAN NOTE
Lab Results   Component Value Date    EGFR 58 01/04/2024    EGFR 56 07/17/2023    EGFR 61 04/10/2023    CREATININE 0.92 01/04/2024    CREATININE 0.95 07/17/2023    CREATININE 0.88 04/10/2023   Chronic asymptomatic GFR stable continue well hydration avoid NSAIDs patient at the goal for the blood pressure   bloody

## 2024-08-05 ENCOUNTER — TELEPHONE (OUTPATIENT)
Dept: FAMILY MEDICINE CLINIC | Facility: CLINIC | Age: 84
End: 2024-08-05

## 2024-08-16 DIAGNOSIS — E11.9 TYPE 2 DIABETES MELLITUS WITHOUT COMPLICATION, WITHOUT LONG-TERM CURRENT USE OF INSULIN (HCC): ICD-10-CM

## 2024-08-29 ENCOUNTER — TELEPHONE (OUTPATIENT)
Age: 84
End: 2024-08-29

## 2024-08-29 NOTE — TELEPHONE ENCOUNTER
Patient called to schedule an appointment with Dr. Roe for a follow up visit and to discuss her ongoing headaches and neck pain.  Scheduled for first available appointment on 9/10/24. Patient would like to be seen sooner than this if possible and is requesting for Dr Ortega to call her at [phone]

## 2024-09-04 ENCOUNTER — OFFICE VISIT (OUTPATIENT)
Dept: FAMILY MEDICINE CLINIC | Facility: CLINIC | Age: 84
End: 2024-09-04
Payer: COMMERCIAL

## 2024-09-04 VITALS
HEIGHT: 62 IN | SYSTOLIC BLOOD PRESSURE: 128 MMHG | WEIGHT: 165.4 LBS | TEMPERATURE: 97.5 F | OXYGEN SATURATION: 98 % | HEART RATE: 58 BPM | BODY MASS INDEX: 30.44 KG/M2 | DIASTOLIC BLOOD PRESSURE: 80 MMHG

## 2024-09-04 DIAGNOSIS — Z13.29 SCREENING FOR THYROID DISORDER: ICD-10-CM

## 2024-09-04 DIAGNOSIS — Z13.220 SCREENING, LIPID: ICD-10-CM

## 2024-09-04 DIAGNOSIS — R53.83 OTHER FATIGUE: ICD-10-CM

## 2024-09-04 DIAGNOSIS — M54.2 NECK PAIN: Primary | ICD-10-CM

## 2024-09-04 PROCEDURE — 3079F DIAST BP 80-89 MM HG: CPT | Performed by: FAMILY MEDICINE

## 2024-09-04 PROCEDURE — G2211 COMPLEX E/M VISIT ADD ON: HCPCS | Performed by: FAMILY MEDICINE

## 2024-09-04 PROCEDURE — 1101F PT FALLS ASSESS-DOCD LE1/YR: CPT | Performed by: FAMILY MEDICINE

## 2024-09-04 PROCEDURE — 1160F RVW MEDS BY RX/DR IN RCRD: CPT | Performed by: FAMILY MEDICINE

## 2024-09-04 PROCEDURE — 1159F MED LIST DOCD IN RCRD: CPT | Performed by: FAMILY MEDICINE

## 2024-09-04 PROCEDURE — 3074F SYST BP LT 130 MM HG: CPT | Performed by: FAMILY MEDICINE

## 2024-09-04 PROCEDURE — 99214 OFFICE O/P EST MOD 30 MIN: CPT | Performed by: FAMILY MEDICINE

## 2024-09-04 PROCEDURE — 3725F SCREEN DEPRESSION PERFORMED: CPT | Performed by: FAMILY MEDICINE

## 2024-09-04 PROCEDURE — 3288F FALL RISK ASSESSMENT DOCD: CPT | Performed by: FAMILY MEDICINE

## 2024-09-06 ENCOUNTER — TELEPHONE (OUTPATIENT)
Dept: FAMILY MEDICINE CLINIC | Facility: CLINIC | Age: 84
End: 2024-09-06

## 2024-09-06 NOTE — PROGRESS NOTES
Ambulatory Visit  Name: Oanh Martinez      : 1940      MRN: [id number]  Encounter Provider: Parul Roe MD  Encounter Date: 2024   Encounter department: [facility]      Assessment & Plan  Neck pain  Acute on chronic symptomatic patient had history of cervical radiculopathy she is already on gabapentin 300 mg twice a day recommend to add Tylenol 500 mg as needed pain refer the patient to physical therapy proper possible discussed with the patient    Orders:    Basic metabolic panel; Future    CBC and differential; Future    Ambulatory Referral to Physical Therapy; Future    Other fatigue  New diagnosis symptomatic discussed with the patient sleeping hygiene well hydration discussed with patient fatigue is vague symptom multiple factorial she had a history of vitamin D deficiency history of diabetic and now she is having pain which close fatigue workup discussed with the patient and recommend keep well hydration    Orders:    Basic metabolic panel; Future    CBC and differential; Future    Magnesium; Future    Vitamin D 25 hydroxy; Future    Vitamin B12; Future    Screening, lipid    Orders:    Lipid Panel with Direct LDL reflex; Future    Screening for thyroid disorder    Orders:    TSH, 3rd generation with Free T4 reflex; Future         History of Present Illness     Patient here with her daughter concerned about neck pain patient known to have history of chronic neck pain previous workup which showed she had spinal stenosis cervical spine and radiculopathy patient currently on gabapentin 300 twice a day as she feels Sotylize of the motion aggravate the pain and the pain shooting to bilateral upper extremity no muscle weakness no drop object no muscle atrophy and no rash also she concerned about feeling tired fatigue feels washed out no rash no nausea vomiting no diarrhea no weight change no change in the diet      Review of Systems   Constitutional:  Positive  "for fatigue. Negative for chills and fever.   HENT:  Negative for ear pain and sore throat.    Eyes:  Negative for pain and visual disturbance.   Respiratory:  Negative for cough and shortness of breath.    Cardiovascular:  Negative for chest pain and palpitations.   Gastrointestinal:  Negative for abdominal pain, constipation, diarrhea and vomiting.   Genitourinary:  Negative for dysuria and hematuria.   Musculoskeletal:  Positive for neck pain. Negative for gait problem.   Skin:  Negative for color change and rash.   Neurological:  Negative for seizures and syncope.   All other systems reviewed and are negative.    Objective     /80 (BP Location: Left arm, Patient Position: Sitting)   Pulse 58   Temp 97.5 °F (36.4 °C) (Tympanic)   Ht 5' 2\" (1.575 m)   Wt 75 kg (165 lb 6.4 oz)   SpO2 98%   BMI 30.25 kg/m²     Physical Exam  Vitals and nursing note reviewed.   Constitutional:       General: She is not in acute distress.     Appearance: She is well-developed. She is not diaphoretic.   HENT:      Head: Normocephalic.      Right Ear: Tympanic membrane and external ear normal.      Left Ear: Tympanic membrane and external ear normal.      Nose: No rhinorrhea.      Mouth/Throat:      Pharynx: No posterior oropharyngeal erythema.   Eyes:      General:         Right eye: No discharge.         Left eye: No discharge.      Conjunctiva/sclera: Conjunctivae normal.   Neck:      Vascular: No JVD.   Cardiovascular:      Rate and Rhythm: Normal rate and regular rhythm.      Heart sounds: Normal heart sounds.      No gallop.   Pulmonary:      Effort: Pulmonary effort is normal. No respiratory distress.      Breath sounds: Normal breath sounds. No wheezing.   Abdominal:      General: There is no distension.      Palpations: Abdomen is soft. There is no mass.      Tenderness: There is no abdominal tenderness. There is no rebound.   Musculoskeletal:      Cervical back: Normal range of motion and neck supple. Tenderness " present. No signs of trauma or bony tenderness.   Lymphadenopathy:      Cervical: No cervical adenopathy.   Skin:     General: Skin is warm.      Findings: No rash.   Neurological:      Mental Status: She is alert and oriented to person, place, and time.         Parul Roe MD

## 2024-09-06 NOTE — TELEPHONE ENCOUNTER
[facility] will be faxing over patients diabetic eye exam from 2023

## 2024-09-06 NOTE — ASSESSMENT & PLAN NOTE
Acute on chronic symptomatic patient had history of cervical radiculopathy she is already on gabapentin 300 mg twice a day recommend to add Tylenol 500 mg as needed pain refer the patient to physical therapy proper possible discussed with the patient    Orders:    Basic metabolic panel; Future    CBC and differential; Future    Ambulatory Referral to Physical Therapy; Future

## 2024-09-10 ENCOUNTER — EVALUATION (OUTPATIENT)
Dept: PHYSICAL THERAPY | Facility: CLINIC | Age: 84
End: 2024-09-10
Payer: COMMERCIAL

## 2024-09-10 DIAGNOSIS — M54.2 NECK PAIN: Primary | ICD-10-CM

## 2024-09-10 PROCEDURE — 97162 PT EVAL MOD COMPLEX 30 MIN: CPT | Performed by: PHYSICAL THERAPIST

## 2024-09-10 PROCEDURE — 97112 NEUROMUSCULAR REEDUCATION: CPT | Performed by: PHYSICAL THERAPIST

## 2024-09-10 PROCEDURE — 97140 MANUAL THERAPY 1/> REGIONS: CPT | Performed by: PHYSICAL THERAPIST
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
## 2024-09-11 DIAGNOSIS — I10 ESSENTIAL HYPERTENSION: ICD-10-CM

## 2024-09-11 DIAGNOSIS — M47.27 OSTEOARTHRITIS OF SPINE WITH RADICULOPATHY, LUMBOSACRAL REGION: ICD-10-CM

## 2024-09-11 DIAGNOSIS — M47.22 OSTEOARTHRITIS OF SPINE WITH RADICULOPATHY, CERVICAL REGION: ICD-10-CM

## 2024-09-11 RX ORDER — SPIRONOLACTONE 25 MG/1
25 TABLET ORAL DAILY
Qty: 30 TABLET | Refills: 5 | Status: SHIPPED | OUTPATIENT
Start: 2024-09-11

## 2024-09-11 RX ORDER — GABAPENTIN 300 MG/1
300 CAPSULE ORAL 2 TIMES DAILY
Qty: 180 CAPSULE | Refills: 1 | Status: SHIPPED | OUTPATIENT
Start: 2024-09-11

## 2024-09-12 ENCOUNTER — OFFICE VISIT (OUTPATIENT)
Dept: PHYSICAL THERAPY | Facility: CLINIC | Age: 84
End: 2024-09-12
Payer: COMMERCIAL

## 2024-09-12 ENCOUNTER — APPOINTMENT (OUTPATIENT)
Dept: LAB | Facility: CLINIC | Age: 84
End: 2024-09-12
Payer: COMMERCIAL

## 2024-09-12 DIAGNOSIS — R53.83 OTHER FATIGUE: ICD-10-CM

## 2024-09-12 DIAGNOSIS — M54.2 NECK PAIN: Primary | ICD-10-CM

## 2024-09-12 DIAGNOSIS — Z13.29 SCREENING FOR THYROID DISORDER: ICD-10-CM

## 2024-09-12 DIAGNOSIS — Z13.220 SCREENING, LIPID: ICD-10-CM

## 2024-09-12 DIAGNOSIS — M54.2 NECK PAIN: ICD-10-CM

## 2024-09-12 LAB
25(OH)D3 SERPL-MCNC: 34 NG/ML (ref 30–100)
ANION GAP SERPL CALCULATED.3IONS-SCNC: 7 MMOL/L (ref 4–13)
BASOPHILS # BLD AUTO: 0.02 THOUSANDS/ΜL (ref 0–0.1)
BASOPHILS NFR BLD AUTO: 0 % (ref 0–1)
BUN SERPL-MCNC: 15 MG/DL (ref 5–25)
CALCIUM SERPL-MCNC: 9 MG/DL (ref 8.4–10.2)
CHLORIDE SERPL-SCNC: 102 MMOL/L (ref 96–108)
CHOLEST SERPL-MCNC: 101 MG/DL
CO2 SERPL-SCNC: 31 MMOL/L (ref 21–32)
CREAT SERPL-MCNC: 1 MG/DL (ref 0.6–1.3)
EOSINOPHIL # BLD AUTO: 0.02 THOUSAND/ΜL (ref 0–0.61)
EOSINOPHIL NFR BLD AUTO: 0 % (ref 0–6)
ERYTHROCYTE [DISTWIDTH] IN BLOOD BY AUTOMATED COUNT: 13 % (ref 11.6–15.1)
GFR SERPL CREATININE-BSD FRML MDRD: 52 ML/MIN/1.73SQ M
GLUCOSE P FAST SERPL-MCNC: 139 MG/DL (ref 65–99)
HCT VFR BLD AUTO: 42.1 % (ref 34.8–46.1)
HDLC SERPL-MCNC: 53 MG/DL
HGB BLD-MCNC: 13.7 G/DL (ref 11.5–15.4)
IMM GRANULOCYTES # BLD AUTO: 0.03 THOUSAND/UL (ref 0–0.2)
IMM GRANULOCYTES NFR BLD AUTO: 1 % (ref 0–2)
LDLC SERPL CALC-MCNC: 33 MG/DL (ref 0–100)
LYMPHOCYTES # BLD AUTO: 1.77 THOUSANDS/ΜL (ref 0.6–4.47)
LYMPHOCYTES NFR BLD AUTO: 35 % (ref 14–44)
MAGNESIUM SERPL-MCNC: 1.8 MG/DL (ref 1.9–2.7)
MCH RBC QN AUTO: 31.9 PG (ref 26.8–34.3)
MCHC RBC AUTO-ENTMCNC: 32.5 G/DL (ref 31.4–37.4)
MCV RBC AUTO: 98 FL (ref 82–98)
MONOCYTES # BLD AUTO: 0.52 THOUSAND/ΜL (ref 0.17–1.22)
MONOCYTES NFR BLD AUTO: 10 % (ref 4–12)
NEUTROPHILS # BLD AUTO: 2.75 THOUSANDS/ΜL (ref 1.85–7.62)
NEUTS SEG NFR BLD AUTO: 54 % (ref 43–75)
NRBC BLD AUTO-RTO: 0 /100 WBCS
PLATELET # BLD AUTO: 203 THOUSANDS/UL (ref 149–390)
PMV BLD AUTO: 11.2 FL (ref 8.9–12.7)
POTASSIUM SERPL-SCNC: 4.1 MMOL/L (ref 3.5–5.3)
RBC # BLD AUTO: 4.3 MILLION/UL (ref 3.81–5.12)
SODIUM SERPL-SCNC: 140 MMOL/L (ref 135–147)
TRIGL SERPL-MCNC: 74 MG/DL
TSH SERPL DL<=0.05 MIU/L-ACNC: 2.96 UIU/ML (ref 0.45–4.5)
VIT B12 SERPL-MCNC: 800 PG/ML (ref 180–914)
WBC # BLD AUTO: 5.11 THOUSAND/UL (ref 4.31–10.16)

## 2024-09-12 PROCEDURE — 84443 ASSAY THYROID STIM HORMONE: CPT

## 2024-09-12 PROCEDURE — 97110 THERAPEUTIC EXERCISES: CPT | Performed by: PHYSICAL THERAPIST

## 2024-09-12 PROCEDURE — 85025 COMPLETE CBC W/AUTO DIFF WBC: CPT

## 2024-09-12 PROCEDURE — 82607 VITAMIN B-12: CPT

## 2024-09-12 PROCEDURE — 83735 ASSAY OF MAGNESIUM: CPT

## 2024-09-12 PROCEDURE — 36415 COLL VENOUS BLD VENIPUNCTURE: CPT

## 2024-09-12 PROCEDURE — 97140 MANUAL THERAPY 1/> REGIONS: CPT | Performed by: PHYSICAL THERAPIST

## 2024-09-12 PROCEDURE — 82306 VITAMIN D 25 HYDROXY: CPT

## 2024-09-12 PROCEDURE — 80048 BASIC METABOLIC PNL TOTAL CA: CPT

## 2024-09-12 PROCEDURE — 97112 NEUROMUSCULAR REEDUCATION: CPT | Performed by: PHYSICAL THERAPIST

## 2024-09-12 PROCEDURE — 80061 LIPID PANEL: CPT

## 2024-09-12 NOTE — PROGRESS NOTES
Daily Note     Today's date: 2024  Patient name: Oanh Martinez  : 1940  MRN: [id number]  Referring provider: Parul Roe MD  Dx:   Encounter Diagnosis     ICD-10-CM    1. Neck pain  M54.2                      Subjective: Daughter reports increased neck soreness after beginning PT.  Patient has not attempted HEP self stretching or use of heating pad      Objective: See treatment diary below      Assessment: Tolerated treatment fair. Patient would benefit from continued PT      Plan: Continue per plan of care.      Precautions: cervical stenosis, DDD      Manuals 9/10 9/12           FMT neck/ UT JL JL                                                  Neuro Re-Ed             UT elongation HEP 20:x2           DNF supine HEP            SCM stretch  20:x2           Levator stretch  20:x2                                                  Ther Ex                                                                                                                     Ther Activity                                       Gait Training                                       Modalities

## 2024-09-17 ENCOUNTER — OFFICE VISIT (OUTPATIENT)
Dept: FAMILY MEDICINE CLINIC | Facility: CLINIC | Age: 84
End: 2024-09-17
Payer: COMMERCIAL

## 2024-09-17 VITALS
HEART RATE: 58 BPM | WEIGHT: 167.6 LBS | OXYGEN SATURATION: 97 % | HEIGHT: 62 IN | DIASTOLIC BLOOD PRESSURE: 70 MMHG | BODY MASS INDEX: 30.84 KG/M2 | TEMPERATURE: 96.2 F | SYSTOLIC BLOOD PRESSURE: 110 MMHG

## 2024-09-17 DIAGNOSIS — M67.40 GANGLION CYST: ICD-10-CM

## 2024-09-17 DIAGNOSIS — E11.42 TYPE 2 DIABETES MELLITUS WITH DIABETIC POLYNEUROPATHY, WITHOUT LONG-TERM CURRENT USE OF INSULIN (HCC): Primary | ICD-10-CM

## 2024-09-17 DIAGNOSIS — E83.42 HYPOMAGNESEMIA: ICD-10-CM

## 2024-09-17 DIAGNOSIS — M25.531 RIGHT WRIST PAIN: ICD-10-CM

## 2024-09-17 DIAGNOSIS — R05.8 OTHER COUGH: ICD-10-CM

## 2024-09-17 DIAGNOSIS — Z23 ENCOUNTER FOR IMMUNIZATION: ICD-10-CM

## 2024-09-17 LAB — SL AMB POCT HEMOGLOBIN AIC: 6.6 (ref ?–6.5)

## 2024-09-17 PROCEDURE — G0008 ADMIN INFLUENZA VIRUS VAC: HCPCS

## 2024-09-17 PROCEDURE — 90662 IIV NO PRSV INCREASED AG IM: CPT

## 2024-09-17 PROCEDURE — 99214 OFFICE O/P EST MOD 30 MIN: CPT | Performed by: FAMILY MEDICINE

## 2024-09-17 PROCEDURE — 83036 HEMOGLOBIN GLYCOSYLATED A1C: CPT | Performed by: FAMILY MEDICINE

## 2024-09-17 RX ORDER — ALBUTEROL SULFATE 90 UG/1
2 INHALANT RESPIRATORY (INHALATION) EVERY 6 HOURS PRN
Qty: 18 G | Refills: 0 | Status: SHIPPED | OUTPATIENT
Start: 2024-09-17

## 2024-09-18 ENCOUNTER — APPOINTMENT (OUTPATIENT)
Dept: PHYSICAL THERAPY | Facility: CLINIC | Age: 84
End: 2024-09-18
Payer: COMMERCIAL

## 2024-09-19 ENCOUNTER — OFFICE VISIT (OUTPATIENT)
Dept: OBGYN CLINIC | Facility: MEDICAL CENTER | Age: 84
End: 2024-09-19
Payer: COMMERCIAL

## 2024-09-19 ENCOUNTER — HOSPITAL ENCOUNTER (OUTPATIENT)
Dept: RADIOLOGY | Facility: HOSPITAL | Age: 84
End: 2024-09-19
Payer: COMMERCIAL

## 2024-09-19 VITALS
DIASTOLIC BLOOD PRESSURE: 85 MMHG | HEART RATE: 55 BPM | WEIGHT: 167.6 LBS | BODY MASS INDEX: 30.84 KG/M2 | HEIGHT: 62 IN | SYSTOLIC BLOOD PRESSURE: 157 MMHG

## 2024-09-19 DIAGNOSIS — M17.11 PRIMARY OSTEOARTHRITIS OF RIGHT KNEE: Primary | ICD-10-CM

## 2024-09-19 DIAGNOSIS — M25.561 CHRONIC PAIN OF RIGHT KNEE: ICD-10-CM

## 2024-09-19 DIAGNOSIS — M25.531 RIGHT WRIST PAIN: ICD-10-CM

## 2024-09-19 DIAGNOSIS — G89.29 CHRONIC PAIN OF RIGHT KNEE: ICD-10-CM

## 2024-09-19 PROBLEM — M67.40 GANGLION CYST: Status: ACTIVE | Noted: 2024-09-19

## 2024-09-19 PROBLEM — E83.42 HYPOMAGNESEMIA: Status: ACTIVE | Noted: 2024-09-19

## 2024-09-19 PROCEDURE — 73110 X-RAY EXAM OF WRIST: CPT

## 2024-09-19 PROCEDURE — 99213 OFFICE O/P EST LOW 20 MIN: CPT | Performed by: ORTHOPAEDIC SURGERY

## 2024-09-19 NOTE — ASSESSMENT & PLAN NOTE
Lab Results   Component Value Date    HGBA1C 6.6 (A) 09/17/2024   Chronic asymptomatic fair control continue current management encourage patient to continue with the low-carb diet continue metformin 500 mg twice a day    Orders:    POCT hemoglobin A1c

## 2024-09-19 NOTE — ASSESSMENT & PLAN NOTE
New diagnosis finding on recent blood work recommend start magnesium supplement 400 mg once a day    Orders:    Magnesium 400 MG CAPS; Take 1 capsule (400 mg total) by mouth in the morning

## 2024-09-19 NOTE — ASSESSMENT & PLAN NOTE
New diagnosis symptomatic positive limited range of motion secondary to the pain and swelling on the exam recommend x-ray of the right wrist recommend Tylenol for the pain    Orders:    XR wrist 3+ vw right; Future

## 2024-09-19 NOTE — PROGRESS NOTES
Ambulatory Visit  Name: Oanh Martinez      : 1940      MRN: [id number]  Encounter Provider: Parul Roe MD  Encounter Date: 2024   Encounter department: [facility]      Assessment & Plan  Type 2 diabetes mellitus with diabetic polyneuropathy, without long-term current use of insulin (HCC)    Lab Results   Component Value Date    HGBA1C 6.6 (A) 2024   Chronic asymptomatic fair control continue current management encourage patient to continue with the low-carb diet continue metformin 500 mg twice a day    Orders:    POCT hemoglobin A1c    Right wrist pain  New diagnosis symptomatic positive limited range of motion secondary to the pain and swelling on the exam recommend x-ray of the right wrist recommend Tylenol for the pain    Orders:    XR wrist 3+ vw right; Future    Ganglion cyst  New diagnosis finding on physical exam on the dorsal of the right wrist discussed with the patient and her daughter can condition recommend to start with x-ray of the wrist and will follow-up with the result accordingly         Hypomagnesemia  New diagnosis finding on recent blood work recommend start magnesium supplement 400 mg once a day    Orders:    Magnesium 400 MG CAPS; Take 1 capsule (400 mg total) by mouth in the morning    Other cough    Orders:    albuterol (Ventolin HFA) 90 mcg/act inhaler; Inhale 2 puffs every 6 (six) hours as needed for wheezing    Encounter for immunization  Benefits versus side effect of flu shot discussed with the patient  Orders:    influenza vaccine, high-dose, PF 0.5 mL (Fluzone High Dose)         History of Present Illness     Patient here with her daughter follow-up with a chronic condition she is concerned about the pain on her right wrist she feels the swelling and lump on the dorsal of the right wrist no fall no trauma no rash she described her pain as achy recent blood work discussed with the patient      Review of Systems  "  Constitutional:  Negative for chills and fever.   HENT:  Negative for ear pain and sore throat.    Eyes:  Negative for pain and visual disturbance.   Respiratory:  Negative for cough and shortness of breath.    Cardiovascular:  Negative for chest pain and palpitations.   Gastrointestinal:  Negative for abdominal pain, constipation, diarrhea and vomiting.   Genitourinary:  Negative for dysuria and hematuria.   Musculoskeletal:  Positive for arthralgias.   Skin:  Negative for color change and rash.   Neurological:  Negative for seizures and syncope.   All other systems reviewed and are negative.    Objective     /70 (BP Location: Left arm, Patient Position: Sitting)   Pulse 58   Temp (!) 96.2 °F (35.7 °C) (Tympanic)   Ht 5' 2\" (1.575 m)   Wt 76 kg (167 lb 9.6 oz)   SpO2 97%   BMI 30.65 kg/m²     Physical Exam  Vitals and nursing note reviewed.   Constitutional:       General: She is not in acute distress.     Appearance: She is well-developed. She is not diaphoretic.   HENT:      Head: Normocephalic.      Right Ear: Tympanic membrane and external ear normal.      Left Ear: Tympanic membrane and external ear normal.      Nose: No rhinorrhea.      Mouth/Throat:      Pharynx: No posterior oropharyngeal erythema.   Eyes:      General:         Right eye: No discharge.         Left eye: No discharge.      Conjunctiva/sclera: Conjunctivae normal.   Neck:      Vascular: No JVD.   Cardiovascular:      Rate and Rhythm: Normal rate.      Heart sounds: Murmur heard.      No gallop.   Pulmonary:      Effort: Pulmonary effort is normal. No respiratory distress.      Breath sounds: Normal breath sounds. No wheezing.   Abdominal:      General: There is no distension.      Palpations: Abdomen is soft.      Tenderness: There is no abdominal tenderness. There is no rebound.   Musculoskeletal:         General: No tenderness.        Arms:       Cervical back: Normal range of motion and neck supple.   Lymphadenopathy:      " Cervical: No cervical adenopathy.   Skin:     General: Skin is warm.      Findings: No rash.   Neurological:      Mental Status: She is alert and oriented to person, place, and time.         Parul Roe MD

## 2024-09-19 NOTE — PROGRESS NOTES
Assessment & Plan     1. Primary osteoarthritis of right knee    2. Chronic pain of right knee      No orders of the defined types were placed in this encounter.      Patient has severe right knee osteoarthritis.   Treatment options were reviewed with patient today including surgical and nonsurgical options.  Patient is a surgical candidate at this time. We discussed her medical issues that may increase her risk for surgery. Patient was encouraged to discuss this with her PCP to determine if she is interested in pursuing surgery. She will also discuss with her family.   Injections: Patient will return in 2 weeks for right knee CSI as patient just received her flu vaccine this week. Patient aware she must wait 3 months between CSI and TKA but would like to allow for this time to discuss with PCP and family.   Medications: Tylenol up to 3000 mg per day  PT:  encouraged home  exercise program  Bracing: Continue short hinge knee brace as needed for comfort.   Activity: Continue activity as tolerated.   Plan for next appt:  right knee CSI      Return in about 2 weeks (around 10/3/2024) for right knee CSI.    I answered all of the patient's questions during the visit and provided education of the patient's condition during the visit.  The patient verbalized understanding of the information given and agrees with the plan.  This note was dictated using [employer] software.  It may contain errors including improperly dictated words.  Please contact physician directly for any questions.    History of Present Illness   Chief complaint:   Chief Complaint   Patient presents with    Right Knee - Follow-up       HPI: Oanh Martinez is a 83 y.o. female that c/o right knee pain.      Mechanism of Injury: none  Pain Description: right knee pain, constant, located anterior and posterior knee, described as achy  Palliating Factors: rest  Provoking Factors: bending, prolonged walking   Associated Symptoms: swelling   Medications: none  currently   Able to take NSAIDs? If not, why: no  Physical Therapy or Home Exercises: none at this time but states she is active at home   Injections: received right knee steroid injection at last visit on 6/18/24 and reports only minimal pain relief. States it was less effective than previous injection.   Bracing: tried short hinge knee brace but this did not fit well.  Previous Surgery: none on the right knee   Miscellaneous: NA      History of MRSA: no  History of blood clots: no  Family history of blood clots: no  History of Hepatitis C:no  History of HIV: no  Are you a smoker:no  Are you diabetic:yes  Do you see a cardiologist: yes  Have you seen a dentist in the past year: yes      ROS:    See [facility] for musculoskeletal review.   All other systems reviewed are negative     Historical Information   Past Medical History:   Diagnosis Date    Allergic reaction 09/27/2021    Allergic rhinitis     Arthritis     Atrial fibrillation (HCC)     Bleeding nose     Breast cancer ([facility])     Bug bite 05/21/2019    Cataracts, bilateral     Chronic headaches     pulsatile daily headaches    Chronic post-traumatic headache, not intractable 09/08/2020    Colitis     Coronary artery disease     Diabetes mellitus (HCC)     Disease of thyroid gland     Dizziness and giddiness     DJD (degenerative joint disease), cervical     Encounter for well adult exam with abnormal findings 10/14/2019    Facial neuralgia     left frontal facial post traumatic neuralgia    Fibromyalgia     Glaucoma     History of external beam radiation therapy     8/16/18 to 9/14/2018 Right breast    Hypertension     Hypokalemia     Hypovitaminosis D     Lupus ([facility]) 07/19/2018    Obesity     Osteoarthritis     Pre-op examination 02/25/2019    Pre-operative clearance 08/21/2018    Prediabetes     Rib pain on right side 02/25/2019    SDH (subdural hematoma) ([facility]) 01/31/2017    Sleep apnea     no cpap    Stage 3a chronic kidney disease ([facility]) 05/10/2022    Syncope and  collapse 12/01/2018    Vertigo      Past Surgical History:   Procedure Laterality Date    BREAST BIOPSY      BREAST LUMPECTOMY      CHOLECYSTECTOMY      COLONOSCOPY  2013    HYSTERECTOMY      MAMMO NEEDLE LOCALIZATION RIGHT (ALL INC) Right 6/21/2018    MAMMO STEREOTACTIC BREAST BIOPSY RIGHT (ALL INC) Right 5/9/2018    THYROIDECTOMY N/A 11/21/2019    Procedure: THYROIDECTOMY, total;  Surgeon: Joshua Glover MD;  Location: [facility];  Service: Surgical Oncology    TONSILLECTOMY      US GUIDED BREAST BIOPSY RIGHT COMPLETE Right 5/9/2018    US GUIDED THYROID BIOPSY  6/12/2019    US GUIDED THYROID BIOPSY  9/13/2019     Social History   Social History     Substance and Sexual Activity   Alcohol Use Yes     Social History     Substance and Sexual Activity   Drug Use No     Social History     Tobacco Use   Smoking Status Never    Passive exposure: Never   Smokeless Tobacco Never   Tobacco Comments    no smoke exposure     Family History:   Family History   Problem Relation Age of Onset    Heart attack Sister     Hypertension Family     Diabetes Family     Stroke Family     Migraines Family     Breast cancer Daughter 35       Current Outpatient Medications on File Prior to Visit   Medication Sig Dispense Refill    albuterol (Ventolin HFA) 90 mcg/act inhaler Inhale 2 puffs every 6 (six) hours as needed for wheezing 18 g 0    allopurinol (ZYLOPRIM) 100 mg tablet Take 1 tablet (100 mg total) by mouth daily 90 tablet 1    apixaban (Eliquis) 5 mg Take 1 tablet (5 mg total) by mouth 2 (two) times a day Patient must schedule appointment for farther refills. 180 tablet 3    Atogepant (Qulipta) 10 MG TABS One tab daily. 90 tablet 0    atorvastatin (LIPITOR) 10 mg tablet Take 1 tablet (10 mg total) by mouth daily 90 tablet 0    cholecalciferol (VITAMIN D3) 1,000 units tablet Take 1 tablet (1,000 Units total) by mouth daily 90 tablet 1    Diclofenac Sodium (VOLTAREN) 1 % Apply 4 g topically 4 (four) times a day 100 g 0    gabapentin  (NEURONTIN) 300 mg capsule Take 1 capsule (300 mg total) by mouth 2 (two) times a day 180 capsule 1    irbesartan-hydrochlorothiazide (AVALIDE) 300-12.5 MG per tablet Take 1 tablet by mouth daily 90 tablet 0    levothyroxine (Euthyrox) 75 mcg tablet Take 1 tablet (75 mcg total) by mouth daily in the early morning 90 tablet 3    Magnesium 400 MG CAPS Take 1 capsule (400 mg total) by mouth in the morning 90 capsule 0    metFORMIN (GLUCOPHAGE) 500 mg tablet Take 1 tablet (500 mg total) by mouth 2 (two) times a day with meals 180 tablet 1    metoprolol tartrate (LOPRESSOR) 25 mg tablet Take 1 tablet (25 mg total) by mouth every 12 (twelve) hours 180 tablet 1    spironolactone (ALDACTONE) 25 mg tablet Take 1 tablet (25 mg total) by mouth daily 30 tablet 5    vitamin B-12 (VITAMIN B-12) 1,000 mcg tablet Take 1 tablet (1,000 mcg total) by mouth daily 90 tablet 0     No current facility-administered medications on file prior to visit.     Allergies   Allergen Reactions    Procardia [Nifedipine] Edema       Current Outpatient Medications on File Prior to Visit   Medication Sig Dispense Refill    albuterol (Ventolin HFA) 90 mcg/act inhaler Inhale 2 puffs every 6 (six) hours as needed for wheezing 18 g 0    allopurinol (ZYLOPRIM) 100 mg tablet Take 1 tablet (100 mg total) by mouth daily 90 tablet 1    apixaban (Eliquis) 5 mg Take 1 tablet (5 mg total) by mouth 2 (two) times a day Patient must schedule appointment for farther refills. 180 tablet 3    Atogepant (Qulipta) 10 MG TABS One tab daily. 90 tablet 0    atorvastatin (LIPITOR) 10 mg tablet Take 1 tablet (10 mg total) by mouth daily 90 tablet 0    cholecalciferol (VITAMIN D3) 1,000 units tablet Take 1 tablet (1,000 Units total) by mouth daily 90 tablet 1    Diclofenac Sodium (VOLTAREN) 1 % Apply 4 g topically 4 (four) times a day 100 g 0    gabapentin (NEURONTIN) 300 mg capsule Take 1 capsule (300 mg total) by mouth 2 (two) times a day 180 capsule 1     "irbesartan-hydrochlorothiazide (AVALIDE) 300-12.5 MG per tablet Take 1 tablet by mouth daily 90 tablet 0    levothyroxine (Euthyrox) 75 mcg tablet Take 1 tablet (75 mcg total) by mouth daily in the early morning 90 tablet 3    Magnesium 400 MG CAPS Take 1 capsule (400 mg total) by mouth in the morning 90 capsule 0    metFORMIN (GLUCOPHAGE) 500 mg tablet Take 1 tablet (500 mg total) by mouth 2 (two) times a day with meals 180 tablet 1    metoprolol tartrate (LOPRESSOR) 25 mg tablet Take 1 tablet (25 mg total) by mouth every 12 (twelve) hours 180 tablet 1    spironolactone (ALDACTONE) 25 mg tablet Take 1 tablet (25 mg total) by mouth daily 30 tablet 5    vitamin B-12 (VITAMIN B-12) 1,000 mcg tablet Take 1 tablet (1,000 mcg total) by mouth daily 90 tablet 0     No current facility-administered medications on file prior to visit.       Objective   Vitals: Blood pressure 157/85, pulse 55, height 5' 2\" (1.575 m), weight 76 kg (167 lb 9.6 oz), not currently breastfeeding.,Body mass index is 30.65 kg/m².    PE:  AAOx 3  WDWN  Hearing intact, no drainage from eyes  Regular rate  no audible wheezing  no abdominal distension  LE compartments soft, skin intact    rightknee:    Appearance:  Yes  swelling   No  ecchymosis  No  obvious joint deformity   No  effusion   Palpation/Tenderness:  Yes  TTP over medial joint line  Yes  TTP over lateral joint line   No  TTP over patella  No  TTP over patellar tendon  No  TTP over pes anserine bursa  Active Range of Motion:  AROM: 3- 115  Special Tests:  Valgus Stress Test: negative  Varus Stress Test: negative        Scribe Attestation      I,:  Glenis Jiang PA-C am acting as a scribe while in the presence of the attending physician.:       I,:  Elida Alvarez DO personally performed the services described in this documentation    as scribed in my presence.:                "

## 2024-09-19 NOTE — ASSESSMENT & PLAN NOTE
New diagnosis finding on physical exam on the dorsal of the right wrist discussed with the patient and her daughter can condition recommend to start with x-ray of the wrist and will follow-up with the result accordingly

## 2024-09-20 ENCOUNTER — APPOINTMENT (OUTPATIENT)
Dept: PHYSICAL THERAPY | Facility: CLINIC | Age: 84
End: 2024-09-20
Payer: COMMERCIAL

## 2024-09-20 ENCOUNTER — TELEPHONE (OUTPATIENT)
Age: 84
End: 2024-09-20

## 2024-09-20 NOTE — TELEPHONE ENCOUNTER
Patients daughter Ninfa called in regards to seeing a provider yesterday named Elida Alvarez and she informed them that patient has latent Tuberculosis. Daughter is concerned due to having children, she would like to know if its active and is it contagious. Daughter stated that she was not informed of patient having this and is very concerned.

## 2024-09-23 ENCOUNTER — OFFICE VISIT (OUTPATIENT)
Dept: PHYSICAL THERAPY | Facility: CLINIC | Age: 84
End: 2024-09-23
Payer: COMMERCIAL

## 2024-09-23 DIAGNOSIS — M54.2 NECK PAIN: Primary | ICD-10-CM

## 2024-09-23 PROCEDURE — 97140 MANUAL THERAPY 1/> REGIONS: CPT | Performed by: PHYSICAL THERAPIST

## 2024-09-23 PROCEDURE — 97110 THERAPEUTIC EXERCISES: CPT | Performed by: PHYSICAL THERAPIST

## 2024-09-23 PROCEDURE — 97112 NEUROMUSCULAR REEDUCATION: CPT | Performed by: PHYSICAL THERAPIST

## 2024-09-23 NOTE — TELEPHONE ENCOUNTER
I spoke to daughter to let her know pt is not contagious. Call the office if pt begins after prolonged symptoms as then she will need to be evaluated. Daughter understood.

## 2024-09-23 NOTE — PROGRESS NOTES
Daily Note     Today's date: 2024  Patient name: Oanh Martinez  : 1940  MRN: [id number]  Referring provider: Parul Roe MD  Dx:   Encounter Diagnosis     ICD-10-CM    1. Neck pain  M54.2                      Subjective: Neck pain is improved since she started HEP      Objective: See treatment diary below      Assessment: Tolerated treatment well. Patient would benefit from continued PT      Plan: Continue per plan of care.      Precautions: cervical stenosis, DDD      Manuals 9/10 9/           FMT neck/ UT JL JL                                                  Neuro Re-Ed             UT elongation HEP 20:x2           DNF supine HEP            SCM stretch  20:x2           Levator stretch  20:x2                                                  Ther Ex                                                                                                                     Ther Activity                                       Gait Training                                       Modalities

## 2024-09-25 ENCOUNTER — APPOINTMENT (OUTPATIENT)
Dept: PHYSICAL THERAPY | Facility: CLINIC | Age: 84
End: 2024-09-25
Payer: COMMERCIAL

## 2024-09-30 ENCOUNTER — APPOINTMENT (OUTPATIENT)
Dept: PHYSICAL THERAPY | Facility: CLINIC | Age: 84
End: 2024-09-30
Payer: COMMERCIAL

## 2024-10-10 ENCOUNTER — TELEPHONE (OUTPATIENT)
Dept: ADMINISTRATIVE | Facility: OTHER | Age: 84
End: 2024-10-10

## 2024-10-10 NOTE — TELEPHONE ENCOUNTER
----- Message from Angel JJ sent at 10/10/2024  7:20 AM EDT -----  Regarding: care gap request  10/10/24 7:20 AM    Hello, our patient attached above has had Diabetic Eye Exam completed/performed. Please assist in updating the patient chart by pulling the document from the Media Tab. The date of service is 11/3/2023. The date it was scanned into media is 10/7/24.      Thank you,  Angel Mcdonough MA  [facility]

## 2024-10-10 NOTE — TELEPHONE ENCOUNTER
10/10/24 10:09 AM     Chart reviewed for Diabetic Eye Exam was/were submitted to the patient's insurance.     Sunita Quinteros   [facility]

## 2024-10-10 NOTE — TELEPHONE ENCOUNTER
Upon review of the In Basket request we were able to locate, review, and update the patient chart as requested for Diabetic Eye Exam.    Any additional questions or concerns should be emailed to the Practice Liaisons via the appropriate education email address, please do not reply via In Basket.    Thank you  Sunita Quinteros   [facility]

## 2024-10-24 ENCOUNTER — OFFICE VISIT (OUTPATIENT)
Dept: OBGYN CLINIC | Facility: MEDICAL CENTER | Age: 84
End: 2024-10-24
Payer: COMMERCIAL

## 2024-10-24 VITALS
HEIGHT: 62 IN | WEIGHT: 166.2 LBS | HEART RATE: 61 BPM | SYSTOLIC BLOOD PRESSURE: 144 MMHG | BODY MASS INDEX: 30.59 KG/M2 | DIASTOLIC BLOOD PRESSURE: 76 MMHG

## 2024-10-24 DIAGNOSIS — M17.11 PRIMARY OSTEOARTHRITIS OF RIGHT KNEE: Primary | ICD-10-CM

## 2024-10-24 DIAGNOSIS — G89.29 CHRONIC PAIN OF RIGHT KNEE: ICD-10-CM

## 2024-10-24 DIAGNOSIS — M25.561 CHRONIC PAIN OF RIGHT KNEE: ICD-10-CM

## 2024-10-24 PROCEDURE — 20610 DRAIN/INJ JOINT/BURSA W/O US: CPT | Performed by: PHYSICIAN ASSISTANT

## 2024-10-24 PROCEDURE — 99213 OFFICE O/P EST LOW 20 MIN: CPT | Performed by: ORTHOPAEDIC SURGERY

## 2024-10-24 RX ORDER — BUPIVACAINE HYDROCHLORIDE 2.5 MG/ML
2 INJECTION, SOLUTION INFILTRATION; PERINEURAL
Status: COMPLETED | OUTPATIENT
Start: 2024-10-24 | End: 2024-10-24

## 2024-10-24 RX ORDER — TRIAMCINOLONE ACETONIDE 40 MG/ML
40 INJECTION, SUSPENSION INTRA-ARTICULAR; INTRAMUSCULAR
Status: COMPLETED | OUTPATIENT
Start: 2024-10-24 | End: 2024-10-24

## 2024-10-24 RX ADMIN — BUPIVACAINE HYDROCHLORIDE 2 ML: 2.5 INJECTION, SOLUTION INFILTRATION; PERINEURAL at 08:15

## 2024-10-24 RX ADMIN — TRIAMCINOLONE ACETONIDE 40 MG: 40 INJECTION, SUSPENSION INTRA-ARTICULAR; INTRAMUSCULAR at 08:15

## 2024-10-24 NOTE — PROGRESS NOTES
Assessment & Plan     1. Primary osteoarthritis of right knee    2. Chronic pain of right knee      Orders Placed This Encounter   Procedures    Large joint arthrocentesis       Patient has severe right knee osteoarthritis.   Non operative and operative treatment options were reviewed with patient today.   Patient is a surgical candidate at this time but she would like to discuss her options with her PCP and cardiologist at her upcoming appointments. Patient aware that she must wait 3 months between CSI and TKA. .   Injections: Patient received right knee steroid injection today. Tolerated the procedure well. Post injection instructions reviewed including information on glucose monitoring for diabetic patients. Patient aware that they may repeat steroid injection every 3 months if needed.   Medications: Tylenol up to 3000 mg per day  PT: Continue home exercises.   Bracing: Continue short hinge knee brace as needed for comfort.   Activity: Continue activity as tolerated.   Plan for next appt:  repeat CSI vs discuss TKA      Return in about 3 months (around 1/24/2025) for right knee CSI vs TKA planning.    I answered all of the patient's questions during the visit and provided education of the patient's condition during the visit.  The patient verbalized understanding of the information given and agrees with the plan.  This note was dictated using hi5 software.  It may contain errors including improperly dictated words.  Please contact physician directly for any questions.    History of Present Illness   Chief complaint:   Chief Complaint   Patient presents with    Right Knee - Follow-up, Pain       HPI: Oanh Martinez is a 83 y.o. female that c/o right knee pain.      Mechanism of Injury: none  Pain Description: right knee pain, constant, described as tightness and achy   Palliating Factors: rest  Provoking Factors: bending, squatting   Associated Symptoms: swelling, mostly in the posterior aspect    Medications: tylenol if needed   Able to take NSAIDs? If not, why: no age over 76 yo   Physical Therapy or Home Exercises: no PT at this time but doing some HEP  Injections: received right knee steroid injection previously and reports mild relief   Bracing: none at this time but does have short hinge knee brace at home  Previous Surgery: none  Miscellaneous: considering TKA but would like to discuss with PCP and cardiology at appts in Nov       ROS:    See HPI for musculoskeletal review.   All other systems reviewed are negative     Historical Information   Past Medical History:   Diagnosis Date    Allergic reaction 09/27/2021    Allergic rhinitis     Arthritis     Atrial fibrillation (HCC)     Bleeding nose     Breast cancer (HCC)     Bug bite 05/21/2019    Cataracts, bilateral     Chronic headaches     pulsatile daily headaches    Chronic post-traumatic headache, not intractable 09/08/2020    Colitis     Coronary artery disease     Diabetes mellitus (HCC)     Disease of thyroid gland     Dizziness and giddiness     DJD (degenerative joint disease), cervical     Encounter for well adult exam with abnormal findings 10/14/2019    Facial neuralgia     left frontal facial post traumatic neuralgia    Fibromyalgia     Glaucoma     History of external beam radiation therapy     8/16/18 to 9/14/2018 Right breast    Hypertension     Hypokalemia     Hypovitaminosis D     Lupus ([facility]) 07/19/2018    Obesity     Osteoarthritis     Pre-op examination 02/25/2019    Pre-operative clearance 08/21/2018    Prediabetes     Rib pain on right side 02/25/2019    SDH (subdural hematoma) ([facility]) 01/31/2017    Sleep apnea     no cpap    Stage 3a chronic kidney disease ([facility]) 05/10/2022    Syncope and collapse 12/01/2018    Vertigo      Past Surgical History:   Procedure Laterality Date    BREAST BIOPSY      BREAST LUMPECTOMY      CHOLECYSTECTOMY      COLONOSCOPY  2013    HYSTERECTOMY      MAMMO NEEDLE LOCALIZATION RIGHT (ALL INC) Right  6/21/2018    MAMMO STEREOTACTIC BREAST BIOPSY RIGHT (ALL INC) Right 5/9/2018    THYROIDECTOMY N/A 11/21/2019    Procedure: THYROIDECTOMY, total;  Surgeon: Joshua Glover MD;  Location: [facility];  Service: Surgical Oncology    TONSILLECTOMY      US GUIDED BREAST BIOPSY RIGHT COMPLETE Right 5/9/2018    US GUIDED THYROID BIOPSY  6/12/2019    US GUIDED THYROID BIOPSY  9/13/2019     Social History   Social History     Substance and Sexual Activity   Alcohol Use Yes     Social History     Substance and Sexual Activity   Drug Use No     Social History     Tobacco Use   Smoking Status Never    Passive exposure: Never   Smokeless Tobacco Never   Tobacco Comments    no smoke exposure     Family History:   Family History   Problem Relation Age of Onset    Heart attack Sister     Hypertension Family     Diabetes Family     Stroke Family     Migraines Family     Breast cancer Daughter 35       Current Outpatient Medications on File Prior to Visit   Medication Sig Dispense Refill    albuterol (Ventolin HFA) 90 mcg/act inhaler Inhale 2 puffs every 6 (six) hours as needed for wheezing 18 g 0    allopurinol (ZYLOPRIM) 100 mg tablet Take 1 tablet (100 mg total) by mouth daily 90 tablet 1    apixaban (Eliquis) 5 mg Take 1 tablet (5 mg total) by mouth 2 (two) times a day Patient must schedule appointment for farther refills. 180 tablet 3    Atogepant (Qulipta) 10 MG TABS One tab daily. 90 tablet 0    atorvastatin (LIPITOR) 10 mg tablet Take 1 tablet (10 mg total) by mouth daily 90 tablet 0    cholecalciferol (VITAMIN D3) 1,000 units tablet Take 1 tablet (1,000 Units total) by mouth daily 90 tablet 1    Diclofenac Sodium (VOLTAREN) 1 % Apply 4 g topically 4 (four) times a day 100 g 0    gabapentin (NEURONTIN) 300 mg capsule Take 1 capsule (300 mg total) by mouth 2 (two) times a day 180 capsule 1    irbesartan-hydrochlorothiazide (AVALIDE) 300-12.5 MG per tablet Take 1 tablet by mouth daily 90 tablet 0    levothyroxine (Euthyrox) 75  mcg tablet Take 1 tablet (75 mcg total) by mouth daily in the early morning 90 tablet 3    Magnesium 400 MG CAPS Take 1 capsule (400 mg total) by mouth in the morning 90 capsule 0    metFORMIN (GLUCOPHAGE) 500 mg tablet Take 1 tablet (500 mg total) by mouth 2 (two) times a day with meals 180 tablet 1    metoprolol tartrate (LOPRESSOR) 25 mg tablet Take 1 tablet (25 mg total) by mouth every 12 (twelve) hours 180 tablet 1    spironolactone (ALDACTONE) 25 mg tablet Take 1 tablet (25 mg total) by mouth daily 30 tablet 5    vitamin B-12 (VITAMIN B-12) 1,000 mcg tablet Take 1 tablet (1,000 mcg total) by mouth daily 90 tablet 0     No current facility-administered medications on file prior to visit.     Allergies   Allergen Reactions    Procardia [Nifedipine] Edema       Current Outpatient Medications on File Prior to Visit   Medication Sig Dispense Refill    albuterol (Ventolin HFA) 90 mcg/act inhaler Inhale 2 puffs every 6 (six) hours as needed for wheezing 18 g 0    allopurinol (ZYLOPRIM) 100 mg tablet Take 1 tablet (100 mg total) by mouth daily 90 tablet 1    apixaban (Eliquis) 5 mg Take 1 tablet (5 mg total) by mouth 2 (two) times a day Patient must schedule appointment for farther refills. 180 tablet 3    Atogepant (Qulipta) 10 MG TABS One tab daily. 90 tablet 0    atorvastatin (LIPITOR) 10 mg tablet Take 1 tablet (10 mg total) by mouth daily 90 tablet 0    cholecalciferol (VITAMIN D3) 1,000 units tablet Take 1 tablet (1,000 Units total) by mouth daily 90 tablet 1    Diclofenac Sodium (VOLTAREN) 1 % Apply 4 g topically 4 (four) times a day 100 g 0    gabapentin (NEURONTIN) 300 mg capsule Take 1 capsule (300 mg total) by mouth 2 (two) times a day 180 capsule 1    irbesartan-hydrochlorothiazide (AVALIDE) 300-12.5 MG per tablet Take 1 tablet by mouth daily 90 tablet 0    levothyroxine (Euthyrox) 75 mcg tablet Take 1 tablet (75 mcg total) by mouth daily in the early morning 90 tablet 3    Magnesium 400 MG CAPS Take 1  "capsule (400 mg total) by mouth in the morning 90 capsule 0    metFORMIN (GLUCOPHAGE) 500 mg tablet Take 1 tablet (500 mg total) by mouth 2 (two) times a day with meals 180 tablet 1    metoprolol tartrate (LOPRESSOR) 25 mg tablet Take 1 tablet (25 mg total) by mouth every 12 (twelve) hours 180 tablet 1    spironolactone (ALDACTONE) 25 mg tablet Take 1 tablet (25 mg total) by mouth daily 30 tablet 5    vitamin B-12 (VITAMIN B-12) 1,000 mcg tablet Take 1 tablet (1,000 mcg total) by mouth daily 90 tablet 0     No current facility-administered medications on file prior to visit.       Objective   Vitals: Blood pressure 144/76, pulse 61, height 5' 2\" (1.575 m), weight 75.4 kg (166 lb 3.2 oz), not currently breastfeeding.,Body mass index is 30.4 kg/m².    PE:  AAOx 3  WDWN  Hearing intact, no drainage from eyes  Regular rate  no audible wheezing  no abdominal distension  LE compartments soft, skin intact    rightknee:    Appearance:  Yes  swelling   No  ecchymosis  Yes  obvious joint deformity   No  effusion   Palpation/Tenderness:  No  TTP over medial joint line  No  TTP over lateral joint line   No  TTP over patella  No  TTP over patellar tendon  No  TTP over pes anserine bursa  Active Range of Motion:  AROM: 3- 110  Special Tests:  Valgus Stress Test: negative  Varus Stress Test: negative        Large joint arthrocentesis: R knee  [place] Protocol:  procedure performed by consultantConsent: Verbal consent obtained.  Risks and benefits: risks, benefits and alternatives were discussed  Consent given by: patient  Site marked: the operative site was marked  Supporting Documentation  Indications: pain   Procedure Details  Location: knee - R knee  Preparation: Patient was prepped and draped in the usual sterile fashion  Needle size: 22 G  Ultrasound guidance: no  Approach: anterolateral  Medications administered: 2 mL bupivacaine 0.25 %; 40 mg triamcinolone acetonide 40 mg/mL    Patient tolerance: patient tolerated the " procedure well with no immediate complications  Dressing:  Sterile dressing applied            Scribe Attestation      I,:  Glenis Jiang PA-C am acting as a scribe while in the presence of the attending physician.:       I,:  Elida Alvarez DO personally performed the services described in this documentation    as scribed in my presence.:

## 2024-10-25 DIAGNOSIS — E78.2 MIXED HYPERLIPIDEMIA: ICD-10-CM

## 2024-10-25 DIAGNOSIS — I10 ESSENTIAL HYPERTENSION: ICD-10-CM

## 2024-10-25 RX ORDER — IRBESARTAN AND HYDROCHLOROTHIAZIDE 300; 12.5 MG/1; MG/1
1 TABLET, FILM COATED ORAL DAILY
Qty: 90 TABLET | Refills: 0 | Status: SHIPPED | OUTPATIENT
Start: 2024-10-25

## 2024-10-25 RX ORDER — ATORVASTATIN CALCIUM 10 MG/1
10 TABLET, FILM COATED ORAL DAILY
Qty: 90 TABLET | Refills: 0 | Status: SHIPPED | OUTPATIENT
Start: 2024-10-25

## 2024-11-13 ENCOUNTER — OFFICE VISIT (OUTPATIENT)
Dept: FAMILY MEDICINE CLINIC | Facility: CLINIC | Age: 84
End: 2024-11-13
Payer: COMMERCIAL

## 2024-11-13 VITALS
DIASTOLIC BLOOD PRESSURE: 80 MMHG | SYSTOLIC BLOOD PRESSURE: 130 MMHG | WEIGHT: 164.8 LBS | OXYGEN SATURATION: 97 % | HEIGHT: 62 IN | BODY MASS INDEX: 30.33 KG/M2 | TEMPERATURE: 97.3 F | HEART RATE: 62 BPM

## 2024-11-13 DIAGNOSIS — N39.3 FEMALE STRESS INCONTINENCE: ICD-10-CM

## 2024-11-13 DIAGNOSIS — E11.42 TYPE 2 DIABETES MELLITUS WITH DIABETIC POLYNEUROPATHY, WITHOUT LONG-TERM CURRENT USE OF INSULIN (HCC): ICD-10-CM

## 2024-11-13 DIAGNOSIS — G89.29 ACUTE EXACERBATION OF CHRONIC LOW BACK PAIN: ICD-10-CM

## 2024-11-13 DIAGNOSIS — M54.50 ACUTE EXACERBATION OF CHRONIC LOW BACK PAIN: ICD-10-CM

## 2024-11-13 DIAGNOSIS — Z00.00 MEDICARE ANNUAL WELLNESS VISIT, SUBSEQUENT: Primary | ICD-10-CM

## 2024-11-13 DIAGNOSIS — E66.811 CLASS 1 OBESITY DUE TO EXCESS CALORIES WITH SERIOUS COMORBIDITY AND BODY MASS INDEX (BMI) OF 30.0 TO 30.9 IN ADULT: ICD-10-CM

## 2024-11-13 DIAGNOSIS — E78.2 MIXED HYPERLIPIDEMIA: ICD-10-CM

## 2024-11-13 DIAGNOSIS — E66.09 CLASS 1 OBESITY DUE TO EXCESS CALORIES WITH SERIOUS COMORBIDITY AND BODY MASS INDEX (BMI) OF 30.0 TO 30.9 IN ADULT: ICD-10-CM

## 2024-11-13 DIAGNOSIS — Z13.29 SCREENING FOR THYROID DISORDER: ICD-10-CM

## 2024-11-13 PROCEDURE — 99214 OFFICE O/P EST MOD 30 MIN: CPT | Performed by: FAMILY MEDICINE

## 2024-11-13 PROCEDURE — G0439 PPPS, SUBSEQ VISIT: HCPCS | Performed by: FAMILY MEDICINE

## 2024-11-13 NOTE — ASSESSMENT & PLAN NOTE
Lab Results   Component Value Date    HGBA1C 6.6 (A) 09/17/2024       Orders:    Hemoglobin A1C; Future    Albumin / creatinine urine ratio; Future

## 2024-11-13 NOTE — PATIENT INSTRUCTIONS
"Patient Education     Urinary incontinence in females   The Basics   Written by the doctors and editors at [facility]   What is urinary incontinence? -- Urinary incontinence is the medical term for when a person leaks urine or loses bladder control.  Incontinence is a very common problem, but it is not a normal part of aging. If you have this problem, there are treatments that can help. There are also things that you can do on your own to stop or reduce urine leakage so you don't have to \"just live with it.\"  What are the symptoms of incontinence? -- There are different types of incontinence. Each causes different symptoms. The 3 most common types are:   Stress incontinence - With stress incontinence, you leak urine when you laugh, cough, sneeze, or do anything that \"stresses\" the belly. Stress incontinence is most common in females, especially those who have had a baby.   Urgency incontinence - With urgency incontinence, you feel a strong need to urinate all of a sudden. This is also known as \"urge incontinence.\" Often, the \"urge\" is so strong that you can't make it to the bathroom in time. \"Overactive bladder\" is another term for having a sudden, frequent urge to urinate. People with overactive bladder might or might not actually leak urine.   Mixed incontinence - With mixed incontinence, you have symptoms of both stress and urgency incontinence.  Is there anything I can do on my own to feel better? -- Yes. Here are some things that can help reduce urine leaks:   Reduce the amount of liquid that you drink, especially a few hours before bed.   Cut down on any foods or drinks that make your symptoms worse. Some people find that alcohol, caffeine, or spicy or acidic foods irritate the bladder.   Try to lose weight, if you are overweight. Your doctor or nurse can help you do this in a healthy way.   If you have diabetes, keep your blood sugar as close to your goal level as possible.   If you take medicines called " "diuretics, plan ahead. These medicines increase the need to urinate. Try to take them when you know you will be near a bathroom for a few hours. If you keep having problems with leakage because of diuretics, ask your doctor if you can take a lower dose or switch to a different medicine.  These techniques can also help improve bladder control:   Bladder retraining - During bladder retraining, you go to the bathroom at scheduled times. For instance, you might decide that you will go every hour. Make yourself go every hour, even if you don't feel like you need to. Try to wait the whole hour, even if you need to go sooner. Then, once you get used to going every hour, increase the amount of time you wait in between bathroom visits. Over time, you might be able to \"retrain\" your bladder to wait 3 or 4 hours between bathroom visits.   Pelvic floor muscle training - This involves learning exercises to strengthen and relax your pelvic muscles. These include the muscles that control the flow of urine and bowel movements. When done right, these exercises can help. But people often do them wrong. Ask your doctor or nurse how to do them right. Your doctor might suggest working with a physical therapist who has special training in these exercises.  Should I see my doctor or nurse? -- Yes. Your doctor or nurse can find out what might be causing your incontinence. They can also suggest ways to relieve the problem.  When you speak to your doctor or nurse, ask if any of the medicines you take could be causing your symptoms. Some medicines can cause incontinence or make it worse.  Some people choose to wear pads or special underwear. These can help if you accidentally leak urine once in a while. But they can also cause skin irritation if you use them a lot. If you have incontinence, ask your doctor or nurse how to treat it.  How is incontinence treated? -- The treatment options differ depending on what type of incontinence you have. " Some of the options include:   Medicines to relax the bladder   Surgery to repair the tissues that support the bladder or to improve the flow of urine   Electrical stimulation of the nerves that relax the bladder  Urinary incontinence is more common in people who have been through menopause. (Menopause is when you stop having monthly periods). Some people have vaginal dryness after menopause. If this is the case for you, a treatment called vaginal estrogen might help.  What will my life be like? -- Many people with incontinence can regain bladder control or at least reduce the amount of leakage they have. The most important thing is to tell your doctor or nurse. Then, work with them to find an approach that helps you.  All topics are updated as new evidence becomes available and our peer review process is complete.  This topic retrieved from [employer] on: Feb 26, 2024.  Topic 59706 Version 18.0  Release: 32.2.4 - C32.56  © 2024 UpToDate, Inc. and/or its affiliates. All rights reserved.  figure 1: Location of the bladder     This drawing shows the side view of a woman's body. The bladder is in front of the vagina. The urethra is the tube that carries urine from the bladder out of the body.  Graphic 894605 Version 1.0  Consumer Information Use and Disclaimer   Disclaimer: This generalized information is a limited summary of diagnosis, treatment, and/or medication information. It is not meant to be comprehensive and should be used as a tool to help the user understand and/or assess potential diagnostic and treatment options. It does NOT include all information about conditions, treatments, medications, side effects, or risks that may apply to a specific patient. It is not intended to be medical advice or a substitute for the medical advice, diagnosis, or treatment of a health care provider based on the health care provider's examination and assessment of a patient's specific and unique circumstances. Patients must speak  with a health care provider for complete information about their health, medical questions, and treatment options, including any risks or benefits regarding use of medications. This information does not endorse any treatments or medications as safe, effective, or approved for treating a specific patient. UpToDate, Inc. and its affiliates disclaim any warranty or liability relating to this information or the use thereof.The use of this information is governed by the Terms of Use, available at https://www.woltersGetPromotduwer.com/en/know/clinical-effectiveness-terms. 2024© UpToDate, Inc. and its affiliates and/or licensors. All rights reserved.  Copyright   © 2024 UpToDate, Inc. and/or its affiliates. All rights reserved.

## 2024-11-13 NOTE — ASSESSMENT & PLAN NOTE
Advice and education were given regarding nutrition, aerobic exercises, weight-bearing exercises, cardiovascular risk reduction, fall risk reduction, and age-appropriate supplements.     The patient was counseled regarding instructions for management, risk factor reductions, prognosis, risks and benefits of treatment options, patient and family education, and importance of compliance with treatment.  Orders:    CBC and differential; Future    Comprehensive metabolic panel; Future

## 2024-11-13 NOTE — ASSESSMENT & PLAN NOTE
Chronic BMI 30.14 discussed with patient portion control low-carb low-fat diet increase physical activity    Orders:    CBC and differential; Future    Comprehensive metabolic panel; Future

## 2024-11-13 NOTE — PROGRESS NOTES
Ambulatory Visit  Name: Oanh Martinez      : 1940      MRN: [id number]  Encounter Provider: Parul Roe MD  Encounter Date: 2024   Encounter department: [facility]  Medicare annual wellness visit, subsequent  Advice and education were given regarding nutrition, aerobic exercises, weight-bearing exercises, cardiovascular risk reduction, fall risk reduction, and age-appropriate supplements.     The patient was counseled regarding instructions for management, risk factor reductions, prognosis, risks and benefits of treatment options, patient and family education, and importance of compliance with treatment.  Orders:    CBC and differential; Future    Comprehensive metabolic panel; Future    Class 1 obesity due to excess calories with serious comorbidity and body mass index (BMI) of 30.0 to 30.9 in adult  Chronic BMI 30.14 discussed with patient portion control low-carb low-fat diet increase physical activity    Orders:    CBC and differential; Future    Comprehensive metabolic panel; Future    Female stress incontinence  New diagnosis discussed with the patient Kegel exercises avoid constipation keep blood sugar well-controlled  Orders:    CBC and differential; Future    Comprehensive metabolic panel; Future    Acute exacerbation of chronic low back pain  Acute on chronic symptomatic previously patient had x-ray of the lumbar spine result reviewed with the patient 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 with patient chronic kidney function avoid NSAID recommend Tylenol and discussed the proper possible  Orders:    CBC and differential; Future    Comprehensive metabolic panel; Future    Mixed hyperlipidemia  Chronic asymptomatic fair control continue current management patient on atorvastatin 10 mg once a day  Orders:    CBC and differential; Future    Comprehensive metabolic panel; Future    Lipid Panel with Direct LDL reflex; Future    Type 2 diabetes mellitus  with diabetic polyneuropathy, without long-term current use of insulin ([facility])    Lab Results   Component Value Date    HGBA1C 6.6 (A) 09/17/2024       Orders:    Hemoglobin A1C; Future    Albumin / creatinine urine ratio; Future    Screening for thyroid disorder    Orders:    TSH, 3rd generation with Free T4 reflex; Future      Depression Screening and Follow-up Plan: Patient was screened for depression during today's encounter. They screened negative with a PHQ-2 score of 0.    Falls Plan of Care: balance, strength, and gait training instructions were provided.     Urinary Incontinence Plan of Care: counseling topics discussed: practice Kegel (pelvic floor strengthening) exercises, limiting fluid intake 3-4 hours before bed, preventing constipation and improving blood sugar control.       Preventive health issues were discussed with patient, and age appropriate screening tests were ordered as noted in patient's After Visit Summary. Personalized health advice and appropriate referrals for health education or preventive services given if needed, as noted in patient's After Visit Summary.    History of Present Illness     Patient here for Medicare exam and follow-up with a chronic condition concerned also about back pain in the lower back radiated to the buttock and left side and to her left side no recent fall or trauma no numbness or muscle weakness no rash patient had history of chronic back pain and recently acting up on her feet is stiff in the morning since been seen last time evaluated by orthopedic for knee pain and thinking about her knee replacement       Patient Care Team:  Parul Roe MD as PCP - General  Jennifer Kyle MD (Radiation Oncology)  Toby Post MD (Cardiology)  Santosh Peña MD (General Surgery)  Bess Lovelace PA-C (Neurology)    Review of Systems   Constitutional:  Negative for chills and fever.   HENT:  Negative for ear pain and sore throat.    Eyes:  Negative for pain and visual  disturbance.   Respiratory:  Negative for cough and shortness of breath.    Cardiovascular:  Negative for chest pain and palpitations.   Gastrointestinal:  Negative for abdominal pain, constipation, diarrhea and vomiting.   Genitourinary:  Negative for dysuria and hematuria.   Musculoskeletal:  Positive for back pain.   Skin:  Negative for color change and rash.   Neurological:  Negative for seizures and syncope.   All other systems reviewed and are negative.    Medical History Reviewed by provider this encounter:  Tobacco  Allergies  Meds  Problems  Med Hx  Surg Hx  Fam Hx       Annual Wellness Visit Questionnaire   Oanh is here for her Subsequent Wellness visit.     Health Risk Assessment:   Patient rates overall health as fair. Patient feels that their physical health rating is same. Patient is satisfied with their life. Eyesight was rated as slightly worse. Hearing was rated as slightly worse. Patient feels that their emotional and mental health rating is same. Patients states they are never, rarely angry. Patient states they are sometimes unusually tired/fatigued. Pain experienced in the last 7 days has been some. Patient's pain rating has been 7/10. R knee pain patient follow-up with the orthopedic and low back pain    Depression Screening:   PHQ-2 Score: 0      Fall Risk Screening:   In the past year, patient has experienced: history of falling in past year    Number of falls: 2 or more  Injured during fall?: No    Feels unsteady when standing or walking?: Yes    Worried about falling?: Yes      Urinary Incontinence Screening:   Patient has leaked urine accidently in the last six months.     Home Safety:  Patient has trouble with stairs inside or outside of their home. Patient has working smoke alarms and has working carbon monoxide detector. Home safety hazards include: none.     Nutrition:   Current diet is No Added Salt and Diabetic.     Medications:   Patient is currently taking over-the-counter  supplements. OTC medications include: see medication list. Patient is able to manage medications.     Activities of Daily Living (ADLs)/Instrumental Activities of Daily Living (IADLs):   Walk and transfer into and out of bed and chair?: Yes  Dress and groom yourself?: Yes    Bathe or shower yourself?: Yes    Feed yourself? Yes  Do your laundry/housekeeping?: Yes  Manage your money, pay your bills and track your expenses?: Yes  Make your own meals?: No    Do your own shopping?: Yes    Previous Hospitalizations:   Any hospitalizations or ED visits within the last 12 months?: No      Advance Care Planning:   Living will: No    Durable POA for healthcare: No    Five wishes given: No      Cognitive Screening:   Provider or family/friend/caregiver concerned regarding cognition?: No    PREVENTIVE SCREENINGS      Cardiovascular Screening:    General: Screening Not Indicated and History Lipid Disorder      Diabetes Screening:     General: Screening Not Indicated and History Diabetes      Colorectal Cancer Screening:     General: Screening Not Indicated      Breast Cancer Screening:     General: History Breast Cancer      Cervical Cancer Screening:    General: Screening Not Indicated      Osteoporosis Screening:    General: Screening Current      Abdominal Aortic Aneurysm (AAA) Screening:        General: Screening Not Indicated      Lung Cancer Screening:     General: Screening Not Indicated      Hepatitis C Screening:    General: Screening Current    Screening, Brief Intervention, and Referral to Treatment (SBIRT)    Screening  Typical number of drinks in a day: 0  Typical number of drinks in a week: 0  Interpretation: Low risk drinking behavior.    AUDIT-C Screenin) How often did you have a drink containing alcohol in the past year? never  2) How many drinks did you have on a typical day when you were drinking in the past year? 0  3) How often did you have 6 or more drinks on one occasion in the past year?  "never    AUDIT-C Score: 0  Interpretation: Score 0-2 (female): Negative screen for alcohol misuse    Single Item Drug Screening:  How often have you used an illegal drug (including marijuana) or a prescription medication for non-medical reasons in the past year? never    Single Item Drug Screen Score: 0  Interpretation: Negative screen for possible drug use disorder    Brief Intervention  Alcohol & drug use screenings were reviewed. No concerns regarding substance use disorder identified.     Social Drivers of Health     Financial Resource Strain: Low Risk  (11/13/2023)    Overall Financial Resource Strain (CARDIA)     Difficulty of Paying Living Expenses: Not hard at all   Food Insecurity: No Food Insecurity (11/13/2024)    Hunger Vital Sign     Worried About Running Out of Food in the Last Year: Never true     Ran Out of Food in the Last Year: Never true   Transportation Needs: No Transportation Needs (11/13/2024)    PRAPARE - Transportation     Lack of Transportation (Medical): No     Lack of Transportation (Non-Medical): No   Housing Stability: Low Risk  (11/13/2024)    Housing Stability Vital Sign     Unable to Pay for Housing in the Last Year: No     Number of Times Moved in the Last Year: 1     Homeless in the Last Year: No   Utilities: Not At Risk (11/13/2024)    [facility] Utilities     Threatened with loss of utilities: No     No results found.    Objective     /80 (BP Location: Left arm, Patient Position: Sitting)   Pulse 62   Temp (!) 97.3 °F (36.3 °C) (Tympanic)   Ht 5' 2\" (1.575 m)   Wt 74.8 kg (164 lb 12.8 oz)   SpO2 97%   BMI 30.14 kg/m²     Physical Exam  Vitals and nursing note reviewed.   Constitutional:       General: She is not in acute distress.     Appearance: She is well-developed. She is not diaphoretic.   HENT:      Head: Normocephalic.      Right Ear: Tympanic membrane and external ear normal.      Left Ear: Tympanic membrane and external ear normal.      Nose: No rhinorrhea.      " Mouth/Throat:      Pharynx: No posterior oropharyngeal erythema.   Eyes:      General:         Right eye: No discharge.         Left eye: No discharge.      Conjunctiva/sclera: Conjunctivae normal.   Neck:      Vascular: No JVD.   Cardiovascular:      Rate and Rhythm: Normal rate.      Heart sounds: Murmur heard.      No gallop.   Pulmonary:      Effort: Pulmonary effort is normal. No respiratory distress.      Breath sounds: Normal breath sounds. No wheezing.   Abdominal:      General: There is no distension.      Palpations: Abdomen is soft.      Tenderness: There is no abdominal tenderness. There is no rebound.   Musculoskeletal:      Cervical back: Normal range of motion and neck supple.      Lumbar back: Tenderness present. No signs of trauma or bony tenderness.   Lymphadenopathy:      Cervical: No cervical adenopathy.   Skin:     General: Skin is warm.      Findings: No rash.   Neurological:      Mental Status: She is alert and oriented to person, place, and time.

## 2024-11-13 NOTE — ASSESSMENT & PLAN NOTE
Chronic asymptomatic fair control continue current management patient on atorvastatin 10 mg once a day  Orders:    CBC and differential; Future    Comprehensive metabolic panel; Future    Lipid Panel with Direct LDL reflex; Future

## 2024-11-23 PROBLEM — I25.10 CHRONIC CORONARY ARTERY DISEASE: Status: RESOLVED | Noted: 2018-07-19 | Resolved: 2024-11-23

## 2024-11-23 NOTE — ASSESSMENT & PLAN NOTE
Lab Results   Component Value Date    EGFR 52 09/12/2024    EGFR 62 06/13/2024    EGFR 53 04/11/2024    CREATININE 1.00 09/12/2024    CREATININE 0.87 06/13/2024    CREATININE 0.98 04/11/2024

## 2024-11-23 NOTE — ASSESSMENT & PLAN NOTE
Lab Results   Component Value Date    HGBA1C 6.6 (A) 09/17/2024     Defer management to PCP.  Hemoglobin A1c under 7 at 6.6 which is excellent.

## 2024-11-23 NOTE — ASSESSMENT & PLAN NOTE
9/19/2024 157/85  10/24/2024 144/76  11/13/2024 130/80    Irbesartain hydrochlorothiazide(Avalide) 300/12.5 mg 1 daily  Metoprolol tartrate 25 mg every 12 hours  Spironolactone 25 mg daily

## 2024-11-25 ENCOUNTER — OFFICE VISIT (OUTPATIENT)
Dept: CARDIOLOGY CLINIC | Facility: CLINIC | Age: 84
End: 2024-11-25
Payer: COMMERCIAL

## 2024-11-25 VITALS
DIASTOLIC BLOOD PRESSURE: 68 MMHG | BODY MASS INDEX: 30.69 KG/M2 | HEIGHT: 62 IN | WEIGHT: 166.8 LBS | SYSTOLIC BLOOD PRESSURE: 122 MMHG | HEART RATE: 62 BPM

## 2024-11-25 DIAGNOSIS — N18.31 CHRONIC KIDNEY DISEASE, STAGE 3A (HCC): ICD-10-CM

## 2024-11-25 DIAGNOSIS — E11.42 TYPE 2 DIABETES MELLITUS WITH DIABETIC POLYNEUROPATHY, WITHOUT LONG-TERM CURRENT USE OF INSULIN (HCC): ICD-10-CM

## 2024-11-25 DIAGNOSIS — E66.09 CLASS 1 OBESITY DUE TO EXCESS CALORIES WITH SERIOUS COMORBIDITY AND BODY MASS INDEX (BMI) OF 30.0 TO 30.9 IN ADULT: ICD-10-CM

## 2024-11-25 DIAGNOSIS — E66.811 CLASS 1 OBESITY DUE TO EXCESS CALORIES WITH SERIOUS COMORBIDITY AND BODY MASS INDEX (BMI) OF 30.0 TO 30.9 IN ADULT: ICD-10-CM

## 2024-11-25 DIAGNOSIS — I48.0 PAROXYSMAL ATRIAL FIBRILLATION (HCC): Primary | ICD-10-CM

## 2024-11-25 DIAGNOSIS — E78.2 MIXED HYPERLIPIDEMIA: ICD-10-CM

## 2024-11-25 DIAGNOSIS — I10 ESSENTIAL HYPERTENSION: ICD-10-CM

## 2024-11-25 PROCEDURE — 93000 ELECTROCARDIOGRAM COMPLETE: CPT | Performed by: INTERNAL MEDICINE

## 2024-11-25 PROCEDURE — 99214 OFFICE O/P EST MOD 30 MIN: CPT | Performed by: INTERNAL MEDICINE
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
## 2024-12-05 ENCOUNTER — RESULTS FOLLOW-UP (OUTPATIENT)
Dept: FAMILY MEDICINE CLINIC | Facility: CLINIC | Age: 84
End: 2024-12-05

## 2024-12-06 ENCOUNTER — TELEPHONE (OUTPATIENT)
Dept: ADMINISTRATIVE | Facility: OTHER | Age: 84
End: 2024-12-06

## 2024-12-06 NOTE — TELEPHONE ENCOUNTER
----- Message from Rachel BARTLETT sent at 12/6/2024  8:12 AM EST -----  Regarding: care gap request  12/06/24 8:12 AM    Hello, our patient attached above has had Mammogram completed/performed. Please assist in updating the patient chart by pulling the document from the Media Tab. The date of service is 12/4/24.     Thank you,  Rachel Esposito PG  [facility]

## 2024-12-06 NOTE — TELEPHONE ENCOUNTER
----- Message from Parul Roe MD sent at 12/5/2024  8:48 AM EST -----  Negative mammogram recommend to repeat in 1 year to send the report to care gap

## 2024-12-09 NOTE — TELEPHONE ENCOUNTER
Upon review of the In Basket request we were able to note that no further action is required. The patient chart is up to date as a result of a previous request.      Any additional questions or concerns should be emailed to the Practice Liaisons via the appropriate education email address, please do not reply via In Basket.    Thank you  Zully Sellers MA   [facility]

## 2024-12-13 DIAGNOSIS — I48.91 ATRIAL FIBRILLATION, UNSPECIFIED TYPE (HCC): ICD-10-CM

## 2024-12-13 DIAGNOSIS — R79.89 LOW VITAMIN B12 LEVEL: ICD-10-CM

## 2024-12-13 DIAGNOSIS — U07.1 COVID-19 VIRUS INFECTION: ICD-10-CM

## 2024-12-13 PROBLEM — Z00.00 MEDICARE ANNUAL WELLNESS VISIT, SUBSEQUENT: Status: RESOLVED | Noted: 2024-11-13 | Resolved: 2024-12-13

## 2024-12-13 RX ORDER — CHOLECALCIFEROL (VITAMIN D3) 25 MCG
1000 TABLET ORAL DAILY
Qty: 90 TABLET | Refills: 1 | Status: SHIPPED | OUTPATIENT
Start: 2024-12-13

## 2024-12-13 RX ORDER — LANOLIN ALCOHOL/MO/W.PET/CERES
1000 CREAM (GRAM) TOPICAL DAILY
Qty: 90 TABLET | Refills: 0 | Status: SHIPPED | OUTPATIENT
Start: 2024-12-13

## 2024-12-13 NOTE — TELEPHONE ENCOUNTER
Toby Post MD- [facility]     apixaban (Eliquis) 5 mg- Take 1 tablet (5 mg total) by mouth 2 (two) times a day     [facility] [place] [place] - [address] [place] [address]    Pt is out of medication

## 2024-12-30 DIAGNOSIS — I10 ESSENTIAL HYPERTENSION: ICD-10-CM

## 2024-12-31 RX ORDER — METOPROLOL TARTRATE 25 MG/1
25 TABLET, FILM COATED ORAL EVERY 12 HOURS SCHEDULED
Qty: 180 TABLET | Refills: 1 | Status: SHIPPED | OUTPATIENT
Start: 2024-12-31

## 2025-01-09 ENCOUNTER — OFFICE VISIT (OUTPATIENT)
Dept: OBGYN CLINIC | Facility: MEDICAL CENTER | Age: 85
End: 2025-01-09
Payer: COMMERCIAL

## 2025-01-09 VITALS — WEIGHT: 167 LBS | HEIGHT: 62 IN | BODY MASS INDEX: 30.73 KG/M2

## 2025-01-09 DIAGNOSIS — M17.11 ARTHRITIS OF RIGHT KNEE: Primary | ICD-10-CM

## 2025-01-09 PROCEDURE — 99213 OFFICE O/P EST LOW 20 MIN: CPT | Performed by: ORTHOPAEDIC SURGERY

## 2025-01-09 NOTE — PROGRESS NOTES
Assessment/Plan:  1. Arthritis of right knee      Orders Placed This Encounter   Procedures    Injection Procedure Prior Authorization     Patient has severe right knee osteoarthritis.   Non operative and operative treatment options were reviewed with patient today.   Discussed risks and benefits about possible TKA.  However patient states she is not mentally ready to have surgical intervention at this point in time.  Viscosupplementation was ordered at today's visit for patient to have 3 series gel injection into right knee.  Patient rates her pain at 8/10 at today's visit.  Can take Tylenol 1,000mg by mouth every 8 hours as needed for pain.  Do not exceed 3,000mg per day.    Continue ice, heat, and topical gels as needed.  Continue bracing as needed for comfort.  Continue activity as tolerated.  Patient will follow-up for 3 series gel injection to right knee.      Return for right knee gel injections.    I answered all of the patient's questions during the visit and provided education of the patient's condition during the visit.  The patient verbalized understanding of the information given and agrees with the plan.  This note was dictated using [employer] software.  It may contain errors including improperly dictated words.  Please contact physician directly for any questions.    Subjective   Chief Complaint:   Chief Complaint   Patient presents with    Right Knee - Follow-up       HPI  Oanh Martinez is a 84 y.o. female who presents for follow up for severe right knee osteoarthritis.  Patient was last seen 10/24/2024 where patient wanted to discuss with her PCP and cardiologist about possible TKA.  Patient also received right knee steroid injection at that visit.  Patient states she does not remember how much relief she got with steroid injection into the right knee.  Patient states she has not tried gel injections in the past.  Patient states she is not taking any medications for pain.  Patient is not utilizing  any bracing for her knee.  Patient states that she is occasionally performing HEP.  Patient is interested in discussing gel injections versus surgical intervention.    History of MRSA: no  History of blood clots: no  Family history of blood clots: no  History of Hepatitis C:no  History of HIV: no  Are you a smoker:no  Are you diabetic:yes  Do you see a cardiologist: yes  Have you seen a dentist in the past year: yes  Do you have a spinal cord stimulator: no  Review allergies     Review of Systems  ROS:    See hospitals for musculoskeletal review.   All other systems reviewed are negative     History:  Past Medical History:   Diagnosis Date    Allergic reaction 09/27/2021    Allergic rhinitis     Arthritis     Atrial fibrillation (HCC)     Bleeding nose 4/3/2023    Breast cancer (HCC)     Bug bite 05/21/2019    Cataracts, bilateral     Chronic headaches     pulsatile daily headaches    Chronic post-traumatic headache, not intractable 09/08/2020    Colitis     Coronary artery disease     Diabetes mellitus (HCC)     Disease of thyroid gland     Dizziness and giddiness 4/23/2014    DJD (degenerative joint disease), cervical     Encounter for well adult exam with abnormal findings 10/14/2019    Facial neuralgia     left frontal facial post traumatic neuralgia    Fibromyalgia     Glaucoma     History of external beam radiation therapy     8/16/18 to 9/14/2018 Right breast    Hypertension     Hypokalemia     Hypovitaminosis D     Lupus 07/19/2018    Obesity     Osteoarthritis     Pre-op examination 02/25/2019    Pre-operative clearance 08/21/2018    Prediabetes     Rib pain on right side 02/25/2019    SDH (subdural hematoma) (HCC) 01/31/2017    Sleep apnea     no cpap    Stage 3a chronic kidney disease (HCC) 05/10/2022    Syncope and collapse 12/01/2018    Vertigo      Past Surgical History:   Procedure Laterality Date    BREAST BIOPSY      BREAST LUMPECTOMY      CHOLECYSTECTOMY      COLONOSCOPY  2013    HYSTERECTOMY      MAMMO  NEEDLE LOCALIZATION RIGHT (ALL INC) Right 6/21/2018    MAMMO STEREOTACTIC BREAST BIOPSY RIGHT (ALL INC) Right 5/9/2018    THYROIDECTOMY N/A 11/21/2019    Procedure: THYROIDECTOMY, total;  Surgeon: Joshua Glover MD;  Location: [facility];  Service: Surgical Oncology    TONSILLECTOMY      US GUIDED BREAST BIOPSY RIGHT COMPLETE Right 5/9/2018    US GUIDED THYROID BIOPSY  6/12/2019    US GUIDED THYROID BIOPSY  9/13/2019     Social History   Social History     Substance and Sexual Activity   Alcohol Use Yes     Social History     Substance and Sexual Activity   Drug Use No     Social History     Tobacco Use   Smoking Status Never    Passive exposure: Never   Smokeless Tobacco Never   Tobacco Comments    no smoke exposure     Family History:   Family History   Problem Relation Age of Onset    Heart attack Sister     Hypertension Family     Diabetes Family     Stroke Family     Migraines Family     Breast cancer Daughter 35       Current Outpatient Medications on File Prior to Visit   Medication Sig Dispense Refill    albuterol (Ventolin HFA) 90 mcg/act inhaler Inhale 2 puffs every 6 (six) hours as needed for wheezing 18 g 0    allopurinol (ZYLOPRIM) 100 mg tablet Take 1 tablet (100 mg total) by mouth daily 90 tablet 1    apixaban (Eliquis) 5 mg Take 1 tablet (5 mg total) by mouth 2 (two) times a day Patient must schedule appointment for farther refills. 180 tablet 1    Atogepant (Qulipta) 10 MG TABS One tab daily. 90 tablet 0    atorvastatin (LIPITOR) 10 mg tablet Take 1 tablet (10 mg total) by mouth daily 90 tablet 0    cholecalciferol (VITAMIN D3) 1,000 units tablet Take 1 tablet (1,000 Units total) by mouth daily 90 tablet 1    Diclofenac Sodium (VOLTAREN) 1 % Apply 4 g topically 4 (four) times a day 100 g 0    gabapentin (NEURONTIN) 300 mg capsule Take 1 capsule (300 mg total) by mouth 2 (two) times a day 180 capsule 1    irbesartan-hydrochlorothiazide (AVALIDE) 300-12.5 MG per tablet Take 1 tablet by mouth daily  "90 tablet 0    levothyroxine (Euthyrox) 75 mcg tablet Take 1 tablet (75 mcg total) by mouth daily in the early morning 90 tablet 3    Magnesium 400 MG CAPS Take 1 capsule (400 mg total) by mouth in the morning 90 capsule 0    metFORMIN (GLUCOPHAGE) 500 mg tablet Take 1 tablet (500 mg total) by mouth 2 (two) times a day with meals 180 tablet 1    metoprolol tartrate (LOPRESSOR) 25 mg tablet Take 1 tablet (25 mg total) by mouth every 12 (twelve) hours 180 tablet 1    spironolactone (ALDACTONE) 25 mg tablet Take 1 tablet (25 mg total) by mouth daily 30 tablet 5    vitamin B-12 (VITAMIN B-12) 1,000 mcg tablet Take 1 tablet (1,000 mcg total) by mouth daily 90 tablet 0     No current facility-administered medications on file prior to visit.     Allergies   Allergen Reactions    Procardia [Nifedipine] Edema        Objective     Ht 5' 2\" (1.575 m)   Wt 75.8 kg (167 lb)   BMI 30.54 kg/m²      PE:  AAOx 3  WDWN  Hearing intact, no drainage from eyes  no audible wheezing  no abdominal distension  LE compartments soft, skin intact    Ortho Exam:  right Knee:   No erythema  no swelling  no effusion  no warmth  AROM: 0-110  Stable to varus/valgus stress    Scribe Attestation      I,:  Theo Baltazar am acting as a scribe while in the presence of the attending physician.:       I,:  Elida Alvarez DO personally performed the services described in this documentation    as scribed in my presence.:                     " [FreeTextEntry1] : 53 y/o female presents to the office today for a physical exam with fasting labs  [de-identified] : Pt presents to the office today for CPE and FBW Pt has been feeling well overall. Still has intermittent right hip pain and has seen ortho in the past. Right hand pain under worker comp

## 2025-01-15 ENCOUNTER — TELEPHONE (OUTPATIENT)
Age: 85
End: 2025-01-15

## 2025-01-16 ENCOUNTER — APPOINTMENT (OUTPATIENT)
Dept: LAB | Facility: CLINIC | Age: 85
End: 2025-01-16
Payer: COMMERCIAL

## 2025-01-16 DIAGNOSIS — G89.29 ACUTE EXACERBATION OF CHRONIC LOW BACK PAIN: ICD-10-CM

## 2025-01-16 DIAGNOSIS — Z00.00 MEDICARE ANNUAL WELLNESS VISIT, SUBSEQUENT: ICD-10-CM

## 2025-01-16 DIAGNOSIS — Z13.29 SCREENING FOR THYROID DISORDER: ICD-10-CM

## 2025-01-16 DIAGNOSIS — E78.2 MIXED HYPERLIPIDEMIA: ICD-10-CM

## 2025-01-16 DIAGNOSIS — E11.42 TYPE 2 DIABETES MELLITUS WITH DIABETIC POLYNEUROPATHY, WITHOUT LONG-TERM CURRENT USE OF INSULIN (HCC): ICD-10-CM

## 2025-01-16 DIAGNOSIS — N39.3 FEMALE STRESS INCONTINENCE: ICD-10-CM

## 2025-01-16 DIAGNOSIS — E66.811 CLASS 1 OBESITY DUE TO EXCESS CALORIES WITH SERIOUS COMORBIDITY AND BODY MASS INDEX (BMI) OF 30.0 TO 30.9 IN ADULT: ICD-10-CM

## 2025-01-16 DIAGNOSIS — E66.09 CLASS 1 OBESITY DUE TO EXCESS CALORIES WITH SERIOUS COMORBIDITY AND BODY MASS INDEX (BMI) OF 30.0 TO 30.9 IN ADULT: ICD-10-CM

## 2025-01-16 DIAGNOSIS — M54.50 ACUTE EXACERBATION OF CHRONIC LOW BACK PAIN: ICD-10-CM

## 2025-01-16 LAB
BASOPHILS # BLD AUTO: 0.02 THOUSANDS/ΜL (ref 0–0.1)
BASOPHILS NFR BLD AUTO: 0 % (ref 0–1)
EOSINOPHIL # BLD AUTO: 0.06 THOUSAND/ΜL (ref 0–0.61)
EOSINOPHIL NFR BLD AUTO: 1 % (ref 0–6)
ERYTHROCYTE [DISTWIDTH] IN BLOOD BY AUTOMATED COUNT: 12.7 % (ref 11.6–15.1)
EST. AVERAGE GLUCOSE BLD GHB EST-MCNC: 157 MG/DL
HBA1C MFR BLD: 7.1 %
HCT VFR BLD AUTO: 41.9 % (ref 34.8–46.1)
HGB BLD-MCNC: 13.8 G/DL (ref 11.5–15.4)
IMM GRANULOCYTES # BLD AUTO: 0.03 THOUSAND/UL (ref 0–0.2)
IMM GRANULOCYTES NFR BLD AUTO: 1 % (ref 0–2)
LYMPHOCYTES # BLD AUTO: 1.86 THOUSANDS/ΜL (ref 0.6–4.47)
LYMPHOCYTES NFR BLD AUTO: 35 % (ref 14–44)
MCH RBC QN AUTO: 31.4 PG (ref 26.8–34.3)
MCHC RBC AUTO-ENTMCNC: 32.9 G/DL (ref 31.4–37.4)
MCV RBC AUTO: 95 FL (ref 82–98)
MONOCYTES # BLD AUTO: 0.8 THOUSAND/ΜL (ref 0.17–1.22)
MONOCYTES NFR BLD AUTO: 15 % (ref 4–12)
NEUTROPHILS # BLD AUTO: 2.57 THOUSANDS/ΜL (ref 1.85–7.62)
NEUTS SEG NFR BLD AUTO: 48 % (ref 43–75)
NRBC BLD AUTO-RTO: 0 /100 WBCS
PLATELET # BLD AUTO: 189 THOUSANDS/UL (ref 149–390)
PMV BLD AUTO: 11 FL (ref 8.9–12.7)
RBC # BLD AUTO: 4.39 MILLION/UL (ref 3.81–5.12)
TSH SERPL DL<=0.05 MIU/L-ACNC: 0.86 UIU/ML (ref 0.45–4.5)
WBC # BLD AUTO: 5.34 THOUSAND/UL (ref 4.31–10.16)

## 2025-01-16 PROCEDURE — 83036 HEMOGLOBIN GLYCOSYLATED A1C: CPT

## 2025-01-16 PROCEDURE — 85025 COMPLETE CBC W/AUTO DIFF WBC: CPT

## 2025-01-16 PROCEDURE — 84443 ASSAY THYROID STIM HORMONE: CPT

## 2025-01-16 PROCEDURE — 82570 ASSAY OF URINE CREATININE: CPT

## 2025-01-16 PROCEDURE — 82043 UR ALBUMIN QUANTITATIVE: CPT

## 2025-01-16 PROCEDURE — 80061 LIPID PANEL: CPT

## 2025-01-16 PROCEDURE — 80053 COMPREHEN METABOLIC PANEL: CPT

## 2025-01-16 PROCEDURE — 36415 COLL VENOUS BLD VENIPUNCTURE: CPT

## 2025-01-17 ENCOUNTER — RESULTS FOLLOW-UP (OUTPATIENT)
Dept: FAMILY MEDICINE CLINIC | Facility: CLINIC | Age: 85
End: 2025-01-17

## 2025-01-17 LAB
ALBUMIN SERPL BCG-MCNC: 3.9 G/DL (ref 3.5–5)
ALP SERPL-CCNC: 50 U/L (ref 34–104)
ALT SERPL W P-5'-P-CCNC: 6 U/L (ref 7–52)
ANION GAP SERPL CALCULATED.3IONS-SCNC: 10 MMOL/L (ref 4–13)
AST SERPL W P-5'-P-CCNC: 10 U/L (ref 13–39)
BILIRUB SERPL-MCNC: 1.9 MG/DL (ref 0.2–1)
BUN SERPL-MCNC: 18 MG/DL (ref 5–25)
CALCIUM SERPL-MCNC: 7.7 MG/DL (ref 8.4–10.2)
CHLORIDE SERPL-SCNC: 101 MMOL/L (ref 96–108)
CHOLEST SERPL-MCNC: 65 MG/DL (ref ?–200)
CO2 SERPL-SCNC: 29 MMOL/L (ref 21–32)
CREAT SERPL-MCNC: 0.87 MG/DL (ref 0.6–1.3)
CREAT UR-MCNC: 166.7 MG/DL
GFR SERPL CREATININE-BSD FRML MDRD: 61 ML/MIN/1.73SQ M
GLUCOSE P FAST SERPL-MCNC: 131 MG/DL (ref 65–99)
HDLC SERPL-MCNC: 38 MG/DL
LDLC SERPL CALC-MCNC: 13 MG/DL (ref 0–100)
MICROALBUMIN UR-MCNC: 14.5 MG/L
MICROALBUMIN/CREAT 24H UR: 9 MG/G CREATININE (ref 0–30)
POTASSIUM SERPL-SCNC: 3.9 MMOL/L (ref 3.5–5.3)
PROT SERPL-MCNC: 7.2 G/DL (ref 6.4–8.4)
SODIUM SERPL-SCNC: 140 MMOL/L (ref 135–147)
TRIGL SERPL-MCNC: 70 MG/DL (ref ?–150)

## 2025-01-21 DIAGNOSIS — E89.0 POSTOPERATIVE HYPOTHYROIDISM: ICD-10-CM

## 2025-01-21 RX ORDER — LEVOTHYROXINE SODIUM 75 UG/1
75 TABLET ORAL
Qty: 90 TABLET | Refills: 2 | Status: SHIPPED | OUTPATIENT
Start: 2025-01-21

## 2025-01-30 DIAGNOSIS — E78.2 MIXED HYPERLIPIDEMIA: ICD-10-CM

## 2025-01-30 DIAGNOSIS — I10 ESSENTIAL HYPERTENSION: ICD-10-CM

## 2025-01-30 RX ORDER — ATORVASTATIN CALCIUM 10 MG/1
10 TABLET, FILM COATED ORAL DAILY
Qty: 90 TABLET | Refills: 1 | Status: SHIPPED | OUTPATIENT
Start: 2025-01-30

## 2025-01-30 RX ORDER — IRBESARTAN AND HYDROCHLOROTHIAZIDE 300; 12.5 MG/1; MG/1
1 TABLET, FILM COATED ORAL DAILY
Qty: 90 TABLET | Refills: 1 | Status: SHIPPED | OUTPATIENT
Start: 2025-01-30

## 2025-02-04 ENCOUNTER — OFFICE VISIT (OUTPATIENT)
Dept: FAMILY MEDICINE CLINIC | Facility: CLINIC | Age: 85
End: 2025-02-04
Payer: COMMERCIAL

## 2025-02-04 VITALS
HEART RATE: 62 BPM | BODY MASS INDEX: 30.99 KG/M2 | SYSTOLIC BLOOD PRESSURE: 130 MMHG | WEIGHT: 168.4 LBS | OXYGEN SATURATION: 98 % | TEMPERATURE: 98.3 F | HEIGHT: 62 IN | DIASTOLIC BLOOD PRESSURE: 78 MMHG

## 2025-02-04 DIAGNOSIS — M21.612 BILATERAL BUNIONS: ICD-10-CM

## 2025-02-04 DIAGNOSIS — R79.0 CALCIUM BLOOD DECREASED: Primary | ICD-10-CM

## 2025-02-04 DIAGNOSIS — C50.111 MALIGNANT NEOPLASM OF CENTRAL PORTION OF RIGHT BREAST IN FEMALE, ESTROGEN RECEPTOR POSITIVE (HCC): ICD-10-CM

## 2025-02-04 DIAGNOSIS — E11.42 TYPE 2 DIABETES MELLITUS WITH DIABETIC POLYNEUROPATHY, WITHOUT LONG-TERM CURRENT USE OF INSULIN (HCC): ICD-10-CM

## 2025-02-04 DIAGNOSIS — Z17.0 MALIGNANT NEOPLASM OF CENTRAL PORTION OF RIGHT BREAST IN FEMALE, ESTROGEN RECEPTOR POSITIVE (HCC): ICD-10-CM

## 2025-02-04 DIAGNOSIS — M21.611 BILATERAL BUNIONS: ICD-10-CM

## 2025-02-04 DIAGNOSIS — N18.31 CHRONIC KIDNEY DISEASE, STAGE 3A (HCC): ICD-10-CM

## 2025-02-04 DIAGNOSIS — E55.9 VITAMIN D DEFICIENCY, UNSPECIFIED: ICD-10-CM

## 2025-02-04 DIAGNOSIS — I48.0 PAROXYSMAL ATRIAL FIBRILLATION (HCC): ICD-10-CM

## 2025-02-04 PROCEDURE — 99214 OFFICE O/P EST MOD 30 MIN: CPT | Performed by: FAMILY MEDICINE

## 2025-02-04 PROCEDURE — G2211 COMPLEX E/M VISIT ADD ON: HCPCS | Performed by: FAMILY MEDICINE

## 2025-02-04 RX ORDER — DAPAGLIFLOZIN 10 MG/1
10 TABLET, FILM COATED ORAL DAILY
Qty: 30 TABLET | Refills: 2 | Status: SHIPPED | OUTPATIENT
Start: 2025-02-04 | End: 2025-08-03

## 2025-02-04 NOTE — ASSESSMENT & PLAN NOTE
Lab Results   Component Value Date    EGFR 58 02/05/2025    EGFR 61 01/16/2025    EGFR 52 09/12/2024    CREATININE 0.91 02/05/2025    CREATININE 0.87 01/16/2025    CREATININE 1.00 09/12/2024     Orders:  •  dapagliflozin (Farxiga) 10 MG tablet; Take 1 tablet (10 mg total) by mouth daily  Chronic asymptomatic GFR decreased compared with before patient has multiple risk factor including obesity diabetic with a hemoglobin A1c subtherapeutic for patient with chronic kidney disease recommend to start proper use and possible side effect review Farxiga 10 mg

## 2025-02-04 NOTE — ASSESSMENT & PLAN NOTE
Chronic asymptomatic uncontrolled hemoglobin A1c 7.1 patient is a chronic kidney disease recommend to add Farxiga 10 mg continue with the metformin 500 mg twice a day proper use medication possible side effect review  Lab Results   Component Value Date    HGBA1C 7.1 (H) 01/16/2025     Orders:  •  dapagliflozin (Farxiga) 10 MG tablet; Take 1 tablet (10 mg total) by mouth daily

## 2025-02-04 NOTE — PROGRESS NOTES
Diabetic Foot Exam    Patient's shoes and socks removed.    Right Foot/Ankle   Right Foot Inspection  Skin Exam: skin intact. No warmth and no pre-ulcer.     Toe Exam: No swelling and erythema    Sensory   Monofilament testing: intact    Vascular  Capillary refills: < 3 seconds  The right DP pulse is 2+.     Left Foot/Ankle  Left Foot Inspection  Skin Exam: skin intact. No warmth and no pre-ulcer.     Toe Exam: No swelling and no erythema.     Sensory   Monofilament testing: intact    Vascular  Capillary refills: < 3 seconds  The left DP pulse is 2+.     Assign Risk Category  No deformity present  No loss of protective sensation  No weak pulses  Risk: 0      Name: Oanh Martinez      : 1940      MRN: [id number]  Encounter Provider: Parul Roe MD  Encounter Date: 2025   Encounter department: [facility]  :  Assessment & Plan  Calcium blood decreased  Finding on recent blood work recommend to repeat calcium level ionized calcium vitamin D PTH and albumin level will follow-up with the result accordingly  Orders:  •  Calcium, ionized; Future  •  PTH, intact; Future  •  Comprehensive metabolic panel; Future  •  Vitamin D 25 hydroxy; Future    Type 2 diabetes mellitus with diabetic polyneuropathy, without long-term current use of insulin (HCC)  Chronic asymptomatic uncontrolled hemoglobin A1c 7.1 patient is a chronic kidney disease recommend to add Farxiga 10 mg continue with the metformin 500 mg twice a day proper use medication possible side effect review  Lab Results   Component Value Date    HGBA1C 7.1 (H) 2025     Orders:  •  dapagliflozin (Farxiga) 10 MG tablet; Take 1 tablet (10 mg total) by mouth daily    Paroxysmal atrial fibrillation (HCC)  Chronic asymptomatic rate is controlled patient already on beta-blocker and she is on Eliquis follow-up with the cardiology periodically       Malignant neoplasm of central portion of right breast in female, estrogen  receptor positive (HCC)  Patient follow-up with oncology periodically       Bilateral bunions  Chronic finding on physical exam patient recommended proper shoes wear proper footcare to follow-up with podiatry periodically       Chronic kidney disease, stage 3a (HCC)  Lab Results   Component Value Date    EGFR 58 02/05/2025    EGFR 61 01/16/2025    EGFR 52 09/12/2024    CREATININE 0.91 02/05/2025    CREATININE 0.87 01/16/2025    CREATININE 1.00 09/12/2024     Orders:  •  dapagliflozin (Farxiga) 10 MG tablet; Take 1 tablet (10 mg total) by mouth daily  Chronic asymptomatic GFR decreased compared with before patient has multiple risk factor including obesity diabetic with a hemoglobin A1c subtherapeutic for patient with chronic kidney disease recommend to start proper use and possible side effect review Farxiga 10 mg   Vitamin D deficiency, unspecified    Orders:  •  Vitamin D 25 hydroxy; Future          Falls Plan of Care: balance, strength, and gait training instructions were provided.       History of Present Illness   Patient here with her daughter follow-up with a chronic condition concerned about knee pain reviewed chart patient already evaluated by orthopedic and and she been follow-up with them regularly recommend to continue management with them patient history of non-insulin-dependent diabetic on metformin 500 twice a day tolerated well history of atrial fibrillation rate controlled asymptomatic recent blood work reviewed with the patient      Review of Systems   Constitutional:  Negative for chills and fever.   HENT:  Negative for ear pain and sore throat.    Eyes:  Negative for pain and visual disturbance.   Respiratory:  Negative for cough and shortness of breath.    Cardiovascular:  Negative for chest pain and palpitations.   Gastrointestinal:  Negative for abdominal pain, constipation, diarrhea and vomiting.   Genitourinary:  Negative for dysuria and hematuria.   Skin:  Negative for color change and  "rash.   Neurological:  Negative for seizures and syncope.   All other systems reviewed and are negative.      Objective   /78 (BP Location: Left arm, Patient Position: Sitting, Cuff Size: Standard)   Pulse 62   Temp 98.3 °F (36.8 °C) (Tympanic)   Ht 5' 2\" (1.575 m)   Wt 76.4 kg (168 lb 6.4 oz)   SpO2 98%   BMI 30.80 kg/m²      Physical Exam  Vitals and nursing note reviewed.   Constitutional:       General: She is not in acute distress.     Appearance: She is well-developed. She is not diaphoretic.   HENT:      Head: Normocephalic.      Right Ear: Tympanic membrane and external ear normal.      Left Ear: Tympanic membrane and external ear normal.      Nose: No rhinorrhea.      Mouth/Throat:      Pharynx: No posterior oropharyngeal erythema.   Eyes:      General:         Right eye: No discharge.         Left eye: No discharge.      Conjunctiva/sclera: Conjunctivae normal.   Neck:      Vascular: No JVD.   Cardiovascular:      Rate and Rhythm: Normal rate and regular rhythm.      Pulses: no weak pulses.           Dorsalis pedis pulses are 2+ on the right side and 2+ on the left side.      Heart sounds: Normal heart sounds.      No gallop.   Pulmonary:      Effort: Pulmonary effort is normal. No respiratory distress.      Breath sounds: Normal breath sounds. No wheezing.   Abdominal:      General: There is no distension.      Palpations: Abdomen is soft.      Tenderness: There is no abdominal tenderness. There is no rebound.   Musculoskeletal:      Cervical back: Normal range of motion and neck supple.      Right lower leg: No edema.      Left lower leg: No edema.        Feet:    Feet:      Right foot:      Skin integrity: No warmth.      Left foot:      Skin integrity: No warmth.   Lymphadenopathy:      Cervical: No cervical adenopathy.   Skin:     General: Skin is warm.      Findings: No rash.   Neurological:      Mental Status: She is alert and oriented to person, place, and time.         "

## 2025-02-04 NOTE — ASSESSMENT & PLAN NOTE
Chronic finding on physical exam patient recommended proper shoes wear proper footcare to follow-up with podiatry periodically

## 2025-02-04 NOTE — ASSESSMENT & PLAN NOTE
Chronic asymptomatic rate is controlled patient already on beta-blocker and she is on Eliquis follow-up with the cardiology periodically

## 2025-02-05 ENCOUNTER — APPOINTMENT (OUTPATIENT)
Dept: LAB | Facility: CLINIC | Age: 85
End: 2025-02-05
Payer: COMMERCIAL

## 2025-02-05 DIAGNOSIS — E55.9 VITAMIN D DEFICIENCY, UNSPECIFIED: ICD-10-CM

## 2025-02-05 DIAGNOSIS — R79.0 CALCIUM BLOOD DECREASED: ICD-10-CM

## 2025-02-05 LAB
25(OH)D3 SERPL-MCNC: 48.1 NG/ML (ref 30–100)
CA-I BLD-SCNC: 1.12 MMOL/L (ref 1.12–1.32)
PTH-INTACT SERPL-MCNC: 45.8 PG/ML (ref 12–88)

## 2025-02-05 PROCEDURE — 82306 VITAMIN D 25 HYDROXY: CPT

## 2025-02-05 PROCEDURE — 80053 COMPREHEN METABOLIC PANEL: CPT

## 2025-02-05 PROCEDURE — 36415 COLL VENOUS BLD VENIPUNCTURE: CPT

## 2025-02-05 PROCEDURE — 83970 ASSAY OF PARATHORMONE: CPT

## 2025-02-05 PROCEDURE — 82330 ASSAY OF CALCIUM: CPT

## 2025-02-06 LAB
ALBUMIN SERPL BCG-MCNC: 4 G/DL (ref 3.5–5)
ALP SERPL-CCNC: 76 U/L (ref 34–104)
ALT SERPL W P-5'-P-CCNC: 7 U/L (ref 7–52)
ANION GAP SERPL CALCULATED.3IONS-SCNC: 10 MMOL/L (ref 4–13)
AST SERPL W P-5'-P-CCNC: 10 U/L (ref 13–39)
BILIRUB SERPL-MCNC: 1.62 MG/DL (ref 0.2–1)
BUN SERPL-MCNC: 14 MG/DL (ref 5–25)
CALCIUM SERPL-MCNC: 9.1 MG/DL (ref 8.4–10.2)
CHLORIDE SERPL-SCNC: 102 MMOL/L (ref 96–108)
CO2 SERPL-SCNC: 30 MMOL/L (ref 21–32)
CREAT SERPL-MCNC: 0.91 MG/DL (ref 0.6–1.3)
GFR SERPL CREATININE-BSD FRML MDRD: 58 ML/MIN/1.73SQ M
GLUCOSE P FAST SERPL-MCNC: 140 MG/DL (ref 65–99)
POTASSIUM SERPL-SCNC: 3.7 MMOL/L (ref 3.5–5.3)
PROT SERPL-MCNC: 6.6 G/DL (ref 6.4–8.4)
SODIUM SERPL-SCNC: 142 MMOL/L (ref 135–147)

## 2025-02-13 ENCOUNTER — PROCEDURE VISIT (OUTPATIENT)
Dept: OBGYN CLINIC | Facility: MEDICAL CENTER | Age: 85
End: 2025-02-13
Payer: COMMERCIAL

## 2025-02-13 VITALS — HEIGHT: 62 IN | WEIGHT: 167.2 LBS | BODY MASS INDEX: 30.77 KG/M2

## 2025-02-13 DIAGNOSIS — M17.11 PRIMARY OSTEOARTHRITIS OF RIGHT KNEE: Primary | ICD-10-CM

## 2025-02-13 PROCEDURE — 20610 DRAIN/INJ JOINT/BURSA W/O US: CPT | Performed by: PHYSICIAN ASSISTANT

## 2025-02-13 NOTE — PROGRESS NOTES
1.Patient is here today for Synvisc injection #1 to the right knee.  Risk and benefits discussed.  2.  Patient will ice the knee this evening and avoid strenuous activity.  3.  Patient return in 1 week for injection #2 Synvisc to the right knee.        Large joint arthrocentesis: R knee  [place] Protocol:  procedure performed by consultantConsent: Verbal consent obtained. Written consent not obtained.  Risks and benefits: risks, benefits and alternatives were discussed  Consent given by: patient  Patient understanding: patient states understanding of the procedure being performed  Patient consent: the patient's understanding of the procedure matches consent given  Patient identity confirmed: verbally with patient  Supporting Documentation  Indications: pain and joint swelling   Procedure Details  Location: knee - R knee  Needle size: 22 G  Ultrasound guidance: no  Approach: anterolateral  Medications administered: 16 mg hylan 16 MG/2ML    Patient tolerance: patient tolerated the procedure well with no immediate complications  Dressing:  Sterile dressing applied

## 2025-02-18 ENCOUNTER — TELEPHONE (OUTPATIENT)
Age: 85
End: 2025-02-18

## 2025-02-18 ENCOUNTER — TELEMEDICINE (OUTPATIENT)
Dept: FAMILY MEDICINE CLINIC | Facility: CLINIC | Age: 85
End: 2025-02-18
Payer: COMMERCIAL

## 2025-02-18 DIAGNOSIS — E11.42 TYPE 2 DIABETES MELLITUS WITH DIABETIC POLYNEUROPATHY, WITHOUT LONG-TERM CURRENT USE OF INSULIN (HCC): ICD-10-CM

## 2025-02-18 DIAGNOSIS — M25.561 CHRONIC PAIN OF RIGHT KNEE: Primary | ICD-10-CM

## 2025-02-18 DIAGNOSIS — E55.9 VITAMIN D DEFICIENCY: ICD-10-CM

## 2025-02-18 DIAGNOSIS — G89.29 CHRONIC PAIN OF RIGHT KNEE: Primary | ICD-10-CM

## 2025-02-18 DIAGNOSIS — E78.2 MIXED HYPERLIPIDEMIA: ICD-10-CM

## 2025-02-18 PROCEDURE — G2211 COMPLEX E/M VISIT ADD ON: HCPCS | Performed by: FAMILY MEDICINE

## 2025-02-18 PROCEDURE — 99214 OFFICE O/P EST MOD 30 MIN: CPT | Performed by: FAMILY MEDICINE

## 2025-02-18 NOTE — TELEPHONE ENCOUNTER
Patient's dghtr called and wants to know if the PCP can look at the labs and let them know the results.  Also they would like to see if the PCP can prescribe the Diclofenac a pill and not a cream.  [facility] advise

## 2025-02-18 NOTE — TELEPHONE ENCOUNTER
Pts daughter called and stated pt wants an appointment slot for a Thursday, next available was March. Offered pt an appointment for tomorrow and next Monday. Pt refused.

## 2025-02-19 NOTE — ASSESSMENT & PLAN NOTE
Chronic asymptomatic fair control continue vitamin D supplement vitamin D rich diet discussed with the patient

## 2025-02-19 NOTE — ASSESSMENT & PLAN NOTE
Chronic asymptomatic LDL therapeutic in diabetic patient continue current dose of statin  Orders:  •  CBC and differential; Future  •  Comprehensive metabolic panel; Future  •  Lipid panel; Future  •  TSH, 3rd generation with Free T4 reflex; Future  •  Hemoglobin A1C; Future

## 2025-02-19 NOTE — PROGRESS NOTES
Virtual Regular VisitName: Oanh Martinez      : 1940      MRN: [id number]  Encounter Provider: Parul Roe MD  Encounter Date: 2025   Encounter department: [facility]  :  Assessment & Plan  Chronic pain of right knee  Acute on chronic symptomatic no recent trauma patient had workup previously she had osteoarthritis secondary to chronic kidney disease avoid using NSAID recommend Voltaren gel proper use review  Orders:  •  Diclofenac Sodium (VOLTAREN) 1 %; Apply 4 g topically 4 (four) times a day    Mixed hyperlipidemia  Chronic asymptomatic LDL therapeutic in diabetic patient continue current dose of statin  Orders:  •  CBC and differential; Future  •  Comprehensive metabolic panel; Future  •  Lipid panel; Future  •  TSH, 3rd generation with Free T4 reflex; Future  •  Hemoglobin A1C; Future    Vitamin D deficiency  Chronic asymptomatic fair control continue vitamin D supplement vitamin D rich diet discussed with the patient       Type 2 diabetes mellitus with diabetic polyneuropathy, without long-term current use of insulin ([facility])    Lab Results   Component Value Date    HGBA1C 7.1 (H) 2025     Orders:  •  CBC and differential; Future  •  Comprehensive metabolic panel; Future  •  Lipid panel; Future  •  TSH, 3rd generation with Free T4 reflex; Future  •  Hemoglobin A1C; Future        History of Present Illness     Patient virtual visit with her daughter follow-up with a chronic condition patient last blood workup calcium was low workup including ionized calcium and vitamin D was done came back to normal patient concerned about knee pain right side is acting up on her patient known to have history of chronic knee pain previous workup which showed she had osteoarthritis no recent fall or trauma pain is worse when she getting up from sitting position localized in the knee no radiation history of hyperlipidemia her lipid panel well-controlled on recent blood  work      Review of Systems   Constitutional:  Negative for chills and fever.   HENT:  Negative for ear pain and sore throat.    Eyes:  Negative for pain and visual disturbance.   Respiratory:  Negative for cough and shortness of breath.    Cardiovascular:  Negative for chest pain and palpitations.   Gastrointestinal:  Negative for abdominal pain, constipation, diarrhea and vomiting.   Genitourinary:  Negative for dysuria and hematuria.   Musculoskeletal:  Positive for arthralgias. Negative for back pain.   Skin:  Negative for color change and rash.   Neurological:  Negative for seizures and syncope.   All other systems reviewed and are negative.      Objective   There were no vitals taken for this visit.    Physical Exam  Constitutional:       General: She is not in acute distress.     Appearance: She is well-developed. She is not ill-appearing, toxic-appearing or diaphoretic.   HENT:      Head: Normocephalic.      Nose: Nose normal. No congestion or rhinorrhea.      Mouth/Throat:      Mouth: Mucous membranes are moist.      Pharynx: No oropharyngeal exudate or posterior oropharyngeal erythema.   Eyes:      General:         Right eye: No discharge.         Left eye: No discharge.      Conjunctiva/sclera: Conjunctivae normal.   Neck:      Vascular: No JVD.      Comments: No grossly visible mass   Pulmonary:      Effort: Pulmonary effort is normal. No respiratory distress.      Breath sounds: No stridor. No wheezing.   Abdominal:      General: There is no distension.      Comments: Patient state her abdomen and a soft she also state no tender  Patient deny any visible or palpable bulging to suggest hernia  I was able to visualize abdomen and there is no change in the color no distension no visible mass   Musculoskeletal:         General: Normal range of motion.      Cervical back: Normal range of motion and neck supple.   Skin:     Findings: No erythema or rash.   Neurological:      Mental Status: She is alert and  oriented to person, place, and time.   Psychiatric:         Mood and Affect: Mood normal.         Administrative Statements   Encounter provider Parul Roe MD    The Patient is located at Home and in the following state in which I hold an active license [place].    The patient was identified by name and date of birth. Oanh Martinez was informed that this is a telemedicine visit and that the visit is being conducted through the Epic Embedded platform. She agrees to proceed..  My office door was closed. No one else was in the room.  She acknowledged consent and understanding of privacy and security of the video platform. The patient has agreed to participate and understands they can discontinue the visit at any time.    I have spent a total time of 15 minutes in caring for this patient on the day of the visit/encounter including Diagnostic results, Instructions for management, Patient and family education, and Importance of tx compliance.

## 2025-02-19 NOTE — ASSESSMENT & PLAN NOTE
Acute on chronic symptomatic no recent trauma patient had workup previously she had osteoarthritis secondary to chronic kidney disease avoid using NSAID recommend Voltaren gel proper use review  Orders:  •  Diclofenac Sodium (VOLTAREN) 1 %; Apply 4 g topically 4 (four) times a day

## 2025-02-19 NOTE — ASSESSMENT & PLAN NOTE
Lab Results   Component Value Date    HGBA1C 7.1 (H) 01/16/2025     Orders:  •  CBC and differential; Future  •  Comprehensive metabolic panel; Future  •  Lipid panel; Future  •  TSH, 3rd generation with Free T4 reflex; Future  •  Hemoglobin A1C; Future

## 2025-02-20 ENCOUNTER — PROCEDURE VISIT (OUTPATIENT)
Dept: OBGYN CLINIC | Facility: MEDICAL CENTER | Age: 85
End: 2025-02-20
Payer: COMMERCIAL

## 2025-02-20 VITALS — WEIGHT: 166 LBS | BODY MASS INDEX: 30.55 KG/M2 | HEIGHT: 62 IN

## 2025-02-20 DIAGNOSIS — M17.11 PRIMARY OSTEOARTHRITIS OF RIGHT KNEE: Primary | ICD-10-CM

## 2025-02-20 PROCEDURE — 20610 DRAIN/INJ JOINT/BURSA W/O US: CPT | Performed by: ORTHOPAEDIC SURGERY

## 2025-02-20 NOTE — PROGRESS NOTES
1.Patient is here today for Synvisc injection #2 to the right knee.  Risk and benefits discussed.  2.  Patient will ice the knee this evening and avoid strenuous activity.  3.  Patient return in 1 week for injection #3 Synvisc to the right knee.        Large joint arthrocentesis: R knee  [place] Protocol:  procedure performed by consultantConsent: Verbal consent obtained.  Risks and benefits: risks, benefits and alternatives were discussed  Consent given by: patient  Site marked: the operative site was marked  Radiology Images displayed and confirmed. If images not available, report reviewed: imaging studies available  Patient identity confirmed: verbally with patient  Supporting Documentation  Indications: pain and diagnostic evaluation   Procedure Details  Location: knee - R knee  Preparation: Patient was prepped and draped in the usual sterile fashion  Needle size: 22 G  Ultrasound guidance: no  Approach: anterolateral  Medications administered: 16 mg hylan 16 MG/2ML    Patient tolerance: patient tolerated the procedure well with no immediate complications  Dressing:  Sterile dressing applied

## 2025-02-25 DIAGNOSIS — E11.9 TYPE 2 DIABETES MELLITUS WITHOUT COMPLICATION, WITHOUT LONG-TERM CURRENT USE OF INSULIN (HCC): ICD-10-CM

## 2025-02-27 ENCOUNTER — PROCEDURE VISIT (OUTPATIENT)
Dept: OBGYN CLINIC | Facility: MEDICAL CENTER | Age: 85
End: 2025-02-27
Payer: COMMERCIAL

## 2025-02-27 VITALS — HEIGHT: 62 IN | BODY MASS INDEX: 30.44 KG/M2 | WEIGHT: 165.4 LBS

## 2025-02-27 DIAGNOSIS — M17.11 ARTHRITIS OF RIGHT KNEE: ICD-10-CM

## 2025-02-27 DIAGNOSIS — M17.11 PRIMARY OSTEOARTHRITIS OF RIGHT KNEE: Primary | ICD-10-CM

## 2025-02-27 PROCEDURE — 20610 DRAIN/INJ JOINT/BURSA W/O US: CPT | Performed by: PHYSICIAN ASSISTANT

## 2025-02-27 NOTE — PROGRESS NOTES
1. Primary osteoarthritis of right knee        2. Arthritis of right knee            Patient has severe knee osteoarthritis.  Patient is here for her 3rd injection of right into the right knee.   Physical exam of the knee shows no effusion no ecchymosis.  Patient tolerated procedure follow up months to check injection response and level of relief    Large joint arthrocentesis: R knee  [place] Protocol:  procedure performed by consultantConsent: Verbal consent obtained. Written consent not obtained.  Risks and benefits: risks, benefits and alternatives were discussed  Consent given by: patient  Patient understanding: patient states understanding of the procedure being performed  Patient consent: the patient's understanding of the procedure matches consent given  Patient identity confirmed: verbally with patient  Supporting Documentation  Indications: pain and joint swelling   Procedure Details  Location: knee - R knee  Needle size: 22 G  Ultrasound guidance: no  Approach: lateral  Medications administered: 16 mg hylan 16 MG/2ML    Patient tolerance: patient tolerated the procedure well with no immediate complications  Dressing:  Sterile dressing applied

## 2025-03-27 DIAGNOSIS — R79.89 LOW VITAMIN B12 LEVEL: ICD-10-CM

## 2025-03-28 RX ORDER — LANOLIN ALCOHOL/MO/W.PET/CERES
1000 CREAM (GRAM) TOPICAL DAILY
Qty: 90 TABLET | Refills: 1 | Status: SHIPPED | OUTPATIENT
Start: 2025-03-28

## 2025-04-15 ENCOUNTER — APPOINTMENT (OUTPATIENT)
Dept: LAB | Facility: CLINIC | Age: 85
End: 2025-04-15
Payer: COMMERCIAL

## 2025-04-15 DIAGNOSIS — E11.42 TYPE 2 DIABETES MELLITUS WITH DIABETIC POLYNEUROPATHY, WITHOUT LONG-TERM CURRENT USE OF INSULIN (HCC): ICD-10-CM

## 2025-04-15 DIAGNOSIS — E78.2 MIXED HYPERLIPIDEMIA: ICD-10-CM

## 2025-04-15 LAB
ALBUMIN SERPL BCG-MCNC: 4.1 G/DL (ref 3.5–5)
ALP SERPL-CCNC: 73 U/L (ref 34–104)
ALT SERPL W P-5'-P-CCNC: 6 U/L (ref 7–52)
ANION GAP SERPL CALCULATED.3IONS-SCNC: 10 MMOL/L (ref 4–13)
AST SERPL W P-5'-P-CCNC: 12 U/L (ref 13–39)
BASOPHILS # BLD AUTO: 0.02 THOUSANDS/ÂΜL (ref 0–0.1)
BASOPHILS NFR BLD AUTO: 0 % (ref 0–1)
BILIRUB SERPL-MCNC: 1.42 MG/DL (ref 0.2–1)
BUN SERPL-MCNC: 17 MG/DL (ref 5–25)
CALCIUM SERPL-MCNC: 9.1 MG/DL (ref 8.4–10.2)
CHLORIDE SERPL-SCNC: 104 MMOL/L (ref 96–108)
CHOLEST SERPL-MCNC: 104 MG/DL (ref ?–200)
CO2 SERPL-SCNC: 26 MMOL/L (ref 21–32)
CREAT SERPL-MCNC: 0.99 MG/DL (ref 0.6–1.3)
EOSINOPHIL # BLD AUTO: 0.03 THOUSAND/ÂΜL (ref 0–0.61)
EOSINOPHIL NFR BLD AUTO: 1 % (ref 0–6)
ERYTHROCYTE [DISTWIDTH] IN BLOOD BY AUTOMATED COUNT: 12.9 % (ref 11.6–15.1)
EST. AVERAGE GLUCOSE BLD GHB EST-MCNC: 154 MG/DL
GFR SERPL CREATININE-BSD FRML MDRD: 52 ML/MIN/1.73SQ M
GLUCOSE P FAST SERPL-MCNC: 131 MG/DL (ref 65–99)
HBA1C MFR BLD: 7 %
HCT VFR BLD AUTO: 42.8 % (ref 34.8–46.1)
HDLC SERPL-MCNC: 52 MG/DL
HGB BLD-MCNC: 14.3 G/DL (ref 11.5–15.4)
IMM GRANULOCYTES # BLD AUTO: 0.02 THOUSAND/UL (ref 0–0.2)
IMM GRANULOCYTES NFR BLD AUTO: 0 % (ref 0–2)
LDLC SERPL CALC-MCNC: 36 MG/DL (ref 0–100)
LYMPHOCYTES # BLD AUTO: 1.99 THOUSANDS/ÂΜL (ref 0.6–4.47)
LYMPHOCYTES NFR BLD AUTO: 38 % (ref 14–44)
MCH RBC QN AUTO: 32.1 PG (ref 26.8–34.3)
MCHC RBC AUTO-ENTMCNC: 33.4 G/DL (ref 31.4–37.4)
MCV RBC AUTO: 96 FL (ref 82–98)
MONOCYTES # BLD AUTO: 0.65 THOUSAND/ÂΜL (ref 0.17–1.22)
MONOCYTES NFR BLD AUTO: 12 % (ref 4–12)
NEUTROPHILS # BLD AUTO: 2.52 THOUSANDS/ÂΜL (ref 1.85–7.62)
NEUTS SEG NFR BLD AUTO: 49 % (ref 43–75)
NONHDLC SERPL-MCNC: 52 MG/DL
NRBC BLD AUTO-RTO: 0 /100 WBCS
PLATELET # BLD AUTO: 207 THOUSANDS/UL (ref 149–390)
PMV BLD AUTO: 11.1 FL (ref 8.9–12.7)
POTASSIUM SERPL-SCNC: 4 MMOL/L (ref 3.5–5.3)
PROT SERPL-MCNC: 6.9 G/DL (ref 6.4–8.4)
RBC # BLD AUTO: 4.46 MILLION/UL (ref 3.81–5.12)
SODIUM SERPL-SCNC: 140 MMOL/L (ref 135–147)
TRIGL SERPL-MCNC: 81 MG/DL (ref ?–150)
TSH SERPL DL<=0.05 MIU/L-ACNC: 1.13 UIU/ML (ref 0.45–4.5)
WBC # BLD AUTO: 5.23 THOUSAND/UL (ref 4.31–10.16)

## 2025-04-15 PROCEDURE — 84443 ASSAY THYROID STIM HORMONE: CPT

## 2025-04-15 PROCEDURE — 36415 COLL VENOUS BLD VENIPUNCTURE: CPT

## 2025-04-15 PROCEDURE — 85025 COMPLETE CBC W/AUTO DIFF WBC: CPT

## 2025-04-15 PROCEDURE — 83036 HEMOGLOBIN GLYCOSYLATED A1C: CPT

## 2025-04-15 PROCEDURE — 80061 LIPID PANEL: CPT

## 2025-04-15 PROCEDURE — 80053 COMPREHEN METABOLIC PANEL: CPT

## 2025-04-16 NOTE — TELEPHONE ENCOUNTER
[place] ED to IP Report (EDIP)      Mental Status     Alert but confused    Safety     ED to IP Report Safety: Dementia and Fall Risk      Mobility    ED to IP Report Mobility: Uses Assistive Devices:  Cane    Protocols  ED to IP Report Protocols: None    ISAR / Screening   ISAR     Age Greater than 65 Head CT No    Isolation  ED to IP Report Isolation: None    Telemetry  Telemetry: No Telemetry     Oxygen   Ventilation: None and on room air    LDAs:      Urinary Catheter: No    Central Line No    none        Additional Information:    Patient contacted the office this morning in regards to upcoming appt 01/22/25, asking if lab orders need to be completed prior to visit. Appt note states follow up labs, active orders in chart from 11/13/24. Patient aware and will complete prior to visit. Nothing else needed at this time.

## 2025-04-17 ENCOUNTER — APPOINTMENT (OUTPATIENT)
Dept: RADIOLOGY | Facility: MEDICAL CENTER | Age: 85
End: 2025-04-17
Attending: ORTHOPAEDIC SURGERY
Payer: COMMERCIAL

## 2025-04-17 ENCOUNTER — OFFICE VISIT (OUTPATIENT)
Dept: OBGYN CLINIC | Facility: MEDICAL CENTER | Age: 85
End: 2025-04-17
Payer: COMMERCIAL

## 2025-04-17 VITALS — WEIGHT: 163 LBS | HEIGHT: 62 IN | BODY MASS INDEX: 30 KG/M2

## 2025-04-17 DIAGNOSIS — M17.11 PRIMARY OSTEOARTHRITIS OF RIGHT KNEE: Primary | ICD-10-CM

## 2025-04-17 DIAGNOSIS — M25.551 PAIN IN RIGHT HIP: ICD-10-CM

## 2025-04-17 DIAGNOSIS — M16.11 PRIMARY OSTEOARTHRITIS OF ONE HIP, RIGHT: ICD-10-CM

## 2025-04-17 DIAGNOSIS — M54.50 ACUTE RIGHT-SIDED LOW BACK PAIN WITHOUT SCIATICA: ICD-10-CM

## 2025-04-17 PROCEDURE — 99213 OFFICE O/P EST LOW 20 MIN: CPT | Performed by: ORTHOPAEDIC SURGERY

## 2025-04-17 PROCEDURE — 73502 X-RAY EXAM HIP UNI 2-3 VIEWS: CPT

## 2025-04-17 NOTE — PROGRESS NOTES
Name: Oanh Martinez      : 1940      MRN: [id number]  Encounter Provider: Elida Alvarez DO  Encounter Date: 2025   Encounter department[facility]  :  Assessment & Plan  Primary osteoarthritis of right knee         Primary osteoarthritis of one hip, right           Patient has severe right knee osteoarthritis and mild to moderate Right hip OA  Treatment options were discussed with patient today.  Various treatment options of formal physical therapy, corticosteroid injections, or total arthroplasty were discussed with the patient is not interested in any of these treatment options.  Discussed at length with the patient that we would recommend some formal therapy.  Patient is to continue to take Tylenol as needed for pain.  The patient may return to the office if she would like to proceed forward with any other interventions such as injection or therapy.  Patient is not interested in total knee arthroplasty at this time or corticosteroid injections. She does not want to go to PT due to prior bad experience  Injections: Denied  Medications: Tylenol up to 3000 mg per day  PT: Declined PT script. Patient may call if they would like a script placed in chart.   Bracing: Patient declined knee brace.   Activity: Continue activity as tolerated.   Plan for next appt: Consider treatment options of CSI/PT/ TKA    Return if symptoms worsen or fail to improve.    I answered all of the patient's questions during the visit and provided education of the patient's condition during the visit.  The patient verbalized understanding of the information given and agrees with the plan.  This note was dictated using [employer] software.  It may contain errors including improperly dictated words.  Please contact physician directly for any questions.    Subjective   Chief Complaint:   Chief Complaint   Patient presents with    Right Knee - Follow-up     Pt notes little to no  improvement from pain. Burning sensation when sitting         History of Present Illness     HPI    Oanh Martinez is a 84 y.o. female who presents for follow up for right knee pain.  Patient present to the office roughly 7 weeks ago and underwent the 3 series viscosupplementation injections.  Patient reports that the first couple of weeks she felt no improvement at all and then maybe a week ago she had very slight improvement but not anything long-lasting.  Patient reports that she continues to have pain globally about the knee but states that it is worse medial, lateral, and posteriorly.  Patient notes that since the first injection she feels that there is a burning sensation about the anterior lateral aspect of the tibia.  Patient denies any burning sensation about the posterior aspect of the leg or laterally.  Patient reports when her pain becomes bad she takes Tylenol which is able to ease her pain.  Patient reports that she has difficulty standing after sitting for long periods of time has to give herself time to ground herself and then is able to ambulate.  Patient does not ambulate with any assist devices but daughter does think that she should walk with a cane due to needing to hold onto walls and structures to be stable.  Patient is uninterested in total knee arthroplasty    Patient also notes that she has a new pain in the right buttock.  Patient reports that she has not talked to her PCP about this but would like to and denies ever seeing anyone in spine and pain.  Patient reports that upon sitting she will have pain that starts on the right side of her low back and will radiate down into her buttock and then around into the right groin.  Patient has any clicking or catching within her groin.  Patient denies ever performing prior treatment for her hip/back.  Patient denies ever attending physical therapy for this, prior injections, or prior surgical interventions.      Length of time knee pain has been  present: years  Any falls or trauma associated with onset of pain: No  Location of pain: constant right knee pain and new right buttock pain that radiates into the groin   Intermittent or constant: Right knee constant, Buttock and groin intermittent   Description of pain: dull and achy, burning  Aggravating factors: sitting for long periods of time   Instability or locking: yes of the knee  Pain medication that has been tried: Tylenol as needed and helps  Topical mediation that has been tried: no  Has heat/ice/elevation been tried: no  Can NSAIDs be taken?  If not why?: no, age over 75  Has PT or home exercises been tried?: No, prior PT for head, none for back, hip, or knee  Has bracing been tried? OTC or rx?  No  Have injections been tried?  Steroid/visco?: Visco 2/13/25 without relief, patient refused steroids   Any history of surgery on that knee?:  No    Review of Systems  ROS:    See HPI for musculoskeletal review.   All other systems reviewed are negative     History:  Past Medical History:   Diagnosis Date    Allergic     Allergic reaction 09/27/2021    Allergic rhinitis     Arthritis     Asthma     Atrial fibrillation (HCC)     Bleeding nose 04/03/2023    Breast cancer (HCC)     Bug bite 05/21/2019    Cataracts, bilateral     Chronic headaches     pulsatile daily headaches    Chronic post-traumatic headache, not intractable 09/08/2020    Colitis     Coronary artery disease     Diabetes mellitus (HCC)     Disease of thyroid gland     Dizziness and giddiness 04/23/2014    DJD (degenerative joint disease), cervical     Encounter for well adult exam with abnormal findings 10/14/2019    Facial neuralgia     left frontal facial post traumatic neuralgia    Fibromyalgia     Glaucoma     History of external beam radiation therapy     8/16/18 to 9/14/2018 Right breast    Hypertension     Hypokalemia     Hypovitaminosis D     Lupus 07/19/2018    Obesity     Osteoarthritis     Pre-op examination 02/25/2019     Pre-operative clearance 08/21/2018    Prediabetes     Rib pain on right side 02/25/2019    SDH (subdural hematoma) ([facility]) 01/31/2017    Shingles     Sleep apnea     no cpap    Stage 3a chronic kidney disease ([facility]) 05/10/2022    Syncope and collapse 12/01/2018    Vertigo      Past Surgical History:   Procedure Laterality Date    BREAST BIOPSY      BREAST LUMPECTOMY      CHOLECYSTECTOMY      COLONOSCOPY  2013    EYE SURGERY      HYSTERECTOMY      MAMMO NEEDLE LOCALIZATION RIGHT (ALL INC) Right 06/21/2018    MAMMO STEREOTACTIC BREAST BIOPSY RIGHT (ALL INC) Right 05/09/2018    THYROIDECTOMY N/A 11/21/2019    Procedure: THYROIDECTOMY, total;  Surgeon: Joshua Glover MD;  Location: [facility];  Service: Surgical Oncology    TONSILLECTOMY      US GUIDED BREAST BIOPSY RIGHT COMPLETE Right 05/09/2018    US GUIDED THYROID BIOPSY  06/12/2019    US GUIDED THYROID BIOPSY  09/13/2019     Social History   Social History     Substance and Sexual Activity   Alcohol Use Not Currently    Comment: I drink on occasions maybe a beer maybe a glass of wine     Social History     Substance and Sexual Activity   Drug Use No     Social History     Tobacco Use   Smoking Status Never    Passive exposure: Never   Smokeless Tobacco Never   Tobacco Comments    no smoke exposure     Family History:   Family History   Problem Relation Age of Onset    Heart attack Sister     Hypertension Family     Diabetes Family     Stroke Family     Migraines Family     Breast cancer Daughter 35    Diabetes Daughter     Thyroid disease Daughter     Asthma Daughter     Arthritis Daughter        Current Outpatient Medications on File Prior to Visit   Medication Sig Dispense Refill    albuterol (Ventolin HFA) 90 mcg/act inhaler Inhale 2 puffs every 6 (six) hours as needed for wheezing 18 g 0    allopurinol (ZYLOPRIM) 100 mg tablet Take 1 tablet (100 mg total) by mouth daily 90 tablet 1    apixaban (Eliquis) 5 mg Take 1 tablet (5 mg total) by mouth 2 (two) times a  "day Patient must schedule appointment for farther refills. 180 tablet 1    Atogepant (Qulipta) 10 MG TABS One tab daily. 90 tablet 0    atorvastatin (LIPITOR) 10 mg tablet Take 1 tablet (10 mg total) by mouth daily 90 tablet 1    cholecalciferol (VITAMIN D3) 1,000 units tablet Take 1 tablet (1,000 Units total) by mouth daily 90 tablet 1    dapagliflozin (Farxiga) 10 MG tablet Take 1 tablet (10 mg total) by mouth daily 30 tablet 2    Diclofenac Sodium (VOLTAREN) 1 % Apply 4 g topically 4 (four) times a day 100 g 0    gabapentin (NEURONTIN) 300 mg capsule Take 1 capsule (300 mg total) by mouth 2 (two) times a day 180 capsule 1    irbesartan-hydrochlorothiazide (AVALIDE) 300-12.5 MG per tablet Take 1 tablet by mouth daily 90 tablet 1    levothyroxine 75 mcg tablet Take 1 tablet (75 mcg total) by mouth daily in the early morning 90 tablet 2    Magnesium 400 MG CAPS Take 1 capsule (400 mg total) by mouth in the morning 90 capsule 0    metFORMIN (GLUCOPHAGE) 500 mg tablet Take 1 tablet (500 mg total) by mouth 2 (two) times a day with meals 180 tablet 1    metoprolol tartrate (LOPRESSOR) 25 mg tablet Take 1 tablet (25 mg total) by mouth every 12 (twelve) hours 180 tablet 1    spironolactone (ALDACTONE) 25 mg tablet Take 1 tablet (25 mg total) by mouth daily 30 tablet 5    vitamin B-12 (VITAMIN B-12) 1,000 mcg tablet Take 1 tablet (1,000 mcg total) by mouth daily 90 tablet 1     No current facility-administered medications on file prior to visit.     Allergies   Allergen Reactions    Procardia [Nifedipine] Edema          Objective   Ht 5' 2\" (1.575 m)   Wt 73.9 kg (163 lb)   BMI 29.81 kg/m²          PE:  AAOx 3  WDWN  Hearing intact, no drainage from eyes  no audible wheezing  no abdominal distension  LE compartments soft, skin intact    Ortho Exam:  rightknee:    Appearance:  no swelling   No bruising  no obvious joint deformity   No effusion  Palpation/Tenderness:  +TTP over medial and lateral joint line, no TTP over " patella/patellar tendon  Active Range of Motion:  AROM: 0-110      Imaging Studies: I have personally reviewed right hip x-rays and my interpretation is as follows x-ray of the right hip demonstrates mild to moderate OA with sclerotic changes and osteophyte formation.

## 2025-04-18 ENCOUNTER — OFFICE VISIT (OUTPATIENT)
Dept: CARDIOLOGY CLINIC | Facility: CLINIC | Age: 85
End: 2025-04-18
Payer: COMMERCIAL

## 2025-04-18 ENCOUNTER — OFFICE VISIT (OUTPATIENT)
Dept: FAMILY MEDICINE CLINIC | Facility: CLINIC | Age: 85
End: 2025-04-18
Payer: COMMERCIAL

## 2025-04-18 VITALS
SYSTOLIC BLOOD PRESSURE: 130 MMHG | HEART RATE: 55 BPM | BODY MASS INDEX: 29.92 KG/M2 | WEIGHT: 163.6 LBS | DIASTOLIC BLOOD PRESSURE: 60 MMHG

## 2025-04-18 VITALS
HEART RATE: 56 BPM | SYSTOLIC BLOOD PRESSURE: 110 MMHG | WEIGHT: 163 LBS | BODY MASS INDEX: 30 KG/M2 | TEMPERATURE: 97.6 F | HEIGHT: 62 IN | OXYGEN SATURATION: 99 % | DIASTOLIC BLOOD PRESSURE: 60 MMHG

## 2025-04-18 DIAGNOSIS — I10 ESSENTIAL HYPERTENSION: ICD-10-CM

## 2025-04-18 DIAGNOSIS — N18.31 CHRONIC KIDNEY DISEASE, STAGE 3A (HCC): ICD-10-CM

## 2025-04-18 DIAGNOSIS — M25.551 PAIN OF RIGHT HIP: Primary | ICD-10-CM

## 2025-04-18 DIAGNOSIS — E11.42 TYPE 2 DIABETES MELLITUS WITH DIABETIC POLYNEUROPATHY, WITHOUT LONG-TERM CURRENT USE OF INSULIN (HCC): ICD-10-CM

## 2025-04-18 DIAGNOSIS — I48.0 PAROXYSMAL ATRIAL FIBRILLATION (HCC): Primary | ICD-10-CM

## 2025-04-18 DIAGNOSIS — E78.2 MIXED HYPERLIPIDEMIA: ICD-10-CM

## 2025-04-18 PROCEDURE — 93000 ELECTROCARDIOGRAM COMPLETE: CPT | Performed by: INTERNAL MEDICINE

## 2025-04-18 PROCEDURE — 99213 OFFICE O/P EST LOW 20 MIN: CPT | Performed by: FAMILY MEDICINE

## 2025-04-18 PROCEDURE — G2211 COMPLEX E/M VISIT ADD ON: HCPCS | Performed by: FAMILY MEDICINE

## 2025-04-18 PROCEDURE — 99214 OFFICE O/P EST MOD 30 MIN: CPT | Performed by: INTERNAL MEDICINE

## 2025-04-18 NOTE — ASSESSMENT & PLAN NOTE
9/19/2024 157/85  10/24/2024 144/76  11/13/2024 130/80  4/18/2025 110/60  4/18/2025 130/60    Continue Irbesartain hydrochlorothiazide(Avalide) 300/12.5 mg 1 daily  Continue metoprolol tartrate 25 mg every 12 hours  Continue spironolactone 25 mg daily

## 2025-04-18 NOTE — ASSESSMENT & PLAN NOTE
/15/25 cholesterol 104, triglycerides 81, HDL 52, LDL calculated 36, non-HDL cholesterol 52  Continue atorvastatin 10 mg daily

## 2025-04-18 NOTE — ASSESSMENT & PLAN NOTE
Lab Results   Component Value Date    EGFR 52 04/15/2025    EGFR 58 02/05/2025    EGFR 61 01/16/2025    CREATININE 0.99 04/15/2025    CREATININE 0.91 02/05/2025    CREATININE 0.87 01/16/2025
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
## 2025-04-18 NOTE — PROGRESS NOTES
"Name: Oanh Martinez      : 1940      MRN: [id number]  Encounter Provider: Parul Roe MD  Encounter Date: 2025   Encounter department: [facility] PRIMARY [facility]  :  Assessment & Plan  Pain of right hip  Acute on chronic symptomatic no fall or trauma patient history of chronic kidney disease recommend to avoid NSAID recommend injection she declined for today recommend Tylenol for the pain apply ice proper exercise reviewed              History of Present Illness   Patient here concerned about the right hip pain started couple weeks ago no fall no trauma certain range of motion aggravate the pain the pain localized in the area no radiation and no limping no rash Emollin muscle atrophy      Review of Systems   Constitutional:  Negative for chills and fever.   HENT:  Negative for ear pain and sore throat.    Eyes:  Negative for pain and visual disturbance.   Respiratory:  Negative for cough and shortness of breath.    Cardiovascular:  Negative for chest pain and palpitations.   Gastrointestinal:  Negative for abdominal pain and vomiting.   Genitourinary:  Negative for dysuria and hematuria.   Musculoskeletal:  Positive for arthralgias.   Skin:  Negative for color change and rash.   Neurological:  Negative for seizures and syncope.   All other systems reviewed and are negative.      Objective   /60 (BP Location: Left arm, Patient Position: Sitting)   Pulse 56   Temp 97.6 °F (36.4 °C) (Tympanic)   Ht 5' 2\" (1.575 m)   Wt 73.9 kg (163 lb)   SpO2 99%   Breastfeeding No   BMI 29.81 kg/m²      Physical Exam  Vitals and nursing note reviewed.   Constitutional:       General: She is not in acute distress.     Appearance: She is well-developed. She is not diaphoretic.   HENT:      Head: Normocephalic.      Right Ear: Tympanic membrane and external ear normal.      Left Ear: Tympanic membrane and external ear normal.      Nose: No rhinorrhea.      Mouth/Throat:      Pharynx: No posterior " oropharyngeal erythema.   Eyes:      General:         Right eye: No discharge.         Left eye: No discharge.      Conjunctiva/sclera: Conjunctivae normal.   Neck:      Vascular: No JVD.   Cardiovascular:      Rate and Rhythm: Normal rate and regular rhythm.      Heart sounds: Normal heart sounds.      No gallop.   Pulmonary:      Effort: Pulmonary effort is normal. No respiratory distress.      Breath sounds: Normal breath sounds. No wheezing.   Abdominal:      General: There is no distension.      Palpations: Abdomen is soft.      Tenderness: There is no abdominal tenderness. There is no rebound.   Musculoskeletal:      Cervical back: Normal range of motion and neck supple.      Right hip: Tenderness present. Normal range of motion.   Lymphadenopathy:      Cervical: No cervical adenopathy.   Skin:     General: Skin is warm.      Findings: No rash.   Neurological:      Mental Status: She is alert and oriented to person, place, and time.

## 2025-04-20 PROBLEM — M25.551 PAIN OF RIGHT HIP: Status: ACTIVE | Noted: 2025-04-20

## 2025-04-20 NOTE — ASSESSMENT & PLAN NOTE
Acute on chronic symptomatic no fall or trauma patient history of chronic kidney disease recommend to avoid NSAID recommend injection she declined for today recommend Tylenol for the pain apply ice proper exercise reviewed

## 2025-05-10 DIAGNOSIS — N18.31 CHRONIC KIDNEY DISEASE, STAGE 3A (HCC): ICD-10-CM

## 2025-05-10 DIAGNOSIS — E11.42 TYPE 2 DIABETES MELLITUS WITH DIABETIC POLYNEUROPATHY, WITHOUT LONG-TERM CURRENT USE OF INSULIN (HCC): ICD-10-CM

## 2025-05-12 RX ORDER — DAPAGLIFLOZIN 10 MG/1
10 TABLET, FILM COATED ORAL DAILY
Qty: 30 TABLET | Refills: 1 | Status: SHIPPED | OUTPATIENT
Start: 2025-05-12

## 2025-06-02 ENCOUNTER — OFFICE VISIT (OUTPATIENT)
Dept: FAMILY MEDICINE CLINIC | Facility: CLINIC | Age: 85
End: 2025-06-02
Payer: COMMERCIAL

## 2025-06-02 VITALS
TEMPERATURE: 98 F | SYSTOLIC BLOOD PRESSURE: 120 MMHG | WEIGHT: 163.8 LBS | BODY MASS INDEX: 30.14 KG/M2 | HEART RATE: 57 BPM | OXYGEN SATURATION: 98 % | HEIGHT: 62 IN | DIASTOLIC BLOOD PRESSURE: 68 MMHG

## 2025-06-02 DIAGNOSIS — M25.551 PAIN OF RIGHT HIP: Primary | ICD-10-CM

## 2025-06-02 DIAGNOSIS — L60.9 NAIL DISORDER, UNSPECIFIED: ICD-10-CM

## 2025-06-02 DIAGNOSIS — M47.27 OSTEOARTHRITIS OF SPINE WITH RADICULOPATHY, LUMBOSACRAL REGION: ICD-10-CM

## 2025-06-02 DIAGNOSIS — E11.42 TYPE 2 DIABETES MELLITUS WITH DIABETIC POLYNEUROPATHY, WITHOUT LONG-TERM CURRENT USE OF INSULIN (HCC): ICD-10-CM

## 2025-06-02 DIAGNOSIS — N18.31 CHRONIC KIDNEY DISEASE, STAGE 3A (HCC): ICD-10-CM

## 2025-06-02 DIAGNOSIS — E78.2 MIXED HYPERLIPIDEMIA: ICD-10-CM

## 2025-06-02 DIAGNOSIS — M16.11 PRIMARY OSTEOARTHRITIS OF RIGHT HIP: ICD-10-CM

## 2025-06-02 DIAGNOSIS — M25.561 CHRONIC PAIN OF RIGHT KNEE: ICD-10-CM

## 2025-06-02 DIAGNOSIS — G89.29 CHRONIC PAIN OF RIGHT KNEE: ICD-10-CM

## 2025-06-02 PROCEDURE — 20610 DRAIN/INJ JOINT/BURSA W/O US: CPT | Performed by: FAMILY MEDICINE

## 2025-06-02 PROCEDURE — 99214 OFFICE O/P EST MOD 30 MIN: CPT | Performed by: FAMILY MEDICINE

## 2025-06-02 RX ORDER — GABAPENTIN 300 MG/1
300 CAPSULE ORAL 3 TIMES DAILY
Qty: 270 CAPSULE | Refills: 1 | Status: SHIPPED | OUTPATIENT
Start: 2025-06-02

## 2025-06-02 RX ADMIN — TRIAMCINOLONE ACETONIDE 40 MG: 40 INJECTION, SUSPENSION INTRA-ARTICULAR; INTRAMUSCULAR at 09:30

## 2025-06-02 NOTE — ASSESSMENT & PLAN NOTE
Symptomatic not responding to conservative treatment patient cannot take NSAIDs secondary to being on anticoagulation discussed the injection and she agreed tolerated well  Orders:  •  Large joint arthrocentesis: R greater trochanteric bursa  •  triamcinolone acetonide (Kenalog-40) 40 mg/mL injection 40 mg

## 2025-06-02 NOTE — ASSESSMENT & PLAN NOTE
Chronic symptomatic recommend to increase the gabapentin to 300 mg 3 times a day proper use possible side effects reviewed  Orders:  •  gabapentin (NEURONTIN) 300 mg capsule; Take 1 capsule (300 mg total) by mouth 3 (three) times a day

## 2025-06-02 NOTE — PROGRESS NOTES
Name: Oanh Martinez      : 1940      MRN: [id number]  Encounter Provider: Parul Roe MD  Encounter Date: 2025   Encounter department: [facility] PRIMARY CARE  :  Assessment & Plan  Pain of right hip  Symptomatic not responding to conservative treatment patient cannot take NSAIDs secondary to being on anticoagulation discussed the injection and she agreed tolerated well  Orders:  •  Large joint arthrocentesis: R greater trochanteric bursa  •  triamcinolone acetonide (Kenalog-40) 40 mg/mL injection 40 mg    Osteoarthritis of spine with radiculopathy, lumbosacral region  Chronic symptomatic recommend to increase the gabapentin to 300 mg 3 times a day proper use possible side effects reviewed  Orders:  •  gabapentin (NEURONTIN) 300 mg capsule; Take 1 capsule (300 mg total) by mouth 3 (three) times a day    Nail disorder, unspecified  And new diagnosis of discoloration in the toenails secondary to fungal infection discussed with patient proper shoes with proper footcare with discussed if she would like to see her dermatology she declined        Primary osteoarthritis of right hip  Symptomatic not responding to conservative treatment patient cannot take NSAIDs secondary to being on anticoagulation discussed the injection and she agreed tolerated well       Chronic pain of right knee    Orders:  •  Diclofenac Sodium (VOLTAREN) 1 %; Apply 4 g topically 4 (four) times a day    Type 2 diabetes mellitus with diabetic polyneuropathy, without long-term current use of insulin (HCC)    Lab Results   Component Value Date    HGBA1C 7.0 (H) 04/15/2025       Orders:  •  CBC and differential; Future  •  Comprehensive metabolic panel; Future  •  Lipid panel; Future  •  TSH, 3rd generation with Free T4 reflex; Future  •  Hemoglobin A1C; Future    Chronic kidney disease, stage 3a (HCC)  Lab Results   Component Value Date    EGFR 52 04/15/2025    EGFR 58 2025    EGFR 61 2025    CREATININE 0.99  04/15/2025    CREATININE 0.91 02/05/2025    CREATININE 0.87 01/16/2025       Orders:  •  CBC and differential; Future  •  Comprehensive metabolic panel; Future  •  Lipid panel; Future  •  TSH, 3rd generation with Free T4 reflex; Future  •  Hemoglobin A1C; Future    Mixed hyperlipidemia    Orders:  •  CBC and differential; Future  •  Comprehensive metabolic panel; Future  •  Lipid panel; Future  •  TSH, 3rd generation with Free T4 reflex; Future  •  Hemoglobin A1C; Future      Assessment & Plan           History of Present Illness   Patient here with her daughter concerned about back pain as she described it as aching as she moved history of chronic back pain is acting up on her in the fall no trauma no numbness or muscle weakness also concerned about the hip pain on the right side especially when she getting up from a sitting position mostly on the lateral upper thigh no numbness no muscle weakness no rash concerned about the discoloration of the left big toe versus thickness denies any trauma no new shoes no pain no open skin no discharge      Review of Systems   Constitutional:  Negative for activity change, appetite change, fatigue and fever.   HENT:  Negative for congestion, ear pain, sinus pressure, sinus pain and sore throat.    Eyes:  Negative for discharge and redness.   Respiratory:  Negative for cough, chest tightness and shortness of breath.    Cardiovascular:  Negative for chest pain, palpitations and leg swelling.   Gastrointestinal:  Negative for abdominal pain, constipation and diarrhea.   Genitourinary:  Negative for dysuria, flank pain, frequency and hematuria.   Musculoskeletal:  Positive for arthralgias and back pain. Negative for gait problem.   Skin:  Negative for pallor and rash.   Neurological:  Negative for dizziness, tremors, weakness, numbness and headaches.   Hematological:  Does not bruise/bleed easily.       Objective   /68 (BP Location: Left arm, Patient Position: Sitting)    "Pulse 57   Temp 98 °F (36.7 °C) (Tympanic)   Ht 5' 1.61\" (1.565 m)   Wt 74.3 kg (163 lb 12.8 oz)   SpO2 98%   BMI 30.34 kg/m²      Physical Exam  Vitals and nursing note reviewed.   Constitutional:       General: She is not in acute distress.     Appearance: She is well-developed. She is not diaphoretic.   HENT:      Head: Normocephalic.      Right Ear: Tympanic membrane and external ear normal.      Left Ear: Tympanic membrane and external ear normal.      Nose: No rhinorrhea.      Mouth/Throat:      Pharynx: No posterior oropharyngeal erythema.     Eyes:      General:         Right eye: No discharge.         Left eye: No discharge.      Conjunctiva/sclera: Conjunctivae normal.     Neck:      Vascular: No JVD.     Cardiovascular:      Rate and Rhythm: Normal rate.      Heart sounds: Normal heart sounds.      No gallop.   Pulmonary:      Effort: Pulmonary effort is normal. No respiratory distress.      Breath sounds: Normal breath sounds. No wheezing.   Abdominal:      General: There is no distension.      Palpations: Abdomen is soft.      Tenderness: There is no abdominal tenderness. There is no rebound.     Musculoskeletal:      Cervical back: Normal range of motion and neck supple.      Lumbar back: Tenderness present. No signs of trauma. Negative right straight leg raise test and negative left straight leg raise test.        Legs:         Feet:    Lymphadenopathy:      Cervical: No cervical adenopathy.     Skin:     General: Skin is warm.      Findings: No rash.     Neurological:      Mental Status: She is alert and oriented to person, place, and time.       Large joint arthrocentesis: R greater trochanteric bursa    Performed by: Parul Roe MD  Authorized by: Parul Roe MD    [place] Protocol:  procedure performed by consultantConsent: Verbal consent obtained  Risks and benefits: risks, benefits and alternatives were discussed  Consent given by: patient  Time out: Immediately prior to procedure a " "\"time out\" was called to verify the correct patient, procedure, equipment, support staff and site/side marked as required.  Patient understanding: patient states understanding of the procedure being performed  Site marked: the operative site was marked  Patient identity confirmed: verbally with patient  Supporting Documentation  Indications: pain     Is this a Visco injection? NoProcedure Details  Location: hip - R greater trochanteric bursa  Preparation: Patient was prepped and draped in the usual sterile fashion  Needle size: 25 G  Ultrasound guidance: no  Approach: anterolateral  Medications administered: 40 mg triamcinolone acetonide 40 mg/mL    Dressing:  Sterile dressing applied           "

## 2025-06-02 NOTE — ASSESSMENT & PLAN NOTE
Symptomatic not responding to conservative treatment patient cannot take NSAIDs secondary to being on anticoagulation discussed the injection and she agreed tolerated well

## 2025-06-05 PROBLEM — L60.9 NAIL DISORDER, UNSPECIFIED: Status: ACTIVE | Noted: 2025-06-05

## 2025-06-05 RX ORDER — TRIAMCINOLONE ACETONIDE 40 MG/ML
40 INJECTION, SUSPENSION INTRA-ARTICULAR; INTRAMUSCULAR
Status: COMPLETED | OUTPATIENT
Start: 2025-06-02 | End: 2025-06-02

## 2025-06-05 NOTE — ASSESSMENT & PLAN NOTE
And new diagnosis of discoloration in the toenails secondary to fungal infection discussed with patient proper shoes with proper footcare with discussed if she would like to see her dermatology she declined

## 2025-06-05 NOTE — ASSESSMENT & PLAN NOTE
Lab Results   Component Value Date    EGFR 52 04/15/2025    EGFR 58 02/05/2025    EGFR 61 01/16/2025    CREATININE 0.99 04/15/2025    CREATININE 0.91 02/05/2025    CREATININE 0.87 01/16/2025       Orders:  •  CBC and differential; Future  •  Comprehensive metabolic panel; Future  •  Lipid panel; Future  •  TSH, 3rd generation with Free T4 reflex; Future  •  Hemoglobin A1C; Future

## 2025-06-05 NOTE — ASSESSMENT & PLAN NOTE
Orders:  •  CBC and differential; Future  •  Comprehensive metabolic panel; Future  •  Lipid panel; Future  •  TSH, 3rd generation with Free T4 reflex; Future  •  Hemoglobin A1C; Future

## 2025-06-05 NOTE — ASSESSMENT & PLAN NOTE
Lab Results   Component Value Date    HGBA1C 7.0 (H) 04/15/2025       Orders:  •  CBC and differential; Future  •  Comprehensive metabolic panel; Future  •  Lipid panel; Future  •  TSH, 3rd generation with Free T4 reflex; Future  •  Hemoglobin A1C; Future

## 2025-06-23 DIAGNOSIS — I48.91 ATRIAL FIBRILLATION, UNSPECIFIED TYPE (HCC): ICD-10-CM

## 2025-06-23 DIAGNOSIS — U07.1 COVID-19 VIRUS INFECTION: ICD-10-CM

## 2025-06-23 RX ORDER — CHOLECALCIFEROL (VITAMIN D3) 25 MCG
1000 TABLET ORAL DAILY
Qty: 90 TABLET | Refills: 1 | Status: SHIPPED | OUTPATIENT
Start: 2025-06-23

## 2025-06-23 NOTE — TELEPHONE ENCOUNTER
Reason for call:   [x] Refill   [] Prior Auth  [] Other:     Office:   [x] PCP/Provider -   [] Specialty/Provider -     Medication: apixaban (Eliquis) 5 mg Take 1 tablet (5 mg total) by mouth 2 (two) times a day       Pharmacy: [facility] - [place], [place]     Local Pharmacy   Does the patient have enough for 3 days?   [] Yes   [x] No - Send as HP to POD    Mail Away Pharmacy   Does the patient have enough for 10 days?   [] Yes   [] No - Send as HP to POD

## 2025-07-02 DIAGNOSIS — I10 ESSENTIAL HYPERTENSION: ICD-10-CM

## 2025-07-02 RX ORDER — METOPROLOL TARTRATE 25 MG/1
25 TABLET, FILM COATED ORAL 2 TIMES DAILY
Qty: 180 TABLET | Refills: 1 | Status: SHIPPED | OUTPATIENT
Start: 2025-07-02

## 2025-07-19 DIAGNOSIS — E11.42 TYPE 2 DIABETES MELLITUS WITH DIABETIC POLYNEUROPATHY, WITHOUT LONG-TERM CURRENT USE OF INSULIN (HCC): ICD-10-CM

## 2025-07-19 DIAGNOSIS — N18.31 CHRONIC KIDNEY DISEASE, STAGE 3A (HCC): ICD-10-CM

## 2025-07-21 ENCOUNTER — APPOINTMENT (OUTPATIENT)
Dept: LAB | Facility: CLINIC | Age: 85
End: 2025-07-21
Payer: COMMERCIAL

## 2025-07-21 DIAGNOSIS — E11.42 TYPE 2 DIABETES MELLITUS WITH DIABETIC POLYNEUROPATHY, WITHOUT LONG-TERM CURRENT USE OF INSULIN (HCC): ICD-10-CM

## 2025-07-21 DIAGNOSIS — N18.31 CHRONIC KIDNEY DISEASE, STAGE 3A (HCC): ICD-10-CM

## 2025-07-21 DIAGNOSIS — E78.2 MIXED HYPERLIPIDEMIA: ICD-10-CM

## 2025-07-21 LAB
ALBUMIN SERPL BCG-MCNC: 3.9 G/DL (ref 3.5–5)
ALP SERPL-CCNC: 72 U/L (ref 34–104)
ALT SERPL W P-5'-P-CCNC: 8 U/L (ref 7–52)
ANION GAP SERPL CALCULATED.3IONS-SCNC: 9 MMOL/L (ref 4–13)
AST SERPL W P-5'-P-CCNC: 12 U/L (ref 13–39)
BASOPHILS # BLD AUTO: 0.01 THOUSANDS/ÂΜL (ref 0–0.1)
BASOPHILS NFR BLD AUTO: 0 % (ref 0–1)
BILIRUB SERPL-MCNC: 1.23 MG/DL (ref 0.2–1)
BUN SERPL-MCNC: 19 MG/DL (ref 5–25)
CALCIUM SERPL-MCNC: 9.2 MG/DL (ref 8.4–10.2)
CHLORIDE SERPL-SCNC: 101 MMOL/L (ref 96–108)
CHOLEST SERPL-MCNC: 96 MG/DL (ref ?–200)
CO2 SERPL-SCNC: 28 MMOL/L (ref 21–32)
CREAT SERPL-MCNC: 1.29 MG/DL (ref 0.6–1.3)
EOSINOPHIL # BLD AUTO: 0.04 THOUSAND/ÂΜL (ref 0–0.61)
EOSINOPHIL NFR BLD AUTO: 1 % (ref 0–6)
ERYTHROCYTE [DISTWIDTH] IN BLOOD BY AUTOMATED COUNT: 13.3 % (ref 11.6–15.1)
EST. AVERAGE GLUCOSE BLD GHB EST-MCNC: 163 MG/DL
GFR SERPL CREATININE-BSD FRML MDRD: 38 ML/MIN/1.73SQ M
GLUCOSE P FAST SERPL-MCNC: 125 MG/DL (ref 65–99)
HBA1C MFR BLD: 7.3 %
HCT VFR BLD AUTO: 43.3 % (ref 34.8–46.1)
HDLC SERPL-MCNC: 55 MG/DL
HGB BLD-MCNC: 14.2 G/DL (ref 11.5–15.4)
IMM GRANULOCYTES # BLD AUTO: 0.03 THOUSAND/UL (ref 0–0.2)
IMM GRANULOCYTES NFR BLD AUTO: 1 % (ref 0–2)
LDLC SERPL CALC-MCNC: 29 MG/DL (ref 0–100)
LYMPHOCYTES # BLD AUTO: 1.84 THOUSANDS/ÂΜL (ref 0.6–4.47)
LYMPHOCYTES NFR BLD AUTO: 34 % (ref 14–44)
MCH RBC QN AUTO: 31.5 PG (ref 26.8–34.3)
MCHC RBC AUTO-ENTMCNC: 32.8 G/DL (ref 31.4–37.4)
MCV RBC AUTO: 96 FL (ref 82–98)
MONOCYTES # BLD AUTO: 0.57 THOUSAND/ÂΜL (ref 0.17–1.22)
MONOCYTES NFR BLD AUTO: 10 % (ref 4–12)
NEUTROPHILS # BLD AUTO: 3 THOUSANDS/ÂΜL (ref 1.85–7.62)
NEUTS SEG NFR BLD AUTO: 54 % (ref 43–75)
NONHDLC SERPL-MCNC: 41 MG/DL
NRBC BLD AUTO-RTO: 0 /100 WBCS
PLATELET # BLD AUTO: 208 THOUSANDS/UL (ref 149–390)
PMV BLD AUTO: 10.9 FL (ref 8.9–12.7)
POTASSIUM SERPL-SCNC: 4.3 MMOL/L (ref 3.5–5.3)
PROT SERPL-MCNC: 6.7 G/DL (ref 6.4–8.4)
RBC # BLD AUTO: 4.51 MILLION/UL (ref 3.81–5.12)
SODIUM SERPL-SCNC: 138 MMOL/L (ref 135–147)
TRIGL SERPL-MCNC: 58 MG/DL (ref ?–150)
TSH SERPL DL<=0.05 MIU/L-ACNC: 2.06 UIU/ML (ref 0.45–4.5)
WBC # BLD AUTO: 5.49 THOUSAND/UL (ref 4.31–10.16)

## 2025-07-21 PROCEDURE — 36415 COLL VENOUS BLD VENIPUNCTURE: CPT

## 2025-07-21 PROCEDURE — 80053 COMPREHEN METABOLIC PANEL: CPT

## 2025-07-21 PROCEDURE — 80061 LIPID PANEL: CPT

## 2025-07-21 PROCEDURE — 84443 ASSAY THYROID STIM HORMONE: CPT

## 2025-07-21 PROCEDURE — 83036 HEMOGLOBIN GLYCOSYLATED A1C: CPT

## 2025-07-21 PROCEDURE — 85025 COMPLETE CBC W/AUTO DIFF WBC: CPT

## 2025-07-21 RX ORDER — DAPAGLIFLOZIN 10 MG/1
10 TABLET, FILM COATED ORAL DAILY
Qty: 30 TABLET | Refills: 0 | Status: SHIPPED | OUTPATIENT
Start: 2025-07-21

## 2025-07-23 ENCOUNTER — TELEPHONE (OUTPATIENT)
Age: 85
End: 2025-07-23

## 2025-07-23 NOTE — TELEPHONE ENCOUNTER
Patient is requesting an insurance referral for the following specialty:      Test Name / Order Name: Chiropractic     DX Code: m47.22    Date Of Service: N/A     Location/Facility Name/Address/Phone #: N/A    Location / Facility NPI: N/A    Best Phone # To Reach The Patient: [phone]

## 2025-07-24 NOTE — TELEPHONE ENCOUNTER
Spoke to patient will call back with appointment and chiropractor she will be seeing to complete insurance referral

## 2025-07-28 ENCOUNTER — TELEPHONE (OUTPATIENT)
Age: 85
End: 2025-07-28

## 2025-07-31 DIAGNOSIS — I10 ESSENTIAL HYPERTENSION: ICD-10-CM

## 2025-07-31 DIAGNOSIS — E78.2 MIXED HYPERLIPIDEMIA: ICD-10-CM

## 2025-08-01 RX ORDER — IRBESARTAN AND HYDROCHLOROTHIAZIDE 300; 12.5 MG/1; MG/1
1 TABLET, FILM COATED ORAL DAILY
Qty: 90 TABLET | Refills: 0 | Status: SHIPPED | OUTPATIENT
Start: 2025-08-01

## 2025-08-01 RX ORDER — ATORVASTATIN CALCIUM 10 MG/1
10 TABLET, FILM COATED ORAL DAILY
Qty: 90 TABLET | Refills: 1 | Status: SHIPPED | OUTPATIENT
Start: 2025-08-01

## 2025-08-04 ENCOUNTER — OFFICE VISIT (OUTPATIENT)
Dept: FAMILY MEDICINE CLINIC | Facility: CLINIC | Age: 85
End: 2025-08-04
Payer: COMMERCIAL

## 2025-08-04 VITALS
WEIGHT: 163.8 LBS | BODY MASS INDEX: 37.91 KG/M2 | SYSTOLIC BLOOD PRESSURE: 112 MMHG | HEART RATE: 59 BPM | HEIGHT: 55 IN | DIASTOLIC BLOOD PRESSURE: 70 MMHG | OXYGEN SATURATION: 98 % | TEMPERATURE: 97.7 F

## 2025-08-04 DIAGNOSIS — Z78.0 POST-MENOPAUSE: ICD-10-CM

## 2025-08-04 DIAGNOSIS — M54.42 CHRONIC BILATERAL LOW BACK PAIN WITH BILATERAL SCIATICA: ICD-10-CM

## 2025-08-04 DIAGNOSIS — R42 DIZZINESS: ICD-10-CM

## 2025-08-04 DIAGNOSIS — M54.41 CHRONIC BILATERAL LOW BACK PAIN WITH BILATERAL SCIATICA: ICD-10-CM

## 2025-08-04 DIAGNOSIS — R55 SYNCOPE AND COLLAPSE: ICD-10-CM

## 2025-08-04 DIAGNOSIS — G89.29 CHRONIC BILATERAL LOW BACK PAIN WITH BILATERAL SCIATICA: ICD-10-CM

## 2025-08-04 DIAGNOSIS — N17.9 ACUTE KIDNEY INJURY (HCC): Primary | ICD-10-CM

## 2025-08-04 DIAGNOSIS — M47.27 OSTEOARTHRITIS OF SPINE WITH RADICULOPATHY, LUMBOSACRAL REGION: ICD-10-CM

## 2025-08-04 PROCEDURE — 99214 OFFICE O/P EST MOD 30 MIN: CPT | Performed by: FAMILY MEDICINE

## 2025-08-04 PROCEDURE — G2211 COMPLEX E/M VISIT ADD ON: HCPCS | Performed by: FAMILY MEDICINE

## 2025-08-04 RX ORDER — PREDNISONE 1 MG/1
TABLET ORAL
COMMUNITY
Start: 2025-07-18

## 2025-08-04 RX ORDER — GABAPENTIN 300 MG/1
300 CAPSULE ORAL 2 TIMES DAILY
Start: 2025-08-04

## 2025-08-05 PROBLEM — R42 DIZZINESS: Status: ACTIVE | Noted: 2025-08-05

## 2025-08-05 PROBLEM — N17.9 ACUTE KIDNEY INJURY (HCC): Status: ACTIVE | Noted: 2025-08-05

## 2025-08-05 PROCEDURE — 93000 ELECTROCARDIOGRAM COMPLETE: CPT | Performed by: FAMILY MEDICINE

## 2025-08-12 ENCOUNTER — APPOINTMENT (OUTPATIENT)
Dept: LAB | Facility: CLINIC | Age: 85
End: 2025-08-12
Payer: COMMERCIAL

## 2025-08-13 ENCOUNTER — HOSPITAL ENCOUNTER (OUTPATIENT)
Dept: MRI IMAGING | Facility: HOSPITAL | Age: 85
Discharge: HOME | End: 2025-08-13
Attending: FAMILY MEDICINE
Payer: COMMERCIAL

## 2025-08-13 ENCOUNTER — HOSPITAL ENCOUNTER (OUTPATIENT)
Dept: ULTRASOUND IMAGING | Facility: HOSPITAL | Age: 85
Discharge: HOME | End: 2025-08-13
Attending: FAMILY MEDICINE
Payer: COMMERCIAL

## 2025-08-13 DIAGNOSIS — N17.9 ACUTE KIDNEY INJURY (HCC): ICD-10-CM

## 2025-08-13 DIAGNOSIS — G89.29 CHRONIC BILATERAL LOW BACK PAIN WITH BILATERAL SCIATICA: ICD-10-CM

## 2025-08-13 DIAGNOSIS — M54.42 CHRONIC BILATERAL LOW BACK PAIN WITH BILATERAL SCIATICA: ICD-10-CM

## 2025-08-13 DIAGNOSIS — M54.41 CHRONIC BILATERAL LOW BACK PAIN WITH BILATERAL SCIATICA: ICD-10-CM

## 2025-08-13 PROCEDURE — 76775 US EXAM ABDO BACK WALL LIM: CPT

## 2025-08-13 PROCEDURE — 72148 MRI LUMBAR SPINE W/O DYE: CPT

## 2025-08-21 ENCOUNTER — HOSPITAL ENCOUNTER (OUTPATIENT)
Dept: NON INVASIVE DIAGNOSTICS | Facility: HOSPITAL | Age: 85
Discharge: HOME | End: 2025-08-21
Attending: FAMILY MEDICINE
Payer: COMMERCIAL

## 2025-08-21 DIAGNOSIS — R42 DIZZINESS: ICD-10-CM

## 2025-08-21 DIAGNOSIS — R55 SYNCOPE AND COLLAPSE: ICD-10-CM

## 2025-08-21 PROCEDURE — 93880 EXTRACRANIAL BILAT STUDY: CPT

## 2025-08-21 PROCEDURE — 93880 EXTRACRANIAL BILAT STUDY: CPT | Performed by: SURGERY

## (undated) DEVICE — SUT VICRYL 3-0 SH 27 IN J416H

## (undated) DEVICE — GLOVE INDICATOR PI UNDERGLOVE SZ 7 BLUE

## (undated) DEVICE — SUT VICRYL 3-0 18 IN J110T

## (undated) DEVICE — BIPOLAR CORD DISP

## (undated) DEVICE — SUT SILK 2-0 18 IN A185H

## (undated) DEVICE — INTENDED FOR TISSUE SEPARATION, AND OTHER PROCEDURES THAT REQUIRE A SHARP SURGICAL BLADE TO PUNCTURE OR CUT.: Brand: BARD-PARKER ® CARBON RIB-BACK BLADES

## (undated) DEVICE — ELECTRODE BLADE MOD E-Z CLEAN 2.5IN 6.4CM -0012M

## (undated) DEVICE — NEEDLE 25G X 1 1/2

## (undated) DEVICE — SUT MONOCRYL 5-0 P-3 18 IN Y493G

## (undated) DEVICE — SUT SILK 3-0 18 IN A184H

## (undated) DEVICE — GLOVE SRG BIOGEL ECLIPSE 7

## (undated) DEVICE — PAD GROUNDING ADULT

## (undated) DEVICE — CHLORAPREP HI-LITE 26ML ORANGE

## (undated) DEVICE — PACK UNIVERSAL NECK

## (undated) DEVICE — 3000CC GUARDIAN II: Brand: GUARDIAN

## (undated) DEVICE — MINOR PROCEDURE DRAPE: Brand: CONVERTORS

## (undated) DEVICE — DRAPE SURGIKIT SADDLE BAG

## (undated) DEVICE — 3M™ STERI-STRIP™ REINFORCED ADHESIVE SKIN CLOSURES, R1547, 1/2 IN X 4 IN (12 MM X 100 MM), 6 STRIPS/ENVELOPE: Brand: 3M™ STERI-STRIP™

## (undated) DEVICE — SUT VICRYL 4-0 PS-2 27 IN J426H

## (undated) DEVICE — PLUMEPEN PRO 10FT

## (undated) DEVICE — SUT SILK 2-0 SH 30 IN K833H

## (undated) DEVICE — ELECTRODE BLADE MOD E-Z CLEAN 4IN -0014AM

## (undated) DEVICE — SURGICEL 2 X 3

## (undated) DEVICE — 3M™ STERI-STRIP™ COMPOUND BENZOIN TINCTURE 40 BAGS/CARTON 4 CARTONS/CASE C1544: Brand: 3M™ STERI-STRIP™

## (undated) DEVICE — HARMONIC 1100 SHEARS, 20CM SHAFT LENGTH: Brand: HARMONIC

## (undated) DEVICE — LIGACLIP MCA MULTIPLE CLIP APPLIERS, 20 SMALL CLIPS: Brand: LIGACLIP

## (undated) DEVICE — INTENDED FOR TISSUE SEPARATION, AND OTHER PROCEDURES THAT REQUIRE A SHARP SURGICAL BLADE TO PUNCTURE OR CUT.: Brand: BARD-PARKER SAFETY BLADES SIZE 15, STERILE